# Patient Record
Sex: FEMALE | Race: WHITE | NOT HISPANIC OR LATINO | Employment: UNEMPLOYED | URBAN - METROPOLITAN AREA
[De-identification: names, ages, dates, MRNs, and addresses within clinical notes are randomized per-mention and may not be internally consistent; named-entity substitution may affect disease eponyms.]

---

## 2017-03-15 ENCOUNTER — TRANSFERRED RECORDS (OUTPATIENT)
Dept: HEALTH INFORMATION MANAGEMENT | Facility: CLINIC | Age: 27
End: 2017-03-15

## 2017-07-19 ENCOUNTER — TRANSFERRED RECORDS (OUTPATIENT)
Dept: HEALTH INFORMATION MANAGEMENT | Facility: CLINIC | Age: 27
End: 2017-07-19

## 2017-07-24 DIAGNOSIS — N31.9 NEUROGENIC BLADDER: Primary | ICD-10-CM

## 2017-07-24 RX ORDER — CEFAZOLIN SODIUM 1 G/3ML
1 INJECTION, POWDER, FOR SOLUTION INTRAMUSCULAR; INTRAVENOUS SEE ADMIN INSTRUCTIONS
Status: CANCELLED | OUTPATIENT
Start: 2017-07-24

## 2017-07-25 ENCOUNTER — TELEPHONE (OUTPATIENT)
Dept: UROLOGY | Facility: CLINIC | Age: 27
End: 2017-07-25

## 2017-08-22 ENCOUNTER — CARE COORDINATION (OUTPATIENT)
Dept: UROLOGY | Facility: CLINIC | Age: 27
End: 2017-08-22

## 2017-08-22 NOTE — PROGRESS NOTES
Upcoming surgical procedure with Dr Michael Mcnulty on 9.1.17 at 0930, check in at 0800.   Surgery at ([facility] or [place]): [facility]  Patient is having a Cystoscopy and Botox injection into the bladder  Pre-op physical completed: Yes  PAC: No  Bowel Prep: No, not needed  Urine culture completed: YES. Date-8.28.17.      Post-operative appointment needed: No    8.22.17 - left message for patient. Needs pre-op and UC.    Clara Medina, JARVIS-BC, BSN  Care Coordinator - Reconstructive Urology  [phone]

## 2017-09-01 ENCOUNTER — ANESTHESIA (OUTPATIENT)
Dept: SURGERY | Facility: AMBULATORY SURGERY CENTER | Age: 27
End: 2017-09-01

## 2017-09-01 ENCOUNTER — HOSPITAL ENCOUNTER (OUTPATIENT)
Facility: AMBULATORY SURGERY CENTER | Age: 27
End: 2017-09-01
Attending: UROLOGY

## 2017-09-01 ENCOUNTER — ANESTHESIA EVENT (OUTPATIENT)
Dept: SURGERY | Facility: AMBULATORY SURGERY CENTER | Age: 27
End: 2017-09-01

## 2017-09-01 VITALS
OXYGEN SATURATION: 98 % | WEIGHT: 160 LBS | HEART RATE: 94 BPM | SYSTOLIC BLOOD PRESSURE: 108 MMHG | DIASTOLIC BLOOD PRESSURE: 65 MMHG | RESPIRATION RATE: 16 BRPM | TEMPERATURE: 98.2 F | HEIGHT: 69 IN | BODY MASS INDEX: 23.7 KG/M2

## 2017-09-01 RX ORDER — ONDANSETRON 2 MG/ML
4 INJECTION INTRAMUSCULAR; INTRAVENOUS EVERY 30 MIN PRN
Status: DISCONTINUED | OUTPATIENT
Start: 2017-09-01 | End: 2017-09-02 | Stop reason: HOSPADM

## 2017-09-01 RX ORDER — IBUPROFEN 200 MG
600 TABLET ORAL ONCE
Status: DISCONTINUED | OUTPATIENT
Start: 2017-09-01 | End: 2017-09-02 | Stop reason: HOSPADM

## 2017-09-01 RX ORDER — LIDOCAINE HYDROCHLORIDE 20 MG/ML
INJECTION, SOLUTION INFILTRATION; PERINEURAL PRN
Status: DISCONTINUED | OUTPATIENT
Start: 2017-09-01 | End: 2017-09-01

## 2017-09-01 RX ORDER — ONDANSETRON 2 MG/ML
INJECTION INTRAMUSCULAR; INTRAVENOUS PRN
Status: DISCONTINUED | OUTPATIENT
Start: 2017-09-01 | End: 2017-09-01

## 2017-09-01 RX ORDER — PROPOFOL 10 MG/ML
INJECTION, EMULSION INTRAVENOUS CONTINUOUS PRN
Status: DISCONTINUED | OUTPATIENT
Start: 2017-09-01 | End: 2017-09-01

## 2017-09-01 RX ORDER — SODIUM CHLORIDE, SODIUM LACTATE, POTASSIUM CHLORIDE, CALCIUM CHLORIDE 600; 310; 30; 20 MG/100ML; MG/100ML; MG/100ML; MG/100ML
INJECTION, SOLUTION INTRAVENOUS CONTINUOUS
Status: DISCONTINUED | OUTPATIENT
Start: 2017-09-01 | End: 2017-09-02 | Stop reason: HOSPADM

## 2017-09-01 RX ORDER — LIDOCAINE 40 MG/G
CREAM TOPICAL
Status: DISCONTINUED | OUTPATIENT
Start: 2017-09-01 | End: 2017-09-01 | Stop reason: HOSPADM

## 2017-09-01 RX ORDER — NALOXONE HYDROCHLORIDE 0.4 MG/ML
.1-.4 INJECTION, SOLUTION INTRAMUSCULAR; INTRAVENOUS; SUBCUTANEOUS
Status: DISCONTINUED | OUTPATIENT
Start: 2017-09-01 | End: 2017-09-02 | Stop reason: HOSPADM

## 2017-09-01 RX ORDER — CEFAZOLIN SODIUM 1 G/3ML
1 INJECTION, POWDER, FOR SOLUTION INTRAMUSCULAR; INTRAVENOUS SEE ADMIN INSTRUCTIONS
Status: DISCONTINUED | OUTPATIENT
Start: 2017-09-01 | End: 2017-09-01 | Stop reason: HOSPADM

## 2017-09-01 RX ORDER — ONDANSETRON 4 MG/1
4 TABLET, ORALLY DISINTEGRATING ORAL EVERY 30 MIN PRN
Status: DISCONTINUED | OUTPATIENT
Start: 2017-09-01 | End: 2017-09-02 | Stop reason: HOSPADM

## 2017-09-01 RX ORDER — SODIUM CHLORIDE, SODIUM LACTATE, POTASSIUM CHLORIDE, CALCIUM CHLORIDE 600; 310; 30; 20 MG/100ML; MG/100ML; MG/100ML; MG/100ML
INJECTION, SOLUTION INTRAVENOUS CONTINUOUS
Status: DISCONTINUED | OUTPATIENT
Start: 2017-09-01 | End: 2017-09-01 | Stop reason: HOSPADM

## 2017-09-01 RX ORDER — TROSPIUM CHLORIDE ER 60 MG/1
60 CAPSULE ORAL EVERY MORNING
COMMUNITY
End: 2018-10-10

## 2017-09-01 RX ADMIN — SODIUM CHLORIDE, SODIUM LACTATE, POTASSIUM CHLORIDE, CALCIUM CHLORIDE: 600; 310; 30; 20 INJECTION, SOLUTION INTRAVENOUS at 09:27

## 2017-09-01 RX ADMIN — Medication 300 MCG: at 09:53

## 2017-09-01 RX ADMIN — ONDANSETRON 4 MG: 2 INJECTION INTRAMUSCULAR; INTRAVENOUS at 09:35

## 2017-09-01 RX ADMIN — PROPOFOL 200 MCG/KG/MIN: 10 INJECTION, EMULSION INTRAVENOUS at 09:30

## 2017-09-01 RX ADMIN — Medication 200 MCG: at 09:49

## 2017-09-01 RX ADMIN — LIDOCAINE HYDROCHLORIDE 100 MG: 20 INJECTION, SOLUTION INFILTRATION; PERINEURAL at 09:29

## 2017-09-01 NOTE — IP AVS SNAPSHOT
[facility]    [address] [place] [id number]    Phone:  [phone]    Fax:  [phone]                                       After Visit Summary   9/1/2017    Virgilio Chavez    MRN: [id number]           After Visit Summary Signature Page     I have received my discharge instructions, and my questions have been answered. I have discussed any challenges I see with this plan with the nurse or doctor.    ..........................................................................................................................................  Patient/Patient Representative Signature      ..........................................................................................................................................  Patient Representative Print Name and Relationship to Patient    ..................................................               ................................................  Date                                            Time    ..........................................................................................................................................  Reviewed by Signature/Title    ...................................................              ..............................................  Date                                                            Time

## 2017-09-01 NOTE — DISCHARGE INSTRUCTIONS
[facility]  Home Care Following Anesthesia  For 24 hours after surgery:  1. Get plenty of rest.  A responsible adult must stay with you for at least 24 hours after you leave the surgery center.  2. Do not drive or use heavy equipment.  If you have weakness or tingling, don't drive or use heavy equipment until this feeling goes away.   3. Do not drink alcohol.   4. Avoid strenuous or risky activities.  Ask for help when climbing stairs.  5. You may feel lightheaded.  IF so, sit for a few minutes before standing.  Have someone help you get up.   6. If you have nausea (feel sick to your stomach): Drink only clear liquids such as apple juice, ginger ale, broth or 7-Up.  Rest may also help.  Be sure to drink enough fluids.  Move to a regular diet as you feel able.   7. You may have a slight fever.  Call the doctor if your fever is over 100 F (37.7 C) (taken under the tongue) or lasts longer than 24 hours.  8. You may have a dry mouth, a sore throat, muscle aches or trouble sleeping. These should go away after 24 hours.  9. Do not make important or legal decisions.               Tips for taking pain medications  To get the best pain relief possible, remember these points:    Take pain medications as directed, before pain becomes severe.    Pain medication can upset your stomach: taking it with food may help.    Constipation is a common side effect of pain medication. Drink plenty of  fluids.    Eat foods high in fiber. Take a stool softener if recommended by your doctor or pharmacist.    Do not drink alcohol, drive or operate machinery while taking pain medications.    Ask about other ways to control pain, such as with heat, ice or relaxation.    Tylenol/Acetaminophen Consumption  To help encourage the safe use of acetaminophen, the makers of TYLENOL  have lowered the maximum daily dose for single-ingredient Extra Strength TYLENOL  (acetaminophen) products sold in the [place] from 8  pills per day (4,000 mg) to 6 pills per day (3,000 mg). The dosing interval has also changed from 2 pills every 4-6 hours to 2 pills every 6 hours.    If you feel your pain relief is insufficient, you may take Tylenol/Acetaminophen in addition to your narcotic pain medication.     Be careful not to exceed 3,000 mg of Tylenol/Acetaminophen in a 24 hour period from all sources.    If you are taking extra strength Tylenol/acetaminophen (500 mg), the maximum dose is 6 tablets in 24 hours.    If you are taking regular strength acetaminophen (325 mg), the maximum dose is 9 tablets in 24 hours.    Call a doctor for any of the followin. Signs of infection (fever, growing tenderness at the surgery site, a large amount of drainage or bleeding, severe pain, foul-smelling drainage, redness, swelling).  2. It has been over 8 to 10 hours since surgery and you are still not able to urinate (pass water).  3. Headache for over 24 hours.  Your doctor is:  Dr. Michael Rodarte, Prostate and Urology: [phone]                    Or dial [phone] and ask for the resident on call for:  Prostate Urology  For emergency care, call the:  [facility]:  [phone] (TTY for hearing impaired: [phone])

## 2017-09-01 NOTE — ANESTHESIA POSTPROCEDURE EVALUATION
Patient: Virgilio Chavez    Procedure(s):  Cystoscopy and Botox Injection into the Bladder - Wound Class: II-Clean Contaminated   - Wound Class: II-Clean Contaminated    Diagnosis:Neurogenic Bladder  Diagnosis Additional Information: No value filed.    Anesthesia Type:  MAC    Note:  Anesthesia Post Evaluation    Patient location during evaluation: Phase 2  Patient participation: Able to fully participate in evaluation  Level of consciousness: awake and alert  Pain management: adequate  Airway patency: patent  Cardiovascular status: acceptable and hemodynamically stable  Respiratory status: acceptable  Hydration status: acceptable  PONV: none     Anesthetic complications: None          Last vitals:  Vitals:    09/01/17 1050 09/01/17 1055 09/01/17 1105   BP: 94/55 92/74 104/68   Pulse:      Resp: 10 10 16   Temp:   36.8  C (98.2  F)   SpO2: 98%  96%         Electronically Signed By: Andrea Cornejo MD  September 1, 2017  11:14 AM

## 2017-09-01 NOTE — ANESTHESIA CARE TRANSFER NOTE
Patient: Virgilio Chavez    Procedure(s):  Cystoscopy and Botox Injection into the Bladder - Wound Class: II-Clean Contaminated   - Wound Class: II-Clean Contaminated    Diagnosis: Neurogenic Bladder  Diagnosis Additional Information: No value filed.    Anesthesia Type:   MAC     Note:  Airway :Face Mask  Patient transferred to:PACU        Vitals: (Last set prior to Anesthesia Care Transfer)    CRNA VITALS  9/1/2017 0954 - 9/1/2017 1037      9/1/2017             Pulse: 72    SpO2: 97 %    Resp Rate (set): 10                Electronically Signed By: AUTUMN Gaitan CRNA  September 1, 2017  10:37 AM

## 2017-09-01 NOTE — IP AVS SNAPSHOT
MRN:[id number]                      After Visit Summary   9/1/2017    Virgilio Chavez    MRN: [id number]           Thank you!     Thank you for choosing [facility] for your care. Our goal is always to provide you with excellent care. Hearing back from our patients is one way we can continue to improve our services. Please take a few minutes to complete the written survey that you may receive in the mail after you visit with us. Thank you!        Patient Information     Date Of Birth          1990        About your hospital stay     You were [facility] on:  September 1, 2017 [facility]    You were discharged on:  September 1, 2017       Who to Call     For medical emergencies, please call 911.  For non-urgent questions about your medical care, please call your primary care provider or clinic, None  For questions related to your surgery, please call your surgery clinic        Attending Provider     Provider Specialty    Michael Mcnulty MD Urology       Primary Care Provider    Pcp Unknown Verified      After Care Instructions     Diet Instructions       Resume pre procedure diet            Discharge Instructions       Activity  - No strenuous exercise for 3-5 days.  - No lifting, pushing, pulling more than 15 pounds for 3-5 days.   - Do not strain with bowel movements.  - Do not drive until you can press the brake pedal quickly and fully without pain.   - Do not operate a motor vehicle while taking narcotic pain medications.   - Some blood in the urine is expected. Increase your fluid intake to help flush the blood out of your bladder    Medications  - No narcotic pain medications are required and over the counter tylenol should suffice.  Wean yourself off all pain medications as you are able.  - Some pain medications contain both tylenol (acetaminophen) and a narcotic (Norco, vicodin, percocet), do not take more than 4,000mg of  "Tylenol (acetaminophen) from all sources in any 24 hour period.  - Take over the counter fiber (metamucil or benefiber) and stool softeners (miralax, docusate or senna) to prevent postoperative constipation, but stop if you develop diarrhea.  - No driving or operating machinery while taking narcotic pain medications     Follow-Up:  - Follow up with at [place] for repeat bladder study. Contact them on Tuesday. We will arrange for repeat botox injection here in 6 months.   - Call or return sooner than your regularly scheduled visit if you develop any of the following: fever (greater than 101.5), uncontrolled pain, uncontrolled nausea or vomiting, as well as worsening blood in the urine    Phone numbers:   - Monday through Friday 8am to 4:30pm: Call [phone] with questions or to schedule or confirm appointment.    - Nights or weekends: call the after hours emergency pager - [phone] and tell the  \"I would like to page the Urology Resident on call.\"  - For emergencies, call [phone]            Encourage fluids       Encourage fluids at home to keep urine clear to light pink            No Alcohol       for 24 hours post procedure            No driving or operating machinery        until the day after procedure                  Further instructions from your care team       [facility]  Home Care Following Anesthesia  For 24 hours after surgery:  1. Get plenty of rest.  A responsible adult must stay with you for at least 24 hours after you leave the surgery center.  2. Do not drive or use heavy equipment.  If you have weakness or tingling, don't drive or use heavy equipment until this feeling goes away.   3. Do not drink alcohol.   4. Avoid strenuous or risky activities.  Ask for help when climbing stairs.  5. You may feel lightheaded.  IF so, sit for a few minutes before standing.  Have someone help you get up.   6. If you have nausea (feel sick to your stomach): Drink " only clear liquids such as apple juice, ginger ale, broth or 7-Up.  Rest may also help.  Be sure to drink enough fluids.  Move to a regular diet as you feel able.   7. You may have a slight fever.  Call the doctor if your fever is over 100 F (37.7 C) (taken under the tongue) or lasts longer than 24 hours.  8. You may have a dry mouth, a sore throat, muscle aches or trouble sleeping. These should go away after 24 hours.  9. Do not make important or legal decisions.               Tips for taking pain medications  To get the best pain relief possible, remember these points:    Take pain medications as directed, before pain becomes severe.    Pain medication can upset your stomach: taking it with food may help.    Constipation is a common side effect of pain medication. Drink plenty of  fluids.    Eat foods high in fiber. Take a stool softener if recommended by your doctor or pharmacist.    Do not drink alcohol, drive or operate machinery while taking pain medications.    Ask about other ways to control pain, such as with heat, ice or relaxation.    Tylenol/Acetaminophen Consumption  To help encourage the safe use of acetaminophen, the makers of TYLENOL  have lowered the maximum daily dose for single-ingredient Extra Strength TYLENOL  (acetaminophen) products sold in the [place] from 8 pills per day (4,000 mg) to 6 pills per day (3,000 mg). The dosing interval has also changed from 2 pills every 4-6 hours to 2 pills every 6 hours.    If you feel your pain relief is insufficient, you may take Tylenol/Acetaminophen in addition to your narcotic pain medication.     Be careful not to exceed 3,000 mg of Tylenol/Acetaminophen in a 24 hour period from all sources.    If you are taking extra strength Tylenol/acetaminophen (500 mg), the maximum dose is 6 tablets in 24 hours.    If you are taking regular strength acetaminophen (325 mg), the maximum dose is 9 tablets in 24 hours.    Call a doctor for any of the followin. Signs  "of infection (fever, growing tenderness at the surgery site, a large amount of drainage or bleeding, severe pain, foul-smelling drainage, redness, swelling).  2. It has been over 8 to 10 hours since surgery and you are still not able to urinate (pass water).  3. Headache for over 24 hours.  Your doctor is:  Dr. Michael Rodarte, Prostate and Urology: [phone]                    Or dial [phone] and ask for the resident on call for:  Prostate Urology  For emergency care, call the:  [facility]:  [phone] (TTY for hearing impaired: [phone])                Pending Results     No orders found from 2017 to 2017.            Admission Information     Date & Time Provider Department Dept. Phone    2017 Michael Mcnulty MD [facility] [phone]      Your Vitals Were     Blood Pressure Pulse Temperature Respirations Height Weight    104/68 94 98.2  F (36.8  C) (Temporal) 16 1.753 m (5' 9\") 72.6 kg (160 lb)    Pulse Oximetry BMI (Body Mass Index)                96% 23.63 kg/m2          MyChart Information     mcTEL is an electronic gateway that provides easy, online access to your medical records. With mcTEL, you can request a clinic appointment, read your test results, renew a prescription or communicate with your care team.     To sign up for mcTEL visit the website at [email or web address]   You will be asked to enter the access code listed below, as well as some personal information. Please follow the directions to create your username and password.     Your access code is: [email or web address]  Expires: 2017 10:21 AM     Your access code will  in 90 days. If you need help or a new code, please contact your [facility] or call [phone] for assistance.        Care EveryWhere ID     This is your Care EveryWhere ID. This could be used by other organizations to access your [facility] medical " records  [id number]        Equal Access to Services     SHELLI RODRÍGUEZ : Sarah Chinchilla, waalonosda mauro, qamac dean, travis christian. So [facility] [phone].    ATENCIÓN: Si habla español, tiene a coon disposición servicios gratuitos de asistencia lingüística. Llame al [phone].    We comply with applicable federal civil rights laws and [place] laws. We do not discriminate on the basis of race, color, national origin, age, disability sex, sexual orientation or gender identity.               Review of your medicines      CONTINUE these medicines which have NOT CHANGED        Dose / Directions    BACLOFEN PO   Indication:  Muscle Spasticity        Refills:  0       trospium 60 MG Cp24 24 hr capsule   Commonly known as:  SANCTURA XR        Dose:  60 mg   Take 60 mg by mouth every morning   Refills:  0                Protect others around you: Learn how to safely use, store and throw away your medicines at www.disposemymeds.org.             Medication List: This is a list of all your medications and when to take them. Check marks below indicate your daily home schedule. Keep this list as a reference.      Medications           Morning Afternoon Evening Bedtime As Needed    BACLOFEN PO                                trospium 60 MG Cp24 24 hr capsule   Commonly known as:  SANCTURA XR   Take 60 mg by mouth every morning

## 2017-09-01 NOTE — ANESTHESIA PREPROCEDURE EVALUATION
Anesthesia Evaluation     . Pt has had prior anesthetic. Type: General    No history of anesthetic complications          ROS/MED HX    ENT/Pulmonary:     (+)Intermittent asthma , . .    Neurologic:     (+)Spinal cord injury level of injury: T10 without autonomic hyperflexia symptoms,     Cardiovascular:  - neg cardiovascular ROS   (+) ----. : . . . :. . No previous cardiac testing       METS/Exercise Tolerance:  >4 METS   Hematologic:  - neg hematologic  ROS       Musculoskeletal:  - neg musculoskeletal ROS       GI/Hepatic:  - neg GI/hepatic ROS       Renal/Genitourinary: Comment: Neurogenic bladder in relation to spinal cord injury.          Endo:  - neg endo ROS       Psychiatric:  - neg psychiatric ROS       Infectious Disease:  - neg infectious disease ROS       Malignancy:      - no malignancy   Other:    - neg other ROS                 Physical Exam  Normal systems: pulmonary and dental    Airway   Mallampati: II  TM distance: >3 FB  Neck ROM: full    Dental     Cardiovascular   Rhythm and rate: regular and normal      Pulmonary                        Lab / Radiology Results:   Reviewed current labs when avail, see EMR for details.      BMP:  No results for input(s): NA, POTASSIUM, CHLORIDE, CO2, BUN, CR, GLC, CHERIE in the last 92811 hours.    Invalid input(s): MG    LFTs:   No results for input(s): PROTTOTAL, ALBUMIN, BILITOTAL, ALKPHOS, AST, ALT, BILIDIRECT in the last 38964 hours.    CBC:   No results for input(s): WBC, HGB, PLT in the last 98609 hours.    Coags:  No results for input(s): INR, PTT, FIBR in the last 61638 hours.    Blood Bank:  No results found for: ABO, RH, AS    Studies:  See EMR for current studies, reviewed when available.      Anesthesia Plan      History & Physical Review  History and physical reviewed and following examination; no interval change.    ASA Status:  3 .    NPO Status:  > 8 hours    Plan for MAC with Intravenous induction. Maintenance will be TIVA.  Reason for MAC:   Deep or markedly invasive procedure (G8)  PONV prophylaxis:  Ondansetron (or other 5HT-3)       Postoperative Care  Postoperative pain management:  Multi-modal analgesia.      Consents  Anesthetic plan, risks, benefits and alternatives discussed with:  Patient.  Use of blood products discussed: No .   .      Andrea Cornejo MD  Anesthesiologist  9:07 AM  September 1, 2017                        .

## 2017-12-01 DIAGNOSIS — N31.9 NEUROGENIC BLADDER: Primary | ICD-10-CM

## 2017-12-01 RX ORDER — CEFAZOLIN SODIUM 1 G/3ML
1 INJECTION, POWDER, FOR SOLUTION INTRAMUSCULAR; INTRAVENOUS SEE ADMIN INSTRUCTIONS
Status: CANCELLED | OUTPATIENT
Start: 2017-12-01

## 2018-02-21 ENCOUNTER — CARE COORDINATION (OUTPATIENT)
Dept: UROLOGY | Facility: CLINIC | Age: 28
End: 2018-02-21

## 2018-02-21 PROBLEM — N31.9 NEUROGENIC BLADDER: Status: ACTIVE | Noted: 2018-02-21

## 2018-02-21 NOTE — PROGRESS NOTES
Upcoming surgical procedure with Dr Michael Mcnulty on 3/2/18 at 1400, check in at 1230.   Surgery at ([facility] or [place]): [facility]  Patient is having a Cystoscopy and Botox injection into the bladder  Patient has a responsible adult to drive home and stay with them for 24 hours: Yes  Pre-op physical completed: Yes, 2/27/18  PAC: No  Bowel Prep: No, not needed  Urine culture completed: YES. Date-2/27/18.  No growth  Post-operative appointment needed: No, not needed.  All questions answered.    Patient verbalized understanding. No further questions.      Kathy St, JARVIS  Care Coordinator - Reconstructive Urology  [phone]

## 2018-02-27 ENCOUNTER — TRANSFERRED RECORDS (OUTPATIENT)
Dept: HEALTH INFORMATION MANAGEMENT | Facility: CLINIC | Age: 28
End: 2018-02-27

## 2018-03-01 ENCOUNTER — ANESTHESIA EVENT (OUTPATIENT)
Dept: SURGERY | Facility: AMBULATORY SURGERY CENTER | Age: 28
End: 2018-03-01

## 2018-03-01 ENCOUNTER — TELEPHONE (OUTPATIENT)
Dept: UROLOGY | Facility: CLINIC | Age: 28
End: 2018-03-01

## 2018-03-01 NOTE — TELEPHONE ENCOUNTER
Left voice mail for patient to call back regarding surgery time change with Dr Mcnulty on 3/2/18.  Surgery time is 8:05 am with a check in time of 6:35 am @ [facility] OR. Left direct phone number for patient to call back.  Left time change information on voice mail.

## 2018-03-02 ENCOUNTER — SURGERY (OUTPATIENT)
Age: 28
End: 2018-03-02

## 2018-03-02 ENCOUNTER — ANESTHESIA (OUTPATIENT)
Dept: SURGERY | Facility: AMBULATORY SURGERY CENTER | Age: 28
End: 2018-03-02

## 2018-03-02 ENCOUNTER — HOSPITAL ENCOUNTER (OUTPATIENT)
Facility: AMBULATORY SURGERY CENTER | Age: 28
End: 2018-03-02
Attending: UROLOGY
Payer: COMMERCIAL

## 2018-03-02 VITALS
RESPIRATION RATE: 16 BRPM | BODY MASS INDEX: 23.7 KG/M2 | HEIGHT: 69 IN | WEIGHT: 160 LBS | OXYGEN SATURATION: 97 % | SYSTOLIC BLOOD PRESSURE: 109 MMHG | DIASTOLIC BLOOD PRESSURE: 70 MMHG | TEMPERATURE: 97.8 F

## 2018-03-02 RX ORDER — FENTANYL CITRATE 50 UG/ML
25-50 INJECTION, SOLUTION INTRAMUSCULAR; INTRAVENOUS
Status: DISCONTINUED | OUTPATIENT
Start: 2018-03-02 | End: 2018-03-02 | Stop reason: HOSPADM

## 2018-03-02 RX ORDER — SODIUM CHLORIDE, SODIUM LACTATE, POTASSIUM CHLORIDE, CALCIUM CHLORIDE 600; 310; 30; 20 MG/100ML; MG/100ML; MG/100ML; MG/100ML
INJECTION, SOLUTION INTRAVENOUS CONTINUOUS
Status: DISCONTINUED | OUTPATIENT
Start: 2018-03-02 | End: 2018-03-02 | Stop reason: HOSPADM

## 2018-03-02 RX ORDER — FENTANYL CITRATE 50 UG/ML
INJECTION, SOLUTION INTRAMUSCULAR; INTRAVENOUS PRN
Status: DISCONTINUED | OUTPATIENT
Start: 2018-03-02 | End: 2018-03-02

## 2018-03-02 RX ORDER — ONDANSETRON 2 MG/ML
INJECTION INTRAMUSCULAR; INTRAVENOUS PRN
Status: DISCONTINUED | OUTPATIENT
Start: 2018-03-02 | End: 2018-03-02

## 2018-03-02 RX ORDER — SODIUM CHLORIDE, SODIUM LACTATE, POTASSIUM CHLORIDE, CALCIUM CHLORIDE 600; 310; 30; 20 MG/100ML; MG/100ML; MG/100ML; MG/100ML
INJECTION, SOLUTION INTRAVENOUS CONTINUOUS
Status: DISCONTINUED | OUTPATIENT
Start: 2018-03-02 | End: 2018-03-03 | Stop reason: HOSPADM

## 2018-03-02 RX ORDER — DEXAMETHASONE SODIUM PHOSPHATE 4 MG/ML
INJECTION, SOLUTION INTRA-ARTICULAR; INTRALESIONAL; INTRAMUSCULAR; INTRAVENOUS; SOFT TISSUE PRN
Status: DISCONTINUED | OUTPATIENT
Start: 2018-03-02 | End: 2018-03-02

## 2018-03-02 RX ORDER — ALBUTEROL SULFATE 0.83 MG/ML
2.5 SOLUTION RESPIRATORY (INHALATION) EVERY 6 HOURS PRN
Status: DISCONTINUED | OUTPATIENT
Start: 2018-03-02 | End: 2018-03-03 | Stop reason: HOSPADM

## 2018-03-02 RX ORDER — MEPERIDINE HYDROCHLORIDE 25 MG/ML
12.5 INJECTION INTRAMUSCULAR; INTRAVENOUS; SUBCUTANEOUS
Status: DISCONTINUED | OUTPATIENT
Start: 2018-03-02 | End: 2018-03-03 | Stop reason: HOSPADM

## 2018-03-02 RX ORDER — ACETAMINOPHEN 325 MG/1
975 TABLET ORAL ONCE
Status: COMPLETED | OUTPATIENT
Start: 2018-03-02 | End: 2018-03-02

## 2018-03-02 RX ORDER — PROPOFOL 10 MG/ML
INJECTION, EMULSION INTRAVENOUS CONTINUOUS PRN
Status: DISCONTINUED | OUTPATIENT
Start: 2018-03-02 | End: 2018-03-02

## 2018-03-02 RX ORDER — ONDANSETRON 2 MG/ML
4 INJECTION INTRAMUSCULAR; INTRAVENOUS EVERY 30 MIN PRN
Status: DISCONTINUED | OUTPATIENT
Start: 2018-03-02 | End: 2018-03-03 | Stop reason: HOSPADM

## 2018-03-02 RX ORDER — ONDANSETRON 4 MG/1
4 TABLET, ORALLY DISINTEGRATING ORAL EVERY 30 MIN PRN
Status: DISCONTINUED | OUTPATIENT
Start: 2018-03-02 | End: 2018-03-03 | Stop reason: HOSPADM

## 2018-03-02 RX ORDER — NALOXONE HYDROCHLORIDE 0.4 MG/ML
.1-.4 INJECTION, SOLUTION INTRAMUSCULAR; INTRAVENOUS; SUBCUTANEOUS
Status: DISCONTINUED | OUTPATIENT
Start: 2018-03-02 | End: 2018-03-03 | Stop reason: HOSPADM

## 2018-03-02 RX ORDER — GABAPENTIN 300 MG/1
300 CAPSULE ORAL ONCE
Status: COMPLETED | OUTPATIENT
Start: 2018-03-02 | End: 2018-03-02

## 2018-03-02 RX ORDER — LIDOCAINE 40 MG/G
CREAM TOPICAL
Status: DISCONTINUED | OUTPATIENT
Start: 2018-03-02 | End: 2018-03-02 | Stop reason: HOSPADM

## 2018-03-02 RX ADMIN — ONDANSETRON 4 MG: 2 INJECTION INTRAMUSCULAR; INTRAVENOUS at 08:09

## 2018-03-02 RX ADMIN — DEXAMETHASONE SODIUM PHOSPHATE 4 MG: 4 INJECTION, SOLUTION INTRA-ARTICULAR; INTRALESIONAL; INTRAMUSCULAR; INTRAVENOUS; SOFT TISSUE at 08:09

## 2018-03-02 RX ADMIN — ALBUTEROL SULFATE 2.5 MG: 0.83 SOLUTION RESPIRATORY (INHALATION) at 07:10

## 2018-03-02 RX ADMIN — SODIUM CHLORIDE, SODIUM LACTATE, POTASSIUM CHLORIDE, CALCIUM CHLORIDE: 600; 310; 30; 20 INJECTION, SOLUTION INTRAVENOUS at 07:03

## 2018-03-02 RX ADMIN — PROPOFOL 150 MCG/KG/MIN: 10 INJECTION, EMULSION INTRAVENOUS at 08:06

## 2018-03-02 RX ADMIN — ACETAMINOPHEN 975 MG: 325 TABLET ORAL at 07:03

## 2018-03-02 RX ADMIN — FENTANYL CITRATE 50 MCG: 50 INJECTION, SOLUTION INTRAMUSCULAR; INTRAVENOUS at 08:09

## 2018-03-02 RX ADMIN — GABAPENTIN 300 MG: 300 CAPSULE ORAL at 07:03

## 2018-03-02 NOTE — OP NOTE
OPERATIVE REPORT  03/02/2018    PREOPERATIVE DIAGNOSIS:            1. Neurogenic bladder    POSTOPERATIVE DIAGNOSIS:          1. Neurogenic bladder    PROCEDURES PERFORMED:   1. Cystourethroscopy  2. Bladder botox injections; 200 units  3. Injection of phenylephrine into corpora; 300 mcg    STAFF SURGEON:  Michael Mcnulty MD  FELLOW:  Pascual Dominguez MD  RESIDENT(S):   Rene Woodruff MD (Urology Resident)  ANESTHESIA:  MAC  BLOOD LOSS:  < 1 mL.   DRAINS:   None    FINDINGS: Erection upon induction requiring intracavernosal injection of 300 mcg phenylephrine with detumescence.  Injection of 200 units botulinum toxin into detrusor muscle in templated fashion.    OPERATIVE INDICATIONS: Virgilio Chavez is a(n) 27 year old male with a history of neurogenic bladder due to SCI.  Following a through discussion of the risks and benefits of the proposed procedure, the patient consented to the above.    DESCRIPTION OF PROCEDURE:   After verification of informed consent was obtained, the patient was brought to the operating room, and moved to the operating table. After adequate anesthesia was induced, the patient was repositioned in dorsal lithotomy position and prepped and draped in the usual sterile fashion. A formal timeout was performed to confirm the correct patient, procedure and operative site.  Preoperative antibiotics were administered.  Prior to the start of the procedure, 200 units of botulimun toxin was mixed with 20 mL of sterile injectable saline and placed in a single 40 cc luer lock syringe.    A 21-Georgian rigid Carmona injection cystoscope with non-visual obturator was inserted into a well lubricated urethra. There was noted erection and 300 mcg of phenylephrine in 3 mL injectable saline was injected intracavernosally with detumescence.  The non-visual obturator was exchanged for our working element.  Full cystoscopy was performed and the ureteral orifices were noted to be in their orthotopic position.  There were no  tumors or stones.  The bladder was mildly trabeculated.  We then performed a templated injection of 200 units of Botox into the detrusor muscle.  The bladder was then drained and the cystoscope removed.    The procedure was then concluded. The patient was transferred to the postanesthesia care unit in stable condition and tolerated the procedure well.    POST-OPERATIVE PLAN: Following recovery in PACU, the patient will discharge to home.

## 2018-03-02 NOTE — DISCHARGE INSTRUCTIONS
[facility]  Home Care Following Anesthesia  For 24 hours after surgery:  1. Get plenty of rest.  A responsible adult must stay with you for at least 24 hours after you leave the surgery center.  2. Do not drive or use heavy equipment.  If you have weakness or tingling, don't drive or use heavy equipment until this feeling goes away.   3. Do not drink alcohol.   4. Avoid strenuous or risky activities.  Ask for help when climbing stairs.  5. You may feel lightheaded.  IF so, sit for a few minutes before standing.  Have someone help you get up.   6. If you have nausea (feel sick to your stomach): Drink only clear liquids such as apple juice, ginger ale, broth or 7-Up.  Rest may also help.  Be sure to drink enough fluids.  Move to a regular diet as you feel able.   7. You may have a slight fever.  Call the doctor if your fever is over 100 F (37.7 C) (taken under the tongue) or lasts longer than 24 hours.  8. You may have a dry mouth, a sore throat, muscle aches or trouble sleeping. These should go away after 24 hours.  9. Do not make important or legal decisions.               Tips for taking pain medications  To get the best pain relief possible, remember these points:    Take pain medications as directed, before pain becomes severe.    Pain medication can upset your stomach: taking it with food may help.    Constipation is a common side effect of pain medication. Drink plenty of  fluids.    Eat foods high in fiber. Take a stool softener if recommended by your doctor or pharmacist.    Do not drink alcohol, drive or operate machinery while taking pain medications.    Ask about other ways to control pain, such as with heat, ice or relaxation.    Tylenol/Acetaminophen Consumption  To help encourage the safe use of acetaminophen, the makers of TYLENOL  have lowered the maximum daily dose for single-ingredient Extra Strength TYLENOL  (acetaminophen) products sold in the [place] from 8  pills per day (4,000 mg) to 6 pills per day (3,000 mg). The dosing interval has also changed from 2 pills every 4-6 hours to 2 pills every 6 hours.    If you feel your pain relief is insufficient, you may take Tylenol/Acetaminophen in addition to your narcotic pain medication.     Be careful not to exceed 3,000 mg of Tylenol/Acetaminophen in a 24 hour period from all sources.    If you are taking extra strength Tylenol/acetaminophen (500 mg), the maximum dose is 6 tablets in 24 hours.    If you are taking regular strength acetaminophen (325 mg), the maximum dose is 9 tablets in 24 hours.    Call a doctor for any of the followin. Signs of infection (fever, growing tenderness at the surgery site, a large amount of drainage or bleeding, severe pain, foul-smelling drainage, redness, swelling).  2. It has been over 8 to 10 hours since surgery and you are still not able to urinate (pass water).  3. Headache for over 24 hours.  Your doctor is:  Dr. Michael Rodarte, Prostate and Urology: [phone]  Or dial [phone] and ask for the resident on call for:  Prostate Urology  For emergency care, call the:  [place] [facility]:  [phone] (TTY for hearing impaired: [phone])

## 2018-03-02 NOTE — ANESTHESIA CARE TRANSFER NOTE
Patient: Virgilio Chavez    Procedure(s):  Cystoscopy And Botox Injection Into The Bladder   - Wound Class: II-Clean Contaminated    Diagnosis: Neurogenic Bladder   Diagnosis Additional Information: No value filed.    Anesthesia Type:   MAC     Note:  Airway :Face Mask  Patient transferred to:Phase II  Comments: VSS and WNL, denies pain, no PONV, report to Julissa CONLEYHandoff Report: Identifed the Patient, Identified the Reponsible Provider, Reviewed the pertinent medical history, Discussed the surgical course, Reviewed Intra-OP anesthesia mangement and issues during anesthesia, Set expectations for post-procedure period and Allowed opportunity for questions and acknowledgement of understanding      Vitals: (Last set prior to Anesthesia Care Transfer)    CRNA VITALS  3/2/2018 0800 - 3/2/2018 0837      3/2/2018             Pulse: 84    Ht Rate: 83    SpO2: 98 %    Resp Rate (set): 10                Electronically Signed By: AUTUMN Tierney CRNA  March 2, 2018  8:37 AM

## 2018-03-02 NOTE — ANESTHESIA PREPROCEDURE EVALUATION
Anesthesia Evaluation     . Pt has had prior anesthetic.     No history of anesthetic complications          ROS/MED HX    ENT/Pulmonary:     (+)Intermittent asthma , . .    Neurologic:     (+)Spinal cord injury (Neurogenic bladder. Difficulty walking, but relativley high functioning adn able to go bowling,) without autonomic hyperflexia symptoms,     Cardiovascular:         METS/Exercise Tolerance:  >4 METS   Hematologic:         Musculoskeletal:         GI/Hepatic:         Renal/Genitourinary:         Endo:         Psychiatric:         Infectious Disease:         Malignancy:         Other:                     Physical Exam  Normal systems: cardiovascular and dental    Airway   Mallampati: II  TM distance: >3 FB  Neck ROM: full  Comment: beard    Dental     Cardiovascular       Pulmonary    breath sounds clear to auscultation                    Anesthesia Plan      History & Physical Review  History and physical reviewed and following examination; no interval change.    ASA Status:  2 .    NPO Status:  > 6 hours    Plan for MAC with Intravenous and Propofol induction. Maintenance will be TIVA.  Reason for MAC:  Deep or markedly invasive procedure (G8)  PONV prophylaxis:  Ondansetron (or other 5HT-3)  Patient had erection related to last anesthetic, will try and using multi-agent TIVA to decrease vasodilatory effects of propofol.      Postoperative Care  Postoperative pain management:  IV analgesics and Oral pain medications.      Consents  Anesthetic plan, risks, benefits and alternatives discussed with:  Patient..                  History and physical assessed; Patient examined.   Risks and alternatives presented and discussed. Patient and family agree. All questions answered.      Talon Yañez MD  Staff Anesthesiologist  [phone]

## 2018-03-02 NOTE — IP AVS SNAPSHOT
[facility]    [address] [place] [id number]    Phone:  [phone]    Fax:  [phone]                                       After Visit Summary   3/2/2018    Virgilio Chavez    MRN: [id number]           After Visit Summary Signature Page     I have received my discharge instructions, and my questions have been answered. I have discussed any challenges I see with this plan with the nurse or doctor.    ..........................................................................................................................................  Patient/Patient Representative Signature      ..........................................................................................................................................  Patient Representative Print Name and Relationship to Patient    ..................................................               ................................................  Date                                            Time    ..........................................................................................................................................  Reviewed by Signature/Title    ...................................................              ..............................................  Date                                                            Time

## 2018-03-02 NOTE — ANESTHESIA POSTPROCEDURE EVALUATION
Patient: Virgilio Chavez    Procedure(s):  Cystoscopy And Botox Injection Into The Bladder   - Wound Class: II-Clean Contaminated    Diagnosis:Neurogenic Bladder   Diagnosis Additional Information: No value filed.    Anesthesia Type:  MAC    Note:  Anesthesia Post Evaluation    Patient location during evaluation: Phase 2  Patient participation: Able to fully participate in evaluation  Level of consciousness: awake and alert  Pain management: adequate  Airway patency: patent  Cardiovascular status: acceptable  Respiratory status: acceptable  Hydration status: acceptable  PONV: none     Anesthetic complications: None    Comments: Patient with erection secondary to vasodilatory effects of anesthetic + spinal cord injury which made procedure difficult to start and required local injection of phenylephrine. Occurred despite decrease of propfol dose and supplementation with precedex to start sedation. Discussed with urology team, and may consider glycopyrrolate pre-op with next procedure to block parasympathetic response in attempt to avoid this issue with next surgery.        Last vitals:  Vitals:    03/02/18 0847 03/02/18 0900 03/02/18 0958   BP: 120/49 107/52 109/70   Resp: 16 16 16   Temp: 36.8  C (98.2  F)  36.6  C (97.8  F)   SpO2: 99% 99% 97%         Electronically Signed By: Talon Yañez MD  March 2, 2018  10:17 AM

## 2018-03-02 NOTE — IP AVS SNAPSHOT
MRN:[id number]                      After Visit Summary   3/2/2018    Virgilio Chavez    MRN: [id number]           Thank you!     Thank you for choosing [facility] for your care. Our goal is always to provide you with excellent care. Hearing back from our patients is one way we can continue to improve our services. Please take a few minutes to complete the written survey that you may receive in the mail after you visit with us. Thank you!        Patient Information     Date Of Birth          1990        About your hospital stay     You were admitted on:  March 2, 2018 [facility]    You were discharged on:  March 2, 2018       Who to Call     For medical emergencies, please call 911.  For non-urgent questions about your medical care, please call your primary care provider or clinic, None  For questions related to your surgery, please call your surgery clinic        Attending Provider     Provider Specialty    Michael Mcnulty MD Urology       Primary Care Provider    None Specified      After Care Instructions     Diet Instructions       Resume pre procedure diet            Discharge Instructions       Activity  - No strenuous exercise for 3-5 days.  - No lifting, pushing, pulling more than 15 pounds for 3-5 days.   - Do not strain with bowel movements.  - Do not drive until you can press the brake pedal quickly and fully without pain.   - Do not operate a motor vehicle while taking narcotic pain medications.   - Some blood in the urine is expected. Increase your fluid intake to help flush the blood out of your bladder    Medications  - No narcotic pain medications are required and over the counter tylenol should suffice.  Wean yourself off all pain medications as you are able.  - Some pain medications contain both tylenol (acetaminophen) and a narcotic (Norco, vicodin, percocet), do not take more than 4,000mg of Tylenol  "(acetaminophen) from all sources in any 24 hour period.  - Take over the counter fiber (metamucil or benefiber) and stool softeners (miralax, docusate or senna) to prevent postoperative constipation, but stop if you develop diarrhea.  - No driving or operating machinery while taking narcotic pain medications     Follow-Up:  - Follow up with Dr. Mcnulty's team as scheduled. Otherwise you can just follow up when your symptoms return for your next botox injection  - Call or return sooner than your regularly scheduled visit if you develop any of the following: fever (greater than 101.5), uncontrolled pain, uncontrolled nausea or vomiting, as well as worsening blood in the urine    Phone numbers:   - Monday through Friday 8am to 4:30pm: Call [phone] with questions or to schedule or confirm appointment.    - Nights or weekends: call the after hours emergency pager - [phone] and tell the  \"I would like to page the Urology Resident on call.\"  - For emergencies, call [phone]            Encourage fluids       Encourage fluids at home to keep urine clear to light pink            No Alcohol       for 24 hours post procedure            No driving or operating machinery        until the day after procedure                  Further instructions from your care team       [facility]  Home Care Following Anesthesia  For 24 hours after surgery:  1. Get plenty of rest.  A responsible adult must stay with you for at least 24 hours after you leave the surgery center.  2. Do not drive or use heavy equipment.  If you have weakness or tingling, don't drive or use heavy equipment until this feeling goes away.   3. Do not drink alcohol.   4. Avoid strenuous or risky activities.  Ask for help when climbing stairs.  5. You may feel lightheaded.  IF so, sit for a few minutes before standing.  Have someone help you get up.   6. If you have nausea (feel sick to your stomach): Drink only clear " liquids such as apple juice, ginger ale, broth or 7-Up.  Rest may also help.  Be sure to drink enough fluids.  Move to a regular diet as you feel able.   7. You may have a slight fever.  Call the doctor if your fever is over 100 F (37.7 C) (taken under the tongue) or lasts longer than 24 hours.  8. You may have a dry mouth, a sore throat, muscle aches or trouble sleeping. These should go away after 24 hours.  9. Do not make important or legal decisions.               Tips for taking pain medications  To get the best pain relief possible, remember these points:    Take pain medications as directed, before pain becomes severe.    Pain medication can upset your stomach: taking it with food may help.    Constipation is a common side effect of pain medication. Drink plenty of  fluids.    Eat foods high in fiber. Take a stool softener if recommended by your doctor or pharmacist.    Do not drink alcohol, drive or operate machinery while taking pain medications.    Ask about other ways to control pain, such as with heat, ice or relaxation.    Tylenol/Acetaminophen Consumption  To help encourage the safe use of acetaminophen, the makers of TYLENOL  have lowered the maximum daily dose for single-ingredient Extra Strength TYLENOL  (acetaminophen) products sold in the [place] from 8 pills per day (4,000 mg) to 6 pills per day (3,000 mg). The dosing interval has also changed from 2 pills every 4-6 hours to 2 pills every 6 hours.    If you feel your pain relief is insufficient, you may take Tylenol/Acetaminophen in addition to your narcotic pain medication.     Be careful not to exceed 3,000 mg of Tylenol/Acetaminophen in a 24 hour period from all sources.    If you are taking extra strength Tylenol/acetaminophen (500 mg), the maximum dose is 6 tablets in 24 hours.    If you are taking regular strength acetaminophen (325 mg), the maximum dose is 9 tablets in 24 hours.    Call a doctor for any of the followin. Signs of infection  "(fever, growing tenderness at the surgery site, a large amount of drainage or bleeding, severe pain, foul-smelling drainage, redness, swelling).  2. It has been over 8 to 10 hours since surgery and you are still not able to urinate (pass water).  3. Headache for over 24 hours.  Your doctor is:  Dr. Michael Rodarte, Prostate and Urology: [phone]  Or dial [phone] and ask for the resident on call for:  Prostate Urology  For emergency care, call the:  [place]:  [phone] (TTY for hearing impaired: [phone])    Pending Results     No orders found from 2018 to 3/3/2018.            Admission Information     Date & Time Provider Department Dept. Phone    3/2/2018 Michael Mcnulty MD [facility] [phone]      Your Vitals Were     Blood Pressure Temperature Respirations Height Weight Pulse Oximetry    120/49 98.2  F (36.8  C) (Temporal) 16 1.753 m (5' 9\") 72.6 kg (160 lb) 99%    BMI (Body Mass Index)                   23.63 kg/m2           MyChart Information     [employer] is an electronic gateway that provides easy, online access to your medical records. With [employer], you can request a clinic appointment, read your test results, renew a prescription or communicate with your care team.     To sign up for [employer] visit the website at [email or web address]   You will be asked to enter the access code listed below, as well as some personal information. Please follow the directions to create your username and password.     Your access code is: [email or web address]  Expires: 2018  8:53 AM     Your access code will  in 90 days. If you need help or a new code, please contact your [facility] or call [phone] for assistance.        Care EveryWhere ID     This is your Care EveryWhere ID. This could be used by other organizations to access your [facility] medical records  [id number]        Equal Access to Services     " SHELLI RODRÍGUEZ : Hadii aad ku hadkemalcaren Renée, waaxda luqadaha, qaybta kaalmada juliacharlyruth, travis mathewstingjake fong . So [facility] [phone].    ATENCIÓN: Si habla español, tiene a coon disposición servicios gratuitos de asistencia lingüística. Llame al [phone].    We comply with applicable federal civil rights laws and [place] laws. We do not discriminate on the basis of race, color, national origin, age, disability, sex, sexual orientation, or gender identity.               Review of your medicines      CONTINUE these medicines which have NOT CHANGED        Dose / Directions    BACLOFEN PO   Indication:  Muscle Spasticity        Refills:  0       trospium 60 MG Cp24 24 hr capsule   Commonly known as:  SANCTURA XR        Dose:  60 mg   Take 60 mg by mouth every morning   Refills:  0       UNABLE TO FIND        4 times daily as needed Albuteral inhaler   Refills:  0                Protect others around you: Learn how to safely use, store and throw away your medicines at www.disposemymeds.org.             Medication List: This is a list of all your medications and when to take them. Check marks below indicate your daily home schedule. Keep this list as a reference.      Medications           Morning Afternoon Evening Bedtime As Needed    BACLOFEN PO                                trospium 60 MG Cp24 24 hr capsule   Commonly known as:  SANCTURA XR   Take 60 mg by mouth every morning                                UNABLE TO FIND   4 times daily as needed Albuteral inhaler

## 2018-03-05 ENCOUNTER — CARE COORDINATION (OUTPATIENT)
Dept: UROLOGY | Facility: CLINIC | Age: 28
End: 2018-03-05

## 2018-03-05 NOTE — PROGRESS NOTES
Called and left message on 3/5/18 to see how he is doing after his procedure on 3/2/18 with Dr. Mcnulty.  Waiting to hear back from patient.    Kathy St RN  Care Coordinator- Reconstructive Urology

## 2018-08-06 ENCOUNTER — TELEPHONE (OUTPATIENT)
Dept: UROLOGY | Facility: CLINIC | Age: 28
End: 2018-08-06

## 2018-08-06 DIAGNOSIS — N31.9 NEUROGENIC BLADDER: Primary | ICD-10-CM

## 2018-08-06 RX ORDER — CEFAZOLIN SODIUM 1 G/50ML
1 INJECTION, SOLUTION INTRAVENOUS SEE ADMIN INSTRUCTIONS
Status: CANCELLED | OUTPATIENT
Start: 2018-08-06 | End: 2019-08-06

## 2018-08-06 NOTE — TELEPHONE ENCOUNTER
Called and left a voicemail regarding surgery orders. Left my direct number for a call back [phone].

## 2018-08-08 ENCOUNTER — TELEPHONE (OUTPATIENT)
Dept: UROLOGY | Facility: CLINIC | Age: 28
End: 2018-08-08

## 2018-08-08 NOTE — TELEPHONE ENCOUNTER
Called to schedule surgery with Dr Mcnulty.  Left voice mail with direct phone number [phone] for patient to call back to schedule.

## 2018-08-13 NOTE — TELEPHONE ENCOUNTER
Patient is scheduled for surgery with Dr. Mcnulty      Spoke or left message with: I spoke with the patient     Date of Surgery: 09/21/18    Location [facility] OR     H&P: Scheduled with PCP    Post op: NA    Additional imaging/appointments: NA    Surgery packet sent: Mailed out 08/13/18     Additional comments:  The patient is aware they need a pre-op at least a couple of weeks before surgery date. We went over the patient needing a  and someone to stay with them for 24 hours after the surgery. The patient was given my direct number for any questions [phone]

## 2018-10-09 ENCOUNTER — TRANSFERRED RECORDS (OUTPATIENT)
Dept: HEALTH INFORMATION MANAGEMENT | Facility: CLINIC | Age: 28
End: 2018-10-09

## 2018-10-11 ENCOUNTER — ANESTHESIA EVENT (OUTPATIENT)
Dept: SURGERY | Facility: AMBULATORY SURGERY CENTER | Age: 28
End: 2018-10-11

## 2018-10-11 DIAGNOSIS — N39.0 URINARY TRACT INFECTION: Primary | ICD-10-CM

## 2018-10-11 RX ORDER — SULFAMETHOXAZOLE/TRIMETHOPRIM 800-160 MG
1 TABLET ORAL 2 TIMES DAILY
Qty: 6 TABLET | Refills: 0 | Status: SHIPPED | OUTPATIENT
Start: 2018-10-11 | End: 2018-10-14

## 2018-10-12 ENCOUNTER — ANESTHESIA (OUTPATIENT)
Dept: SURGERY | Facility: AMBULATORY SURGERY CENTER | Age: 28
End: 2018-10-12

## 2018-10-12 ENCOUNTER — SURGERY (OUTPATIENT)
Age: 28
End: 2018-10-12

## 2018-10-12 ENCOUNTER — HOSPITAL ENCOUNTER (OUTPATIENT)
Facility: AMBULATORY SURGERY CENTER | Age: 28
End: 2018-10-12
Attending: UROLOGY
Payer: COMMERCIAL

## 2018-10-12 VITALS
HEIGHT: 69 IN | BODY MASS INDEX: 23.7 KG/M2 | OXYGEN SATURATION: 96 % | TEMPERATURE: 97.1 F | WEIGHT: 160 LBS | HEART RATE: 91 BPM | RESPIRATION RATE: 17 BRPM | DIASTOLIC BLOOD PRESSURE: 75 MMHG | SYSTOLIC BLOOD PRESSURE: 113 MMHG

## 2018-10-12 DIAGNOSIS — N31.9 NEUROGENIC BLADDER: ICD-10-CM

## 2018-10-12 RX ORDER — SODIUM CHLORIDE, SODIUM LACTATE, POTASSIUM CHLORIDE, CALCIUM CHLORIDE 600; 310; 30; 20 MG/100ML; MG/100ML; MG/100ML; MG/100ML
INJECTION, SOLUTION INTRAVENOUS CONTINUOUS PRN
Status: DISCONTINUED | OUTPATIENT
Start: 2018-10-12 | End: 2018-10-12

## 2018-10-12 RX ORDER — CEFAZOLIN SODIUM 1 G/50ML
1 SOLUTION INTRAVENOUS SEE ADMIN INSTRUCTIONS
Status: DISCONTINUED | OUTPATIENT
Start: 2018-10-12 | End: 2018-10-12 | Stop reason: HOSPADM

## 2018-10-12 RX ORDER — ACETAMINOPHEN 325 MG/1
650 TABLET ORAL ONCE
Status: DISCONTINUED | OUTPATIENT
Start: 2018-10-12 | End: 2018-10-13 | Stop reason: HOSPADM

## 2018-10-12 RX ORDER — LIDOCAINE HYDROCHLORIDE 20 MG/ML
INJECTION, SOLUTION INFILTRATION; PERINEURAL PRN
Status: DISCONTINUED | OUTPATIENT
Start: 2018-10-12 | End: 2018-10-12

## 2018-10-12 RX ORDER — CEFAZOLIN SODIUM 2 G/50ML
2 SOLUTION INTRAVENOUS
Status: COMPLETED | OUTPATIENT
Start: 2018-10-12 | End: 2018-10-12

## 2018-10-12 RX ORDER — ONDANSETRON 4 MG/1
4 TABLET, ORALLY DISINTEGRATING ORAL EVERY 30 MIN PRN
Status: DISCONTINUED | OUTPATIENT
Start: 2018-10-12 | End: 2018-10-13 | Stop reason: HOSPADM

## 2018-10-12 RX ORDER — PROPOFOL 10 MG/ML
INJECTION, EMULSION INTRAVENOUS PRN
Status: DISCONTINUED | OUTPATIENT
Start: 2018-10-12 | End: 2018-10-12

## 2018-10-12 RX ORDER — PROPOFOL 10 MG/ML
INJECTION, EMULSION INTRAVENOUS CONTINUOUS PRN
Status: DISCONTINUED | OUTPATIENT
Start: 2018-10-12 | End: 2018-10-12

## 2018-10-12 RX ORDER — MEPERIDINE HYDROCHLORIDE 25 MG/ML
12.5 INJECTION INTRAMUSCULAR; INTRAVENOUS; SUBCUTANEOUS
Status: DISCONTINUED | OUTPATIENT
Start: 2018-10-12 | End: 2018-10-13 | Stop reason: HOSPADM

## 2018-10-12 RX ORDER — OXYCODONE HYDROCHLORIDE 5 MG/1
5 TABLET ORAL EVERY 4 HOURS PRN
Status: DISCONTINUED | OUTPATIENT
Start: 2018-10-12 | End: 2018-10-13 | Stop reason: HOSPADM

## 2018-10-12 RX ORDER — ONDANSETRON 2 MG/ML
4 INJECTION INTRAMUSCULAR; INTRAVENOUS EVERY 30 MIN PRN
Status: DISCONTINUED | OUTPATIENT
Start: 2018-10-12 | End: 2018-10-13 | Stop reason: HOSPADM

## 2018-10-12 RX ORDER — NALOXONE HYDROCHLORIDE 0.4 MG/ML
.1-.4 INJECTION, SOLUTION INTRAMUSCULAR; INTRAVENOUS; SUBCUTANEOUS
Status: DISCONTINUED | OUTPATIENT
Start: 2018-10-12 | End: 2018-10-13 | Stop reason: HOSPADM

## 2018-10-12 RX ORDER — ONDANSETRON 2 MG/ML
INJECTION INTRAMUSCULAR; INTRAVENOUS PRN
Status: DISCONTINUED | OUTPATIENT
Start: 2018-10-12 | End: 2018-10-12

## 2018-10-12 RX ORDER — FENTANYL CITRATE 50 UG/ML
25-50 INJECTION, SOLUTION INTRAMUSCULAR; INTRAVENOUS
Status: DISCONTINUED | OUTPATIENT
Start: 2018-10-12 | End: 2018-10-12 | Stop reason: HOSPADM

## 2018-10-12 RX ORDER — SODIUM CHLORIDE, SODIUM LACTATE, POTASSIUM CHLORIDE, CALCIUM CHLORIDE 600; 310; 30; 20 MG/100ML; MG/100ML; MG/100ML; MG/100ML
INJECTION, SOLUTION INTRAVENOUS CONTINUOUS
Status: DISCONTINUED | OUTPATIENT
Start: 2018-10-12 | End: 2018-10-13 | Stop reason: HOSPADM

## 2018-10-12 RX ORDER — KETOROLAC TROMETHAMINE 30 MG/ML
INJECTION, SOLUTION INTRAMUSCULAR; INTRAVENOUS PRN
Status: DISCONTINUED | OUTPATIENT
Start: 2018-10-12 | End: 2018-10-12

## 2018-10-12 RX ORDER — ACETAMINOPHEN 325 MG/1
975 TABLET ORAL ONCE
Status: COMPLETED | OUTPATIENT
Start: 2018-10-12 | End: 2018-10-12

## 2018-10-12 RX ADMIN — PROPOFOL 150 MCG/KG/MIN: 10 INJECTION, EMULSION INTRAVENOUS at 08:10

## 2018-10-12 RX ADMIN — CEFAZOLIN SODIUM 2 G: 2 SOLUTION INTRAVENOUS at 08:05

## 2018-10-12 RX ADMIN — ACETAMINOPHEN 975 MG: 325 TABLET ORAL at 07:58

## 2018-10-12 RX ADMIN — ONDANSETRON 4 MG: 2 INJECTION INTRAMUSCULAR; INTRAVENOUS at 08:10

## 2018-10-12 RX ADMIN — LIDOCAINE HYDROCHLORIDE 40 MG: 20 INJECTION, SOLUTION INFILTRATION; PERINEURAL at 08:09

## 2018-10-12 RX ADMIN — SODIUM CHLORIDE, SODIUM LACTATE, POTASSIUM CHLORIDE, CALCIUM CHLORIDE: 600; 310; 30; 20 INJECTION, SOLUTION INTRAVENOUS at 08:04

## 2018-10-12 RX ADMIN — PROPOFOL 70 MG: 10 INJECTION, EMULSION INTRAVENOUS at 08:10

## 2018-10-12 RX ADMIN — PROPOFOL 50 MG: 10 INJECTION, EMULSION INTRAVENOUS at 08:12

## 2018-10-12 RX ADMIN — PROPOFOL 50 MG: 10 INJECTION, EMULSION INTRAVENOUS at 08:29

## 2018-10-12 RX ADMIN — KETOROLAC TROMETHAMINE 30 MG: 30 INJECTION, SOLUTION INTRAMUSCULAR; INTRAVENOUS at 08:10

## 2018-10-12 NOTE — ANESTHESIA POSTPROCEDURE EVALUATION
Patient: Virgilio Chavez    Procedure(s):  Cystoscopy, Botox - Wound Class: II-Clean Contaminated   - Wound Class: II-Clean Contaminated    Diagnosis:Neurogenic Bladder  Diagnosis Additional Information: No value filed.    Anesthesia Type:  MAC    Note:  Anesthesia Post Evaluation    Patient location during evaluation: PACU  Patient participation: Able to fully participate in evaluation  Level of consciousness: awake  Pain management: adequate  Airway patency: patent  Cardiovascular status: acceptable  Respiratory status: acceptable  Hydration status: balanced  PONV: none     Anesthetic complications: None          Last vitals:  Vitals:    10/12/18 0840 10/12/18 0855 10/12/18 0930   BP: 91/49 98/56 113/75   Pulse:      Resp: 16 15 17   Temp: 36.1  C (97  F) 36.2  C (97.1  F)    SpO2: 98% 98% 96%         Electronically Signed By: Partha High MD  October 12, 2018  4:20 PM

## 2018-10-12 NOTE — OP NOTE
PRE-OPERATIVE DIAGNOSIS:   1.  Neurogenic bladder    POST-OPERATIVE DIAGNOSIS:  1.  Neurogenic bladder    PROCEDURE:    1. Cystourethroscopy.   2. injection of botulinum toxin A 300units in 20cc sterile saline  3. Fulguration of bladder bleeding      SURGEON:  Michael Mcnulty MD; available for the entire case, present for key portions of the procedure    FELLOW:  Jac Govea MD  ANESTHESIA:  MAC    ESTIMATED BLOOD LOSS:  5 mL    DRAINS:  None    SIGNIFICANT FINDINGS: None    OPERATIVE INDICATIONS:  Virgilio Chavez is a 28 year old male with neurogenic bladder. He elects to proceed with botulinum toxin injection understanding the risks for urinary retention, infection, pain, bleeding, need for future procedures and risks of anesthesia.     OPERATIVE DETAILS:  The patient was taken to the operating room in his usual state of health.   He was positioned in modified dorsal lithotomy with yellowfin stirrups.  His genitalia were prepped and draped in the standard fashion. A time-out was performed to ensure proper patient, procedure and positioning.  The patient received appropriate IV antibiotics prior to the procedure.    A 21-Estonian Kim injection cystoscope was placed into the bladder.  The bladder mucosa appeared to be normal without stones, tumors or diverticulum on 360 degree inspection. The ureteral orifices were in the normal orthotopic position bilaterally.  We were unable to inject through the kim scope due to the patient's erection. We then inserted a 19-Estonian cystoscope and injected with a deflux needle. We mixed 2 vials of 100 U botulinum toxin A into 20 mL of injectable saline for a total of (10 units/mL).  We performed a template injection procedure with 5 columns of 4 injections. All injections were placed deep to the mucosa into the detrusor muscle.  We then emptied his bladder to re-inspect our injection sites and found that there was a decent amount of bleeding from one injection site. This was  fulgurated with bugbee electrocautery. The bladder was then reinspected and there was a minimal amount of blood. His bladder was then emptied again. The patient was returned to supine position and transported to recovery in stable condition.      PLAN: continue routine botox        As the attending surgeon I, Michael Mcnulty, was present and scrubbed throughout the procedure.

## 2018-10-12 NOTE — IP AVS SNAPSHOT
MRN:[id number]                      After Visit Summary   10/12/2018    Virgilio Chavez    MRN: [id number]           Thank you!     Thank you for choosing [facility] for your care. Our goal is always to provide you with excellent care. Hearing back from our patients is one way we can continue to improve our services. Please take a few minutes to complete the written survey that you may receive in the mail after you visit with us. Thank you!        Patient Information     Date Of Birth          1990        About your hospital stay     You were admitted on:  October 12, 2018 You last received care in the:  [facility]    You were discharged on:  October 12, 2018       Who to Call     For medical emergencies, please call 911.  For non-urgent questions about your medical care, please call your primary care provider or clinic, None  For questions related to your surgery, please call your surgery clinic        Attending Provider     Provider Michael Dejesus MD Urology       Primary Care Provider    None Specified      Further instructions from your care team       [facility]  Home Care Following Anesthesia  For 24 hours after surgery:  1. Get plenty of rest.  A responsible adult must stay with you for at least 24 hours after you leave the surgery center.  2. Do not drive or use heavy equipment.  If you have weakness or tingling, don't drive or use heavy equipment until this feeling goes away.   3. Do not drink alcohol.   4. Avoid strenuous or risky activities.  Ask for help when climbing stairs.  5. You may feel lightheaded.  IF so, sit for a few minutes before standing.  Have someone help you get up.   6. If you have nausea (feel sick to your stomach): Drink only clear liquids such as apple juice, ginger ale, broth or 7-Up.  Rest may also help.  Be sure to drink enough fluids.  Move to a regular diet as you feel able.    7. You may have a slight fever.  Call the doctor if your fever is over 100 F (37.7 C) (taken under the tongue) or lasts longer than 24 hours.  8. You may have a dry mouth, a sore throat, muscle aches or trouble sleeping. These should go away after 24 hours.  9. Do not make important or legal decisions.               Tips for taking pain medications  To get the best pain relief possible, remember these points:    Take pain medications as directed, before pain becomes severe.    Pain medication can upset your stomach: taking it with food may help.    Constipation is a common side effect of pain medication. Drink plenty of  fluids.    Eat foods high in fiber. Take a stool softener if recommended by your doctor or pharmacist.    Do not drink alcohol, drive or operate machinery while taking pain medications.    Ask about other ways to control pain, such as with heat, ice or relaxation.    Tylenol/Acetaminophen Consumption  To help encourage the safe use of acetaminophen, the makers of TYLENOL  have lowered the maximum daily dose for single-ingredient Extra Strength TYLENOL  (acetaminophen) products sold in the [place] from 8 pills per day (4,000 mg) to 6 pills per day (3,000 mg). The dosing interval has also changed from 2 pills every 4-6 hours to 2 pills every 6 hours.    If you feel your pain relief is insufficient, you may take Tylenol/Acetaminophen in addition to your narcotic pain medication.     Be careful not to exceed 3,000 mg of Tylenol/Acetaminophen in a 24 hour period from all sources.    If you are taking extra strength Tylenol/acetaminophen (500 mg), the maximum dose is 6 tablets in 24 hours.    If you are taking regular strength acetaminophen (325 mg), the maximum dose is 9 tablets in 24 hours.    Call a doctor for any of the followin. Signs of infection (fever, growing tenderness at the surgery site, a large amount of drainage or bleeding, severe pain, foul-smelling drainage, redness, swelling).  2. It  "has been over 8 to 10 hours since surgery and you are still not able to urinate (pass water).  3. Headache for over 24 hours.  Your doctor is:  Dr. Michael Rodarte, Prostate and Urology: [phone]                Or dial [phone] and ask for the resident on call for:  Prostate Urology  For emergency care, call the:  [facility]:  [phone] (TTY for hearing impaired: [phone])              Pending Results     No orders found from 10/10/2018 to 10/13/2018.            Admission Information     Date & Time Provider Department Dept. Phone    10/12/2018 Michael Mcnulty MD [facility] [phone]      Your Vitals Were     Blood Pressure Pulse Temperature Respirations Height Weight    98/56 91 97.1  F (36.2  C) (Temporal) 15 1.753 m (5' 9\") 72.6 kg (160 lb)    Pulse Oximetry BMI (Body Mass Index)                98% 23.63 kg/m2          [facility] Information     [facility] is an electronic gateway that provides easy, online access to your medical records. With [facility], you can request a clinic appointment, read your test results, renew a prescription or communicate with your care team.     To sign up for [facility] visit the website at [email or web address]   You will be asked to enter the access code listed below, as well as some personal information. Please follow the directions to create your username and password.     Your access code is: [email or web address]  Expires: 1/10/2019  8:49 AM     Your access code will  in 90 days. If you need help or a new code, please contact your [facility] or call [phone] for assistance.        Care EveryWhere ID     This is your Care EveryWhere ID. This could be used by other organizations to access your [facility] medical records  [id number]        Equal Access to Services     SHELLI RODRÍGUEZ AH: Sarah Chinchilla, waaxda luqadaha, qaybta kaalelgin dean, travis simpson " bisitingjake miguelElderaan ah. So [facility] [phone].    ATENCIÓN: Si steffi chapa, tiene a coon disposición servicios gratuitos de asistencia lingüística. Carlos al [phone].    We comply with applicable federal civil rights laws and [place] laws. We do not discriminate on the basis of race, color, national origin, age, disability, sex, sexual orientation, or gender identity.               Review of your medicines      CONTINUE these medicines which have NOT CHANGED        Dose / Directions    ALBUTEROL IN        Inhale into the lungs 4 times daily as needed   Refills:  0       BACLOFEN PO   Indication:  Muscle Spasticity, if Baclofen pump fails, takes oral        Refills:  0       DETROL PO        Take by mouth daily   Refills:  0       sulfamethoxazole-trimethoprim 800-160 MG per tablet   Commonly known as:  BACTRIM DS/SEPTRA DS   Used for:  Urinary tract infection        Dose:  1 tablet   Take 1 tablet by mouth 2 times daily for 3 days   Quantity:  6 tablet   Refills:  0                Protect others around you: Learn how to safely use, store and throw away your medicines at www.disposemymeds.org.             Medication List: This is a list of all your medications and when to take them. Check marks below indicate your daily home schedule. Keep this list as a reference.      Medications           Morning Afternoon Evening Bedtime As Needed    ALBUTEROL IN   Inhale into the lungs 4 times daily as needed                                BACLOFEN PO                                DETROL PO   Take by mouth daily                                sulfamethoxazole-trimethoprim 800-160 MG per tablet   Commonly known as:  BACTRIM DS/SEPTRA DS   Take 1 tablet by mouth 2 times daily for 3 days                                          More Information        Cystoscopy    Cystoscopy is a procedure that lets your doctor look directly inside your urethra and bladder. It can be used to:    Help diagnose a problem with your urethra, bladder,  or kidneys.    Take a sample (biopsy) of bladder or urethral tissue.    Treat certain problems (such as removing kidney stones).    Place a stent to bypass an obstruction.    Take special X-rays of the kidneys.  Based on the findings, your doctor may recommend other tests or treatments.  What is a cystoscope?  A cystoscope is a telescope-like instrument that contains lenses and fiberoptics (small glass wires that make bright light). The cystoscope may be straight and rigid, or flexible to bend around curves in the urethra. The doctor may look directly into the cystoscope, or project the image onto a monitor.  Getting ready    Ask your doctor if you should stop taking any medicines before the procedure.    Ask whether you should avoid eating or drinking anything after midnight before the procedure.    Follow any other instructions your doctor gives you.  Tell your doctor before the exam if you:    Take any medicines, such as aspirin or blood thinners    Have allergies to any medicines    Are pregnant   The procedure  Cystoscopy is done in the doctor s office, surgery center, or hospital. The doctor and a nurse are present during the procedure. It takes only a few minutes, longer if a biopsy, X-ray, or treatment needs to be done.  During the procedure:    You lie on an exam table on your back, knees bent and legs apart. You are covered with a drape.    Your urethra and the area around it are washed. Anesthetic jelly may be applied to numb the urethra. Other pain medicine is usually not needed. In some cases, you may be offered a mild sedative to help you relax. If a more extensive procedure is to be done, such as a biopsy or kidney stone removal, general anesthesia may be needed.    The cystoscope is inserted. A sterile fluid is put into the bladder to expand it. You may feel pressure from this fluid.    When the procedure is done, the cystoscope is removed.  After the procedure  If you had a sedative, general  anesthesia, or spinal anesthesia, you must have someone drive you home. Once you re home:    Drink plenty of fluids.    You may have burning or light bleeding when you urinate--this is normal.    Medicines may be prescribed to ease any discomfort or prevent infection. Take these as directed.    Call your doctor if you have heavy bleeding or blood clots, burning that lasts more than a day, a fever over 100 F  (38  C), or trouble urinating.  Date Last Reviewed: 1/1/2017 2000-2017 [employer]. [address] [place] [address]. All rights reserved. This information is not intended as a substitute for professional medical care. Always follow your healthcare professional's instructions.

## 2018-10-12 NOTE — DISCHARGE INSTRUCTIONS
[facility]  Home Care Following Anesthesia  For 24 hours after surgery:  1. Get plenty of rest.  A responsible adult must stay with you for at least 24 hours after you leave the surgery center.  2. Do not drive or use heavy equipment.  If you have weakness or tingling, don't drive or use heavy equipment until this feeling goes away.   3. Do not drink alcohol.   4. Avoid strenuous or risky activities.  Ask for help when climbing stairs.  5. You may feel lightheaded.  IF so, sit for a few minutes before standing.  Have someone help you get up.   6. If you have nausea (feel sick to your stomach): Drink only clear liquids such as apple juice, ginger ale, broth or 7-Up.  Rest may also help.  Be sure to drink enough fluids.  Move to a regular diet as you feel able.   7. You may have a slight fever.  Call the doctor if your fever is over 100 F (37.7 C) (taken under the tongue) or lasts longer than 24 hours.  8. You may have a dry mouth, a sore throat, muscle aches or trouble sleeping. These should go away after 24 hours.  9. Do not make important or legal decisions.               Tips for taking pain medications  To get the best pain relief possible, remember these points:    Take pain medications as directed, before pain becomes severe.    Pain medication can upset your stomach: taking it with food may help.    Constipation is a common side effect of pain medication. Drink plenty of  fluids.    Eat foods high in fiber. Take a stool softener if recommended by your doctor or pharmacist.    Do not drink alcohol, drive or operate machinery while taking pain medications.    Ask about other ways to control pain, such as with heat, ice or relaxation.    Tylenol/Acetaminophen Consumption  To help encourage the safe use of acetaminophen, the makers of TYLENOL  have lowered the maximum daily dose for single-ingredient Extra Strength TYLENOL  (acetaminophen) products sold in the [place] from 8  pills per day (4,000 mg) to 6 pills per day (3,000 mg). The dosing interval has also changed from 2 pills every 4-6 hours to 2 pills every 6 hours.    If you feel your pain relief is insufficient, you may take Tylenol/Acetaminophen in addition to your narcotic pain medication.     Be careful not to exceed 3,000 mg of Tylenol/Acetaminophen in a 24 hour period from all sources.    If you are taking extra strength Tylenol/acetaminophen (500 mg), the maximum dose is 6 tablets in 24 hours.    If you are taking regular strength acetaminophen (325 mg), the maximum dose is 9 tablets in 24 hours.    Call a doctor for any of the followin. Signs of infection (fever, growing tenderness at the surgery site, a large amount of drainage or bleeding, severe pain, foul-smelling drainage, redness, swelling).  2. It has been over 8 to 10 hours since surgery and you are still not able to urinate (pass water).  3. Headache for over 24 hours.  Your doctor is:  Dr. Michael Rodarte, Prostate and Urology: [phone]                Or dial [phone] and ask for the resident on call for:  Prostate Urology  For emergency care, call the:  [facility]:  [phone] (TTY for hearing impaired: [phone])

## 2018-10-12 NOTE — IP AVS SNAPSHOT
[facility]    [address] [place] [address]    Phone:  [phone]    Fax:  [phone]                                       After Visit Summary   10/12/2018    Virgilio Chavez    MRN: [id number]           After Visit Summary Signature Page     I have received my discharge instructions, and my questions have been answered. I have discussed any challenges I see with this plan with the nurse or doctor.    ..........................................................................................................................................  Patient/Patient Representative Signature      ..........................................................................................................................................  Patient Representative Print Name and Relationship to Patient    ..................................................               ................................................  Date                                   Time    ..........................................................................................................................................  Reviewed by Signature/Title    ...................................................              ..............................................  Date                                               Time          22EPIC Rev 08/18

## 2018-10-15 ENCOUNTER — CARE COORDINATION (OUTPATIENT)
Dept: UROLOGY | Facility: CLINIC | Age: 28
End: 2018-10-15

## 2018-10-15 NOTE — PROGRESS NOTES
Urology Postop Phone Note:    Mr. Virgilio Chavez is a 28 year old male who underwent Cystourethroscopy, injection of botulinum toxin A 300units in 20cc sterile saline and Fulguration of bladder bleeding on 10/12/18 with Dr. Mcnulty for a history of neurogenic bladder.  His postoperative course was unremarkable and the patient was d/c to home on 10/12/18.    10/15/18:  Left patient message to see how he's doing since his procedure.    Next Botox is due in 6 months.    Kathy St, RN, BSN  Care Coordinator - Reconstructive Urology

## 2019-07-08 NOTE — ANESTHESIA CARE TRANSFER NOTE
Patient: Virgilio Chavez    Procedure(s):  Cystoscopy, Botox - Wound Class: II-Clean Contaminated   - Wound Class: II-Clean Contaminated    Diagnosis: Neurogenic Bladder  Diagnosis Additional Information: No value filed.    Anesthesia Type:   MAC     Note:  Airway :Room Air  Patient transferred to:Phase II  Comments: Report to RN    91/49, 16, 97, 98%, 67Handoff Report: Identifed the Patient, Identified the Reponsible Provider, Reviewed the pertinent medical history, Discussed the surgical course, Reviewed Intra-OP anesthesia mangement and issues during anesthesia, Set expectations for post-procedure period and Allowed opportunity for questions and acknowledgement of understanding      Vitals: (Last set prior to Anesthesia Care Transfer)    CRNA VITALS  10/12/2018 0807 - 10/12/2018 0843      10/12/2018             Pulse: 69    SpO2: 96 %    Resp Rate (set): 10                Electronically Signed By: AUTUMN Bautista CRNA  October 12, 2018  8:43 AM   (3) adequate

## 2019-07-23 ENCOUNTER — TRANSFERRED RECORDS (OUTPATIENT)
Dept: HEALTH INFORMATION MANAGEMENT | Facility: CLINIC | Age: 29
End: 2019-07-23

## 2021-02-21 ENCOUNTER — COMMUNICATION - HEALTHEAST (OUTPATIENT)
Dept: SCHEDULING | Facility: CLINIC | Age: 31
End: 2021-02-21

## 2021-09-04 ENCOUNTER — HEALTH MAINTENANCE LETTER (OUTPATIENT)
Age: 31
End: 2021-09-04

## 2021-12-21 ENCOUNTER — TELEPHONE (OUTPATIENT)
Dept: PLASTIC SURGERY | Facility: CLINIC | Age: 31
End: 2021-12-21
Payer: COMMERCIAL

## 2021-12-21 DIAGNOSIS — F64.0 GENDER DYSPHORIA IN ADOLESCENT AND ADULT: Primary | ICD-10-CM

## 2021-12-21 NOTE — TELEPHONE ENCOUNTER
Pt referred by Dr. Dominguez for gender affirming hair removal pre-vaginoplasty. Writer to enter referral and reach out to pt.     Fidel Pritchard

## 2022-01-21 ENCOUNTER — TELEPHONE (OUTPATIENT)
Dept: DERMATOLOGY | Facility: CLINIC | Age: 32
End: 2022-01-21
Payer: COMMERCIAL

## 2022-05-19 ENCOUNTER — TELEPHONE (OUTPATIENT)
Dept: UROLOGY | Facility: CLINIC | Age: 32
End: 2022-05-19
Payer: COMMERCIAL

## 2022-05-19 NOTE — TELEPHONE ENCOUNTER
[facility] Call Center    Phone Message    May a detailed message be left on voicemail: yes     Reason for Call: Other: Hannah is calling in asking for a call back. She states that she would like to speak with someone about whether we offer any options for vaginoplasty that do not require hair removal. Please call back as soon as possible to discuss.     Action Taken: Message routed to:  [facility] ([facility] Gender Care    Travel Screening: Not Applicable

## 2022-05-20 NOTE — TELEPHONE ENCOUNTER
Writer sent pt [employer] message regarding services. If no answer, writer to call.     Fidel Pritchard

## 2022-05-23 ENCOUNTER — TELEPHONE (OUTPATIENT)
Dept: PLASTIC SURGERY | Facility: CLINIC | Age: 32
End: 2022-05-23
Payer: COMMERCIAL

## 2022-05-23 DIAGNOSIS — F64.0 GENDER DYSPHORIA IN ADOLESCENT AND ADULT: Primary | ICD-10-CM

## 2022-05-23 NOTE — TELEPHONE ENCOUNTER
[facility] :  Care Coordination Note     SITUATION   Hannah, she/her, is a 32 year old adult who is receiving support for:  No chief complaint on file.  .    BACKGROUND     Pt scheduled vaginoplasty consultation with Dr. Dominguez 5/31/22. Pt previously saw Dr. Dominguez for an appointment at [facility]. Pt is interested in full depth vaginoplasty without hair removal, which Dr. Dominguez explained he could discuss.     ASSESSMENT     Surgery              [facility] Assessment  [facility] ([facility] Enrollment: Enrolled  Patient has a therapist: Yes  Letter of support #1: Requested  Letter of support #2: Requested  Surgery being considered: Yes  Vaginoplasty: Yes          PLAN          Nursing Interventions:      Follow-up plan:    1. Attend Consultation       Fidel Pritchard

## 2022-05-31 ENCOUNTER — PRE VISIT (OUTPATIENT)
Dept: UROLOGY | Facility: CLINIC | Age: 32
End: 2022-05-31
Payer: COMMERCIAL

## 2022-05-31 ENCOUNTER — OFFICE VISIT (OUTPATIENT)
Dept: PLASTIC SURGERY | Facility: CLINIC | Age: 32
End: 2022-05-31
Payer: COMMERCIAL

## 2022-05-31 VITALS
HEIGHT: 66 IN | OXYGEN SATURATION: 97 % | SYSTOLIC BLOOD PRESSURE: 115 MMHG | HEART RATE: 102 BPM | DIASTOLIC BLOOD PRESSURE: 83 MMHG | BODY MASS INDEX: 25.71 KG/M2 | WEIGHT: 160 LBS

## 2022-05-31 DIAGNOSIS — N31.9 NEUROGENIC BLADDER: Primary | ICD-10-CM

## 2022-05-31 DIAGNOSIS — K63.1 PERFORATION OF SIGMOID COLON (H): ICD-10-CM

## 2022-05-31 DIAGNOSIS — F64.0 GENDER DYSPHORIA IN ADOLESCENT AND ADULT: ICD-10-CM

## 2022-05-31 PROCEDURE — 99205 OFFICE O/P NEW HI 60 MIN: CPT | Mod: GC | Performed by: UROLOGY

## 2022-05-31 RX ORDER — BACLOFEN 10 MG/1
10 TABLET ORAL
COMMUNITY
End: 2023-08-28

## 2022-05-31 RX ORDER — ESTRADIOL VALERATE 20 MG/ML
INJECTION INTRAMUSCULAR WEEKLY
COMMUNITY
Start: 2022-02-15

## 2022-05-31 RX ORDER — SYRINGE AND NEEDLE,INSULIN,1ML 25GX1"
SYRINGE, EMPTY DISPOSABLE MISCELLANEOUS
COMMUNITY

## 2022-05-31 RX ORDER — DIAZEPAM 5 MG
5 TABLET ORAL EVERY 6 HOURS PRN
COMMUNITY
Start: 2022-05-09

## 2022-05-31 RX ORDER — SPIRONOLACTONE 100 MG/1
TABLET, FILM COATED ORAL
COMMUNITY
Start: 2021-07-27 | End: 2023-05-08

## 2022-05-31 RX ORDER — SERTRALINE HYDROCHLORIDE 100 MG/1
100 TABLET, FILM COATED ORAL
COMMUNITY
Start: 2022-05-09 | End: 2023-05-08

## 2022-05-31 RX ORDER — PROGESTERONE 200 MG/1
200 CAPSULE ORAL
COMMUNITY
Start: 2022-04-26 | End: 2023-04-26

## 2022-05-31 RX ORDER — CONTAINER,EMPTY
EACH MISCELLANEOUS
COMMUNITY

## 2022-05-31 RX ORDER — FOLIC ACID 1 MG/1
TABLET ORAL
COMMUNITY
Start: 2021-09-27 | End: 2022-08-18

## 2022-05-31 RX ORDER — TRAZODONE HYDROCHLORIDE 100 MG/1
1 TABLET ORAL
COMMUNITY
End: 2023-05-08

## 2022-05-31 RX ORDER — CEFUROXIME AXETIL 500 MG/1
TABLET ORAL
COMMUNITY
Start: 2022-03-29 | End: 2022-08-18

## 2022-05-31 ASSESSMENT — PAIN SCALES - GENERAL: PAINLEVEL: MILD PAIN (3)

## 2022-05-31 NOTE — LETTER
Date:June 9, 2022      Provider requested that no letter be sent. Do not send.       [facility]
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
## 2022-05-31 NOTE — NURSING NOTE
"Chief Complaint   Patient presents with     Consult     Hannah, is being seen today for a consult regarding vaginoplasty.       Vitals:    05/31/22 1309   BP: 115/83   BP Location: Left arm   Patient Position: Chair   Cuff Size: Adult Regular   Pulse: 102   SpO2: 97%   Weight: 72.6 kg (160 lb)   Height: 1.676 m (5' 6\")       Body mass index is 25.82 kg/m .      Elana Mejía LPN    "
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
## 2022-06-06 ENCOUNTER — MYC MEDICAL ADVICE (OUTPATIENT)
Dept: PLASTIC SURGERY | Facility: CLINIC | Age: 32
End: 2022-06-06
Payer: COMMERCIAL

## 2022-06-07 ENCOUNTER — TELEPHONE (OUTPATIENT)
Dept: PLASTIC SURGERY | Facility: CLINIC | Age: 32
End: 2022-06-07
Payer: COMMERCIAL

## 2022-06-07 NOTE — TELEPHONE ENCOUNTER
[facility] :  Care Coordination Note     SITUATION   Hannah Chavez is a 32 year old adult who is receiving support for:  No chief complaint on file.  .    BACKGROUND     Reviewed LOS. They meet criteria. For PA, include all three letters as one can be considered a diagnostic assessment.     ASSESSMENT     Surgery              CGC Assessment  [facility] (CGC) Enrollment: Enrolled  Patient has a therapist: Yes  Name of therapist: Duyen Medina  Letter of support #1: Received  Letter #1 Date: 06/02/22  Letter of support #2: Received  Letter #2 Date: 06/06/22  Surgery being considered: Yes  Vaginoplasty: Yes  Orchiectomy: Yes          PLAN          Nursing Interventions:      Follow-up plan:  Dr. Dominguez to submit PA for orchiectomy.        [facility]A [facility]

## 2022-06-08 DIAGNOSIS — Z93.3 COLOSTOMY PRESENT (H): Primary | ICD-10-CM

## 2022-06-09 ENCOUNTER — TELEPHONE (OUTPATIENT)
Dept: GASTROENTEROLOGY | Facility: CLINIC | Age: 32
End: 2022-06-09
Payer: COMMERCIAL

## 2022-06-09 NOTE — TELEPHONE ENCOUNTER
Screening Questions  BlueKIND OF PREP RedLOCATION [review exclusion criteria] GreenSEDATION TYPE      1. Are you able to give consent for your medical care? Do you have a legal guardian or medical Power of ?  y (Sedation review/consideration needed)    2. Have you had a positive covid test in the last 90 days? n  a. If yes, what date?n    3. Are you active on mychart? y    4. What insurance is in the chart? [employer]     3.   Ordering/Referring Provider: Wilfrido    4. BMI 25.5 [BMI OVER 40-EXTENDED PREP]  If greater than 40 review exclusion criteria [PAC APPT IF @ [facility]]        5.  Respiratory Screening :  [If yes to any of the following HOSPITAL setting only]     Do you use daily home oxygen? n    Do you have mod to severe Obstructive Sleep Apnea? n  [OKAY @ [facility] SH PH RI]   Do you have Pulmonary Hypertension? n     Do you have UNCONTROLLED asthma? n        6.   Have you had a heart or lung transplant? n      7.   Are you currently on dialysis? n [ If yes, G-PREP & HOSPITAL setting only]     8.   Do you have chronic kidney disease? n [ If yes, G-PREP ]    9.   Have you had a stroke or Transient ischemic attack (TIA - aka  mini stroke ) within 6 months?  n (If yes, please review exclusion criteria)    10.   In the past 6 months, have you had any heart related issues including cardiomyopathy or heart attack? n           If yes, did it require cardiac stenting or other implantable device? n      11.   Do you have any implantable devices in your body (pacemaker, defib, LVAD)? n (If yes, please review exclusion criteria)    12.   Do you take nitroglycerin? n           If yes, how often? n  (if yes, HOSPITAL setting ONLY)    13.   Are you currently taking any blood thinners? n           [IF YES, INFORM PATIENT TO FOLLOW UP W/ ORDERING PROVIDER FOR BRIDGING INSTRUCTIONS]     14.   Do you have a diagnosis of diabetes? n   [ If yes, G-PREP ]    15.   [FEMALES] Are you currently pregnant?     If yes, how many  weeks?     16.   Are you taking any prescription pain medications on a routine schedule?  n  [ If yes, EXTENDED PREP.] [If yes, MAC]    17.   Do you have any chemical dependencies such as alcohol, street drugs, or methadone?  n [If yes, MAC]    18.   Do you have any history of post-traumatic stress syndrome, severe anxiety or history of psychosis?  n  [If yes, MAC]    19.   Do you transfer independently?  y    20.  On a regular basis do you go 3-5 days between bowel movements? n   [ If yes, EXTENDED PREP.]    21.   Preferred LOCAL Pharmacy for Pre Prescription   [id number] [place] [address]      Scheduling Details      Caller : Virgilio Chavez  (Please ask for phone number if not scheduled by patient)    Type of Procedure Scheduled: Colonosopy  Which Colonoscopy Prep was Sent?:   ALTONUTS CF PATIENTS & GROEN'S PATIENTS REQUIRE EXTENDED PREP  Surgeon:   Date of Procedure:   Location:       Sedation Type:   Conscious Sedation- Needs  for 6 hours after the procedure  MAC/General-Needs  for 24 hours after procedure    Pre-op Required at [facility], [place], [place] and OR for MAC sedation:   (advise patient they will need a pre-op prior to procedure -)      Informed patient they will need an adult    Cannot take any type of public or medical transportation alone    Pre-Procedure Covid test to be completed at [facility] or Externally:     Confirmed Nurse will call to complete assessment     Additional comments:

## 2022-06-09 NOTE — TELEPHONE ENCOUNTER
Scheduling Details      Caller : Hannah  (Please ask for phone number if not scheduled by patient)    Type of Procedure Scheduled: colon  Which Colonoscopy Prep was Sent?: nandoep  ZAK CF PATIENTS & GROEN'S PATIENTS REQUIRE EXTENDED PREP  Surgeon: SARAH  Date of Procedure: 07/13  Location: [facility]      Sedation Type: MAC  Conscious Sedation- Needs  for 6 hours after the procedure  MAC/General-Needs  for 24 hours after procedure    Pre-op Required at [facility], [place], [place] and OR for MAC sedation: N  (advise patient they will need a pre-op prior to procedure -)      Informed patient they will need an adult  Y  Cannot take any type of public or medical transportation alone    Pre-Procedure Covid test to be completed at [facility] or Externally: HOME    Confirmed Nurse will call to complete assessment Y    Additional comments:

## 2022-06-14 NOTE — TELEPHONE ENCOUNTER
FUTURE VISIT INFORMATION      FUTURE VISIT INFORMATION:    Date: 5/31/22    Time: 1:00pm    Location: [facility]  REFERRAL INFORMATION:    Referring providers clinic:  self    Reason for visit/diagnosis  vaginoplasty    RECORDS REQUESTED FROM:       No recs to collect    
29 yo woman with clinical evidence of pyelonephritis.  Patient is severe pain.  IV antibiotics given as well as IV analgesia.  I recommended admission for IV antibiotics and pain management.  However, patient prefers discharge.  She will return for any new or worsening symptoms.

## 2022-06-16 DIAGNOSIS — F64.9 GENDER DYSPHORIA: Primary | ICD-10-CM

## 2022-06-16 RX ORDER — CEFAZOLIN SODIUM 2 G/50ML
2 SOLUTION INTRAVENOUS
Status: CANCELLED | OUTPATIENT
Start: 2022-06-16

## 2022-06-16 RX ORDER — CEFAZOLIN SODIUM 2 G/50ML
2 SOLUTION INTRAVENOUS SEE ADMIN INSTRUCTIONS
Status: CANCELLED | OUTPATIENT
Start: 2022-06-16

## 2022-06-29 ENCOUNTER — TELEPHONE (OUTPATIENT)
Dept: GASTROENTEROLOGY | Facility: CLINIC | Age: 32
End: 2022-06-29

## 2022-06-29 ENCOUNTER — TELEPHONE (OUTPATIENT)
Dept: SURGERY | Facility: CLINIC | Age: 32
End: 2022-06-29

## 2022-06-29 DIAGNOSIS — Z12.11 ENCOUNTER FOR SCREENING COLONOSCOPY: Primary | ICD-10-CM

## 2022-06-29 NOTE — TELEPHONE ENCOUNTER
Pre assessment questions completed for upcoming colonoscopy procedure scheduled on 7.13.2022    Discussed at home rapid antigen COVID test 1-2 days prior to procedure.    Reviewed procedural arrival time 0900 and facility location [facility].    Designated  policy reviewed. Instructed to have someone stay 24 hours post procedure.     Anticoagulation/blood thinners? no    Electronic implanted devices? No; baclofen pump    Constipation? No    Indication for procedure: screening, pt has a colostomy    Reviewed Miralax/Magnesium citrate/Dulcolax prep instructions with patient. No fiber/iron supplements or foods that contain nuts/seeds prior to procedure.     Patient verbalized understanding and had no questions or concerns at this time.    Anne Lemus RN

## 2022-07-05 NOTE — TELEPHONE ENCOUNTER
Diagnosis, Referred by & from: Discuss Colostomy Reversal   Appt date: 9/19/2022   NOTES STATUS DETAILS   OFFICE NOTE from referring provider Internal [facility]:  5/31/22 - PLASTIC OV with Dr. Dominguez   OFFICE NOTE from other specialist Care Everywhere [facility]:  5/24/22 - PLASTIC OV with Dr. Cade  5/17/22 - [facility] OV with Hitesh Stephens NP    [facility]:  7/19/17 - URO OV with Dr. Mcnulty   DISCHARGE SUMMARY from hospital Care Everywhere [facility]:  12/31/21 - Admission with Dr. Hwang   DISCHARGE REPORT from the ER Care Everywhere [facility]:  5/27/22 - ED OV with HELENA Awad  2/21/22 - ED OV with Dr. Chavez   OPERATIVE REPORT Care Everywhere / [facility]:  8/18/22 - OP Note for BILATERAL SIMPLE SCROTAL ORCHIECTOMY with Dr. Dominguez  10/12/18, 3/2/18, 9/1/17 - OP Note for CYSTOSCOPY, BOTOX with Dr. Mcnulty    [facility]:  9/21/19 - OP Note for CYSTOSCOPY, BLADDER EVACUATION, CATHETER PLACEMENT WITH WIRE with Dr. Huerta  9/19/19 - OP Note for FLEXIBLE CYSTOSCOPY, RETROGRADE, DIFFICULT INSERTION OF URETHRAL CATHETER with Dr. Zheng  4/20/18 - OP Note for EXPLORATORY LAPAROTOMY, COLOSTOMY with Dr. Plummer   MEDICATION LIST Internal    LABS     BIOPSIES/PATHOLOGY RELATED TO DIAGNOSIS Care Everywhere [facility]:  4/20/18 - Colon Biopsy (Case: [id number])   DIAGNOSTIC PROCEDURES     COLONOSCOPY Care Everywhere / Received [facility]:  8/10/22 - Colonoscopy    [facility]:  6/21/19 - Colonoscopy   IMAGING (DISC & REPORT)      CT Received [facility]:  8/12/22 - CT Abd/Pelvis  2/28/20 - CTA Chest/Abd/Pelvis  1/9/20 - CT Abd/Pelvis  9/19/19 - CT Abd/Pelvis  4/20/18 - CT Abd/Pelvis   XRAY Received [facility]:  9/19/19 - XR KUB   ULTRASOUND  (ENDOANAL/ENDORECTAL) Received [facility]:  5/27/22 - US Scrotum  3/29/22 - US Scrotum     Records Requested  07/05/22    Facility  [facility]  Fax: [phone]   Outcome * 7/5/22 2:11 PM Faxed req to [facility] for images to be pushed to [facility]. - Shanel    * 7/12/22 2:27 PM Images received  from [facility] and attached to the patient in PACs. - Shanel    * 8/18/22 8:49 AM Faxed urg req to [facility] for images to be pushed to [facility]. - Shanel    * 8/18/22 12:23 PM Images received from [facility] and attached to the patient in PACs. - Shanel

## 2022-07-15 ENCOUNTER — TELEPHONE (OUTPATIENT)
Dept: GASTROENTEROLOGY | Facility: CLINIC | Age: 32
End: 2022-07-15

## 2022-07-15 DIAGNOSIS — Z93.3 COLOSTOMY PRESENT (H): Primary | ICD-10-CM

## 2022-07-15 NOTE — TELEPHONE ENCOUNTER
From: Carla Gilliam RN  Sent: 7/15/2022  11:48 AM CDT  To: Julianadeanna Linden    Sounds good! I placed a new order. She needs to be scheduled before sept.    Thanks    ----- Message -----  From: Samira Cheatham  Sent: 7/15/2022  11:31 AM CDT  To: Carla Gilliam RN    Sorry just to clarify, does this pt need an additional appointment scheduled besides the one from 7/13? If so, can  you please enter another referral.     From: Carla Gilliam RN  Sent: 7/15/2022  11:33 AM CDT  To: Samira Cheatham    She did nott come to that appointment so needs to be scheduled again, thank you    ----- Message -----  From: Samira Cheatham  Sent: 7/15/2022  11:29 AM CDT  To: Carla Gilliam RN    Pt was scheduled on 6/9  for a colon appointment on  7/13 at [facility] with Dr. Anna. If there is an additional appointment this pt needs please let us know!    Thank you!    Samira Cheatham  Endo Scheduling Team       From: Carla Gilliam RN  Sent: 7/14/2022  10:21 AM CDT  To: Endoscopy Scheduling Pool    Please reschedule this patient at [facility] with Dr. Anna for colonoscopy- please let me know when this is scheduled    Thank you  Carla Gilliam RN BSN  RN Colon Rectal Surgery  [phone]

## 2022-07-15 NOTE — TELEPHONE ENCOUNTER
Caller: Virgilio Chavez    Procedure: Colon    Date, Location, and Surgeon of Procedure Cancelled: 8/3, [facility], [place]    Ordering Provider: Wilfrido    Reason for cancel (please be detailed, any staff messages or encounters to note?): See Notes Above        Rescheduled: Y     If rescheduled:    Date: 8/3   Location: [facility]   Prep Resent: Yes (changes to prep?)   Covid Test Rescheduled: No, home rapid test   Note any change or update to original order/sedation:

## 2022-07-27 NOTE — TELEPHONE ENCOUNTER
Rescheduled procedure.    Attempted to contact patient regarding upcoming colonoscopy procedure on 8.3.2022 for pre assessment questions. No answer.     Left message to return call to [phone] #3    Bring proof of COVID 19 vaccination.    Arrival time: 1400    Facility location: [facility]    Sedation type: MAC    Indication for procedure: screening; pt has a colostomy    Bowel prep recommendation: Does pt have Miralax/Magnesium citrate/Dulcolax prep items?  If not, order Golytely d/t mag citrate recall.  Per [employer] message 7.15.2022 instructions were also sent to pt to complete 2 fleet enemas    Anne Lemus RN

## 2022-07-29 RX ORDER — BISACODYL 5 MG/1
TABLET, DELAYED RELEASE ORAL
Qty: 4 TABLET | Refills: 0 | Status: SHIPPED | OUTPATIENT
Start: 2022-07-29 | End: 2022-08-18

## 2022-07-29 NOTE — TELEPHONE ENCOUNTER
Pre assessment questions completed for upcoming colonoscopy procedure scheduled on 8.3.2022    Bring proof of COVID 19 vaccination.    Reviewed procedural arrival time 1400 and facility location [facility].    Designated  policy reviewed. Instructed to have someone stay 24 hours post procedure.     Anticoagulation/blood thinners? no    Electronic implanted devices? No, baclofen pump    Golytely prep d/t mag citrate recall    Reviewed Goltyely prep instructions with patient. No fiber/iron supplements or foods that contain nuts/seeds prior to procedure.     Patient verbalized understanding and had no questions or concerns at this time.    Anne Lemus RN

## 2022-08-02 ENCOUNTER — TELEPHONE (OUTPATIENT)
Dept: GASTROENTEROLOGY | Facility: CLINIC | Age: 32
End: 2022-08-02

## 2022-08-02 PROBLEM — F64.9 GENDER DYSPHORIA: Status: ACTIVE | Noted: 2022-08-02

## 2022-08-02 NOTE — TELEPHONE ENCOUNTER
Caller: RICHAR    Procedure: COLONOSCOPY    Date, Location, and Surgeon of Procedure Cancelled: 8/3, [facility], [place]    Ordering Provider:    Reason for cancel (please be detailed, any staff messages or encounters to note?): SCHEDULE CONFLICT PATIENT HAS ALL THE PREP         Rescheduled: YES     If rescheduled:    Date: 8/10   Location: [facility]   Prep Resent: Y(changes to prep?)   Covid Test Rescheduled: Y   Note any change or update to original order/sedation: N

## 2022-08-03 NOTE — TELEPHONE ENCOUNTER
Rescheduled procedure.    Attempted to contact patient regarding upcoming colonoscopy procedure on 8.10.2022 for pre assessment questions. No answer.     Left message to return call to [phone] #3    Bring proof COVID 19 vaccination    Arrival time: 0830    Facility location: [facility]    Sedation type: PETRONA Lemus RN

## 2022-08-04 ENCOUNTER — DOCUMENTATION ONLY (OUTPATIENT)
Dept: UROLOGY | Facility: CLINIC | Age: 32
End: 2022-08-04

## 2022-08-04 NOTE — PROGRESS NOTES
RN Care Coordinator: Parth Campos; [phone]    Surgery is scheduled with Dr. Dominguez on 8/18 at the [facility]  Scheduled per next available    H&P to be completed by Primary Care    COVID-19 test:   patient to complete an at-home test 1-2 days prior to scheduled date and bring a picture of negative results or call [phone] option # 7 to schedule a PCR test within 4 days of the scheduled date     Post-op:   9/2, virtually with Dr. Dominguez    Patient will receive a phone call from pre-admission nurses 1-2 days prior to surgery with arrival and start time.    Confirmed with patient via [employer].    Surgery packet was sent via US mail

## 2022-08-08 ENCOUNTER — TELEPHONE (OUTPATIENT)
Dept: PLASTIC SURGERY | Facility: CLINIC | Age: 32
End: 2022-08-08

## 2022-08-08 NOTE — TELEPHONE ENCOUNTER
Pre assessment questions completed for upcoming colonoscopy procedure scheduled on 8.10.2.022    Bring proof COVID 19 vaccination    Reviewed procedural arrival time 0830 and facility location [facility].    Designated  policy reviewed. Instructed to have someone stay 24 hours post procedure.     Anticoagulation/blood thinners? no    Electronic implanted devices? No, baclofen pump    Pt declined having prep instructions reviewed.    Patient verbalized understanding and had no questions or concerns at this time.    Anne Lemus RN

## 2022-08-08 NOTE — TELEPHONE ENCOUNTER
Pre Op Teaching Flowsheet                                        Pre and Post op Patient Education  Diagnosis: Gender dysphoria  Teaching Pre and post op for: bilateral orchiectomy  Person Involved in teaching: Patient      Motivation Level: High  Asks Questions: Yes  Eager to Learn:  Yes  Cooperative: Yes  Receptive (willing/able to accept information):  Yes    Patient demonstrates understanding of the following:  Date of surgery:  8/18/22  Location of surgery: [facility]  Pre operative History/Physical other testing prior to surgery: Pt will schedule with [facility] provider [facility], [facility] in [place]. Pt understands this is a requirement for surgery.  No more than 30 days prior to surgery date.   Medications to take the day of surgery: PCP   Blood thinner medications discussed and when to stop (if applicable): PCP   Diabetes medication management (if applicable): PCP    Patient demonstrates understanding of the following:  Patient informed and verbalizes understanding of the need for a responsible adult  and someone to stay with them for the first 24 hours post-operatively: Yes  Pre-op bowel prep: N/A  NPO per Anesthesia Guidelines : Reviewed  Pre-op showering/scrub information with Hibiclens Soap: Yes    Discussed   Pain Management after surgery: pain scale, pain medications techniques  Infection Prevention  Patient instructed on hand hygiene  Surgical procedure site care taught  Signs and symptoms of infection taught  Wound care will be taught at the time of discharge.    Urine anaylsis and Urine Culture to be collected on: n/a  Pre op Covid testing on: Rapid at-home test 1-2 days before surgery, will take a picture of test and bring on day of surgery or upload to [employer]  Post-op follow-up: 9/2/22 virtual with Dr Dominguez  Discussed how to contact the hospital, nurse, and clinic scheduling staff if necessary.     Surgical instructions given to patient via Phone .  Instructional materials: Before  your surgery packet.    Total time with patient: 10 minutes    Parth ELIZABETH RN

## 2022-08-10 ENCOUNTER — TRANSFERRED RECORDS (OUTPATIENT)
Dept: HEALTH INFORMATION MANAGEMENT | Facility: CLINIC | Age: 32
End: 2022-08-10

## 2022-08-17 ENCOUNTER — ANESTHESIA EVENT (OUTPATIENT)
Dept: SURGERY | Facility: AMBULATORY SURGERY CENTER | Age: 32
End: 2022-08-17
Payer: COMMERCIAL

## 2022-08-18 ENCOUNTER — ANESTHESIA (OUTPATIENT)
Dept: SURGERY | Facility: AMBULATORY SURGERY CENTER | Age: 32
End: 2022-08-18
Payer: COMMERCIAL

## 2022-08-18 ENCOUNTER — HOSPITAL ENCOUNTER (OUTPATIENT)
Facility: AMBULATORY SURGERY CENTER | Age: 32
Discharge: HOME | End: 2022-08-18
Attending: UROLOGY
Payer: COMMERCIAL

## 2022-08-18 VITALS
TEMPERATURE: 98 F | SYSTOLIC BLOOD PRESSURE: 94 MMHG | BODY MASS INDEX: 25.71 KG/M2 | HEIGHT: 66 IN | HEART RATE: 87 BPM | RESPIRATION RATE: 12 BRPM | WEIGHT: 160 LBS | DIASTOLIC BLOOD PRESSURE: 55 MMHG | OXYGEN SATURATION: 95 %

## 2022-08-18 DIAGNOSIS — F64.9 GENDER DYSPHORIA: ICD-10-CM

## 2022-08-18 PROCEDURE — 54520 REMOVAL OF TESTIS: CPT | Mod: 50 | Performed by: UROLOGY

## 2022-08-18 PROCEDURE — 88305 TISSUE EXAM BY PATHOLOGIST: CPT | Mod: 26 | Performed by: PATHOLOGY

## 2022-08-18 PROCEDURE — 88305 TISSUE EXAM BY PATHOLOGIST: CPT | Mod: TC | Performed by: UROLOGY

## 2022-08-18 PROCEDURE — 54520 REMOVAL OF TESTIS: CPT | Mod: KX,RT

## 2022-08-18 RX ORDER — PROPOFOL 10 MG/ML
INJECTION, EMULSION INTRAVENOUS CONTINUOUS PRN
Status: DISCONTINUED | OUTPATIENT
Start: 2022-08-18 | End: 2022-08-18

## 2022-08-18 RX ORDER — ONDANSETRON 4 MG/1
4 TABLET, ORALLY DISINTEGRATING ORAL EVERY 30 MIN PRN
Status: DISCONTINUED | OUTPATIENT
Start: 2022-08-18 | End: 2022-08-19 | Stop reason: HOSPADM

## 2022-08-18 RX ORDER — HYDROMORPHONE HYDROCHLORIDE 1 MG/ML
0.4 INJECTION, SOLUTION INTRAMUSCULAR; INTRAVENOUS; SUBCUTANEOUS EVERY 10 MIN PRN
Status: DISCONTINUED | OUTPATIENT
Start: 2022-08-18 | End: 2022-08-18 | Stop reason: HOSPADM

## 2022-08-18 RX ORDER — ONDANSETRON 2 MG/ML
INJECTION INTRAMUSCULAR; INTRAVENOUS PRN
Status: DISCONTINUED | OUTPATIENT
Start: 2022-08-18 | End: 2022-08-18

## 2022-08-18 RX ORDER — OXYCODONE HYDROCHLORIDE 5 MG/1
5 TABLET ORAL EVERY 6 HOURS PRN
Qty: 12 TABLET | Refills: 0 | Status: SHIPPED | OUTPATIENT
Start: 2022-08-18 | End: 2022-08-21

## 2022-08-18 RX ORDER — CEFAZOLIN SODIUM 2 G/50ML
2 SOLUTION INTRAVENOUS
Status: COMPLETED | OUTPATIENT
Start: 2022-08-18 | End: 2022-08-18

## 2022-08-18 RX ORDER — ONDANSETRON 2 MG/ML
4 INJECTION INTRAMUSCULAR; INTRAVENOUS EVERY 30 MIN PRN
Status: DISCONTINUED | OUTPATIENT
Start: 2022-08-18 | End: 2022-08-19 | Stop reason: HOSPADM

## 2022-08-18 RX ORDER — LIDOCAINE 40 MG/G
CREAM TOPICAL
Status: DISCONTINUED | OUTPATIENT
Start: 2022-08-18 | End: 2022-08-18 | Stop reason: HOSPADM

## 2022-08-18 RX ORDER — GLYCOPYRROLATE 0.2 MG/ML
INJECTION, SOLUTION INTRAMUSCULAR; INTRAVENOUS PRN
Status: DISCONTINUED | OUTPATIENT
Start: 2022-08-18 | End: 2022-08-18

## 2022-08-18 RX ORDER — OXYCODONE HYDROCHLORIDE 5 MG/1
5 TABLET ORAL EVERY 4 HOURS PRN
Status: DISCONTINUED | OUTPATIENT
Start: 2022-08-18 | End: 2022-08-19 | Stop reason: HOSPADM

## 2022-08-18 RX ORDER — LIDOCAINE HYDROCHLORIDE 20 MG/ML
INJECTION, SOLUTION INFILTRATION; PERINEURAL PRN
Status: DISCONTINUED | OUTPATIENT
Start: 2022-08-18 | End: 2022-08-18

## 2022-08-18 RX ORDER — FENTANYL CITRATE 50 UG/ML
50 INJECTION, SOLUTION INTRAMUSCULAR; INTRAVENOUS EVERY 5 MIN PRN
Status: DISCONTINUED | OUTPATIENT
Start: 2022-08-18 | End: 2022-08-18 | Stop reason: HOSPADM

## 2022-08-18 RX ORDER — SODIUM CHLORIDE, SODIUM LACTATE, POTASSIUM CHLORIDE, CALCIUM CHLORIDE 600; 310; 30; 20 MG/100ML; MG/100ML; MG/100ML; MG/100ML
INJECTION, SOLUTION INTRAVENOUS CONTINUOUS
Status: DISCONTINUED | OUTPATIENT
Start: 2022-08-18 | End: 2022-08-18 | Stop reason: HOSPADM

## 2022-08-18 RX ORDER — PROPOFOL 10 MG/ML
INJECTION, EMULSION INTRAVENOUS PRN
Status: DISCONTINUED | OUTPATIENT
Start: 2022-08-18 | End: 2022-08-18

## 2022-08-18 RX ORDER — FENTANYL CITRATE 50 UG/ML
INJECTION, SOLUTION INTRAMUSCULAR; INTRAVENOUS PRN
Status: DISCONTINUED | OUTPATIENT
Start: 2022-08-18 | End: 2022-08-18

## 2022-08-18 RX ORDER — GABAPENTIN 300 MG/1
300 CAPSULE ORAL
Status: COMPLETED | OUTPATIENT
Start: 2022-08-18 | End: 2022-08-18

## 2022-08-18 RX ORDER — ACETAMINOPHEN 325 MG/1
975 TABLET ORAL ONCE
Status: COMPLETED | OUTPATIENT
Start: 2022-08-18 | End: 2022-08-18

## 2022-08-18 RX ORDER — KETOROLAC TROMETHAMINE 30 MG/ML
INJECTION, SOLUTION INTRAMUSCULAR; INTRAVENOUS PRN
Status: DISCONTINUED | OUTPATIENT
Start: 2022-08-18 | End: 2022-08-18

## 2022-08-18 RX ORDER — ALBUTEROL SULFATE 0.83 MG/ML
2.5 SOLUTION RESPIRATORY (INHALATION) EVERY 4 HOURS PRN
Status: DISCONTINUED | OUTPATIENT
Start: 2022-08-18 | End: 2022-08-18 | Stop reason: HOSPADM

## 2022-08-18 RX ORDER — AMOXICILLIN 250 MG
1-2 CAPSULE ORAL 2 TIMES DAILY
Qty: 30 TABLET | Refills: 0 | Status: SHIPPED | OUTPATIENT
Start: 2022-08-18 | End: 2023-05-08

## 2022-08-18 RX ORDER — CEFAZOLIN SODIUM 2 G/50ML
2 SOLUTION INTRAVENOUS SEE ADMIN INSTRUCTIONS
Status: DISCONTINUED | OUTPATIENT
Start: 2022-08-18 | End: 2022-08-18 | Stop reason: HOSPADM

## 2022-08-18 RX ORDER — BUPIVACAINE HYDROCHLORIDE AND EPINEPHRINE 5; 5 MG/ML; UG/ML
INJECTION, SOLUTION PERINEURAL PRN
Status: DISCONTINUED | OUTPATIENT
Start: 2022-08-18 | End: 2022-08-18 | Stop reason: HOSPADM

## 2022-08-18 RX ORDER — HALOPERIDOL 5 MG/ML
1 INJECTION INTRAMUSCULAR
Status: DISCONTINUED | OUTPATIENT
Start: 2022-08-18 | End: 2022-08-19 | Stop reason: HOSPADM

## 2022-08-18 RX ORDER — DEXAMETHASONE SODIUM PHOSPHATE 4 MG/ML
INJECTION, SOLUTION INTRA-ARTICULAR; INTRALESIONAL; INTRAMUSCULAR; INTRAVENOUS; SOFT TISSUE PRN
Status: DISCONTINUED | OUTPATIENT
Start: 2022-08-18 | End: 2022-08-18

## 2022-08-18 RX ADMIN — PROPOFOL 200 MG: 10 INJECTION, EMULSION INTRAVENOUS at 09:47

## 2022-08-18 RX ADMIN — GLYCOPYRROLATE 0.2 MG: 0.2 INJECTION, SOLUTION INTRAMUSCULAR; INTRAVENOUS at 09:40

## 2022-08-18 RX ADMIN — LIDOCAINE HYDROCHLORIDE 80 MG: 20 INJECTION, SOLUTION INFILTRATION; PERINEURAL at 09:47

## 2022-08-18 RX ADMIN — CEFAZOLIN SODIUM 2 G: 2 SOLUTION INTRAVENOUS at 09:41

## 2022-08-18 RX ADMIN — PROPOFOL 200 MCG/KG/MIN: 10 INJECTION, EMULSION INTRAVENOUS at 09:48

## 2022-08-18 RX ADMIN — OXYCODONE HYDROCHLORIDE 5 MG: 5 TABLET ORAL at 11:53

## 2022-08-18 RX ADMIN — FENTANYL CITRATE 100 MCG: 50 INJECTION, SOLUTION INTRAMUSCULAR; INTRAVENOUS at 09:52

## 2022-08-18 RX ADMIN — KETOROLAC TROMETHAMINE 30 MG: 30 INJECTION, SOLUTION INTRAMUSCULAR; INTRAVENOUS at 10:59

## 2022-08-18 RX ADMIN — SODIUM CHLORIDE, SODIUM LACTATE, POTASSIUM CHLORIDE, CALCIUM CHLORIDE: 600; 310; 30; 20 INJECTION, SOLUTION INTRAVENOUS at 08:53

## 2022-08-18 RX ADMIN — DEXAMETHASONE SODIUM PHOSPHATE 4 MG: 4 INJECTION, SOLUTION INTRA-ARTICULAR; INTRALESIONAL; INTRAMUSCULAR; INTRAVENOUS; SOFT TISSUE at 09:48

## 2022-08-18 RX ADMIN — ONDANSETRON 4 MG: 2 INJECTION INTRAMUSCULAR; INTRAVENOUS at 09:48

## 2022-08-18 RX ADMIN — GABAPENTIN 300 MG: 300 CAPSULE ORAL at 08:45

## 2022-08-18 RX ADMIN — ACETAMINOPHEN 975 MG: 325 TABLET ORAL at 08:44

## 2022-08-18 NOTE — ANESTHESIA CARE TRANSFER NOTE
Patient: Hannah Chavez    Procedure: Procedure(s):  Bilateral Simple Scrotal Orchiectomy       Diagnosis: Gender dysphoria [F64.9]  Diagnosis Additional Information: No value filed.    Anesthesia Type:   No value filed.     Note:    Oropharynx: oropharynx clear of all foreign objects  Level of Consciousness: awake  Oxygen Supplementation: room air    Independent Airway: airway patency satisfactory and stable  Dentition: dentition unchanged  Vital Signs Stable: post-procedure vital signs reviewed and stable  Report to RN Given: handoff report given  Patient transferred to: PACU  Comments: VSS and WNL, comfortable, no PONV, report to Ana RN  Handoff Report: Identifed the Patient, Identified the Reponsible Provider, Reviewed the pertinent medical history, Discussed the surgical course, Reviewed Intra-OP anesthesia mangement and issues during anesthesia, Set expectations for post-procedure period and Allowed opportunity for questions and acknowledgement of understanding      Vitals:  Vitals Value Taken Time   BP     Temp     Pulse     Resp     SpO2         Electronically Signed By: AUTUMN Tierney CRNA  August 18, 2022  11:19 AM

## 2022-08-18 NOTE — ANESTHESIA PREPROCEDURE EVALUATION
"Anesthesia Pre-Procedure Evaluation    Patient: Hannah Chavez   MRN: [id number] : 1990        Procedure : Procedure(s):  Bilateral Simple Scrotal Orchiectomy          Past Medical History:   Diagnosis Date     Asthma      Bladder incontinence      Self-catheterizes urinary bladder      Spinal cord injury of T10 vertebra (H)      Spinal cord injury of thoracic region without bone injury, initial encounter (H)      Uncomplicated asthma       Past Surgical History:   Procedure Laterality Date     CYSTOSCOPY N/A 2017    Procedure: CYSTOSCOPY;  Cystoscopy and Botox Injection into the Bladder;  Surgeon: Michael Mcnulty MD;  [facility]     CYSTOSCOPY N/A 10/12/2018    Procedure: CYSTOSCOPY;  Cystoscopy, Botox;  Surgeon: Michael Mcnulty MD;  [facility]     CYSTOSCOPY, INTRAVESICAL INJECTION N/A 3/2/2018    Procedure: CYSTOSCOPY, INTRAVESICAL INJECTION;  Cystoscopy And Botox Injection Into The Bladder  ;  Surgeon: Michael Mcnulty MD;  [facility]     INJECT BOTOX N/A 2017    Procedure: INJECT BOTOX;;  Surgeon: Michael Mcnulty MD;  [facility]     INJECT BOTOX N/A 10/12/2018    Procedure: INJECT BOTOX;;  Surgeon: Michael Mcnulty MD;  [facility]      Allergies   Allergen Reactions     Shellfish Allergy Nausea and Vomiting and Unknown     Patient states his mother has a \"violent reaction\" to shellfish, and that he has a feeling of nausea when he smells shellfish, so he has never consumed any. He states he has also not been tested.       Shellfish-Derived Products Other (See Comments)     Patient states her mother has a \"violent reaction\" to shellfish, and that she has a feeling of nausea when she smells shellfish, so she has never consumed any. She states she has also not been tested.  Patient states his mother has a \"violent reaction\" to shellfish, and that he has a feeling of nausea when he smells shellfish, so he has never consumed any. He states he " has also not been tested.        Social History     Tobacco Use     Smoking status: Never Smoker     Smokeless tobacco: Never Used   Substance Use Topics     Alcohol use: No      Wt Readings from Last 1 Encounters:   08/18/22 72.6 kg (160 lb)        Anesthesia Evaluation   Pt has had prior anesthetic.         ROS/MED HX  ENT/Pulmonary:     (+) Intermittent, asthma     Neurologic: Comment: T10 injury      Cardiovascular:  - neg cardiovascular ROS     METS/Exercise Tolerance:     Hematologic:  - neg hematologic  ROS     Musculoskeletal:  - neg musculoskeletal ROS     GI/Hepatic:  - neg GI/hepatic ROS     Renal/Genitourinary:  - neg Renal ROS     Endo:  - neg endo ROS     Psychiatric/Substance Use:  - neg psychiatric ROS     Infectious Disease:       Malignancy:       Other:            Physical Exam    Airway  airway exam normal           Respiratory Devices and Support         Dental  no notable dental history         Cardiovascular   cardiovascular exam normal          Pulmonary   pulmonary exam normal                OUTSIDE LABS:  CBC: No results found for: WBC, HGB, HCT, PLT  BMP: No results found for: NA, POTASSIUM, CHLORIDE, CO2, BUN, CR, GLC  COAGS: No results found for: PTT, INR, FIBR  POC: No results found for: BGM, HCG, HCGS  HEPATIC: No results found for: ALBUMIN, PROTTOTAL, ALT, AST, GGT, ALKPHOS, BILITOTAL, BILIDIRECT, SABAS  OTHER: No results found for: PH, LACT, A1C, CHERIE, PHOS, MAG, LIPASE, AMYLASE, TSH, T4, T3, CRP, SED    Anesthesia Plan    ASA Status:  3   NPO Status:  NPO Appropriate    Anesthesia Type: General.     - Airway: LMA   Induction: Intravenous.   Maintenance: TIVA.        Consents    Anesthesia Plan(s) and associated risks, benefits, and realistic alternatives discussed. Questions answered and patient/representative(s) expressed understanding.     - Discussed: Risks, Benefits and Alternatives for BOTH SEDATION and the PROCEDURE were discussed     - Discussed with:  Patient          Postoperative Care    Pain management: Oral pain medications.   PONV prophylaxis: Ondansetron (or other 5HT-3), Dexamethasone or Solumedrol     Comments:                Geoff Pulido MD, MD

## 2022-08-18 NOTE — DISCHARGE INSTRUCTIONS
"[facility]  Home Care Following Anesthesia  For 24 hours after surgery:  Get plenty of rest.  A responsible adult must stay with you for at least 24 hours after you leave the surgery center.  Do not drive or use heavy equipment.  If you have weakness or tingling, don't drive or use heavy equipment until this feeling goes away.   Do not drink alcohol.   Avoid strenuous or risky activities.  Ask for help when climbing stairs.  You may feel lightheaded.  IF so, sit for a few minutes before standing.  Have someone help you get up.   If you have nausea (feel sick to your stomach): Drink only clear liquids such as apple juice, ginger ale, broth or 7-Up.  Rest may also help.  Be sure to drink enough fluids.  Move to a regular diet as you feel able.   You may have a slight fever.  Call the doctor if your fever is over 100 F (37.7 C) (taken under the tongue) or lasts longer than 24 hours.  You may have a dry mouth, a sore throat, muscle aches or trouble sleeping. These should go away after 24 hours.  Do not make important or legal decisions.   It is recommended to avoid smoking.        Today you received a Marcaine or bupivacaine block to numb the nerves near your surgery site.  This is a block using local anesthetic or \"numbing\" medication injected around the nerves to anesthetize or \"numb\" the area supplied by those nerves.  This block is injected into the muscle layer near your surgical site.  The medication may numb the location where you had surgery for 6-18 hours, but may last up to 24 hours.  If your surgical site is an arm or leg you should be careful with your affected limb, since it is possible to injure your limb without being aware of it due to the numbing.  Until full feeling returns, you should guard against bumping or hitting your limb, and avoid extreme hot or cold temperatures on the skin.  As the block wears off, the feeling will return as a tingling or prickly " sensation near your surgical site.  You will experience more discomfort from your incision as the feeling returns.  You may want to take a pain pill (a narcotic or Tylenol if this was prescribed by your surgeon) when you start to experience mild pain before the pain beccomes more severe.  If your pain medications do not control your pain you should notifiy your surgeon.    Tips for taking pain medications  To get the best pain relief possible, remember these points:  Take pain medications as directed, before pain becomes severe.  Pain medication can upset your stomach: taking it with food may help.  Constipation is a common side effect of pain medication. Drink plenty of  fluids.  Eat foods high in fiber. Take a stool softener if recommended by your doctor or pharmacist.  Do not drink alcohol, drive or operate machinery while taking pain medications.  Ask about other ways to control pain, such as with heat, ice or relaxation.    Tylenol/Acetaminophen Consumption  To help encourage the safe use of acetaminophen, the makers of TYLENOL  have lowered the maximum daily dose for single-ingredient Extra Strength TYLENOL  (acetaminophen) products sold in the [place] from 8 pills per day (4,000 mg) to 6 pills per day (3,000 mg). The dosing interval has also changed from 2 pills every 4-6 hours to 2 pills every 6 hours.  If you feel your pain relief is insufficient, you may take Tylenol/Acetaminophen in addition to your narcotic pain medication.   Be careful not to exceed 3,000 mg of Tylenol/Acetaminophen in a 24 hour period from all sources.  If you are taking extra strength Tylenol/acetaminophen (500 mg), the maximum dose is 6 tablets in 24 hours.  If you are taking regular strength acetaminophen (325 mg), the maximum dose is 9 tablets in 24 hours.  Tylenol 975mg given at 8:44am. Next dose available after 2:45pm. Then follow package instructions.      Call a doctor for any of the following:  Signs of infection (fever, growing  tenderness at the surgery site, a large amount of drainage or bleeding, severe pain, foul-smelling drainage, redness, swelling).  It has been over 8 to 10 hours since surgery and you are still not able to urinate (pass water).  Headache for over 24 hours.  Numbness, tingling or weakness the day after surgery (if you had spinal anesthesia).  Signs of Covid-19 infection (temperature over 100 degrees, shortness of breath, cough, loss of taste/smell, generalized body aches, persistent headache, chills, sore throat, nausea/vomiting/diarrhea)  Your doctor is:       Dr. Chilango Dominguez, Prostate and Urology: [phone]               Or dial [phone] and ask for the resident on call for:  Prostate Urology  For emergency care, call the:  [facility]:  [phone] (TTY for hearing impaired: [phone])

## 2022-08-18 NOTE — OP NOTE
PREOPERATIVE DIAGNOSIS:   1. Gender dysphoria  2. Recurrent epididymitis    POSTOPERATIVE DIAGNOSIS: Same    PROCEDURES PERFORMED: Bilateral simple scrotal orchiectomy    STAFF SURGEON: Chilango Dominguez MD  RESIDENT(S): Ehsan Reid MD  ANESTHESIA: General    ESTIMATED BLOOD LOSS: 5 mL.   IV FLUIDS: see dictated anesthesia record  COMPLICATIONS: None.   SPECIMEN:  Bilateral testicle and spermatic cord up to the level of the external ring     SIGNIFICANT FINDINGS:   Ligation of the cord at the level approximately of the external ring; testicle excised en bloc. Inflammation from chronic epididymitis.     BRIEF OPERATIVE INDICATIONS: Hannah Chavez is a 32 year old transgender female wishing to undergo bilateral orchiectomy for gender affirmation surgery.      DESCRIPTION OF PROCEDURE: After full informed voluntary consent was obtained, the patient was transported to the operating room, placed supine on the table. After adequate anesthesia was induced, they were prepped and draped in the usual sterile fashion. A timeout was taken to confirm correct patient, procedure and laterality      We began the procedure by marking a 3 cm scrotal incision beginning at the penoscrotal junction at the midline raphe.  Incision was made using a scalpel and electrocautery was used to carry dissection down through the dartos muscle. Of note, there was significant adhesions from chronic epididymitis. The left testicle was delivered into the wound. Tunica vaginalis was intact. Blunt and electrocautery dissection was continued proximally to mobilize the cord.    The cord was dissected superiorly up to the level of the external ring. It was clamped and divided into two segments which were then separately clamped. The cord was divided. Each segment was then stick tied with 0 silk suture. The testicle and spermatic cord were removed.    The procedure as stated above was then repeated on the right side.   Irrigation was performed and a cord  block was done with 0.5% Marcaine on remnant of spermatic cord bilaterally. Hemostasis was excellent.     Of note, procedure was very challenging given extensive scarring due to numerous epididymitis episodes.     The dartos was closed with a running 3-0 vicryl suture followed by the skin with running 4-0 monocryl suture in horizontal mattress fashion. Bacitracin, fluffs, and a scrotal support were applied.      Patient tolerated the procedure well. No apparent complications. She was transported to the postanesthesia care unit in stable condition    As attending surgeon, I, Chilango Dominguez MD, was scrubbed and present for the entire procedure.

## 2022-08-18 NOTE — ANESTHESIA POSTPROCEDURE EVALUATION
Patient: Hannah Chavez    Procedure: Procedure(s):  Bilateral Simple Scrotal Orchiectomy       Anesthesia Type:  General    Note:  Disposition: Outpatient   Postop Pain Control: Uneventful            Sign Out: Well controlled pain   PONV: No   Neuro/Psych: Uneventful            Sign Out: Acceptable/Baseline neuro status   Airway/Respiratory: Uneventful            Sign Out: Acceptable/Baseline resp. status   CV/Hemodynamics: Uneventful            Sign Out: Acceptable CV status; No obvious hypovolemia; No obvious fluid overload   Other NRE: NONE   DID A NON-ROUTINE EVENT OCCUR? No           Last vitals:  Vitals Value Taken Time   BP 94/62 08/18/22 1145   Temp 36.2  C (97.2  F) 08/18/22 1145   Pulse 93 08/18/22 1156   Resp 11 08/18/22 1156   SpO2 94 % 08/18/22 1156   Vitals shown include unvalidated device data.    Electronically Signed By: Geoff Pulido MD, MD  August 18, 2022  12:26 PM

## 2022-08-19 ENCOUNTER — TELEPHONE (OUTPATIENT)
Dept: PLASTIC SURGERY | Facility: CLINIC | Age: 32
End: 2022-08-19

## 2022-08-19 ENCOUNTER — NURSE TRIAGE (OUTPATIENT)
Dept: NURSING | Facility: CLINIC | Age: 32
End: 2022-08-19

## 2022-08-19 NOTE — TELEPHONE ENCOUNTER
Called pt in response to receiving a forwarded triage call from Alicia Escobar RN at 4:44pm today. Pt had bilateral orchiectomy yesterday. Today she has swelling in the scrotal area, which is to be expected at this point. Pt also has swelling near her bladder which she says has made it impossible for her to straight cath herself. Pt denies fever, chills, feeling sick, and spreading redness from the surgical site. Provided pt direction about how to contact the on-call urology resident and instructed her to call. Pt accepting to this plan of care and thanked writer.    JARVIS Alba

## 2022-08-19 NOTE — TELEPHONE ENCOUNTER
Attempted to call pt. Left detailed message to call clinic back at [phone].   To discuss symptoms. Provided after hours line of [phone].     Alicia Escobar RN MSN

## 2022-08-19 NOTE — TELEPHONE ENCOUNTER
Nurse Triage SBAR    Is this a 2nd Level Triage?  Yes    Situation:  Severe pain/Pain medication not working and Pt having issues with straight cathing.    Background/Assessment:     On 8/18/2022 Pt had Bilateral Simple Scrotal Orchiectomy.    Pt is in severe pain and not getting any pain relief.  Pt is having issues with straight cathing.   Also, there is a lot of blood on the gauze from the surgical procedures.   Pt is worried about bleeding concerns.    Would like a call back from the clinic    Please call Pt back @ [phone] for further assistance.    Irina Mccormack RN  Central Triage Red Flags/Med Refills    Protocol Recommended Disposition:   Discuss With PCP And Callback By Nurse Within 1 Hour      Reason for Disposition    SEVERE post-op pain (e.g., excruciating, pain scale 8-10) that is not controlled with pain medications    Additional Information    Negative: Sounds like a life-threatening emergency to the triager    Negative: Chest pain    Negative: Difficulty breathing    Negative: Acting confused (e.g., disoriented, slurred speech) or excessively sleepy    Negative: Surgical incision symptoms and questions    Negative: Pain or burning with passing urine (urination) and male    Negative: Pain or burning with passing urine (urination) and female    Negative: Constipation    Negative: New or worsening leg (calf, thigh) pain    Negative: New or worsening leg swelling    Negative: Dizziness is severe, or persists > 24 hours after surgery    Negative: Symptoms arising from use of a urinary catheter (Evangelista or Coude)    Negative: Cast problems or questions    Negative: Medication question    Negative: Bright red, wide-spread, sunburn-like rash    Negative: SEVERE headache and after spinal (epidural) anesthesia    Negative: Vomiting and persists > 4 hours    Negative: Vomiting and abdomen looks much more swollen than usual    Negative: Drinking very little and dehydration suspected (e.g., no urine > 12  hours, very dry mouth, very lightheaded)    Negative: Patient sounds very sick or weak to the triager    Negative: Sounds like a serious complication to the triager    Negative: Fever > 100.4 F (38.0 C)    Negative: Caller has URGENT question and triager unable to answer question    Protocols used: POST-OP SYMPTOMS AND [id number]

## 2022-08-26 LAB
PATH REPORT.COMMENTS IMP SPEC: NORMAL
PATH REPORT.FINAL DX SPEC: NORMAL
PATH REPORT.GROSS SPEC: NORMAL
PATH REPORT.MICROSCOPIC SPEC OTHER STN: NORMAL
PATH REPORT.RELEVANT HX SPEC: NORMAL
PHOTO IMAGE: NORMAL

## 2022-09-02 ENCOUNTER — VIRTUAL VISIT (OUTPATIENT)
Dept: UROLOGY | Facility: CLINIC | Age: 32
End: 2022-09-02
Payer: COMMERCIAL

## 2022-09-02 DIAGNOSIS — F64.9 GENDER DYSPHORIA: Primary | ICD-10-CM

## 2022-09-02 PROCEDURE — 99024 POSTOP FOLLOW-UP VISIT: CPT | Performed by: UROLOGY

## 2022-09-02 NOTE — LETTER
Date:September 3, 2022      Provider requested that no letter be sent. Do not send.       [facility]

## 2022-09-02 NOTE — NURSING NOTE
"Chief Complaint   Patient presents with     Follow Up     S/p bilateral orchiectomy       There were no vitals taken for this visit. There is no height or weight on file to calculate BMI.    Patient Active Problem List   Diagnosis     Neurogenic bladder     Gender dysphoria       Allergies   Allergen Reactions     Shellfish Allergy Nausea and Vomiting and Unknown     Patient states his mother has a \"violent reaction\" to shellfish, and that he has a feeling of nausea when he smells shellfish, so he has never consumed any. He states he has also not been tested.       Shellfish-Derived Products Other (See Comments)     Patient states her mother has a \"violent reaction\" to shellfish, and that she has a feeling of nausea when she smells shellfish, so she has never consumed any. She states she has also not been tested.  Patient states his mother has a \"violent reaction\" to shellfish, and that he has a feeling of nausea when he smells shellfish, so he has never consumed any. He states he has also not been tested.         Current Outpatient Medications   Medication Sig Dispense Refill     ALBUTEROL IN Inhale into the lungs 4 times daily as needed       baclofen (LIORESAL) 10 MG tablet Take 10 mg by mouth       diazepam (VALIUM) 5 MG tablet Take 5 mg by mouth       estradiol valerate (DELESTROGEN) 20 MG/ML injection estradiol valerate 20 mg/mL intramuscular oil       Insulin Syringe-Needle U-100 (B-D INSULIN SYRINGE) 25G X 1\" 1 ML MISC BD Insulin Syringe 1 mL 25 x 1\"       medication given by implanted intrathecal pump continuous Baclofen continuous pump 171 mcg/day       progesterone (PROMETRIUM) 200 MG capsule Take 200 mg by mouth       senna-docusate (SENOKOT-S/PERICOLACE) 8.6-50 MG tablet Take 1-2 tablets by mouth 2 times daily 30 tablet 0     sertraline (ZOLOFT) 100 MG tablet Take 100 mg by mouth       Sharps Container MISC Sharps Container       spironolactone (ALDACTONE) 100 MG tablet spironolactone 100 mg tablet   " 100mg 2/day       Tolterodine Tartrate (DETROL PO) Take by mouth daily       traZODone (DESYREL) 100 MG tablet Take 1 tablet by mouth         Social History     Tobacco Use     Smoking status: Never Smoker     Smokeless tobacco: Never Used   Substance Use Topics     Alcohol use: No     Drug use: No       Sherif Goode EMT  9/2/2022  12:50 PM

## 2022-09-02 NOTE — LETTER
9/2/2022       RE: Hannah Chavez  [address]  [place] [address]     Dear Colleague,    Thank you for referring your patient, Hannah Chavez, to the [facility] [place] at [facility]. Please see a copy of my visit note below.    Hannah is a 32 year old who is being evaluated via a billable video visit.      Video Visit Technology for this patient: Gerard Video Visit- Patient was left in waiting room    How would you like to obtain your AVS? MyChart  Will anyone else be joining your video visit? No        Video-Visit Details    Video Start Time: 1230p    Type of service:  Video Visit    Video End Time:100p    Originating Location (pt. Location): Home    Distant Location (provider location):  [facility]     Platform used for Video Visit: Gerard    HPI:  Hannah Chavez is a 32 year old transwoman who underwent orchiectomy 8/18/2022  Notably she had a hematoma/swelling, but it has all drained from the incision  Now the scrotum is normal size.  She also has a Baclofen pump, which she wants removed.  She also has a colostomy and Zuleyma's pouch  She is back on estrogen and tells me her E is high,.    She is seeing hormone provider later this week  Also seeing Dr. Anna. She had colostomy and has followup later this month    She is much happier after orchiectomy.  No more testicular pain and swelling/drainage is nearly resolved  She continues to work towards full depth vaginoplasty and colostomy takedown.      Exam:  Exam:           Constitutional - No apparent distress. Patient of stated age.               Eyes - no redness or discharge.              Respiratory - no cough. no labored breathing              Musculoskeletal - full range of motion in all extremities              Skin - no visible skin discoloration or lesions              Neurological - no tremors              Psychiatric - no anxiety, alert &  oriented                 The rest of a comprehensive physical examination is deferred due to PHE (public health emergency) video visit restrictions.    Assessment & Plan     Gender Dysphoria  S/p orchiectomy    Will see Dr. Anna later this month.  Also needs hair removal from scrotum/perineum to be complete if she is to have full depth vaginoplasty.  I hope there is an option for simultaneous colostomy takedown and vaginoplasty. I think going back after completed anastomosis would be risky and challenging. I discussed likely diverting stoma regardless of any plans, she says that's fine.  Will discuss with Dr. Anna re: surgical plans after their consult.    Chilango Dominguez MD  Reconstructive Urology  [facility]        Again, thank you for allowing me to participate in the care of your patient.      Sincerely,    Chilango Dominguez MD

## 2022-09-02 NOTE — PROGRESS NOTES
Hannah is a 32 year old who is being evaluated via a billable video visit.      Video Visit Technology for this patient: Gerard Video Visit- Patient was left in waiting room    How would you like to obtain your AVS? MyChart  Will anyone else be joining your video visit? No        Video-Visit Details    Video Start Time: 1230p    Type of service:  Video Visit    Video End Time:100p    Originating Location (pt. Location): Home    Distant Location (provider location):  [facility] [place]     Platform used for Video Visit: Gerard    HPI:  Hannah Chavez is a 32 year old transwoman who underwent orchiectomy 8/18/2022  Notably she had a hematoma/swelling, but it has all drained from the incision  Now the scrotum is normal size.  She also has a Baclofen pump, which she wants removed.  She also has a colostomy and Zuleyma's pouch  She is back on estrogen and tells me her E is high,.    She is seeing hormone provider later this week  Also seeing Dr. Anna. She had colostomy and has followup later this month    She is much happier after orchiectomy.  No more testicular pain and swelling/drainage is nearly resolved  She continues to work towards full depth vaginoplasty and colostomy takedown.      Exam:  Exam:           Constitutional - No apparent distress. Patient of stated age.               Eyes - no redness or discharge.              Respiratory - no cough. no labored breathing              Musculoskeletal - full range of motion in all extremities              Skin - no visible skin discoloration or lesions              Neurological - no tremors              Psychiatric - no anxiety, alert & oriented                 The rest of a comprehensive physical examination is deferred due to PHE (public health emergency) video visit restrictions.    Assessment & Plan     Gender Dysphoria  S/p orchiectomy    Will see Dr. Anna later this month.  Also needs hair removal from scrotum/perineum to be  complete if she is to have full depth vaginoplasty.  I hope there is an option for simultaneous colostomy takedown and vaginoplasty. I think going back after completed anastomosis would be risky and challenging. I discussed likely diverting stoma regardless of any plans, she says that's fine.  Will discuss with Dr. Anna re: surgical plans after their consult.    Chilango Dominguez MD  Reconstructive Urology  [facility]

## 2022-09-19 ENCOUNTER — PRE VISIT (OUTPATIENT)
Dept: SURGERY | Facility: CLINIC | Age: 32
End: 2022-09-19

## 2022-10-11 ENCOUNTER — TRANSCRIBE ORDERS (OUTPATIENT)
Dept: OTHER | Age: 32
End: 2022-10-11

## 2022-10-11 DIAGNOSIS — N31.9 NEUROGENIC BLADDER: Primary | ICD-10-CM

## 2022-10-11 DIAGNOSIS — R32 URINARY INCONTINENCE: ICD-10-CM

## 2022-10-22 ENCOUNTER — HEALTH MAINTENANCE LETTER (OUTPATIENT)
Age: 32
End: 2022-10-22

## 2022-10-26 ENCOUNTER — TELEPHONE (OUTPATIENT)
Dept: UROLOGY | Facility: CLINIC | Age: 32
End: 2022-10-26

## 2022-10-26 NOTE — TELEPHONE ENCOUNTER
Pt called and scheduled for bladder botox with Dr. Dominguez. Pt would also like to discuss upcoming gender confirmation surgery at this visit.    Diamond Gabriel CMA  10/26/22

## 2022-10-31 ENCOUNTER — PRE VISIT (OUTPATIENT)
Dept: UROLOGY | Facility: CLINIC | Age: 32
End: 2022-10-31

## 2022-10-31 DIAGNOSIS — N31.9 NEUROGENIC BLADDER: ICD-10-CM

## 2022-10-31 DIAGNOSIS — F64.9 GENDER DYSPHORIA: Primary | ICD-10-CM

## 2022-11-28 ENCOUNTER — PRE VISIT (OUTPATIENT)
Dept: UROLOGY | Facility: CLINIC | Age: 32
End: 2022-11-28

## 2022-11-28 NOTE — TELEPHONE ENCOUNTER
Called patient and discussed botox prep instructions. Ciprofloxacin not sent because patient is already on a course of Ciprofloxacin for the next 9 days. All questions addressed.    Sukhjinder Goode EMT  11/28/22  11:01 AM

## 2022-11-30 ENCOUNTER — MYC MEDICAL ADVICE (OUTPATIENT)
Dept: UROLOGY | Facility: CLINIC | Age: 32
End: 2022-11-30

## 2022-12-01 NOTE — TELEPHONE ENCOUNTER
Called pt to follow up on Am[employer] message regarding what needs to be done before vaginoplasty and colostomy takedown. Per Dr Dominguez's last consultation note, pt was to see Dr Anna for consultation. Dr Dominguez would then consult with Dr Anna about surgical options and planning, hopefully for simultaneous surgeries. Pt is pursuing full depth vaginoplasty and requires hair removal. Called pt to discuss. She shared that she has had two appointments cancelled with Dr Hernández and is now rescheduled for April. Pt is frustrated about this, as this is requisite for her to move forward with vaginoplasty. Pt shared that she is not able to tolerate electrolysis and laser hair removal is not covered by insurance. She mentioned that Dr Dominguez stated alternative options could be available. Discussed with pt that this RN will talk with Dr Dominguez about alternatives to hair removal and see if her appointment with Dr Anna can be moved up sooner, if possible. Pt is accepting to this plan. Will follow up with pt after talking with Dr Dominguez.    Parth Patel, JARVIS

## 2022-12-16 NOTE — PROGRESS NOTES
[facility]    UROLOGICAL ORDER FORM      Patient Information:    Customer's Name: Hannah Chavez  YOB: 1990  Address: [address]  [place] [address]  Phone #: [phone]  Diagnosis: Neurogenic bladder    Product Needed:    Hygiene wipes  Instruction: Use as needed  QTY: 1 pack/week  Length of need: 12 months    Product Needed:    Sterile Gloves for self catheterization  Instruction: use when self catheterizing  QTY: 1 box per month  Length of need: 12 months    Product Needed:    Overnight disposable adult underwear  Instruction: Use as needed  QTY: 30/month  Length of need: 12 months    ORDERING Physician/PA/NP      Dr. Chilango Dominguez  DATE: 12/16/22    [facility]  [address] [place] [address] [id number]  [place], [place], [address]      FAX to [facility] [employer]  [address] [place] [address], [id number]  Phone: [phone]  Fax: [phone]  Urgent Fax: [phone]  Email: [email or web address]    John Reynolds  Phone: [phone]  Fax: [phone]  Email: [email or web address]

## 2022-12-19 ENCOUNTER — TELEPHONE (OUTPATIENT)
Dept: UROLOGY | Facility: CLINIC | Age: 32
End: 2022-12-19

## 2022-12-19 DIAGNOSIS — N31.9 NEUROGENIC BLADDER: Primary | ICD-10-CM

## 2022-12-19 RX ORDER — CIPROFLOXACIN 500 MG/1
500 TABLET, FILM COATED ORAL DAILY
Qty: 3 TABLET | Refills: 0 | Status: SHIPPED | OUTPATIENT
Start: 2022-12-19 | End: 2022-12-22

## 2022-12-19 NOTE — TELEPHONE ENCOUNTER
Called patient and LVM stating antibiotics were sent to [facility] pharmacy in [place]. Patient to start taking 1 tablet 500 mg cipro for 3 days starting Tuesday 12/20.    Sukhjinder Goode EMT  12/19/22  8:39 AM

## 2022-12-21 ENCOUNTER — OFFICE VISIT (OUTPATIENT)
Dept: UROLOGY | Facility: CLINIC | Age: 32
End: 2022-12-21
Attending: NURSE PRACTITIONER
Payer: COMMERCIAL

## 2022-12-21 VITALS — HEART RATE: 85 BPM | DIASTOLIC BLOOD PRESSURE: 85 MMHG | SYSTOLIC BLOOD PRESSURE: 126 MMHG

## 2022-12-21 DIAGNOSIS — N31.9 NEUROGENIC BLADDER: Primary | ICD-10-CM

## 2022-12-21 DIAGNOSIS — F64.9 GENDER DYSPHORIA: ICD-10-CM

## 2022-12-21 PROCEDURE — 52287 CYSTOSCOPY CHEMODENERVATION: CPT | Performed by: UROLOGY

## 2022-12-21 ASSESSMENT — PAIN SCALES - GENERAL: PAINLEVEL: NO PAIN (0)

## 2022-12-21 NOTE — NURSING NOTE
"Chief Complaint   Patient presents with     Cystoscopy       Blood pressure 126/85, pulse 85. There is no height or weight on file to calculate BMI.    Patient Active Problem List   Diagnosis     Neurogenic bladder     Gender dysphoria       Allergies   Allergen Reactions     Shellfish Allergy Nausea and Vomiting and Unknown     Patient states his mother has a \"violent reaction\" to shellfish, and that he has a feeling of nausea when he smells shellfish, so he has never consumed any. He states he has also not been tested.       Shellfish-Derived Products Other (See Comments)     Patient states her mother has a \"violent reaction\" to shellfish, and that she has a feeling of nausea when she smells shellfish, so she has never consumed any. She states she has also not been tested.  Patient states his mother has a \"violent reaction\" to shellfish, and that he has a feeling of nausea when he smells shellfish, so he has never consumed any. He states he has also not been tested.         Current Outpatient Medications   Medication Sig Dispense Refill     ALBUTEROL IN Inhale into the lungs 4 times daily as needed       baclofen (LIORESAL) 10 MG tablet Take 10 mg by mouth       ciprofloxacin (CIPRO) 500 MG tablet Take 1 tablet (500 mg) by mouth daily for 3 days 3 tablet 0     diazepam (VALIUM) 5 MG tablet Take 5 mg by mouth       estradiol valerate (DELESTROGEN) 20 MG/ML injection estradiol valerate 20 mg/mL intramuscular oil       Insulin Syringe-Needle U-100 (B-D INSULIN SYRINGE) 25G X 1\" 1 ML MISC BD Insulin Syringe 1 mL 25 x 1\"       medication given by implanted intrathecal pump continuous Baclofen continuous pump 171 mcg/day       progesterone (PROMETRIUM) 200 MG capsule Take 200 mg by mouth       senna-docusate (SENOKOT-S/PERICOLACE) 8.6-50 MG tablet Take 1-2 tablets by mouth 2 times daily 30 tablet 0     sertraline (ZOLOFT) 100 MG tablet Take 100 mg by mouth       Sharps Container MISC Sharps Container       " spironolactone (ALDACTONE) 100 MG tablet spironolactone 100 mg tablet   100mg 2/day       Tolterodine Tartrate (DETROL PO) Take by mouth daily       traZODone (DESYREL) 100 MG tablet Take 1 tablet by mouth         Social History     Tobacco Use     Smoking status: Never     Smokeless tobacco: Never   Substance Use Topics     Alcohol use: No     Drug use: No       Invasive Procedure Safety Checklist:    Procedure: Cystoscopy with Botox injection    Action: Complete sections and checkboxes as appropriate.    Pre-procedure:  1. Patient ID Verified with 2 identifiers (Asya and  or MRN) : YES    2. Procedure and site verified with patient/designee (when able) : YES    3. Accurate consent documentation in medical record : YES    4. H&P (or appropriate assessment) documented in medical record : N/A  H&P must be up to 30 days prior to procedure an updated within 24 hours of                 Procedure as applicable.     5. Relevant diagnostic and radiology test results appropriately labeled and displayed as applicable : YES    6. Blood products, implants, devices, and/or special equipment available for the procedure as applicable : YES    7. Procedure site(s) marked with provider initials [Exclusions: none] : NO    8. Marking not required. Reason : Yes  Procedure does not require site marking    Time Out:     Time-Out performed immediately prior to starting procedure, including verbal and active participation of all team members addressing: YES    1. Correct patient identity.  2. Confirmed that the correct side and site are marked.  3. An accurate procedure to be done.  4. Agreement on the procedure to be done.  5. Correct patient position.  6. Relevant images and results are properly labeled and appropriately displayed.  7. The need to administer antibiotics or fluids for irrigation purposes during the procedure as applicable.  8. Safety precautions based on patient history or medication use.    During Procedure:  Verification of correct person, site, and procedure occurs any time the responsibility for care of the patient is transferred to another member of the care team.    Patient verified with three identifiers, allergies reviewed. Verified patient stopped blood thinning medications and started Cipro.     Urine obtained and, using Clinitek machine, UA showed no nitrites or leuk.       The following medication was given:     MEDICATION:  Botox  ROUTE: administered by physician - bladder wall/urinary sphincter   SITE: administered by physician - bladder wall/urinary sphincter    DOSE: 200 units  LOT #: [id number]  : Gen[employer]  EXPIRATION DATE: 2025/05  NDC#: [id number]   Was there drug waste? No    Prior to injection, verified patient identity using patient's name and date of birth.  Due to injection administration, patient instructed to remain in clinic for 15 minutes  afterwards, and to report any adverse reaction to me immediately.    Drug Amount Wasted:  None.  Vial/Syringe: Single dose vial      Jerrod Gruber EMT-P  12/21/2022  10:48 AM

## 2022-12-21 NOTE — PATIENT INSTRUCTIONS
"Please follow up in six months for a botox cystoscopy.     It was a pleasure meeting with you today.  Thank you for allowing me and my team the privilege of caring for you today.  YOU are the reason we are here, and I truly hope we provided you with the excellent service you deserve.  Please let us know if there is anything else we can do for you so that we can be sure you are leaving completely satisfied with your care experience.        AFTER YOUR CYSTOSCOPY        You have just completed a cystoscopy, or \"cysto\", which allowed your physician to learn more about your bladder (or to remove a stent placed after surgery). We suggest that you continue to avoid caffeine, fruit juice, and alcohol for the next 24 hours, however, you are encouraged to return to your normal activities.         A few things that are considered normal after your cystoscopy:     * Small amount of bleeding (or spotting) that clears within the next 24 hours     * Slight burning sensation with urination     * Sensation to of needing to avoid more frequently     * The feeling of \"air\" in your urine     * Mild discomfort that is relieved with Tylenol        Please contact our office promptly if you:     * Develop a fever above 101 degrees     * Are unable to urinate     * Develop bright red blood that does not stop     * Severe pain or swelling         Please contact our office with any concerns or questions @[email or web address].  "

## 2022-12-28 NOTE — PROGRESS NOTES
Cystoscopy Note    PRE-PROCEDURE DIAGNOSIS:  Neurogenic bladder    POST-PROCEDURE DIAGNOSIS:  Neurogenic bladder    PROCEDURE:   Cystoscopy with chemodenervation of bladder    HISTORY: Hannah Chavez is a 32 year old transgender female with neurogenic bladder.  She has a colostomy and a baclofen pump.  Also does CIC per urethra and gets botox to bladder for neurogenic bladder.      DESCRIPTION OF PROCEDURE:  After informed consent was obtained, the patient was brought to the procedure room where they were placed in the modified lithotomy position with all pressure points well padded.  The patient was prepped and draped in a sterile fashion. A flexible cystoscope was introduced through a well-lubricated urethra. Botox mixed 200u in 20cc saline. Using flexible needle through flexible scope, we did injection of bladder wall in 15-20 locations spread across bladder. Minimal bleeding. Scope removed.    ASSESSMENT AND PLAN:  Neurogenic bladder    Cystoscopic botox injection well tolerated today  Still working on hair removal. Parth spoke with patient today and helped with resources.  Repeat injection in 6 months.    Chilango Dominguez MD  Reconstructive Urology

## 2023-01-04 ENCOUNTER — DOCUMENTATION ONLY (OUTPATIENT)
Dept: UROLOGY | Facility: CLINIC | Age: 33
End: 2023-01-04

## 2023-01-04 ENCOUNTER — MEDICAL CORRESPONDENCE (OUTPATIENT)
Dept: HEALTH INFORMATION MANAGEMENT | Facility: CLINIC | Age: 33
End: 2023-01-04

## 2023-01-04 NOTE — PROGRESS NOTES
Received Completed forms Yes   Faxed Forms Faxed To: [employer]  Fax Number: [phone]   Sent to HIM (Date) 1/4/23

## 2023-01-18 ENCOUNTER — MEDICAL CORRESPONDENCE (OUTPATIENT)
Dept: HEALTH INFORMATION MANAGEMENT | Facility: CLINIC | Age: 33
End: 2023-01-18
Payer: COMMERCIAL

## 2023-01-25 NOTE — TELEPHONE ENCOUNTER
Diagnosis, Referred by & from: Discuss Colostomy Reversal   Appt date: 4/24/2023   NOTES STATUS DETAILS   OFFICE NOTE from referring provider N/A    OFFICE NOTE from other specialist Care Everywhere / [facility]:  3/9/23 - PCC OV with Maurizio Minaya NP  1/26/23 - UC OV with Connie Reyna NP  1/24/23 - PCC OV with Dr. Cerda  8/13/22 - PCC OV with Dr. Owens  1/12/21 - UC OV with Elsie Freire NP    [facility]:  12/21/22, 9/2/22 - URO OV with Dr. Alberto Gardner:  1/11/22 - ID OV with HELENA Singh   DISCHARGE SUMMARY from hospital Care Everywhere [facility]:  12/31/21 - Admission with Dr. Hwang  1/9/20 - Admission with Dr. Peace  4/20/18 - Admission with Dr. Bautista   DISCHARGE REPORT from the ER Care Everywhere [facility]:  2/12/23 - ED OV with Dr. Escobedo  8/27/22 - ED OV with Dr. Altamirano  8/19/22 - ED OV with HELENA Means  7/25/22 - ED OV with Dr. Burton   * Additional ED visits in [place]   OPERATIVE REPORT Care Everywhere / Internal [facility]:  8/18/22 - OP Note for BILATERAL SIMPLE SCROTAL ORCHIECTOMY with Dr. Dominguez  10/12/18, 3/2/18, 9/1/17 - OP Note for CYSTOSCOPY with Dr. Hamlet Gardner:  9/21/19, 9/19/19 - OP Note for CYSTOSCOPY with Dr. Huerta  4/20/18 - OP Note for LAPAROTOMY with Dr. Plummer   MEDICATION LIST Care Everywhere    LABS N/A    DIAGNOSTIC PROCEDURES     COLONOSCOPY Received / Care Everywhere [facility]:  8/10/22 - Colonoscopy    [facility]:  6/21/19 - Colonoscopy   IMAGING (DISC & REPORT)      CT Received [facility]:  8/12/22 - CT Abd/Pelvis  2/21/22 - CT Abd/Pelvis  2/28/20 - CTA Chest/Abd/Pelvis  1/11/20 - CT Abd/Pelvis  1/9/20 - CT Abd/Pelvis  9/19/19 - CT Abd/Pelvis  4/20/18 - CT Abd/Pelvis   ULTRASOUND  (ENDOANAL/ENDORECTAL) Received [facility]:  8/27/22 - US Pelvis  7/25/22 - US Scrotum  5/27/22 - US Scrotum  3/29/22 - US Scrotum     Records Requested  01/25/23    Facility  [facility]  Fax: [phone]   Outcome * 1/25/23 9:54 AM Faxed req to [facility] for images to be pushed to [employer] PACs. -  Shanel    * 2/15/23 8:52 AM Some images received from [facility] and attached to the patient in PACs, sent another req for additional images. - Shanel    * 3/8/23 7:13 AM Images received from [facility] and attached to the patient in PACs. - Shanel

## 2023-02-22 ENCOUNTER — TELEPHONE (OUTPATIENT)
Dept: UROLOGY | Facility: CLINIC | Age: 33
End: 2023-02-22
Payer: COMMERCIAL

## 2023-02-22 NOTE — TELEPHONE ENCOUNTER
[facility] Call Center    Phone Message    May a detailed message be left on voicemail: yes     Reason for Call: Yesenia goyal RN Case Manager from "[employer] called on behalf of pt. Yesenia would like to get the billing code for catheterization wipes in order to confirm insurance coverage. Please give Yesenia a call back to discuss. Thank you.    Action Taken: Other: URO    Travel Screening: Not Applicable

## 2023-02-24 NOTE — TELEPHONE ENCOUNTER
Mita Hedrick  You 2 hours ago (8:03 AM)     WO  I'm afraid I don't know. Maybe  hospital coders would know?     Mita      Writer having RN assist. Either pt does not need wipes or someone else has code like [email or web address] who supplies these for pt. Writer unsure how to contact [facility] coders.    Writer called Yesenia and left detailed VM stating code should be obtained from supplier: [email or web address].

## 2023-03-16 ENCOUNTER — TELEPHONE (OUTPATIENT)
Dept: DERMATOLOGY | Facility: CLINIC | Age: 33
End: 2023-03-16
Payer: COMMERCIAL

## 2023-03-16 NOTE — TELEPHONE ENCOUNTER
Pt called to inform that they are being taken off the LHR wait list and to get scheduled for telephone consult with Dr Cedeno. Will send mychart as well.    Anne Lewis RN on 3/16/2023 at 2:11 PM

## 2023-03-16 NOTE — TELEPHONE ENCOUNTER
Pt responded to writers [employer] message and consult scheduled.    Anne Lewis RN on 3/16/2023 at 3:02 PM

## 2023-03-21 ENCOUNTER — VIRTUAL VISIT (OUTPATIENT)
Dept: DERMATOLOGY | Facility: CLINIC | Age: 33
End: 2023-03-21
Payer: COMMERCIAL

## 2023-03-21 ENCOUNTER — TELEPHONE (OUTPATIENT)
Dept: DERMATOLOGY | Facility: CLINIC | Age: 33
End: 2023-03-21

## 2023-03-21 DIAGNOSIS — F64.9 GENDER DYSPHORIA: Primary | ICD-10-CM

## 2023-03-21 PROCEDURE — 99203 OFFICE O/P NEW LOW 30 MIN: CPT | Mod: 93 | Performed by: DERMATOLOGY

## 2023-03-21 NOTE — NURSING NOTE
Teledermatology Nurse Call Patients:     Are you in the [facility] at the time of the encounter? yes    Today's visit will be billed to you and your insurance.    A teledermatology visit is not as thorough as an in-person visit and the quality of the photograph sent may not be of the same quality as that taken by the dermatology clinic.      Pt reports taking Seroquel/QUETIAPINE 25mg up to 4x as needed for sleep or anxiety.     Emilia Wilkinson on 3/21/2023 at 10:19 AM

## 2023-03-21 NOTE — PROGRESS NOTES
[facility] Dermatology Note      Dermatology Problem List:  1. Gender dysphoria, pending hair removal on the face and extremities  - LHR PA sent 3/21/23    Encounter Date: Mar 21, 2023    CC:   LHR consult    [facility] Data:  Sex Assigned at Birth: Male  Gender Identity: Female  Pronouns: she/her/hers    History of Present Illness:  Ms. Hannah Chavez is a 32 year old adult who presents for evaluation of laser hair removal on the face and extremities. Patient is pending vagnioplasty but has been receiving electrolysis for the genital area given she has tattoos in those locations.     She is interested in having LHR for the face (perioral area and chin), thighs, and popliteal fossa. She has been getting LHR at [facility] in [place], but has been paying out-of-pocket for these services. She is currently on estradiol and progesterone, prior on spironolactone (s/p orchiectomy), which is managed by her hormone provider Dr. Soriano.     Health is otherwise stable. No other skin concerns.      Dermatology Safety Transgender Patient Intake:  Is the patient pending upcoming gender affirmation surgery: Yes  IF yes, what type of surgery is the upcoming expected affirmation surgery(phalloplasty/vaginoplasty/facial surgery/NA)? Vaginoplasty  Has the patient met with plastic surgeon prior to this visit: Yes  What is the goal date of the upcoming expected affirmation surgery (date/ NA)? Pending hair removal.   Is the patient on hormonal therapy(if yes list what type (estrogen/iraj/estradiol or testosoterone) and length of time treated):  Yes    Is the patient here for laser hair removal assessment:  Yes  Has the patient had laser hair removal in the past?  Yes  If, so, how many approximate weeks/months/years prior: starting in January 2022  Was insurance coverage pursued for laser hair removal procedure (Yes/no/pending)?  No  Was insurance coverage obtained (yes/no/pending)?  No    Has the patient ever had  any other minimally invasive cosmetic procedures such as the following:  Electrolysis:  Yes; electrolysis in groin area for pre-op vaginoplasty prep  Botulinum toxin:  Yes; every 6 months for bladder.   Fillers (list filler type and location):  No  Chemical peels: No  Lasers: No  Intense pulsed light: No  Broad band light: No  Microneedling: No  Has the patient every had any unknown injectable products or other injectable products? (If yes, please list details if available):  If any minimally invasive  cosmetic procedures performed prior to presentation to our clinic were they performed by a gosia DANGELO/ NP/RN/ other or dakotahn:N/A    Has the patient had any prior surgeries as follows:  Breast augmentation:  Yes  Genital:  No; pending  Face:  Yes, facial feminiziation (brow, chins, nose) - Dr. Deleon and Kinsey  ([facility])   Hair transplant:  No    Does the patient have any other concerns regarding the following they would like to address at future appointments:  Skin tone/texture:  No  Rhytides:  No  Scars:  No  Hx of skin cancer:No  History of keloids?:No  History of hypertrophic scars?:No  Other:  No  Does patient have acne concerns: No    Past Medical History:   Patient Active Problem List   Diagnosis     Neurogenic bladder     Gender dysphoria     Past Medical History:   Diagnosis Date     Asthma      Bladder incontinence      Self-catheterizes urinary bladder      Spinal cord injury of T10 vertebra (H)      Spinal cord injury of thoracic region without bone injury, initial encounter (H)      Uncomplicated asthma      Past Surgical History:   Procedure Laterality Date     CYSTOSCOPY N/A 9/1/2017    Procedure: CYSTOSCOPY;  Cystoscopy and Botox Injection into the Bladder;  Surgeon: Michael Mcnulty MD;  [facility]     CYSTOSCOPY N/A 10/12/2018    Procedure: CYSTOSCOPY;  Cystoscopy, Botox;  Surgeon: Michael Mcnulty MD;  [facility]     CYSTOSCOPY, INTRAVESICAL INJECTION N/A 3/2/2018     "Procedure: CYSTOSCOPY, INTRAVESICAL INJECTION;  Cystoscopy And Botox Injection Into The Bladder  ;  Surgeon: Michael Mcnulty MD;  [facility]     INJECT BOTOX N/A 9/1/2017    Procedure: INJECT BOTOX;;  Surgeon: Michael Mcnulty MD;  [facility]     INJECT BOTOX N/A 10/12/2018    Procedure: INJECT BOTOX;;  Surgeon: Michael Mcnulty MD;  [facility]     ORCHIECTOMY SCROTAL Bilateral 8/18/2022    Procedure: Bilateral Simple Scrotal Orchiectomy;  Surgeon: Chilango Dominguez MD;  Location[facility]       Social History:  Patient reports that she has never smoked. She has never used smokeless tobacco. She reports that she does not drink alcohol and does not use drugs.    Family History:  No family history on file.    Medications:  Current Outpatient Medications   Medication Sig Dispense Refill     ALBUTEROL IN Inhale into the lungs 4 times daily as needed       baclofen (LIORESAL) 10 MG tablet Take 10 mg by mouth       diazepam (VALIUM) 5 MG tablet Take 5 mg by mouth       estradiol valerate (DELESTROGEN) 20 MG/ML injection estradiol valerate 20 mg/mL intramuscular oil       Insulin Syringe-Needle U-100 (B-D INSULIN SYRINGE) 25G X 1\" 1 ML MISC BD Insulin Syringe 1 mL 25 x 1\"       medication given by implanted intrathecal pump continuous Baclofen continuous pump 171 mcg/day       progesterone (PROMETRIUM) 200 MG capsule Take 200 mg by mouth       Sharps Container MISC Sharps Container       senna-docusate (SENOKOT-S/PERICOLACE) 8.6-50 MG tablet Take 1-2 tablets by mouth 2 times daily 30 tablet 0     sertraline (ZOLOFT) 100 MG tablet Take 100 mg by mouth       spironolactone (ALDACTONE) 100 MG tablet spironolactone 100 mg tablet   100mg 2/day       Tolterodine Tartrate (DETROL PO) Take by mouth daily       traZODone (DESYREL) 100 MG tablet Take 1 tablet by mouth (Patient not taking: Reported on 3/21/2023)          Allergies   Allergen Reactions     Shellfish Allergy Nausea and Vomiting and " "Unknown     Patient states his mother has a \"violent reaction\" to shellfish, and that he has a feeling of nausea when he smells shellfish, so he has never consumed any. He states he has also not been tested.       Shellfish-Derived Products Other (See Comments)     Patient states her mother has a \"violent reaction\" to shellfish, and that she has a feeling of nausea when she smells shellfish, so she has never consumed any. She states she has also not been tested.  Patient states his mother has a \"violent reaction\" to shellfish, and that he has a feeling of nausea when he smells shellfish, so he has never consumed any. He states he has also not been tested.       Physical exam:  Vitals: There were no vitals taken for this visit.  SKIN: Telemedicine photographs reviewed (Date of images: 3/21/23. Image quality and interpretability: acceptable)  -Orellana skin type: II  -Dark brown terminal pigmented coarse and fine hairs on the face, chin, thighs, popliteal fossa.   -No other lesions of concern on areas examined.                     Impression/Plan:    1. Gender dysphoria, plan for laser hair removal which is medically recommended.     Orellana skin type II. Recommend the 755 laser.     Location: Area planned for treatment and reviewed with patient is for face (chin and lip), bilateral thighs, and popliteal fossa.     Reviewed each treatment will target existing hair bulbs, but to treat all hair multiple treatments will be needed. Patient informed laser hair removal is not permenant and hair may regrow years later. Reviewed this will not work for blond or white hair. Any blonde or white hair will need electrolysis. Reviewed that home laser units may not be as efficacious.     Shaving hair prior to LHR will improve efficacy. Any plucking, waxing, electrolysis, or needs to be avoided at least 4 weeks before LHR. Adhere to strict sun avoidance for a minimum of 6 weeks before and after each treatment.     We reviewed " with the patient that even with laser hair removal there is still risk for hair growth within the neovaginal cavity after vaginoplasty.      After treatment we recommend the patient wait 3 months after the last planned hair removal treatment before proceeding with surgery, in and effort to confirm that no further hair regrowth will occur.    Electrolysis was offered as an alternative, especially for light hairs that may not response to laser hair removal. We would need to refer outside our sytem.     Hand-out provided on laser hair removal in preparation for vaginoplasty, including insurance approval process and contact information in the case of questions or concerns.     Patient was confirmed to not be using topical minoxidil in treatment areas.     We will initiate a letter to insurance to check for insurance coverage by messaging the [facility] derm pool.     Other notes for laser hair removal team:  None. .        The patient will send a [employer] message in 2 weeks to check status of coverage.       Follow-up pending prior auth.       Staff Involved:  Adam Cedeno MD  Pronouns: he/him/his    Department of Dermatology  [facility]: Phone: [phone], Fax:[phone]  [facility]: Phone: [phone] Fax: [phone]

## 2023-03-21 NOTE — TELEPHONE ENCOUNTER
Financial Counselor Review:  Please submit letter of medical necessity from Dr. Cedeno dated 3/21/23 to insurance.    Procedure to be performed (include CPT code):  Laser hair removal, [address]1852  Diagnosis:  Gender dyshporia  ICD-10 code:  F64.9  # of treatments requested:  10  Sites: Face, bilateral thighs, popliteal fossa

## 2023-03-21 NOTE — PATIENT INSTRUCTIONS
Laser Hair Removal     ? What is it?  Laser hair removal is a medical treatment that uses light to reduce hair. It works by targeting the  pigment (melanin) in darker hair.    ? Is it permanent?  No. Laser hair removal may not be permanent. Hair may regrow years later, but it tends to be  less, thinner, and paler than before treatment.    ? Does it work on blonde or white hair?  No. Laser hair removal will not work for blonde or white hair. You will need electrolysis (a  treatment that uses radio waves to stop hair growth) for any blonde or white hair.    ? How many treatments do I need?  We suggest 6 to 12 treatments (performed every 3-6 weeks).    ? What are the risks?  I will have redness, pain and swelling. I may have skin discoloration, bruising and itching. Risks  are permanent discoloration, hives, infection, scarring, eye injury, hair growth, and blisters. The  outcome could be no improvement or slight improvement. Multiple treatments are required. All  hair will not be removed.    ? Will my insurance pay for this?  We will work with you to try to get insurance coverage. After your visit, we will send a letter to  your insurance through our financial counselors to check coverage. If you do NOT hear from us  in two weeks, please contact us via yoonew or telephone. For more details on insurance  coverage, see the diagram below.    Insurance Workflow        Contact Information  ? [facility]  [address], [place] [address]  Phone Number: [phone]    ? [address] [facility], [place] [address]  Phone Number: [phone]    ? Transgender Intake & Referral Coordinator for insurance denial navigation:  Phone Number: [phone]    ? For urgent needs outside of business hours: Call [phone] and ask for the  dermatology resident on call.    ? What should I do BEFORE treatment?    6 weeks before treatment  ?  Keep your treatment area out of the sun. Do NOT tan the removal area or come in for  treatment with a tan. Tans increase the risks of treatment. Do NOT use spray tan or any  over-the-counter self-tanners before treatment.  ? Stop waxing, plucking, electrolysis and other laser treatments.  ? Stop any other cosmetic treatments to the area, such as chemical peels or  Dermabrasion.    1 week before treatment  ? Do NOT use retinols, such as adapalene (Differin) and tretinoin (Retin-A), on the  treatment area.  ? Do NOT use any bleaching creams, such as hydroquinone, on the treatment area.    The night before treatment  ? Gently shave the treatment area.

## 2023-03-21 NOTE — LETTER
3/21/2023       RE: Hannah Chavez  [address]  [place] [address]     Dear Colleague,    Thank you for referring your patient, Hannah Chavez, to the [facility] [place] [facility]. Please see a copy of my visit note below.    [facility] Dermatology Note      Dermatology Problem List:  1. Gender dysphoria, pending hair removal on the face and extremities  - LHR PA sent 3/21/23    Encounter Date: Mar 21, 2023    CC:   LHR consult    SO Data:  Sex Assigned at Birth: Male  Gender Identity: Female  Pronouns: she/her/hers    History of Present Illness:  Ms. Hannah Chavez is a 32 year old adult who presents for evaluation of laser hair removal on the face and extremities. Patient is pending vagnioplasty but has been receiving electrolysis for the genital area given she has tattoos in those locations.     She is interested in having LHR for the face (perioral area and chin), thighs, and popliteal fossa. She has been getting LHR at [facility] in [place], but has been paying out-of-pocket for these services. She is currently on estradiol and progesterone, prior on spironolactone (s/p orchiectomy), which is managed by her hormone provider Dr. Soriano.     Health is otherwise stable. No other skin concerns.      Dermatology Safety Transgender Patient Intake:  Is the patient pending upcoming gender affirmation surgery: Yes  IF yes, what type of surgery is the upcoming expected affirmation surgery(phalloplasty/vaginoplasty/facial surgery/NA)? Vaginoplasty  Has the patient met with plastic surgeon prior to this visit: Yes  What is the goal date of the upcoming expected affirmation surgery (date/ NA)? Pending hair removal.   Is the patient on hormonal therapy(if yes list what type (estrogen/iraj/estradiol or testosoterone) and length of time treated):  Yes    Is the patient here for laser hair removal assessment:   Yes  Has the patient had laser hair removal in the past?  Yes  If, so, how many approximate weeks/months/years prior: starting in January 2022  Was insurance coverage pursued for laser hair removal procedure (Yes/no/pending)?  No  Was insurance coverage obtained (yes/no/pending)?  No    Has the patient ever had any other minimally invasive cosmetic procedures such as the following:  Electrolysis:  Yes; electrolysis in groin area for pre-op vaginoplasty prep  Botulinum toxin:  Yes; every 6 months for bladder.   Fillers (list filler type and location):  No  Chemical peels: No  Lasers: No  Intense pulsed light: No  Broad band light: No  Microneedling: No  Has the patient every had any unknown injectable products or other injectable products? (If yes, please list details if available):  If any minimally invasive  cosmetic procedures performed prior to presentation to our clinic were they performed by a gosia DANGELO/ NP/RN/ other or allie:N/A    Has the patient had any prior surgeries as follows:  Breast augmentation:  Yes  Genital:  No; pending  Face:  Yes, facial feminiziation (brow, chins, nose) - Dr. Deleon and Kinsey  ([facility])   Hair transplant:  No    Does the patient have any other concerns regarding the following they would like to address at future appointments:  Skin tone/texture:  No  Rhytides:  No  Scars:  No  Hx of skin cancer:No  History of keloids?:No  History of hypertrophic scars?:No  Other:  No  Does patient have acne concerns: No    Past Medical History:   Patient Active Problem List   Diagnosis     Neurogenic bladder     Gender dysphoria     Past Medical History:   Diagnosis Date     Asthma      Bladder incontinence      Self-catheterizes urinary bladder      Spinal cord injury of T10 vertebra (H)      Spinal cord injury of thoracic region without bone injury, initial encounter (H)      Uncomplicated asthma      Past Surgical History:   Procedure Laterality Date     CYSTOSCOPY N/A 9/1/2017  "   Procedure: CYSTOSCOPY;  Cystoscopy and Botox Injection into the Bladder;  Surgeon: Michael Mcnulty MD;  [facility]     CYSTOSCOPY N/A 10/12/2018    Procedure: CYSTOSCOPY;  Cystoscopy, Botox;  Surgeon: Michael Mcnulty MD;  [facility]     CYSTOSCOPY, INTRAVESICAL INJECTION N/A 3/2/2018    Procedure: CYSTOSCOPY, INTRAVESICAL INJECTION;  Cystoscopy And Botox Injection Into The Bladder  ;  Surgeon: Michael Mcnulty MD;  [facility]     INJECT BOTOX N/A 9/1/2017    Procedure: INJECT BOTOX;;  Surgeon: Michael Mcnulty MD;  [facility]     INJECT BOTOX N/A 10/12/2018    Procedure: INJECT BOTOX;;  Surgeon: Michael Mcnulty MD;  [facility]     ORCHIECTOMY SCROTAL Bilateral 8/18/2022    Procedure: Bilateral Simple Scrotal Orchiectomy;  Surgeon: Chilango Dominguez MD;  Location: [facility]       Social History:  Patient reports that she has never smoked. She has never used smokeless tobacco. She reports that she does not drink alcohol and does not use drugs.    Family History:  No family history on file.    Medications:  Current Outpatient Medications   Medication Sig Dispense Refill     ALBUTEROL IN Inhale into the lungs 4 times daily as needed       baclofen (LIORESAL) 10 MG tablet Take 10 mg by mouth       diazepam (VALIUM) 5 MG tablet Take 5 mg by mouth       estradiol valerate (DELESTROGEN) 20 MG/ML injection estradiol valerate 20 mg/mL intramuscular oil       Insulin Syringe-Needle U-100 (B-D INSULIN SYRINGE) 25G X 1\" 1 ML MISC BD Insulin Syringe 1 mL 25 x 1\"       medication given by implanted intrathecal pump continuous Baclofen continuous pump 171 mcg/day       progesterone (PROMETRIUM) 200 MG capsule Take 200 mg by mouth       Sharps Container MISC Sharps Container       senna-docusate (SENOKOT-S/PERICOLACE) 8.6-50 MG tablet Take 1-2 tablets by mouth 2 times daily 30 tablet 0     sertraline (ZOLOFT) 100 MG tablet Take 100 mg by mouth       spironolactone (ALDACTONE) " "100 MG tablet spironolactone 100 mg tablet   100mg 2/day       Tolterodine Tartrate (DETROL PO) Take by mouth daily       traZODone (DESYREL) 100 MG tablet Take 1 tablet by mouth (Patient not taking: Reported on 3/21/2023)          Allergies   Allergen Reactions     Shellfish Allergy Nausea and Vomiting and Unknown     Patient states his mother has a \"violent reaction\" to shellfish, and that he has a feeling of nausea when he smells shellfish, so he has never consumed any. He states he has also not been tested.       Shellfish-Derived Products Other (See Comments)     Patient states her mother has a \"violent reaction\" to shellfish, and that she has a feeling of nausea when she smells shellfish, so she has never consumed any. She states she has also not been tested.  Patient states his mother has a \"violent reaction\" to shellfish, and that he has a feeling of nausea when he smells shellfish, so he has never consumed any. He states he has also not been tested.       Physical exam:  Vitals: There were no vitals taken for this visit.  SKIN: Telemedicine photographs reviewed (Date of images: 3/21/23. Image quality and interpretability: acceptable)  -Orellana skin type: II  -Dark brown terminal pigmented coarse and fine hairs on the face, chin, thighs, popliteal fossa.   -No other lesions of concern on areas examined.                     Impression/Plan:    1. Gender dysphoria, plan for laser hair removal which is medically recommended.     Orellana skin type II. Recommend the 755 laser.     Location: Area planned for treatment and reviewed with patient is for face (chin and lip), bilateral thighs, and popliteal fossa.     Reviewed each treatment will target existing hair bulbs, but to treat all hair multiple treatments will be needed. Patient informed laser hair removal is not permenant and hair may regrow years later. Reviewed this will not work for blond or white hair. Any blonde or white hair will need " electrolysis. Reviewed that home laser units may not be as efficacious.     Shaving hair prior to LHR will improve efficacy. Any plucking, waxing, electrolysis, or needs to be avoided at least 4 weeks before LHR. Adhere to strict sun avoidance for a minimum of 6 weeks before and after each treatment.     We reviewed with the patient that even with laser hair removal there is still risk for hair growth within the neovaginal cavity after vaginoplasty.      After treatment we recommend the patient wait 3 months after the last planned hair removal treatment before proceeding with surgery, in and effort to confirm that no further hair regrowth will occur.    Electrolysis was offered as an alternative, especially for light hairs that may not response to laser hair removal. We would need to refer outside our sytem.     Hand-out provided on laser hair removal in preparation for vaginoplasty, including insurance approval process and contact information in the case of questions or concerns.     Patient was confirmed to not be using topical minoxidil in treatment areas.     We will initiate a letter to insurance to check for insurance coverage by messaging the [facility] derm pool.     Other notes for laser hair removal team:  None. .        The patient will send a [employer] message in 2 weeks to check status of coverage.       Follow-up pending prior auth.       Staff Involved:  Adam Cedeno MD  Pronouns: he/him/his    Department of Dermatology  [facility]: Phone: [phone], Fax:[phone]  [facility]: Phone: [phone] Fax: [phone]

## 2023-03-22 ENCOUNTER — MYC MEDICAL ADVICE (OUTPATIENT)
Dept: DERMATOLOGY | Facility: CLINIC | Age: 33
End: 2023-03-22
Payer: COMMERCIAL

## 2023-03-22 NOTE — TELEPHONE ENCOUNTER
Good Morning,    We are needing 2 letters of support from a licensed psychologist for the Prior Authorization. If we can get those in the chart or faxed ([phone]) to us that would be great. Please reach out if you have any questions.    Thanks!    Nathaly Bueno    Financial Counselor  [address] [place] [address]  Phone- [phone]  Fax- [phone]

## 2023-03-22 NOTE — TELEPHONE ENCOUNTER
Pt states she would like a call back to discuss the Prior Auth for her Laser Hair Removal, please call Pt back to discuss, thanks!

## 2023-03-27 NOTE — TELEPHONE ENCOUNTER
Marcus Hartman, here is the medical policy you inquired about:    ...    The member must:  Have persistent, well documented gender dysphoria; and  Have the capacity to make a fully informed decision and to consent for treatment; and  Have two referrals from two separate qualified mental health professionals. One therapist may be in a purely evaluative role, and one must address all of the following:  The member s general identifying characteristics; and  Results of the member s psychosocial assessment, including any diagnoses; and  The duration of the mental health professional s relationship with the member including the type of evaluation and therapy or counseling to date; and  An explanation that the criteria for surgery have been met, and a brief description of the clinical rationale for supporting the member s request for surgery; and  A statement that informed consent has been obtained from the patient; and  A statement that the mental health professional is available for coordination of care.  If significant medical or mental health concerns are present, documentation must support that they are reasonably well controlled; and    ...    Qualified Mental Health Professional (from the [facility] SOC-7)    Master s degree or equivalent in a clinical behavioral science field granted by an institution accredited by the appropriate national accrediting board.  The professional should also have documented credentials from the relevant licensing board or equivalent; and  Competence in using the Diagnostic Statistical Manual of Mental Disorders and/or the International Classification of Disease for diagnostic purposes; and  Ability to recognize and diagnose co-existing mental health concerns and to distinguish these from gender dysphoria;  Knowledgeable about gender nonconforming identities and expressions, and the assessment and treatment of gender dysphoria; and  Continuing education in the assessment and treatment of gender  dysphoria.  This may include attending relevant professional meetings, workshops, or seminars; obtaining supervision from a mental health professional with relevant experience; or participating in research related to gender nonconformity and gender dysphoria.    Or the link:  https://[email or web address]/public/coverage-criteria/policy.html?contentid=AENTRY_045928    SYD Garcia

## 2023-04-17 ASSESSMENT — ENCOUNTER SYMPTOMS
DEPRESSION: 1
HEMATURIA: 0
DYSURIA: 0
FLANK PAIN: 0
DECREASED CONCENTRATION: 0
INSOMNIA: 1
NERVOUS/ANXIOUS: 1
DIFFICULTY URINATING: 0
PANIC: 0

## 2023-04-18 NOTE — PROGRESS NOTES
Colon and Rectal Surgery Clinic Note    RE: Hannah Chavez.  : 1990.  SHAHRIAR: 2023.    Reason for visit: Colostomy status.      HPI:     32-year-old transgender female who developed acute onset bloody stool followed by abdominal pain and was found to have perforated sigmoid colon and underwent exploratory laparotomy with resection and end colostomy on 18 with Dr. Mike Plummer at [facility].  A) SIGMOID COLON, SEGMENTAL RESECTION:  1. Acutely perforated colon clinically, with nonspecific histology (see comment)  2. No evidence of idiopathic inflammatory bowel disease, ischemic bowel disease or neoplasm  3. Negative for diverticular disease  4. Two benign lymph nodes  5. Focal pericolonic fibrosis suggestive of adhesion  Comment:  This is an unusual case. The mucosa adjacent to the 5 cm sigmoid tear show only mild regenerative changes. In this context, one could speculate about possible NSAID-associated injury or intraluminal trauma of some sort. I certainly do not see evidence for an idiopathic inflammatory bowel disease here. There is a small adhesion-like focus of fibrosis, of uncertain etiology.      History of T10 spinal cord injury with neurogenic bladder and indwelling baclofen pump. History of orchiectomy in 2022 with Dr. Pascual Dominguez    He met with Dr. Clive Sagastume in 2018 and Dr. Jagdish Rodriguez in 2019 to discuss colostomy takedown they both recommended pelvic floor evaluation prior to considering takedown.    Colonoscopy with Dr. Jagdish Rodriguez 19:  The digital rectal exam findings include decreased sphincter tone.  There was evidence of a prior avendaño's procedure anastomosis in the   proximal rectum. This was characterized by healthy appearing mucosa. The   previous staple line was just about at the level of the proximal rectal   valve  The entire examined colon appeared normal.    Colonoscopy with me 8/10/22:      PFC testing 19      Surgical history:  Past Surgical History:  "  Procedure Laterality Date     CYSTOSCOPY N/A 9/1/2017    Procedure: CYSTOSCOPY;  Cystoscopy and Botox Injection into the Bladder;  Surgeon: Michael Mcnulty MD;  [facility]     CYSTOSCOPY N/A 10/12/2018    Procedure: CYSTOSCOPY;  Cystoscopy, Botox;  Surgeon: Michael Mcnulty MD;  [facility]     CYSTOSCOPY, INTRAVESICAL INJECTION N/A 3/2/2018    Procedure: CYSTOSCOPY, INTRAVESICAL INJECTION;  Cystoscopy And Botox Injection Into The Bladder  ;  Surgeon: Michael Mcnulty MD;  [facility]     INJECT BOTOX N/A 9/1/2017    Procedure: INJECT BOTOX;;  Surgeon: Michael Mcnulty MD;  [facility]     INJECT BOTOX N/A 10/12/2018    Procedure: INJECT BOTOX;;  Surgeon: Michael Mcnulty MD;  [facility]     ORCHIECTOMY SCROTAL Bilateral 8/18/2022    Procedure: Bilateral Simple Scrotal Orchiectomy;  Surgeon: Chilango Dominguez MD;  Location: [facility] OR       Family history:  No family history on file.    Medications:  Current Outpatient Medications   Medication Sig Dispense Refill     ALBUTEROL IN Inhale into the lungs 4 times daily as needed       baclofen (LIORESAL) 10 MG tablet Take 10 mg by mouth       diazepam (VALIUM) 5 MG tablet Take 5 mg by mouth       estradiol valerate (DELESTROGEN) 20 MG/ML injection estradiol valerate 20 mg/mL intramuscular oil       Insulin Syringe-Needle U-100 (B-D INSULIN SYRINGE) 25G X 1\" 1 ML MISC BD Insulin Syringe 1 mL 25 x 1\"       medication given by implanted intrathecal pump continuous Baclofen continuous pump 171 mcg/day       progesterone (PROMETRIUM) 200 MG capsule Take 200 mg by mouth       Sharps Container MISC Sharps Container       metroNIDAZOLE (FLAGYL) 500 MG tablet Take 1 tablet (500 mg) by mouth every 6 hours At 8:00 am, 2:00 pm, 8:00 pm the day prior to your surgery with neomycin and zofran. 3 tablet 0     neomycin (MYCIFRADIN) 500 MG tablet Take 2 tablets (1,000 mg) by mouth every 6 hours At 8:00 am, 2:00 pm, 8:00 pm the day prior " "to your surgery with flagyl and zofran. 6 tablet 0     ondansetron (ZOFRAN) 4 MG tablet Take 1 tablet (4 mg) by mouth every 6 hours At 8:00 am, 2:00 pm, 8:00 pm the day prior to your surgery with neomycin and flagyl. 3 tablet 0     polyethylene glycol (MIRALAX) 17 g packet Take 238 g by mouth See Admin Instructions Starting at 4 pm night prior to surgery. Refer to \"Getting Ready for Surgery\" instructions. 14 packet 0     senna-docusate (SENOKOT-S/PERICOLACE) 8.6-50 MG tablet Take 1-2 tablets by mouth 2 times daily 30 tablet 0     sertraline (ZOLOFT) 100 MG tablet Take 100 mg by mouth       spironolactone (ALDACTONE) 100 MG tablet spironolactone 100 mg tablet   100mg 2/day       Tolterodine Tartrate (DETROL PO) Take by mouth daily       traZODone (DESYREL) 100 MG tablet Take 1 tablet by mouth (Patient not taking: Reported on 3/21/2023)         Allergies:  Allergies   Allergen Reactions     Shellfish Allergy Nausea and Vomiting and Unknown     Patient states his mother has a \"violent reaction\" to shellfish, and that he has a feeling of nausea when he smells shellfish, so he has never consumed any. He states he has also not been tested.       Shellfish-Derived Products Other (See Comments)     Patient states her mother has a \"violent reaction\" to shellfish, and that she has a feeling of nausea when she smells shellfish, so she has never consumed any. She states she has also not been tested.  Patient states his mother has a \"violent reaction\" to shellfish, and that he has a feeling of nausea when he smells shellfish, so he has never consumed any. He states he has also not been tested.         Social history:   Social History     Tobacco Use     Smoking status: Never     Smokeless tobacco: Never   Vaping Use     Vaping status: Not on file   Substance Use Topics     Alcohol use: No     Marital status: single.    Physical Examination:  BP (!) 146/93 (BP Location: Left arm, Patient Position: Sitting, Cuff Size: Adult " "Regular)   Pulse 90   Ht 1.676 m (5' 6\")   Wt 75.3 kg (166 lb)   SpO2 98%   BMI 26.79 kg/m    General: Well hydrated. No acute distress.  Abdomen: Soft, NT, right lower quadrant baclofen pump. Left-sided colostomy viable and functioning.  No abdominal masses or inguinal adenopathy palpated.    Investigations:  None.    Procedures:  None.    ASSESSMENT  32-year old transgender female status post Sanchez's procedure for sigmoid perforation of unclear etiology in 2018, possibly related to ischemia or NSAIDs. Given past medical history, there is a concern for pelvic floor disorder.  Pelvic floor testing showed a component of nonrelaxation but this is not very severe.  Her manometry pressures are slightly below normal although she was able to retain and evacuate the defecography contrast completely. Risks, benefits, and alternatives of operative treatment were thoroughly discussed with the patient, he/she understands these well and agrees to proceed.    PLAN  1.  After discussing with Dr. Dominguez, he prefers to schedule sexual reaffirmation surgery (vaginoplasty) robotically given that his dissection is very low in the pelvis.  I told him that we could start by inserting a laparoscope in the peritoneal cavity and see if we can do lysis of adhesions laparoscopically and potentially free up an area for us to dock the robot and perform the entire operation robotically versus laparoscopic assisted, but it could potentially be open as well.  Schedule robotic colostomy takedown and vaginoplasty, flexible sigmoidoscopy.  Schedule full set of labs now as well as a CT abdomen and pelvis with IV and p.o. contrast.  Once we have a surgery date, she will need PAC, labs week before surgery, mechanical bowel prep with antibiotics and 2 Fleet enemas.    60 minutes spent on the date of encounter performing chart review, history and exam, documentation.     Amadou Anna MD, MSc, FACS, FASCRS.    Chief, " Division of Colon & Rectal Surgery  Stanley M. Goldberg, MD Endowed Chair, Colon & Rectal Surgery  Department of Surgery  [facility]      Referring Provider:  Chilango Dominguez MD  [address] [place] [id number]     Primary Care Provider:  Hayden Cerda

## 2023-04-24 ENCOUNTER — PRE VISIT (OUTPATIENT)
Dept: SURGERY | Facility: CLINIC | Age: 33
End: 2023-04-24

## 2023-04-24 ENCOUNTER — OFFICE VISIT (OUTPATIENT)
Dept: SURGERY | Facility: CLINIC | Age: 33
End: 2023-04-24
Attending: UROLOGY
Payer: COMMERCIAL

## 2023-04-24 ENCOUNTER — TELEPHONE (OUTPATIENT)
Dept: SURGERY | Facility: CLINIC | Age: 33
End: 2023-04-24

## 2023-04-24 VITALS
HEIGHT: 66 IN | SYSTOLIC BLOOD PRESSURE: 146 MMHG | OXYGEN SATURATION: 98 % | BODY MASS INDEX: 26.68 KG/M2 | WEIGHT: 166 LBS | HEART RATE: 90 BPM | DIASTOLIC BLOOD PRESSURE: 93 MMHG

## 2023-04-24 DIAGNOSIS — F64.0 TRANSSEXUALISM: ICD-10-CM

## 2023-04-24 DIAGNOSIS — Z93.3 COLOSTOMY STATUS (H): Primary | ICD-10-CM

## 2023-04-24 DIAGNOSIS — Z93.3 COLOSTOMY PRESENT (H): ICD-10-CM

## 2023-04-24 DIAGNOSIS — K63.1 PERFORATION OF SIGMOID COLON (H): ICD-10-CM

## 2023-04-24 PROCEDURE — 99205 OFFICE O/P NEW HI 60 MIN: CPT | Performed by: COLON & RECTAL SURGERY

## 2023-04-24 RX ORDER — METRONIDAZOLE 500 MG/1
500 TABLET ORAL EVERY 6 HOURS
Qty: 3 TABLET | Refills: 0 | Status: SHIPPED | OUTPATIENT
Start: 2023-04-24 | End: 2023-08-28

## 2023-04-24 RX ORDER — POLYETHYLENE GLYCOL 3350 17 G/17G
238 POWDER, FOR SOLUTION ORAL SEE ADMIN INSTRUCTIONS
Qty: 14 PACKET | Refills: 0 | Status: SHIPPED | OUTPATIENT
Start: 2023-04-24 | End: 2023-08-28

## 2023-04-24 RX ORDER — NEOMYCIN SULFATE 500 MG/1
1000 TABLET ORAL EVERY 6 HOURS
Qty: 6 TABLET | Refills: 0 | Status: SHIPPED | OUTPATIENT
Start: 2023-04-24 | End: 2023-08-28

## 2023-04-24 RX ORDER — ONDANSETRON 4 MG/1
4 TABLET, FILM COATED ORAL EVERY 6 HOURS
Qty: 3 TABLET | Refills: 0 | Status: SHIPPED | OUTPATIENT
Start: 2023-04-24 | End: 2023-08-28

## 2023-04-24 ASSESSMENT — PAIN SCALES - GENERAL: PAINLEVEL: NO PAIN (0)

## 2023-04-24 NOTE — LETTER
2023       RE: Hannah Chavez  [address]  [place] [address]       Dear Colleague,    Thank you for referring your patient, Hannah Chavez, to the [facility] COLON AND RECT[facility]GERY [facility]INIC [place] at [facility]. Please see a copy of my visit note below.    Colon and Rectal Surgery Clinic Note    RE: Hannah Chavez.  : 1990.  SHAHRIAR: 2023.    Reason for visit: Colostomy status.      HPI:   32-year-old transgender female who developed acute onset bloody stool followed by abdominal pain and was found to have perforated sigmoid colon and underwent exploratory laparotomy with resection and end colostomy on 18 with Dr. Mike Plummer at [facility].  A) SIGMOID COLON, SEGMENTAL RESECTION:  1. Acutely perforated colon clinically, with nonspecific histology (see comment)  2. No evidence of idiopathic inflammatory bowel disease, ischemic bowel disease or neoplasm  3. Negative for diverticular disease  4. Two benign lymph nodes  5. Focal pericolonic fibrosis suggestive of adhesion  Comment:  This is an unusual case. The mucosa adjacent to the 5 cm sigmoid tear show only mild regenerative changes. In this context, one could speculate about possible NSAID-associated injury or intraluminal trauma of some sort. I certainly do not see evidence for an idiopathic inflammatory bowel disease here. There is a small adhesion-like focus of fibrosis, of uncertain etiology.    History of T10 spinal cord injury with neurogenic bladder and indwelling baclofen pump. History of orchiectomy in 2022 with Dr. Pascual Dominguez  He met with Dr. Clive Sagastume in 2018 and Dr. Jagdish Rodriguez in 2019 to discuss colostomy takedown they both recommended pelvic floor evaluation prior to considering takedown.  Colonoscopy with Dr. Jagdish Rodriguez 19:  The digital rectal exam findings include decreased sphincter tone.  There was evidence of a prior avendaño's procedure anastomosis in  "the   proximal rectum. This was characterized by healthy appearing mucosa. The   previous staple line was just about at the level of the proximal rectal   valve  The entire examined colon appeared normal.  Colonoscopy with me 8/10/22:    PFC testing 7/23/19      Surgical history:  Past Surgical History:   Procedure Laterality Date    CYSTOSCOPY N/A 9/1/2017    Procedure: CYSTOSCOPY;  Cystoscopy and Botox Injection into the Bladder;  Surgeon: Michael Mcnulty MD;  [facility]    CYSTOSCOPY N/A 10/12/2018    Procedure: CYSTOSCOPY;  Cystoscopy, Botox;  Surgeon: Michael Mcnulty MD;  [facility]    CYSTOSCOPY, INTRAVESICAL INJECTION N/A 3/2/2018    Procedure: CYSTOSCOPY, INTRAVESICAL INJECTION;  Cystoscopy And Botox Injection Into The Bladder  ;  Surgeon: Michael Mcnulty MD;  [facility]    INJECT BOTOX N/A 9/1/2017    Procedure: INJECT BOTOX;;  Surgeon: Michael Mcnulty MD;  [facility]    INJECT BOTOX N/A 10/12/2018    Procedure: INJECT BOTOX;;  Surgeon: Michael Mcnulty MD;  [facility]    ORCHIECTOMY SCROTAL Bilateral 8/18/2022    Procedure: Bilateral Simple Scrotal Orchiectomy;  Surgeon: Chilango Dominguez MD;  Location[facility]       Family history:  No family history on file.    Medications:  Current Outpatient Medications   Medication Sig Dispense Refill    ALBUTEROL IN Inhale into the lungs 4 times daily as needed      baclofen (LIORESAL) 10 MG tablet Take 10 mg by mouth      diazepam (VALIUM) 5 MG tablet Take 5 mg by mouth      estradiol valerate (DELESTROGEN) 20 MG/ML injection estradiol valerate 20 mg/mL intramuscular oil      Insulin Syringe-Needle U-100 (B-D INSULIN SYRINGE) 25G X 1\" 1 ML MISC BD Insulin Syringe 1 mL 25 x 1\"      medication given by implanted intrathecal pump continuous Baclofen continuous pump 171 mcg/day      progesterone (PROMETRIUM) 200 MG capsule Take 200 mg by mouth      Sharps Container MISC Sharps Container      metroNIDAZOLE " "(FLAGYL) 500 MG tablet Take 1 tablet (500 mg) by mouth every 6 hours At 8:00 am, 2:00 pm, 8:00 pm the day prior to your surgery with neomycin and zofran. 3 tablet 0    neomycin (MYCIFRADIN) 500 MG tablet Take 2 tablets (1,000 mg) by mouth every 6 hours At 8:00 am, 2:00 pm, 8:00 pm the day prior to your surgery with flagyl and zofran. 6 tablet 0    ondansetron (ZOFRAN) 4 MG tablet Take 1 tablet (4 mg) by mouth every 6 hours At 8:00 am, 2:00 pm, 8:00 pm the day prior to your surgery with neomycin and flagyl. 3 tablet 0    polyethylene glycol (MIRALAX) 17 g packet Take 238 g by mouth See Admin Instructions Starting at 4 pm night prior to surgery. Refer to \"Getting Ready for Surgery\" instructions. 14 packet 0    senna-docusate (SENOKOT-S/PERICOLACE) 8.6-50 MG tablet Take 1-2 tablets by mouth 2 times daily 30 tablet 0    sertraline (ZOLOFT) 100 MG tablet Take 100 mg by mouth      spironolactone (ALDACTONE) 100 MG tablet spironolactone 100 mg tablet   100mg 2/day      Tolterodine Tartrate (DETROL PO) Take by mouth daily      traZODone (DESYREL) 100 MG tablet Take 1 tablet by mouth (Patient not taking: Reported on 3/21/2023)         Allergies:  Allergies   Allergen Reactions    Shellfish Allergy Nausea and Vomiting and Unknown     Patient states his mother has a \"violent reaction\" to shellfish, and that he has a feeling of nausea when he smells shellfish, so he has never consumed any. He states he has also not been tested.      Shellfish-Derived Products Other (See Comments)     Patient states her mother has a \"violent reaction\" to shellfish, and that she has a feeling of nausea when she smells shellfish, so she has never consumed any. She states she has also not been tested.  Patient states his mother has a \"violent reaction\" to shellfish, and that he has a feeling of nausea when he smells shellfish, so he has never consumed any. He states he has also not been tested.         Social history:   Social History     Tobacco " "Use    Smoking status: Never    Smokeless tobacco: Never   Vaping Use    Vaping status: Not on file   Substance Use Topics    Alcohol use: No     Marital status: single.    Physical Examination:  BP (!) 146/93 (BP Location: Left arm, Patient Position: Sitting, Cuff Size: Adult Regular)   Pulse 90   Ht 1.676 m (5' 6\")   Wt 75.3 kg (166 lb)   SpO2 98%   BMI 26.79 kg/m    General: Well hydrated. No acute distress.  Abdomen: Soft, NT, right lower quadrant baclofen pump. Left-sided colostomy viable and functioning.  No abdominal masses or inguinal adenopathy palpated.    Investigations:  None.    Procedures:  None.    ASSESSMENT  32-year old transgender female status post Sanchez's procedure for sigmoid perforation of unclear etiology in 2018, possibly related to ischemia or NSAIDs. Given past medical history, there is a concern for pelvic floor disorder.  Pelvic floor testing showed a component of nonrelaxation but this is not very severe.  Her manometry pressures are slightly below normal although she was able to retain and evacuate the defecography contrast completely. Risks, benefits, and alternatives of operative treatment were thoroughly discussed with the patient, he/she understands these well and agrees to proceed.    PLAN  1.  After discussing with Dr. Dominguez, he prefers to schedule sexual reaffirmation surgery (vaginoplasty) robotically given that his dissection is very low in the pelvis.  I told him that we could start by inserting a laparoscope in the peritoneal cavity and see if we can do lysis of adhesions laparoscopically and potentially free up an area for us to dock the robot and perform the entire operation robotically versus laparoscopic assisted, but it could potentially be open as well.  Schedule robotic colostomy takedown and vaginoplasty, flexible sigmoidoscopy.  Schedule full set of labs now as well as a CT abdomen and pelvis with IV and p.o. contrast.  Once we have a surgery date, she will " need PAC, labs week before surgery, mechanical bowel prep with antibiotics and 2 Fleet enemas.    60 minutes spent on the date of encounter performing chart review, history and exam, documentation.     Referring Provider:  Chilango Dominguez MD  [address] [place] [id number]     Primary Care Provider:  Hayden Cerda      Again, thank you for allowing me to participate in the care of your patient.      Sincerely,    Amadou Anna MD

## 2023-04-24 NOTE — TELEPHONE ENCOUNTER
Records received    April 24, 2023 9:39 AM  [id number]   Facility  [facility]   Outcome Received recs, sent to scan. Copy in provider's rightfax folder - Amay   Name: Virgilio Kathy /MRN: [id number]  Records received:   7/23/19 OV Dr Savi Blackmon  7/23/19 ARM4  7/23/19 EMG tracings

## 2023-04-24 NOTE — TELEPHONE ENCOUNTER
[facility] Call Center    Phone Message    May a detailed message be left on voicemail: yes     Reason for Call: Medication Question or concern regarding medication   Prescription Clarification  Name of Medication: neomycin (MYCIFRADIN) 500 MG tablet  Prescribing Provider: Amadou Anna MD   Pharm[facility]: [id number] [address] [address]   What on the order needs clarification? They are currently unable to get this medication, as it is on back order. They would like to discuss if there are alternative options. Please call back to discuss.          Action Taken: Message routed to:  [facility] MANASA    Travel Screening: Not Applicable

## 2023-04-24 NOTE — PATIENT INSTRUCTIONS
"Follow up:  CT Abdomen pelvis and labs now  Schedule surgery \"(with Dr. Anna and Dr. Dominguez) with Elana at [phone]  Full bowel prep day before surgery and 2 fleet enemas day of surgery  Pre-op physical and labs 1 month before surgery      Please call with any questions or concerns regarding your clinic visit today.    It is a pleasure being involved in your health care.    Contacts post-consultation depending on your need:    Radiology Appointments [phone]    Schedule Clinic Appointments [phone] # 1   M-F 7:30 - 5 pm    JARVIS Aguirre [phone]    Clinic Fax Number [phone]    Surgery Scheduling [phone]    My Chart is available 24 hours a day and is a secure way to access your records and communicate with your care team.  I strongly recommend signing up if you haven't already done so, if you are comfortable with computers.  If you would like to inquire about this or are having problems with My Chart access, you may call [phone] or go online at [email or web address].  Please allow at least 24 hours for a response and extra time on weekends and Holidays.    "

## 2023-04-24 NOTE — NURSING NOTE
"Chief Complaint   Patient presents with     Consult     Discuss colostomy reversal       Vitals:    04/24/23 1511   BP: (!) 146/93   BP Location: Left arm   Patient Position: Sitting   Cuff Size: Adult Regular   Pulse: 90   SpO2: 98%   Weight: 166 lb   Height: 5' 6\"       Body mass index is 26.79 kg/m .    Crystal Louis CMA    "

## 2023-04-26 ENCOUNTER — LAB (OUTPATIENT)
Dept: LAB | Facility: CLINIC | Age: 33
End: 2023-04-26
Payer: COMMERCIAL

## 2023-04-26 ENCOUNTER — ANCILLARY PROCEDURE (OUTPATIENT)
Dept: CT IMAGING | Facility: CLINIC | Age: 33
End: 2023-04-26
Attending: COLON & RECTAL SURGERY
Payer: COMMERCIAL

## 2023-04-26 ENCOUNTER — TELEPHONE (OUTPATIENT)
Dept: PLASTIC SURGERY | Facility: CLINIC | Age: 33
End: 2023-04-26

## 2023-04-26 DIAGNOSIS — K63.1 PERFORATION OF SIGMOID COLON (H): ICD-10-CM

## 2023-04-26 DIAGNOSIS — F64.0 TRANSSEXUALISM: ICD-10-CM

## 2023-04-26 DIAGNOSIS — Z93.3 COLOSTOMY STATUS (H): ICD-10-CM

## 2023-04-26 LAB
ALBUMIN SERPL BCG-MCNC: 4.5 G/DL (ref 3.5–5.2)
ALP SERPL-CCNC: 68 U/L (ref 35–129)
ALT SERPL W P-5'-P-CCNC: 10 U/L (ref 10–50)
ANION GAP SERPL CALCULATED.3IONS-SCNC: 8 MMOL/L (ref 7–15)
AST SERPL W P-5'-P-CCNC: 15 U/L (ref 10–50)
BASOPHILS # BLD AUTO: 0 10E3/UL (ref 0–0.2)
BASOPHILS NFR BLD AUTO: 0 %
BILIRUB SERPL-MCNC: 0.6 MG/DL
BUN SERPL-MCNC: 5.6 MG/DL (ref 6–20)
CALCIUM SERPL-MCNC: 9.7 MG/DL (ref 8.6–10)
CHLORIDE SERPL-SCNC: 104 MMOL/L (ref 98–107)
CREAT SERPL-MCNC: 0.82 MG/DL (ref 0.51–1.17)
DEPRECATED HCO3 PLAS-SCNC: 28 MMOL/L (ref 22–29)
EOSINOPHIL # BLD AUTO: 0.1 10E3/UL (ref 0–0.7)
EOSINOPHIL NFR BLD AUTO: 1 %
ERYTHROCYTE [DISTWIDTH] IN BLOOD BY AUTOMATED COUNT: 11.6 % (ref 10–15)
GFR SERPL CREATININE-BSD FRML MDRD: >90 ML/MIN/1.73M2
GLUCOSE SERPL-MCNC: 91 MG/DL (ref 70–99)
HCT VFR BLD AUTO: 39.7 % (ref 35–53)
HGB BLD-MCNC: 13.6 G/DL (ref 11.7–17.7)
IMM GRANULOCYTES # BLD: 0 10E3/UL
IMM GRANULOCYTES NFR BLD: 0 %
LYMPHOCYTES # BLD AUTO: 2.5 10E3/UL (ref 0.8–5.3)
LYMPHOCYTES NFR BLD AUTO: 38 %
MCH RBC QN AUTO: 29.4 PG (ref 26.5–33)
MCHC RBC AUTO-ENTMCNC: 34.3 G/DL (ref 31.5–36.5)
MCV RBC AUTO: 86 FL (ref 78–100)
MONOCYTES # BLD AUTO: 0.7 10E3/UL (ref 0–1.3)
MONOCYTES NFR BLD AUTO: 11 %
NEUTROPHILS # BLD AUTO: 3.4 10E3/UL (ref 1.6–8.3)
NEUTROPHILS NFR BLD AUTO: 50 %
NRBC # BLD AUTO: 0 10E3/UL
NRBC BLD AUTO-RTO: 0 /100
PLATELET # BLD AUTO: 189 10E3/UL (ref 150–450)
POTASSIUM SERPL-SCNC: 3.8 MMOL/L (ref 3.4–5.3)
PROT SERPL-MCNC: 7.4 G/DL (ref 6.4–8.3)
RBC # BLD AUTO: 4.63 10E6/UL (ref 3.8–5.9)
SODIUM SERPL-SCNC: 140 MMOL/L (ref 136–145)
WBC # BLD AUTO: 6.8 10E3/UL (ref 4–11)

## 2023-04-26 PROCEDURE — 80053 COMPREHEN METABOLIC PANEL: CPT | Performed by: PATHOLOGY

## 2023-04-26 PROCEDURE — 99000 SPECIMEN HANDLING OFFICE-LAB: CPT | Performed by: PATHOLOGY

## 2023-04-26 PROCEDURE — 36415 COLL VENOUS BLD VENIPUNCTURE: CPT | Performed by: PATHOLOGY

## 2023-04-26 PROCEDURE — 74177 CT ABD & PELVIS W/CONTRAST: CPT | Performed by: RADIOLOGY

## 2023-04-26 PROCEDURE — 85025 COMPLETE CBC W/AUTO DIFF WBC: CPT | Performed by: PATHOLOGY

## 2023-04-26 PROCEDURE — 84134 ASSAY OF PREALBUMIN: CPT | Mod: 90 | Performed by: PATHOLOGY

## 2023-04-26 RX ORDER — IOPAMIDOL 755 MG/ML
91 INJECTION, SOLUTION INTRAVASCULAR ONCE
Status: COMPLETED | OUTPATIENT
Start: 2023-04-26 | End: 2023-04-26

## 2023-04-26 RX ADMIN — IOPAMIDOL 91 ML: 755 INJECTION, SOLUTION INTRAVASCULAR at 16:52

## 2023-04-26 NOTE — TELEPHONE ENCOUNTER
Pt saw Dr Anna this week to discuss planning for combo case (colostomy reversal)  with Dr Dominguez (full depth vaginoplasty). Pt last saw Dr Dominguez in 2022 and had been working on hair removal in preparation for surgery. Called pt to discuss status with hair removal.     She states that she has made progress with hair removal. She is going to [facility] for weekly electrolysis appointments. Most of scrotum is done, have not started on shaft skin.    Pt has an appointment in  for cystoscopy. Informed pt that she should avoid shaving areas genitals before this appointment in order for Dr Dominguez to evaluate hair removal status. Hair removal must be completed before we can submit PA for full depth vaginoplasty, which the pt understands. Discussed the need for updated letters of support as well. Pt plans to ask insurance if they have adopted the new WPATH criteria, which indicates the need for one letter of support (rather than two) for gender affirming genital surgery. Pt will follow up with us after talking with her insurance. Pt had two letters of support for orchiectomy, but these will  soon. These letters cannot be easily updated, since pt no longer sees the writers. Pt understands the plan moving forward and has no other questions.    Discussed with Dr Dominguez, and he says that if pt's insurance is okay with one letter of support from her hormone provider, he would accept this as well.

## 2023-04-26 NOTE — TELEPHONE ENCOUNTER
RECORDS RECEIVED FROM: Care Everywhere   REASON FOR VISIT: Colostomy status   Date of Appt: 5/8/23   NOTES (FOR ALL VISITS) STATUS DETAILS   OFFICE NOTE from referring provider Internal 4/24/23 Amadou Anna MD @ [facility]       OFFICE NOTE from other specialist Care Everywhere 1/24/23 Hayden Cerda MD @ [facility]     OPERATIVE REPORT Care Everywhere [facility]  8/10/22 Colonoscopy   MEDICATION LIST Internal    IMAGING  (FOR ALL VISITS)     CT (HEAD, NECK, SPINE) Internal [facility]  8/12/22 CT Abdomen Pelvis    [facility]  2/21/22 CT Abdomen Pelvis

## 2023-04-27 ENCOUNTER — TELEPHONE (OUTPATIENT)
Dept: SURGERY | Facility: CLINIC | Age: 33
End: 2023-04-27
Payer: COMMERCIAL

## 2023-04-27 LAB — PREALB SERPL IA-MCNC: 20 MG/DL (ref 15–45)

## 2023-04-27 NOTE — TELEPHONE ENCOUNTER
[facility] Call Center    Phone Message    May a detailed message be left on voicemail: yes     Reason for Call: Other: Rashmi Stanford in Neurosurgery, called on behalf of Dr. Drew.  Dr. Drew would like to know  1) who has been managing the Balcofen pump and 2) why was patient referred to him instead of Dana to remove the pump.  Please follow up with Rashmi Stanford at [phone] or by private message.     Action Taken: Message routed to:  [facility]    Travel Screening: Not Applicable

## 2023-05-01 ENCOUNTER — DOCUMENTATION ONLY (OUTPATIENT)
Dept: SURGERY | Facility: CLINIC | Age: 33
End: 2023-05-01
Payer: COMMERCIAL

## 2023-05-01 NOTE — PROGRESS NOTES
Combo Surgery Scheduling (All day combo Dr. Dominguez & Dr. Anna): Waiting on green light from Dr. Dominguez - maybe September<    Per Supervisor Mira Sagastume who is currently scheduling for Dr. Dominguez, this is a Dr. Dominguez patient who has several steps to complete before she can move forward with this combo surgery. Maybe September 2023 at soonest. Dr. Dominguez's team will update Dr. Anna's team once patient is ready to be scheduled for this combo surgery.     Elana Monique  Johanne-op Coordinator  [facility]  Direct Phone: [phone]

## 2023-05-07 ASSESSMENT — ENCOUNTER SYMPTOMS
INCREASED ENERGY: 0
CHILLS: 0
DECREASED APPETITE: 0
POLYDIPSIA: 0
FLANK PAIN: 0
NIGHT SWEATS: 0
POLYPHAGIA: 0
DECREASED CONCENTRATION: 1
DIFFICULTY URINATING: 0
FEVER: 0
HEMATURIA: 0
WEIGHT GAIN: 0
WEIGHT LOSS: 0
DYSURIA: 0
DEPRESSION: 1
NERVOUS/ANXIOUS: 1
PANIC: 1
FATIGUE: 1
ALTERED TEMPERATURE REGULATION: 0
INSOMNIA: 1
HALLUCINATIONS: 0

## 2023-05-08 ENCOUNTER — PRE VISIT (OUTPATIENT)
Dept: NEUROSURGERY | Facility: CLINIC | Age: 33
End: 2023-05-08

## 2023-05-08 ENCOUNTER — OFFICE VISIT (OUTPATIENT)
Dept: NEUROSURGERY | Facility: CLINIC | Age: 33
End: 2023-05-08
Attending: COLON & RECTAL SURGERY
Payer: COMMERCIAL

## 2023-05-08 VITALS
HEART RATE: 98 BPM | OXYGEN SATURATION: 96 % | DIASTOLIC BLOOD PRESSURE: 88 MMHG | WEIGHT: 167.6 LBS | SYSTOLIC BLOOD PRESSURE: 130 MMHG | BODY MASS INDEX: 27.05 KG/M2

## 2023-05-08 DIAGNOSIS — Z93.3 COLOSTOMY STATUS (H): ICD-10-CM

## 2023-05-08 DIAGNOSIS — Z01.818 PREOPERATIVE EVALUATION TO RULE OUT SURGICAL CONTRAINDICATION: Primary | ICD-10-CM

## 2023-05-08 DIAGNOSIS — Z97.8 STATUS POST INSERTION OF INTRATHECAL BACLOFEN PUMP: ICD-10-CM

## 2023-05-08 DIAGNOSIS — R25.2 SPASTICITY: Primary | ICD-10-CM

## 2023-05-08 PROCEDURE — 99417 PROLNG OP E/M EACH 15 MIN: CPT | Performed by: NEUROLOGICAL SURGERY

## 2023-05-08 PROCEDURE — 99205 OFFICE O/P NEW HI 60 MIN: CPT | Performed by: NEUROLOGICAL SURGERY

## 2023-05-08 RX ORDER — QUETIAPINE FUMARATE 25 MG/1
25 TABLET, FILM COATED ORAL PRN
COMMUNITY
Start: 2022-11-16

## 2023-05-08 RX ORDER — PROGESTERONE 200 MG/1
1 CAPSULE ORAL EVERY MORNING
COMMUNITY
Start: 2022-04-26

## 2023-05-08 ASSESSMENT — PAIN SCALES - GENERAL: PAINLEVEL: NO PAIN (0)

## 2023-05-08 NOTE — LETTER
"5/8/2023       RE: Hannah Chavez  [address]  [place] [address]         Dear Colleague,    Thank you for referring your patient, Hannah Chavez, to the [facility]UROSURGERY [facility]IC [facility]. Please see a copy of my visit note below.    NEUROSURGERY    HISTORY AND PHYSICAL EXAM    Chief Complaint   Patient presents with    Consult     [facility] new - REFERRAL FOR REMOVAL OF EXISTING INTRATHECAL BACLOFEN PUMP SYSTEM       HISTORY OF PRESENT ILLNESS  Ms. Young \"Asher\" Kathy is a 32 year old transgender female who is referred by Hina nAna and Alberto for removal of the existing intrathecal baclofen pump system.  She is currently under consideration of reversing her colostomy as well as vaginoplasty.  Neurosurgery was asked to remove the intrathecal baclofen pump system.  Initially, the plan was to remove the pump during the other procedures but due to intraoperative positioning changes and the length of the planned surgeries, the decision was made to go with removal of the intrathecal pump system first.    In terms of her baclofen pump, it was placed long time ago for her spasticity.  It was done at [facility] and she continues to get her pump managed by the [facility].  She was last seen by MIKE Flor on 3/17/2023.  Her pump was placed for SCI lower extremity spasticity and it has been revised in the past.  She did have a catastrophic failure of her catheter some time ago and she was in a comatose state for a few days.     Otherwise, she currently reports no issues with the pump or the baclofen regimen.  She is being weaned off of her baclofen and she has been given an oral baclofen to take in case of any withdrawal symptoms but she has done well and she did not need to take any oral baclofen.  She is currently at 66.95 mcg/day dose and her pump has the 500 mcg/mL concentration baclofen in it.  She is scheduled to " have the pump decreased further with the ultimate goal to have it turned off.  She had plans to have it removed at [place] but since her surgeries are being done at the [facility], she wanted to have the pump removed here as well.    Patient states that she is in her usual state of health and she denies any new illness.  She is not on any anticoagulation or antiplatelet therapy.    Past Medical History:   Diagnosis Date    Asthma     Bladder incontinence     Self-catheterizes urinary bladder     Spinal cord injury of T10 vertebra (H)     Spinal cord injury of thoracic region without bone injury, initial encounter (H)     Uncomplicated asthma        Past Surgical History:   Procedure Laterality Date    CYSTOSCOPY N/A 9/1/2017    Procedure: CYSTOSCOPY;  Cystoscopy and Botox Injection into the Bladder;  Surgeon: Michael Mcnulty MD;  Location: [facility]    CYSTOSCOPY N/A 10/12/2018    Procedure: CYSTOSCOPY;  Cystoscopy, Botox;  Surgeon: Michael Mcnulty MD;  [facility]: [facility]    CYSTOSCOPY, INTRAVESICAL INJECTION N/A 3/2/2018    Procedure: CYSTOSCOPY, INTRAVESICAL INJECTION;  Cystoscopy And Botox Injection Into The Bladder  ;  Surgeon: Michael Mcnulty MD;  [facility]    INJECT BOTOX N/A 9/1/2017    Procedure: INJECT BOTOX;;  Surgeon: Michael Mcnulty MD;  [facility]    INJECT BOTOX N/A 10/12/2018    Procedure: INJECT BOTOX;;  Surgeon: Michael Mcnulty MD;  [facility]    ORCHIECTOMY SCROTAL Bilateral 8/18/2022    Procedure: Bilateral Simple Scrotal Orchiectomy;  Surgeon: Chilango Dominguez MD;  Location: [facility] OR       No family history on file.    Social History     Socioeconomic History    Marital status: Single     Spouse name: Not on file    Number of children: Not on file    Years of education: Not on file    Highest education level: Not on file   Occupational History    Not on file   Tobacco Use    Smoking status: Never    Smokeless tobacco: Never   Vaping Use    Vaping status: Not  "on file   Substance and Sexual Activity    Alcohol use: No    Drug use: No    Sexual activity: Not on file   Other Topics Concern    Not on file   Social History Narrative    Not on file     Social Determinants of Health     Financial Resource Strain: Not on file   Food Insecurity: Not on file   Transportation Needs: Not on file   Physical Activity: Not on file   Stress: Not on file   Social Connections: Not on file   Intimate Partner Violence: Not on file   Housing Stability: Not on file          Allergies   Allergen Reactions    Shellfish Allergy Nausea and Vomiting and Unknown     Patient states his mother has a \"violent reaction\" to shellfish, and that he has a feeling of nausea when he smells shellfish, so he has never consumed any. He states he has also not been tested.      Shellfish-Derived Products Other (See Comments)     Patient states her mother has a \"violent reaction\" to shellfish, and that she has a feeling of nausea when she smells shellfish, so she has never consumed any. She states she has also not been tested.  Patient states his mother has a \"violent reaction\" to shellfish, and that he has a feeling of nausea when he smells shellfish, so he has never consumed any. He states he has also not been tested.         Current Outpatient Medications   Medication    ALBUTEROL IN    baclofen (LIORESAL) 10 MG tablet    diazepam (VALIUM) 5 MG tablet    estradiol valerate (DELESTROGEN) 20 MG/ML injection    Insulin Syringe-Needle U-100 (B-D INSULIN SYRINGE) 25G X 1\" 1 ML MISC    medication given by implanted intrathecal pump    metroNIDAZOLE (FLAGYL) 500 MG tablet    neomycin (MYCIFRADIN) 500 MG tablet    ondansetron (ZOFRAN) 4 MG tablet    polyethylene glycol (MIRALAX) 17 g packet    progesterone (PROMETRIUM) 200 MG capsule    QUEtiapine (SEROQUEL) 25 MG tablet    [employer] Container MISC     Current Facility-Administered Medications   Medication    Botulinum Toxin Type A (BOTOX) 200 units injection 200 Units "       REVIEW OF SYSTEMS:  General: POSITIVE for difficulty sleeping.  Negative for chills/sweats/fever, headache, recent fatigue, or weight gain/loss.  Eyes: Negative for blurred vision, crossed eyes, double vision, recent eye infections, vision flashes, or vision halos.  Ears/Nose/Mouth/Throat: Negative for bleeding gums, difficulty swallowing, earache, ear discharge, hearing loss, hoarseness, nosebleeds, tinnitus, or sinus problems.  Respiratory: POSITIVE for shortness of breath from asthma. Negative for chronic cough, coughing blood, night sweats, Tuberculosis, or wheezing.  Cardiovascular: Negative for chest pain, dyspnea at night, heart murmur, palpitations, poor circulation, swollen legs/feet, or varicose veins.  Gastrointestinal: POSITIVE for colostomy, left lower quadrant.  Negative for melena, hematochezia, chronic diarrhea, heartburn, Hepatitis A/B/C, increasing constipation, Liver Disease, nausea, or vomiting.   Genitourinary: POSITIVE for frequent UTI. Negative for urinary retention, genital discharge, urinary incontinence, prostate problems, urgency.   Neurological: POSITIVE for hand weakness.  Negative for syncope, headaches, numbness of arms/legs, tingling in hands/arms/legs, memory problems, or seizures.  Psychological: POSITIVE for anxiety, depression, panic attacks, or restlessness.  Skin: Negative for chronic skin itching, color changes in hand/feet when cold, poor scarring, non-healing ulcers, skin rashes/hives, unusual moles.  Musculoskeletal: Negative for arthritis, joint swelling in hands/wrists/hips/knees/joints, muscle tenderness in arms/legs, or osteoporosis.  Endocrine: POSITIVE for hormone replacement therapy.  Negative for excessive thirst/hunger, intolerance for warm rooms, loss of libido, multiple broken bones, rapid weight gain/loss, galactorrhea, or thyroid issues.  Hematologic/Lymphatic: Negative for easy skin bruising, significant fatigue, prolonged bleeding, tender glands/lymph  nodes.  Allergies: POSITIVE for asthma which is seasonal and to dust mites.    Answers for HPI/ROS submitted by the patient on 5/7/2023  General Symptoms: Yes  Skin Symptoms: No  HENT Symptoms: No  EYE SYMPTOMS: No  HEART SYMPTOMS: No  LUNG SYMPTOMS: No  INTESTINAL SYMPTOMS: No  URINARY SYMPTOMS: Yes  GYNECOLOGIC SYMPTOMS: No  REPRODUCTIVE SYMPTOMS: No  BREAST SYMPTOMS: No  SKELETAL SYMPTOMS: No  BLOOD SYMPTOMS: No  NERVOUS SYSTEM SYMPTOMS: No  MENTAL HEALTH SYMPTOMS: Yes  Fever: No  Loss of appetite: No  Weight loss: No  Weight gain: No  Fatigue: Yes  Night sweats: No  Chills: No  Increased stress: Yes  Excessive hunger: No  Excessive thirst: No  Feeling hot or cold when others believe the temperature is normal: No  Loss of height: No  Post-operative complications: No  Surgical site pain: No  Hallucinations: No  Change in or Loss of Energy: No  Hyperactivity: Yes  Confusion: No  Trouble holding urine or incontinence: Yes  Pain or burning: No  Trouble starting or stopping: No  Increased frequency of urination: Yes  Blood in urine: No  Decreased frequency of urination: No  Frequent nighttime urination: No  Flank pain: No  Difficulty emptying bladder: No  Nervous or Anxious: Yes  Depression: Yes  Trouble sleeping: Yes  Trouble thinking or concentrating: Yes  Mood changes: Yes  Panic attacks: Yes      PHYSICAL EXAM  /88 (BP Location: Right arm, Patient Position: Sitting, Cuff Size: Adult Regular)   Pulse 98   Wt 76 kg (167 lb 9.6 oz)   SpO2 96%   BMI 27.05 kg/m      General: Awake, alert, oriented. Well nourished, well developed,  is not in any acute distress.  HEENT: Head normocephalic, atraumatic. No carotid bruit. Neck supple. Good range of motion. No palpable thyroid mass.  Heart: Regular rhythm and rate. No murmurs.  Lungs: Clear to auscultation and percussion bilaterally. No ronchi, rales or wheeze.  Abdomen: Soft, non-tender, non-distended. No hepatosplenomegaly.  Extremity: Warm with no clubbing or  cyanosis. No lower extremity edema.  Incisions: right lower quadrant incision - healed well.  It is wide.  No exposed hardware.  Midline lumbar spine is intact.  Well healed.    Neurological  Awake, alert and oriented to date, time, place and person. Speech fluent.   Pupils equal, round, reactive to light.  Extraocular movement intact.  Visual fields are full on confrontation.  Hearing is grossly normal to finger rub.   Facial sensation intact.  Face symmetric.  Tongue midline.  Uvula elevates equally.    Motor: full strength throughout, except 4+/5 bilateral  strength.  Sensation: intact to light touch and pinpoint.  Deep tendon reflexes: trace throughout. Negative for clonus. Negative for Mejía's sign. No dysmetria.      IMAGING  CT abdomen and pelvis with contrast from 4/26/2023 shows the intrathecal pump to be located at the right abdomen.  Her catheter goes to the back and enters the intrathecal space at the L3/4 interlaminar space.  She also has an anchor at the lumbar insertion site.  The catheter is ascending to the thoracic spine but the tip could not be visualized.         ASSESSMENT   32 year old transgender female  History of SCI and spasticity  Status post intrathecal baclofen pump placement  Status post intrathecal baclofen pump and catheter revision  Intrathecal baclofen therapy no longer needed  Intrathecal baclofen pump system removal request    Ms. Hannah Chavez presents to have her baclofen pump removed.  She is no longer in need of the intrathecal baclofen therapy and she has been working with [facility] to wean down her baclofen.  She reports that her dose was as high as 600 mcg/day and it is now 63 mcg/day.  She is scheduled to have it refilled on 5/12/2023 at which time she is also scheduled to have the pump programmed down to further wean the baclofen.    I suggested that she be weaned down as low as possible.  If the pump could be turned down to the non-therapeutic rate, that would  be the best for the removal.  I did discuss with her the risk of the withdrawal from baclofen, although this seem unlikely given she has been weaned down significantly without any issues.  She stated that she will discuss this with the [place] providers and she thought that this shouldn't be a problem./    During today's visit, we discussed the risks, benefits and alternative therapies of intrathecal drug delivery system removal.  Risks including but not limited to bleeding, infection, retained hardware, cerebrospinal fluid leak, headaches and need for further surgeries were discussed.  Risks of the general anesthesia were also discussed, including but not limited to pneumonia, stroke, MI, and death.  Surgical procedure for the intrathecal baclofen pump system was also discussed at length.  I did state that if there is a Dacron pouch present, it will be removed.  The goal of the surgery would be to remove all the components.  It is possible that a segment of the catheter might break off and cannot be retrieved.  If that is the case, it will be left behind.  She expressed understanding.  All questions were answered.     A full history and physical exam was performed during today's visit.  The plan is to have her surgery scheduled for June 2, 2023.      Her widened scar may also be revised during the removal process, as part of the surgery.  We also may consider keeping her overnight for observation if we feel that she may be at risk for baclofen withdrawal.    She is allergic to shrimps/shellfish.  However, she does not have any issues with Betadine.      PLAN  1.  Removal of intrathecal baclofen pump system, removal of the baclofen pump from the right abdomen and removal of the intrathecal catheter from the thoracolumbar spine.  2.  Preop labs.      50 minutes were spent face to face with the patient of which more than 50% of the time was spent counseling and discussing the above issues regarding diagnosis,  differential, treatment options, and steps for further evaluation.  10 minutes were spent reviewing patient's chart, imaging in PACS.  30 minutes were spent on documentation for this encounter.  90 minutes total were spent on this encounter today.          Again, thank you for allowing me to participate in the care of your patient.      Sincerely,    Que Drew MD

## 2023-05-08 NOTE — PROGRESS NOTES
"NEUROSURGERY    HISTORY AND PHYSICAL EXAM    Chief Complaint   Patient presents with     Consult     [facility] new - REFERRAL FOR REMOVAL OF EXISTING INTRATHECAL BACLOFEN PUMP SYSTEM       HISTORY OF PRESENT ILLNESS  Ms. Young \"Asher\" Kathy is a 32 year old transgender female who is referred by Hina Anna and Alberto for removal of the existing intrathecal baclofen pump system.  She is currently under consideration of reversing her colostomy as well as vaginoplasty.  Neurosurgery was asked to remove the intrathecal baclofen pump system.  Initially, the plan was to remove the pump during the other procedures but due to intraoperative positioning changes and the length of the planned surgeries, the decision was made to go with removal of the intrathecal pump system first.    In terms of her baclofen pump, it was placed long time ago for her spasticity.  It was done at [facility] and she continues to get her pump managed by the [place] [facility].  She was last seen by MIKE Flor on 3/17/2023.  Her pump was placed for SCI lower extremity spasticity and it has been revised in the past.  She did have a catastrophic failure of her catheter some time ago and she was in a comatose state for a few days.     Otherwise, she currently reports no issues with the pump or the baclofen regimen.  She is being weaned off of her baclofen and she has been given an oral baclofen to take in case of any withdrawal symptoms but she has done well and she did not need to take any oral baclofen.  She is currently at 66.95 mcg/day dose and her pump has the 500 mcg/mL concentration baclofen in it.  She is scheduled to have the pump decreased further with the ultimate goal to have it turned off.  She had plans to have it removed at [place] but since her surgeries are being done at the [facility], she wanted to have the pump removed here as well.    Patient states that she is in her usual state of health and she denies any new illness. "  She is not on any anticoagulation or antiplatelet therapy.    Past Medical History:   Diagnosis Date     Asthma      Bladder incontinence      Self-catheterizes urinary bladder      Spinal cord injury of T10 vertebra (H)      Spinal cord injury of thoracic region without bone injury, initial encounter (H)      Uncomplicated asthma        Past Surgical History:   Procedure Laterality Date     CYSTOSCOPY N/A 9/1/2017    Procedure: CYSTOSCOPY;  Cystoscopy and Botox Injection into the Bladder;  Surgeon: Michael Mcnulty MD;  [facility]: [facility]     CYSTOSCOPY N/A 10/12/2018    Procedure: CYSTOSCOPY;  Cystoscopy, Botox;  Surgeon: Michael Mcnulty MD;  [facility]     CYSTOSCOPY, INTRAVESICAL INJECTION N/A 3/2/2018    Procedure: CYSTOSCOPY, INTRAVESICAL INJECTION;  Cystoscopy And Botox Injection Into The Bladder  ;  Surgeon: Michael Mcnulty MD;  [facility]     INJECT BOTOX N/A 9/1/2017    Procedure: INJECT BOTOX;;  Surgeon: Michael Mcnulty MD;  [facility]     INJECT BOTOX N/A 10/12/2018    Procedure: INJECT BOTOX;;  Surgeon: iMchael Mcnulty MD;  [facility]     ORCHIECTOMY SCROTAL Bilateral 8/18/2022    Procedure: Bilateral Simple Scrotal Orchiectomy;  Surgeon: Chilango Dominguez MD;  Location: [facility] OR       No family history on file.    Social History     Socioeconomic History     Marital status: Single     Spouse name: Not on file     Number of children: Not on file     Years of education: Not on file     Highest education level: Not on file   Occupational History     Not on file   Tobacco Use     Smoking status: Never     Smokeless tobacco: Never   Vaping Use     Vaping status: Not on file   Substance and Sexual Activity     Alcohol use: No     Drug use: No     Sexual activity: Not on file   Other Topics Concern     Not on file   Social History Narrative     Not on file     Social Determinants of Health     Financial Resource Strain: Not on file   Food Insecurity: Not on  "file   Transportation Needs: Not on file   Physical Activity: Not on file   Stress: Not on file   Social Connections: Not on file   Intimate Partner Violence: Not on file   Housing Stability: Not on file          Allergies   Allergen Reactions     Shellfish Allergy Nausea and Vomiting and Unknown     Patient states his mother has a \"violent reaction\" to shellfish, and that he has a feeling of nausea when he smells shellfish, so he has never consumed any. He states he has also not been tested.       Shellfish-Derived Products Other (See Comments)     Patient states her mother has a \"violent reaction\" to shellfish, and that she has a feeling of nausea when she smells shellfish, so she has never consumed any. She states she has also not been tested.  Patient states his mother has a \"violent reaction\" to shellfish, and that he has a feeling of nausea when he smells shellfish, so he has never consumed any. He states he has also not been tested.         Current Outpatient Medications   Medication     ALBUTEROL IN     baclofen (LIORESAL) 10 MG tablet     diazepam (VALIUM) 5 MG tablet     estradiol valerate (DELESTROGEN) 20 MG/ML injection     Insulin Syringe-Needle U-100 (B-D INSULIN SYRINGE) 25G X 1\" 1 ML MISC     medication given by implanted intrathecal pump     metroNIDAZOLE (FLAGYL) 500 MG tablet     neomycin (MYCIFRADIN) 500 MG tablet     ondansetron (ZOFRAN) 4 MG tablet     polyethylene glycol (MIRALAX) 17 g packet     progesterone (PROMETRIUM) 200 MG capsule     QUEtiapine (SEROQUEL) 25 MG tablet     [employer] Container MISC     Current Facility-Administered Medications   Medication     Botulinum Toxin Type A (BOTOX) 200 units injection 200 Units       REVIEW OF SYSTEMS:  General: POSITIVE for difficulty sleeping.  Negative for chills/sweats/fever, headache, recent fatigue, or weight gain/loss.  Eyes: Negative for blurred vision, crossed eyes, double vision, recent eye infections, vision flashes, or vision " halos.  Ears/Nose/Mouth/Throat: Negative for bleeding gums, difficulty swallowing, earache, ear discharge, hearing loss, hoarseness, nosebleeds, tinnitus, or sinus problems.  Respiratory: POSITIVE for shortness of breath from asthma. Negative for chronic cough, coughing blood, night sweats, Tuberculosis, or wheezing.  Cardiovascular: Negative for chest pain, dyspnea at night, heart murmur, palpitations, poor circulation, swollen legs/feet, or varicose veins.  Gastrointestinal: POSITIVE for colostomy, left lower quadrant.  Negative for melena, hematochezia, chronic diarrhea, heartburn, Hepatitis A/B/C, increasing constipation, Liver Disease, nausea, or vomiting.   Genitourinary: POSITIVE for frequent UTI. Negative for urinary retention, genital discharge, urinary incontinence, prostate problems, urgency.   Neurological: POSITIVE for hand weakness.  Negative for syncope, headaches, numbness of arms/legs, tingling in hands/arms/legs, memory problems, or seizures.  Psychological: POSITIVE for anxiety, depression, panic attacks, or restlessness.  Skin: Negative for chronic skin itching, color changes in hand/feet when cold, poor scarring, non-healing ulcers, skin rashes/hives, unusual moles.  Musculoskeletal: Negative for arthritis, joint swelling in hands/wrists/hips/knees/joints, muscle tenderness in arms/legs, or osteoporosis.  Endocrine: POSITIVE for hormone replacement therapy.  Negative for excessive thirst/hunger, intolerance for warm rooms, loss of libido, multiple broken bones, rapid weight gain/loss, galactorrhea, or thyroid issues.  Hematologic/Lymphatic: Negative for easy skin bruising, significant fatigue, prolonged bleeding, tender glands/lymph nodes.  Allergies: POSITIVE for asthma which is seasonal and to dust mites.    Answers for HPI/ROS submitted by the patient on 5/7/2023  General Symptoms: Yes  Skin Symptoms: No  HENT Symptoms: No  EYE SYMPTOMS: No  HEART SYMPTOMS: No  LUNG SYMPTOMS: No  INTESTINAL  SYMPTOMS: No  URINARY SYMPTOMS: Yes  GYNECOLOGIC SYMPTOMS: No  REPRODUCTIVE SYMPTOMS: No  BREAST SYMPTOMS: No  SKELETAL SYMPTOMS: No  BLOOD SYMPTOMS: No  NERVOUS SYSTEM SYMPTOMS: No  MENTAL HEALTH SYMPTOMS: Yes  Fever: No  Loss of appetite: No  Weight loss: No  Weight gain: No  Fatigue: Yes  Night sweats: No  Chills: No  Increased stress: Yes  Excessive hunger: No  Excessive thirst: No  Feeling hot or cold when others believe the temperature is normal: No  Loss of height: No  Post-operative complications: No  Surgical site pain: No  Hallucinations: No  Change in or Loss of Energy: No  Hyperactivity: Yes  Confusion: No  Trouble holding urine or incontinence: Yes  Pain or burning: No  Trouble starting or stopping: No  Increased frequency of urination: Yes  Blood in urine: No  Decreased frequency of urination: No  Frequent nighttime urination: No  Flank pain: No  Difficulty emptying bladder: No  Nervous or Anxious: Yes  Depression: Yes  Trouble sleeping: Yes  Trouble thinking or concentrating: Yes  Mood changes: Yes  Panic attacks: Yes      PHYSICAL EXAM  /88 (BP Location: Right arm, Patient Position: Sitting, Cuff Size: Adult Regular)   Pulse 98   Wt 76 kg (167 lb 9.6 oz)   SpO2 96%   BMI 27.05 kg/m      General: Awake, alert, oriented. Well nourished, well developed,  is not in any acute distress.  HEENT: Head normocephalic, atraumatic. No carotid bruit. Neck supple. Good range of motion. No palpable thyroid mass.  Heart: Regular rhythm and rate. No murmurs.  Lungs: Clear to auscultation and percussion bilaterally. No ronchi, rales or wheeze.  Abdomen: Soft, non-tender, non-distended. No hepatosplenomegaly.  Extremity: Warm with no clubbing or cyanosis. No lower extremity edema.  Incisions: right lower quadrant incision - healed well.  It is wide.  No exposed hardware.  Midline lumbar spine is intact.  Well healed.    Neurological  Awake, alert and oriented to date, time, place and person. Speech fluent.    Pupils equal, round, reactive to light.  Extraocular movement intact.  Visual fields are full on confrontation.  Hearing is grossly normal to finger rub.   Facial sensation intact.  Face symmetric.  Tongue midline.  Uvula elevates equally.    Motor: full strength throughout, except 4+/5 bilateral  strength.  Sensation: intact to light touch and pinpoint.  Deep tendon reflexes: trace throughout. Negative for clonus. Negative for Mejía's sign. No dysmetria.      IMAGING  CT abdomen and pelvis with contrast from 4/26/2023 shows the intrathecal pump to be located at the right abdomen.  Her catheter goes to the back and enters the intrathecal space at the L3/4 interlaminar space.  She also has an anchor at the lumbar insertion site.  The catheter is ascending to the thoracic spine but the tip could not be visualized.         ASSESSMENT   32 year old transgender female  History of SCI and spasticity  Status post intrathecal baclofen pump placement  Status post intrathecal baclofen pump and catheter revision  Intrathecal baclofen therapy no longer needed  Intrathecal baclofen pump system removal request    Ms. Hannah Chavez presents to have her baclofen pump removed.  She is no longer in need of the intrathecal baclofen therapy and she has been working with [facility] to wean down her baclofen.  She reports that her dose was as high as 600 mcg/day and it is now 63 mcg/day.  She is scheduled to have it refilled on 5/12/2023 at which time she is also scheduled to have the pump programmed down to further wean the baclofen.    I suggested that she be weaned down as low as possible.  If the pump could be turned down to the non-therapeutic rate, that would be the best for the removal.  I did discuss with her the risk of the withdrawal from baclofen, although this seem unlikely given she has been weaned down significantly without any issues.  She stated that she will discuss this with the [facility] providers and she  thought that this shouldn't be a problem./    During today's visit, we discussed the risks, benefits and alternative therapies of intrathecal drug delivery system removal.  Risks including but not limited to bleeding, infection, retained hardware, cerebrospinal fluid leak, headaches and need for further surgeries were discussed.  Risks of the general anesthesia were also discussed, including but not limited to pneumonia, stroke, MI, and death.  Surgical procedure for the intrathecal baclofen pump system was also discussed at length.  I did state that if there is a Dacron pouch present, it will be removed.  The goal of the surgery would be to remove all the components.  It is possible that a segment of the catheter might break off and cannot be retrieved.  If that is the case, it will be left behind.  She expressed understanding.  All questions were answered.     A full history and physical exam was performed during today's visit.  The plan is to have her surgery scheduled for June 2, 2023.      Her widened scar may also be revised during the removal process, as part of the surgery.  We also may consider keeping her overnight for observation if we feel that she may be at risk for baclofen withdrawal.    She is allergic to shrimps/shellfish.  However, she does not have any issues with Betadine.      PLAN  1.  Removal of intrathecal baclofen pump system, removal of the baclofen pump from the right abdomen and removal of the intrathecal catheter from the thoracolumbar spine.  2.  Preop labs.      50 minutes were spent face to face with the patient of which more than 50% of the time was spent counseling and discussing the above issues regarding diagnosis, differential, treatment options, and steps for further evaluation.  10 minutes were spent reviewing patient's chart, imaging in PACS.  30 minutes were spent on documentation for this encounter.  90 minutes total were spent on this encounter today.

## 2023-05-09 ENCOUNTER — DOCUMENTATION ONLY (OUTPATIENT)
Dept: NEUROSURGERY | Facility: CLINIC | Age: 33
End: 2023-05-09
Payer: COMMERCIAL

## 2023-05-09 ENCOUNTER — TELEPHONE (OUTPATIENT)
Dept: NEUROSURGERY | Facility: CLINIC | Age: 33
End: 2023-05-09
Payer: COMMERCIAL

## 2023-05-09 NOTE — PROGRESS NOTES
Per pt request, preop lab orders faxed to [facility] at [phone]. Date of surgery, time frame for tests, return fax and my contact information included.

## 2023-05-09 NOTE — TELEPHONE ENCOUNTER
I called the patient in regards to scheduling surgery with Dr. Drew. Patient does not need a covid test unless requested by provider and pre op has been taken care of with H & P completed by surgeon . Patient is aware that the nursing team will be reaching out within the next few days. I did tell patient that a nurse will reach out within 2-3 days prior to surgery with arrival time and instructions.    Surgeon: Dr. Drew  Date of Surgery: 6/2/2023  Location of surgery: [facility]   Pre-Op H&P: Completed   Post-Op Appt Date: 6/12/2023   Imaging needed:  No  Discussed COVID-19 testing:  Yes  Pre-cert/Authorization completed:  Yes  Patient aware that pre-op RN will call 2-3 days prior to surgery with arrival time and instructions Yes         Phillip Pinzon on 5/9/2023 at 3:10 PM

## 2023-05-19 ENCOUNTER — TRANSFERRED RECORDS (OUTPATIENT)
Dept: MULTI SPECIALTY CLINIC | Facility: CLINIC | Age: 33
End: 2023-05-19

## 2023-05-19 LAB
CREATININE (EXTERNAL): 0.75 MG/DL (ref 0.55–1.18)
GFR ESTIMATED (EXTERNAL): >60 ML/MIN/1.73M2
GLUCOSE (EXTERNAL): 106 MG/DL (ref 70–100)
INR (EXTERNAL): 1 (ref 0.9–1.1)
POTASSIUM (EXTERNAL): 4.1 MMOL/L (ref 3.5–5.1)

## 2023-05-22 ENCOUNTER — TELEPHONE (OUTPATIENT)
Dept: NEUROSURGERY | Facility: CLINIC | Age: 33
End: 2023-05-22
Payer: COMMERCIAL

## 2023-05-22 NOTE — TELEPHONE ENCOUNTER
Pt is scheduled for removal of intrathecal pump and catheter on 6/2/23 (Dr. Drew). She would like to know if her nipple piercings need to be removed for sugery. I let her know that the OR instructions say to remove all jewelry, including piercings, prior to surgery. Pt said that she cannot take them out herself b/c they haven't been in for the requisite amount of time. She will have to have a piercing professional remove them and put in spacers, which she said is costly. Finances are a concern so she would like to know if she can leave them in. They are made of medical grade steel, per pt.     Will follow-up with pt. No further questions at this time.

## 2023-05-22 NOTE — TELEPHONE ENCOUNTER
Rashmi,  Piercing has to do with cautery use. Preop will recommend that they be removed. If they can't be, they will cover it, but the concern always is burn injury due to cautery used during the surgery.  You could reach out to 3C or anesthesia, but that's my understanding. We could limit the use to bipolar but that may be tricky for device removal. I don't have a good answer for you. Expose yourself to increased risk brandon use it costs to remove the jewelry? Tough situation.

## 2023-05-22 NOTE — TELEPHONE ENCOUNTER
Followed up with pt to let her know that Anesthesiology said that to maximize pt's safety for surgery, she should remove the piercings and have plastic spacers placed before surgery. Pt expressed understanding and will arrange to have this done. No further questions at this time.

## 2023-06-01 ENCOUNTER — ANESTHESIA EVENT (OUTPATIENT)
Dept: SURGERY | Facility: CLINIC | Age: 33
End: 2023-06-01
Payer: COMMERCIAL

## 2023-06-02 ENCOUNTER — HOSPITAL ENCOUNTER (OUTPATIENT)
Facility: CLINIC | Age: 33
Discharge: HOME | End: 2023-06-02
Attending: NEUROLOGICAL SURGERY | Admitting: NEUROLOGICAL SURGERY
Payer: COMMERCIAL

## 2023-06-02 ENCOUNTER — ANESTHESIA (OUTPATIENT)
Dept: SURGERY | Facility: CLINIC | Age: 33
End: 2023-06-02
Payer: COMMERCIAL

## 2023-06-02 VITALS
HEIGHT: 66 IN | HEART RATE: 93 BPM | BODY MASS INDEX: 26.75 KG/M2 | DIASTOLIC BLOOD PRESSURE: 84 MMHG | RESPIRATION RATE: 12 BRPM | OXYGEN SATURATION: 95 % | WEIGHT: 166.45 LBS | TEMPERATURE: 98.3 F | SYSTOLIC BLOOD PRESSURE: 126 MMHG

## 2023-06-02 DIAGNOSIS — F64.9 GENDER DYSPHORIA: Primary | ICD-10-CM

## 2023-06-02 DIAGNOSIS — R25.2 SPASTICITY: ICD-10-CM

## 2023-06-02 DIAGNOSIS — N31.9 NEUROGENIC BLADDER: ICD-10-CM

## 2023-06-02 LAB
ABO/RH(D): NORMAL
ANTIBODY SCREEN: NEGATIVE
SPECIMEN EXPIRATION DATE: NORMAL

## 2023-06-02 PROCEDURE — 250N000011 HC RX IP 250 OP 636: Performed by: NEUROLOGICAL SURGERY

## 2023-06-02 PROCEDURE — 250N000011 HC RX IP 250 OP 636: Performed by: ANESTHESIOLOGY

## 2023-06-02 PROCEDURE — 710N000012 HC RECOVERY PHASE 2, PER MINUTE: Performed by: NEUROLOGICAL SURGERY

## 2023-06-02 PROCEDURE — 250N000025 HC SEVOFLURANE, PER MIN: Performed by: NEUROLOGICAL SURGERY

## 2023-06-02 PROCEDURE — 86850 RBC ANTIBODY SCREEN: CPT | Performed by: NEUROLOGICAL SURGERY

## 2023-06-02 PROCEDURE — 370N000017 HC ANESTHESIA TECHNICAL FEE, PER MIN: Performed by: NEUROLOGICAL SURGERY

## 2023-06-02 PROCEDURE — 710N000010 HC RECOVERY PHASE 1, LEVEL 2, PER MIN: Performed by: NEUROLOGICAL SURGERY

## 2023-06-02 PROCEDURE — 62365 REMOVE SPINE INFUSION DEVICE: CPT | Mod: GC | Performed by: NEUROLOGICAL SURGERY

## 2023-06-02 PROCEDURE — 272N000001 HC OR GENERAL SUPPLY STERILE: Performed by: NEUROLOGICAL SURGERY

## 2023-06-02 PROCEDURE — 250N000009 HC RX 250: Performed by: NEUROLOGICAL SURGERY

## 2023-06-02 PROCEDURE — 258N000003 HC RX IP 258 OP 636: Performed by: ANESTHESIOLOGY

## 2023-06-02 PROCEDURE — 86901 BLOOD TYPING SEROLOGIC RH(D): CPT | Performed by: NEUROLOGICAL SURGERY

## 2023-06-02 PROCEDURE — 62355 REMOVE SPINAL CANAL CATHETER: CPT | Mod: GC | Performed by: NEUROLOGICAL SURGERY

## 2023-06-02 PROCEDURE — 36415 COLL VENOUS BLD VENIPUNCTURE: CPT | Performed by: NEUROLOGICAL SURGERY

## 2023-06-02 PROCEDURE — 999N000141 HC STATISTIC PRE-PROCEDURE NURSING ASSESSMENT: Performed by: NEUROLOGICAL SURGERY

## 2023-06-02 PROCEDURE — 360N000082 HC SURGERY LEVEL 2 W/ FLUORO, PER MIN: Performed by: NEUROLOGICAL SURGERY

## 2023-06-02 PROCEDURE — 250N000009 HC RX 250: Performed by: ANESTHESIOLOGY

## 2023-06-02 RX ORDER — ONDANSETRON 4 MG/1
4 TABLET, ORALLY DISINTEGRATING ORAL EVERY 30 MIN PRN
Status: DISCONTINUED | OUTPATIENT
Start: 2023-06-02 | End: 2023-06-02 | Stop reason: HOSPADM

## 2023-06-02 RX ORDER — HYDROMORPHONE HYDROCHLORIDE 1 MG/ML
0.2 INJECTION, SOLUTION INTRAMUSCULAR; INTRAVENOUS; SUBCUTANEOUS EVERY 5 MIN PRN
Status: DISCONTINUED | OUTPATIENT
Start: 2023-06-02 | End: 2023-06-02 | Stop reason: HOSPADM

## 2023-06-02 RX ORDER — POLYETHYLENE GLYCOL 3350 17 G/17G
1 POWDER, FOR SOLUTION ORAL DAILY
Qty: 7 PACKET | Refills: 0 | Status: SHIPPED | OUTPATIENT
Start: 2023-06-02 | End: 2023-06-09

## 2023-06-02 RX ORDER — OXYCODONE HYDROCHLORIDE 10 MG/1
10 TABLET ORAL
Status: DISCONTINUED | OUTPATIENT
Start: 2023-06-02 | End: 2023-06-02 | Stop reason: HOSPADM

## 2023-06-02 RX ORDER — ACETAMINOPHEN 325 MG/1
650 TABLET ORAL EVERY 6 HOURS PRN
Qty: 60 TABLET | Refills: 0 | Status: SHIPPED | OUTPATIENT
Start: 2023-06-02 | End: 2023-08-28

## 2023-06-02 RX ORDER — SENNA AND DOCUSATE SODIUM 50; 8.6 MG/1; MG/1
2 TABLET, FILM COATED ORAL AT BEDTIME
Qty: 14 TABLET | Refills: 0 | Status: SHIPPED | OUTPATIENT
Start: 2023-06-02 | End: 2023-06-09

## 2023-06-02 RX ORDER — CEFAZOLIN SODIUM/WATER 2 G/20 ML
2 SYRINGE (ML) INTRAVENOUS
Status: COMPLETED | OUTPATIENT
Start: 2023-06-02 | End: 2023-06-02

## 2023-06-02 RX ORDER — OXYCODONE HYDROCHLORIDE 5 MG/1
5 TABLET ORAL EVERY 6 HOURS PRN
Qty: 30 TABLET | Refills: 0 | Status: SHIPPED | OUTPATIENT
Start: 2023-06-02 | End: 2023-08-28

## 2023-06-02 RX ORDER — LIDOCAINE HYDROCHLORIDE 20 MG/ML
INJECTION, SOLUTION INFILTRATION; PERINEURAL PRN
Status: DISCONTINUED | OUTPATIENT
Start: 2023-06-02 | End: 2023-06-02

## 2023-06-02 RX ORDER — FENTANYL CITRATE 50 UG/ML
50 INJECTION, SOLUTION INTRAMUSCULAR; INTRAVENOUS EVERY 5 MIN PRN
Status: DISCONTINUED | OUTPATIENT
Start: 2023-06-02 | End: 2023-06-02 | Stop reason: HOSPADM

## 2023-06-02 RX ORDER — CEPHALEXIN 500 MG/1
500 CAPSULE ORAL 4 TIMES DAILY
Qty: 28 CAPSULE | Refills: 0 | Status: SHIPPED | OUTPATIENT
Start: 2023-06-02 | End: 2023-06-09

## 2023-06-02 RX ORDER — DEXAMETHASONE SODIUM PHOSPHATE 4 MG/ML
INJECTION, SOLUTION INTRA-ARTICULAR; INTRALESIONAL; INTRAMUSCULAR; INTRAVENOUS; SOFT TISSUE PRN
Status: DISCONTINUED | OUTPATIENT
Start: 2023-06-02 | End: 2023-06-02

## 2023-06-02 RX ORDER — HYDROMORPHONE HYDROCHLORIDE 1 MG/ML
0.4 INJECTION, SOLUTION INTRAMUSCULAR; INTRAVENOUS; SUBCUTANEOUS EVERY 5 MIN PRN
Status: DISCONTINUED | OUTPATIENT
Start: 2023-06-02 | End: 2023-06-02 | Stop reason: HOSPADM

## 2023-06-02 RX ORDER — PROPOFOL 10 MG/ML
INJECTION, EMULSION INTRAVENOUS PRN
Status: DISCONTINUED | OUTPATIENT
Start: 2023-06-02 | End: 2023-06-02

## 2023-06-02 RX ORDER — CEFAZOLIN SODIUM/WATER 2 G/20 ML
2 SYRINGE (ML) INTRAVENOUS SEE ADMIN INSTRUCTIONS
Status: DISCONTINUED | OUTPATIENT
Start: 2023-06-02 | End: 2023-06-02 | Stop reason: HOSPADM

## 2023-06-02 RX ORDER — ONDANSETRON 2 MG/ML
4 INJECTION INTRAMUSCULAR; INTRAVENOUS EVERY 30 MIN PRN
Status: DISCONTINUED | OUTPATIENT
Start: 2023-06-02 | End: 2023-06-02 | Stop reason: HOSPADM

## 2023-06-02 RX ORDER — ONDANSETRON 2 MG/ML
INJECTION INTRAMUSCULAR; INTRAVENOUS PRN
Status: DISCONTINUED | OUTPATIENT
Start: 2023-06-02 | End: 2023-06-02

## 2023-06-02 RX ORDER — OXYCODONE HYDROCHLORIDE 5 MG/1
5 TABLET ORAL
Status: DISCONTINUED | OUTPATIENT
Start: 2023-06-02 | End: 2023-06-02 | Stop reason: HOSPADM

## 2023-06-02 RX ORDER — FENTANYL CITRATE 50 UG/ML
25 INJECTION, SOLUTION INTRAMUSCULAR; INTRAVENOUS EVERY 5 MIN PRN
Status: DISCONTINUED | OUTPATIENT
Start: 2023-06-02 | End: 2023-06-02 | Stop reason: HOSPADM

## 2023-06-02 RX ORDER — SODIUM CHLORIDE, SODIUM LACTATE, POTASSIUM CHLORIDE, CALCIUM CHLORIDE 600; 310; 30; 20 MG/100ML; MG/100ML; MG/100ML; MG/100ML
INJECTION, SOLUTION INTRAVENOUS CONTINUOUS
Status: DISCONTINUED | OUTPATIENT
Start: 2023-06-02 | End: 2023-06-02 | Stop reason: HOSPADM

## 2023-06-02 RX ORDER — FENTANYL CITRATE 50 UG/ML
INJECTION, SOLUTION INTRAMUSCULAR; INTRAVENOUS PRN
Status: DISCONTINUED | OUTPATIENT
Start: 2023-06-02 | End: 2023-06-02

## 2023-06-02 RX ORDER — SODIUM CHLORIDE, SODIUM LACTATE, POTASSIUM CHLORIDE, CALCIUM CHLORIDE 600; 310; 30; 20 MG/100ML; MG/100ML; MG/100ML; MG/100ML
INJECTION, SOLUTION INTRAVENOUS CONTINUOUS PRN
Status: DISCONTINUED | OUTPATIENT
Start: 2023-06-02 | End: 2023-06-02

## 2023-06-02 RX ADMIN — PHENYLEPHRINE HYDROCHLORIDE 100 MCG: 10 INJECTION INTRAVENOUS at 07:50

## 2023-06-02 RX ADMIN — SODIUM CHLORIDE, POTASSIUM CHLORIDE, SODIUM LACTATE AND CALCIUM CHLORIDE: 600; 310; 30; 20 INJECTION, SOLUTION INTRAVENOUS at 08:52

## 2023-06-02 RX ADMIN — FENTANYL CITRATE 100 MCG: 50 INJECTION, SOLUTION INTRAMUSCULAR; INTRAVENOUS at 07:29

## 2023-06-02 RX ADMIN — HYDROMORPHONE HYDROCHLORIDE 0.5 MG: 1 INJECTION, SOLUTION INTRAMUSCULAR; INTRAVENOUS; SUBCUTANEOUS at 09:01

## 2023-06-02 RX ADMIN — PHENYLEPHRINE HYDROCHLORIDE 100 MCG: 10 INJECTION INTRAVENOUS at 07:39

## 2023-06-02 RX ADMIN — MIDAZOLAM 2 MG: 1 INJECTION INTRAMUSCULAR; INTRAVENOUS at 07:21

## 2023-06-02 RX ADMIN — DEXAMETHASONE SODIUM PHOSPHATE 8 MG: 4 INJECTION, SOLUTION INTRA-ARTICULAR; INTRALESIONAL; INTRAMUSCULAR; INTRAVENOUS; SOFT TISSUE at 07:37

## 2023-06-02 RX ADMIN — SODIUM CHLORIDE, POTASSIUM CHLORIDE, SODIUM LACTATE AND CALCIUM CHLORIDE: 600; 310; 30; 20 INJECTION, SOLUTION INTRAVENOUS at 07:27

## 2023-06-02 RX ADMIN — ONDANSETRON 4 MG: 2 INJECTION INTRAMUSCULAR; INTRAVENOUS at 07:37

## 2023-06-02 RX ADMIN — LIDOCAINE HYDROCHLORIDE 100 MG: 20 INJECTION, SOLUTION INFILTRATION; PERINEURAL at 07:29

## 2023-06-02 RX ADMIN — Medication 2 G: at 07:36

## 2023-06-02 RX ADMIN — SUGAMMADEX 200 MG: 100 INJECTION, SOLUTION INTRAVENOUS at 10:09

## 2023-06-02 RX ADMIN — PROPOFOL 200 MG: 10 INJECTION, EMULSION INTRAVENOUS at 07:29

## 2023-06-02 RX ADMIN — PHENYLEPHRINE HYDROCHLORIDE 100 MCG: 10 INJECTION INTRAVENOUS at 07:59

## 2023-06-02 RX ADMIN — Medication 20 MG: at 08:35

## 2023-06-02 RX ADMIN — Medication 50 MG: at 07:29

## 2023-06-02 ASSESSMENT — ACTIVITIES OF DAILY LIVING (ADL)
ADLS_ACUITY_SCORE: 37

## 2023-06-02 ASSESSMENT — VISUAL ACUITY
OU: NORMAL ACUITY

## 2023-06-02 NOTE — ANESTHESIA CARE TRANSFER NOTE
Patient: Hannah Chavez    Procedure: Procedure(s):  Removal of intrathecal drug delivery system - removal of the intrathecal drug pump from the right abdomen and removal of the intrathecal catheter from the thoracolumbar spine       Diagnosis: Status post insertion of intrathecal baclofen pump [Z97.8]  Colostomy status (H) [Z93.3]  Spasticity [R25.2]  Diagnosis Additional Information: No value filed.    Anesthesia Type:   General     Note:    Oropharynx: oropharynx clear of all foreign objects  Level of Consciousness: awake  Oxygen Supplementation: nasal cannula  Level of Supplemental Oxygen (L/min / FiO2): 3  Independent Airway: airway patency satisfactory and stable  Dentition: dentition unchanged  Vital Signs Stable: post-procedure vital signs reviewed and stable  Report to RN Given: handoff report given  Patient transferred to: PACU    Handoff Report: Identifed the Patient, Identified the Reponsible Provider, Reviewed the pertinent medical history, Discussed the surgical course, Reviewed Intra-OP anesthesia mangement and issues during anesthesia, Set expectations for post-procedure period and Allowed opportunity for questions and acknowledgement of understanding      Vitals:  Vitals Value Taken Time   /68 06/02/23 1017   Temp     Pulse 86 06/02/23 1020   Resp     SpO2 99 % 06/02/23 1020   Vitals shown include unvalidated device data.    Electronically Signed By: AUTUMN Peoples CRNA  June 2, 2023  10:21 AM

## 2023-06-02 NOTE — DISCHARGE INSTRUCTIONS
[facility]  Same-Day Surgery   Adult Discharge Orders & Instructions     For 24 hours after surgery    Get plenty of rest.  A responsible adult must stay with you for at least 24 hours after you leave the hospital.   Do not drive or use heavy equipment.  If you have weakness or tingling, don't drive or use heavy equipment until this feeling goes away.  Do not drink alcohol.  Avoid strenuous or risky activities.  Ask for help when climbing stairs.   You may feel lightheaded.  IF so, sit for a few minutes before standing.  Have someone help you get up.   If you have nausea (feel sick to your stomach): Drink only clear liquids such as apple juice, ginger ale, broth or 7-Up.  Rest may also help.  Be sure to drink enough fluids.  Move to a regular diet as you feel able.  You may have a slight fever. Call the doctor if your fever is over 100 F (37.7 C) (taken under the tongue) or lasts longer than 24 hours.  You may have a dry mouth, a sore throat, muscle aches or trouble sleeping.  These should go away after 24 hours.  Do not make important or legal decisions.   Call your doctor for any of the followin.  Signs of infection (fever, growing tenderness at the surgery site, a large amount of drainage or bleeding, severe pain, foul-smelling drainage, redness, swelling).    2. It has been over 8 to 10 hours since surgery and you are still not able to urinate (pass water).    3.  Headache for over 24 hours.    To contact a doctor, call Dr Drew at [phone] at the Neurosurgery Clinic from 8 am till 5 pm --   or:    '   [phone] and ask for the resident on call for   Neurosurgery (answered 24 hours a day)  '   Emergency Department:    [facility]: [phone]       (TTY for hearing impaired: [phone])

## 2023-06-02 NOTE — ANESTHESIA POSTPROCEDURE EVALUATION
Patient: Hannah Chavez    Procedure: Procedure(s):  Removal of intrathecal drug delivery system - removal of the intrathecal drug pump from the right abdomen and removal of the intrathecal catheter from the thoracolumbar spine       Anesthesia Type:  General    Note:  Disposition: Outpatient   Postop Pain Control: Uneventful            Sign Out: Well controlled pain   PONV: No   Neuro/Psych: Uneventful            Sign Out: Acceptable/Baseline neuro status   Airway/Respiratory: Uneventful            Sign Out: Acceptable/Baseline resp. status   CV/Hemodynamics: Uneventful            Sign Out: Acceptable CV status; No obvious hypovolemia; No obvious fluid overload   Other NRE: NONE   DID A NON-ROUTINE EVENT OCCUR? No           Last vitals:  Vitals Value Taken Time   /65 06/02/23 1020   Temp     Pulse 83 06/02/23 1022   Resp     SpO2 98 % 06/02/23 1022   Vitals shown include unvalidated device data.    Electronically Signed By: Hesham Gilbert MD  June 2, 2023  10:23 AM

## 2023-06-02 NOTE — ANESTHESIA PROCEDURE NOTES
Airway       Patient location during procedure: OR       Procedure Start/Stop Times: 6/2/2023 7:31 AM  Staff -        CRNA: David Augustin APRN CRNA       Performed By: CRNA  Consent for Airway        Urgency: elective  Indications and Patient Condition       Indications for airway management: selina-procedural       Induction type:intravenous       Mask difficulty assessment: 1 - vent by mask    Final Airway Details       Final airway type: endotracheal airway       Successful airway: ETT - single  Endotracheal Airway Details        ETT size (mm): 7.0       Cuffed: yes       Successful intubation technique: direct laryngoscopy       DL Blade Type: [place] 2       Grade View of Cords: 1       Adjucts: stylet       Position: Right       Measured from: lips       Secured at (cm): 22       Bite block used: None    Post intubation assessment        Placement verified by: capnometry, equal breath sounds and chest rise        Number of attempts at approach: 1       Number of other approaches attempted: 0       Secured with: plastic tape       Ease of procedure: easy       Dentition: Intact and Unchanged    Medication(s) Administered   Medication Administration Time: 6/2/2023 7:31 AM

## 2023-06-02 NOTE — ANESTHESIA PREPROCEDURE EVALUATION
"Anesthesia Pre-Procedure Evaluation    Patient: aHnnah Chavez   MRN: [id number] : 1990        Procedure : Procedure(s):  Removal of intrathecal drug delivery system - removal of the intrathecal drug pump from the right abdomen and removal of the intrathecal catheter from the thoracolumbar spine          Past Medical History:   Diagnosis Date     Asthma      Bladder incontinence      Self-catheterizes urinary bladder      Spinal cord injury of T10 vertebra (H)      Spinal cord injury of thoracic region without bone injury, initial encounter (H)      Uncomplicated asthma       Past Surgical History:   Procedure Laterality Date     CYSTOSCOPY N/A 2017    Procedure: CYSTOSCOPY;  Cystoscopy and Botox Injection into the Bladder;  Surgeon: Michael Mcnulty MD;  [facility]     CYSTOSCOPY N/A 10/12/2018    Procedure: CYSTOSCOPY;  Cystoscopy, Botox;  Surgeon: Michael Mcnulty MD;  [facility]     CYSTOSCOPY, INTRAVESICAL INJECTION N/A 3/2/2018    Procedure: CYSTOSCOPY, INTRAVESICAL INJECTION;  Cystoscopy And Botox Injection Into The Bladder  ;  Surgeon: Michael Mcnulty MD;  [facility]     INJECT BOTOX N/A 2017    Procedure: INJECT BOTOX;;  Surgeon: Michael Mcnulty MD;  [facility]     INJECT BOTOX N/A 10/12/2018    Procedure: INJECT BOTOX;;  Surgeon: Michael Mcnulty MD;  [facility]     ORCHIECTOMY SCROTAL Bilateral 2022    Procedure: Bilateral Simple Scrotal Orchiectomy;  Surgeon: Chilango Dominguez MD;  [facility]      Allergies   Allergen Reactions     Dust Mites      Shellfish Allergy Nausea and Vomiting and Unknown     Patient states his mother has a \"violent reaction\" to shellfish, and that he has a feeling of nausea when he smells shellfish, so he has never consumed any. He states he has also not been tested.       Shellfish-Derived Products Other (See Comments)     Patient states her mother has a \"violent reaction\" to shellfish, and that " "she has a feeling of nausea when she smells shellfish, so she has never consumed any. She states she has also not been tested.  Patient states his mother has a \"violent reaction\" to shellfish, and that he has a feeling of nausea when he smells shellfish, so he has never consumed any. He states he has also not been tested.        Social History     Tobacco Use     Smoking status: Never     Smokeless tobacco: Never   Vaping Use     Vaping status: Not on file   Substance Use Topics     Alcohol use: No      Wt Readings from Last 1 Encounters:   06/02/23 75.5 kg (166 lb 7.2 oz)        Anesthesia Evaluation   Pt has had prior anesthetic. Type: General.        ROS/MED HX  ENT/Pulmonary:  - neg pulmonary ROS   (+) asthma     Neurologic: Comment: Partial 10 spinal cord injury at 10 months of age.  Baclofen pump.    (+) Spinal cord injury, year sustained: 1993, level of injury: T10,     Cardiovascular:       METS/Exercise Tolerance:     Hematologic:  - neg hematologic  ROS     Musculoskeletal:  - neg musculoskeletal ROS     GI/Hepatic: Comment: Colostomy five years ago.      Renal/Genitourinary:  - neg Renal ROS     Endo:  - neg endo ROS     Psychiatric/Substance Use:  - neg psychiatric ROS     Infectious Disease:  - neg infectious disease ROS     Malignancy:  - neg malignancy ROS     Other:            Physical Exam    Airway  airway exam normal      Mallampati: I   TM distance: > 3 FB   Neck ROM: full   Mouth opening: > 3 cm    Respiratory Devices and Support         Dental       (+) Completely normal teeth      Cardiovascular   cardiovascular exam normal          Pulmonary   pulmonary exam normal                OUTSIDE LABS:  CBC:   Lab Results   Component Value Date    WBC 6.8 04/26/2023    HGB 13.6 04/26/2023    HCT 39.7 04/26/2023     04/26/2023     BMP:   Lab Results   Component Value Date     04/26/2023    POTASSIUM 3.8 04/26/2023    CHLORIDE 104 04/26/2023    CO2 28 04/26/2023    BUN 5.6 (L) 04/26/2023    " CR 0.82 04/26/2023    GLC 91 04/26/2023     COAGS: No results found for: PTT, INR, FIBR  POC: No results found for: BGM, HCG, HCGS  HEPATIC:   Lab Results   Component Value Date    ALBUMIN 4.5 04/26/2023    PROTTOTAL 7.4 04/26/2023    ALT 10 04/26/2023    AST 15 04/26/2023    ALKPHOS 68 04/26/2023    BILITOTAL 0.6 04/26/2023     OTHER:   Lab Results   Component Value Date    CHERIE 9.7 04/26/2023       Anesthesia Plan    ASA Status:  4   NPO Status:  NPO Appropriate    Anesthesia Type: General.              Consents    Anesthesia Plan(s) and associated risks, benefits, and realistic alternatives discussed. Questions answered and patient/representative(s) expressed understanding.    - Discussed: Risks, Benefits and Alternatives for the PROCEDURE were discussed     - Discussed with:       - Extended Intubation/Ventilatory Support Discussed: Yes.      - Patient is DNR/DNI Status: No    Use of blood products discussed: Yes.     Postoperative Care    Pain management: IV analgesics.   PONV prophylaxis: Ondansetron (or other 5HT-3), Dexamethasone or Solumedrol     Comments:                Hesham Gilbert MD

## 2023-06-02 NOTE — BRIEF OP NOTE
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 Tip of catheter removed intact..  Complications: None.  Implants: * No implants in log *

## 2023-06-05 ENCOUNTER — PATIENT OUTREACH (OUTPATIENT)
Dept: NEUROSURGERY | Facility: CLINIC | Age: 33
End: 2023-06-05
Payer: COMMERCIAL

## 2023-06-05 NOTE — PROGRESS NOTES
[facility]: Post-Discharge Note  SITUATION                                                      Admission:    Admission Date: 06/02/23   Reason for Admission: Removal of intrathecal drug delivery system - removal of the intrathecal drug pump from the right abdomen and removal of the intrathecal catheter from the thoracolumbar spine  Discharge:   Discharge Date: 06/02/23  Discharge Diagnosis: Status post insertion of intrathecal baclofen pump    BACKGROUND                                                      Per hospital discharge summary and inpatient provider notes:  Neurosurgery Discharge Coordination Note     Attending physician: Dr. Drew  Discharge to: Home     Current status: Patient states she is doing well since surgery. Pain is well controlled with oxycodone as needed. Denies redness, swelling, increased tenderness, drainage, incisions opening or elevated temp. Reports incisions CDI without signs of infection.  Denies current bowel or bladder issues.    Discharge instructions and medications reviewed with patient.  Follow up appointments/imaging/tests needed: 2 week post op with Mimi Talbot on 6/12/23 at 12:30.     RN triage/on call number given: [phone]/ [phone]        ASSESSMENT           Discharge Assessment  How are your symptoms? (Red Flag symptoms escalate to triage hotline per guidelines): Improved  Do you feel your condition is stable enough to be safe at home until your provider visit?: Yes  Does the patient have their discharge instructions? : Yes  Were you started on any new medications or were there changes to any of your previous medications? : Yes  Does the patient have all of their medications?: Yes  Do you have questions regarding any of your medications? : No  Discharge follow-up appointment scheduled within 14 calendar days? : Yes  Discharge Follow Up Appointment Date: 06/12/23  Discharge Follow Up Appointment Scheduled with?: Specialty Care Provider         Post-op  (Clinicians Only)  Did the patient have surgery or a procedure: Yes  Incision: closed;healing  Drainage: No  Bleeding: none  Fever: No  Chills: No  Redness: No  Warmth: No  Swelling: No  Incision site pain: Yes  Closure: suture;dissolving  Eating & Drinking: eating and drinking without complaints/concerns  PO Intake: regular diet  Bowel Function: normal  Urinary Status: voiding without complaint/concerns        PLAN                                                      Outpatient Plan:  Routine postop follow-up      Future Appointments   Date Time Provider Department Center   6/12/2023 12:30 PM [facility]bot NP [facility]   6/21/2023 10:30 AM [facility]         For any urgent concerns, please contact our 24 hour nurse triage line: [phone] ([phone])         EFRAIN JUNG RN

## 2023-06-12 ENCOUNTER — OFFICE VISIT (OUTPATIENT)
Dept: NEUROSURGERY | Facility: CLINIC | Age: 33
End: 2023-06-12
Payer: COMMERCIAL

## 2023-06-12 VITALS
WEIGHT: 168 LBS | HEART RATE: 107 BPM | BODY MASS INDEX: 27.12 KG/M2 | SYSTOLIC BLOOD PRESSURE: 138 MMHG | OXYGEN SATURATION: 96 % | RESPIRATION RATE: 16 BRPM | DIASTOLIC BLOOD PRESSURE: 79 MMHG

## 2023-06-12 DIAGNOSIS — Z09 FOLLOW-UP SURGERY CARE: ICD-10-CM

## 2023-06-12 DIAGNOSIS — R25.2 SPASTICITY: Primary | ICD-10-CM

## 2023-06-12 DIAGNOSIS — T85.610D BACLOFEN PUMP FAILURE, SUBSEQUENT ENCOUNTER: ICD-10-CM

## 2023-06-12 PROCEDURE — 99024 POSTOP FOLLOW-UP VISIT: CPT | Performed by: NURSE PRACTITIONER

## 2023-06-12 ASSESSMENT — PAIN SCALES - GENERAL: PAINLEVEL: MILD PAIN (2)

## 2023-06-12 NOTE — PROGRESS NOTES
NEUROSURGERY    HISTORY AND PHYSICAL EXAM    Chief Complaint   Patient presents with     RECHECK     Surgical Followup       HISTORY OF PRESENT ILLNESS  Ms. Chavez returns today for her follow-up after removal of the intrathecal drug pump from the right abdomen and removal of the intrathecal catheter from the thoracolumbar spine on 6/2/2023 by Dr. Drew.  Patient reports that she is doing well and there are no complaints. She reports minimal pain but stiffness which she notes is same as prior. She has not restarted physical therapy but was doing it twice weekly prior.  No changes in medications, she notes she has not required any additional pain medication. She is able to move around well. No changes in bowel or bladder. She notes a hardened area approximately fingerbreadth on midline portion closest to umbilicus and hard area of tissue approximately 5mm, the area underneath is soft and mildly swollen. She denies any fevers, chills, nausea, vomiting, dizziness, weakness, numbness or seizure like activity.  She reports that the incision is looking good and there is no redness, no drainage and no fluid collection noted although swelling per prior. It feels more lipose tissue than fluid or fluctuance.  Overall, she is quite happy with the recent surgery.        Past Medical History:   Diagnosis Date     Asthma      Bladder incontinence      Self-catheterizes urinary bladder      Spinal cord injury of T10 vertebra (H)      Spinal cord injury of thoracic region without bone injury, initial encounter (H)      Uncomplicated asthma        Past Surgical History:   Procedure Laterality Date     CYSTOSCOPY N/A 9/1/2017    Procedure: CYSTOSCOPY;  Cystoscopy and Botox Injection into the Bladder;  Surgeon: Michael Mcnulty MD;  [facility]     CYSTOSCOPY N/A 10/12/2018    Procedure: CYSTOSCOPY;  Cystoscopy, Botox;  Surgeon: Michael Mcnulty MD;  [facility]     CYSTOSCOPY, INTRAVESICAL INJECTION N/A 3/2/2018     Procedure: CYSTOSCOPY, INTRAVESICAL INJECTION;  Cystoscopy And Botox Injection Into The Bladder  ;  Surgeon: Michael Mcnulty MD;  [facility]     INJECT BOTOX N/A 9/1/2017    Procedure: INJECT BOTOX;;  Surgeon: Michael Mcnulty MD;  [facility]     INJECT BOTOX N/A 10/12/2018    Procedure: INJECT BOTOX;;  Surgeon: Michael Mcnulty MD;  [facility]     ORCHIECTOMY SCROTAL Bilateral 8/18/2022    Procedure: Bilateral Simple Scrotal Orchiectomy;  Surgeon: Chilango Dominguez MD;  [facility]     REMOVE INTRATHECAL PAIN PUMP N/A 6/2/2023    Procedure: Removal of intrathecal drug delivery system - removal of the intrathecal drug pump from the right abdomen and removal of the intrathecal catheter from the thoracolumbar spine;  Surgeon: Que Drew MD;  [facility]       Social History     Socioeconomic History     Marital status: Single     Spouse name: Not on file     Number of children: Not on file     Years of education: Not on file     Highest education level: Not on file   Occupational History     Not on file   Tobacco Use     Smoking status: Never     Smokeless tobacco: Never   Vaping Use     Vaping status: Not on file   Substance and Sexual Activity     Alcohol use: No     Drug use: No     Sexual activity: Not on file   Other Topics Concern     Not on file   Social History Narrative     Not on file     Social Determinants of Health     Financial Resource Strain: Not on file   Food Insecurity: Not on file   Transportation Needs: Not on file   Physical Activity: Not on file   Stress: Not on file   Social Connections: Not on file   Intimate Partner Violence: Not on file   Housing Stability: Not on file       No family history on file.    Current Outpatient Medications   Medication     acetaminophen (TYLENOL) 325 MG tablet     ALBUTEROL IN     baclofen (LIORESAL) 10 MG tablet     diazepam (VALIUM) 5 MG tablet     estradiol valerate (DELESTROGEN) 20 MG/ML injection      "Insulin Syringe-Needle U-100 (B-D INSULIN SYRINGE) 25G X 1\" 1 ML MISC     medication given by implanted intrathecal pump     metroNIDAZOLE (FLAGYL) 500 MG tablet     neomycin (MYCIFRADIN) 500 MG tablet     ondansetron (ZOFRAN) 4 MG tablet     oxyCODONE (ROXICODONE) 5 MG tablet     polyethylene glycol (MIRALAX) 17 g packet     progesterone (PROMETRIUM) 200 MG capsule     QUEtiapine (SEROQUEL) 25 MG tablet     [employer]ar[employer] Container MISC     Current Facility-Administered Medications   Medication     Botulinum Toxin Type A (BOTOX) 200 units injection 200 Units       Allergies   Allergen Reactions     Dust Mites      Shellfish Allergy Nausea and Vomiting and Unknown     Patient states his mother has a \"violent reaction\" to shellfish, and that he has a feeling of nausea when he smells shellfish, so he has never consumed any. He states he has also not been tested.       Shellfish-Derived Products Other (See Comments)     Patient states her mother has a \"violent reaction\" to shellfish, and that she has a feeling of nausea when she smells shellfish, so she has never consumed any. She states she has also not been tested.  Patient states his mother has a \"violent reaction\" to shellfish, and that he has a feeling of nausea when he smells shellfish, so he has never consumed any. He states he has also not been tested.           REVIEW OF SYSTEMS:   ROS: 10 point ROS neg other than the symptoms noted above in the HPI.    PHYSICAL EXAM  /79   Pulse 107   Resp 16   Wt 76.2 kg (168 lb)   SpO2 96%   BMI 27.12 kg/m       General: Awake, alert, oriented. Well nourished, well developed, she is not in any acute distress.  HEENT: Head normocephalic, atraumatic. No carotid bruit. Neck supple. Good range of motion.  Extremity: Warm with no clubbing or cyanosis. No lower extremity edema.    Incisions:  R lower abdominal incision with flue removed today, edges well approximated no redness, fluid collection or drainage noted, swelling " underneath incision and small hardened area noted under incision, when comparing prior images it looks similar to pre operatively. Midline back incision with glue removed, no redness, swelling or drainage noted    Neurological  Awake, alert and oriented to date, time, place and person.  Speech is fluent.   Face symmetric.    Motor: full strength throughout.  Sensation: intact to light touch and pinpoint.      ASSESSMENT       Patient is recovering well from the recent Baclofen pump removal surgery.  Her  Incisions are healing well with no signs of infection.  She is doing well overall.         PLAN:  1.  [facility] she will reach out, to see how her swelling has changed  2. Follow up with neurosurgery as needed.

## 2023-06-12 NOTE — LETTER
6/12/2023       RE: Hannah Chavez  [address]  [place] [address]     Dear Colleague,    Thank you for referring your patient, Hannah Chavez, to the [facility]. Please see a copy of my visit note below.    NEUROSURGERY    HISTORY AND PHYSICAL EXAM    Chief Complaint   Patient presents with    RECHECK    Surgical Followup       HISTORY OF PRESENT ILLNESS  Ms. Chavez returns today for her follow-up after removal of the intrathecal drug pump from the right abdomen and removal of the intrathecal catheter from the thoracolumbar spine on 6/2/2023 by Dr. Drew.  Patient reports that she is doing well and there are no complaints. She reports minimal pain but stiffness which she notes is same as prior. She has not restarted physical therapy but was doing it twice weekly prior.  No changes in medications, she notes she has not required any additional pain medication. She is able to move around well. No changes in bowel or bladder. She notes a hardened area approximately fingerbreadth on midline portion closest to umbilicus and hard area of tissue approximately 5mm, the area underneath is soft and mildly swollen. She denies any fevers, chills, nausea, vomiting, dizziness, weakness, numbness or seizure like activity.  She reports that the incision is looking good and there is no redness, no drainage and no fluid collection noted although swelling per prior. It feels more lipose tissue than fluid or fluctuance.  Overall, she is quite happy with the recent surgery.        Past Medical History:   Diagnosis Date    Asthma     Bladder incontinence     Self-catheterizes urinary bladder     Spinal cord injury of T10 vertebra (H)     Spinal cord injury of thoracic region without bone injury, initial encounter (H)     Uncomplicated asthma        Past Surgical History:   Procedure Laterality Date    CYSTOSCOPY N/A 9/1/2017     Procedure: CYSTOSCOPY;  Cystoscopy and Botox Injection into the Bladder;  Surgeon: Michael Mcnulty MD;  [facility]    CYSTOSCOPY N/A 10/12/2018    Procedure: CYSTOSCOPY;  Cystoscopy, Botox;  Surgeon: Michael Mcnulty MD;  [facility]    CYSTOSCOPY, INTRAVESICAL INJECTION N/A 3/2/2018    Procedure: CYSTOSCOPY, INTRAVESICAL INJECTION;  Cystoscopy And Botox Injection Into The Bladder  ;  Surgeon: Michael Mcnulty MD;  [facility]    INJECT BOTOX N/A 9/1/2017    Procedure: INJECT BOTOX;;  Surgeon: Michael Mcnulty MD;  [facility]    INJECT BOTOX N/A 10/12/2018    Procedure: INJECT BOTOX;;  Surgeon: Michael Mcnulty MD;  [facility]    ORCHIECTOMY SCROTAL Bilateral 8/18/2022    Procedure: Bilateral Simple Scrotal Orchiectomy;  Surgeon: Chilango Dominguez MD;  [facility]    REMOVE INTRATHECAL PAIN PUMP N/A 6/2/2023    Procedure: Removal of intrathecal drug delivery system - removal of the intrathecal drug pump from the right abdomen and removal of the intrathecal catheter from the thoracolumbar spine;  Surgeon: Que Drew MD;  [facility]       Social History     Socioeconomic History    Marital status: Single     Spouse name: Not on file    Number of children: Not on file    Years of education: Not on file    Highest education level: Not on file   Occupational History    Not on file   Tobacco Use    Smoking status: Never    Smokeless tobacco: Never   Vaping Use    Vaping status: Not on file   Substance and Sexual Activity    Alcohol use: No    Drug use: No    Sexual activity: Not on file   Other Topics Concern    Not on file   Social History Narrative    Not on file     Social Determinants of Health     Financial Resource Strain: Not on file   Food Insecurity: Not on file   Transportation Needs: Not on file   Physical Activity: Not on file   Stress: Not on file   Social Connections: Not on file   Intimate Partner Violence: Not on file   Housing  "Stability: Not on file       No family history on file.    Current Outpatient Medications   Medication    acetaminophen (TYLENOL) 325 MG tablet    ALBUTEROL IN    baclofen (LIORESAL) 10 MG tablet    diazepam (VALIUM) 5 MG tablet    estradiol valerate (DELESTROGEN) 20 MG/ML injection    Insulin Syringe-Needle U-100 (B-D INSULIN SYRINGE) 25G X 1\" 1 ML MISC    medication given by implanted intrathecal pump    metroNIDAZOLE (FLAGYL) 500 MG tablet    neomycin (MYCIFRADIN) 500 MG tablet    ondansetron (ZOFRAN) 4 MG tablet    oxyCODONE (ROXICODONE) 5 MG tablet    polyethylene glycol (MIRALAX) 17 g packet    progesterone (PROMETRIUM) 200 MG capsule    QUEtiapine (SEROQUEL) 25 MG tablet    [employer]ar[employer] Container MISC     Current Facility-Administered Medications   Medication    Botulinum Toxin Type A (BOTOX) 200 units injection 200 Units       Allergies   Allergen Reactions    Dust Mites     Shellfish Allergy Nausea and Vomiting and Unknown     Patient states his mother has a \"violent reaction\" to shellfish, and that he has a feeling of nausea when he smells shellfish, so he has never consumed any. He states he has also not been tested.      Shellfish-Derived Products Other (See Comments)     Patient states her mother has a \"violent reaction\" to shellfish, and that she has a feeling of nausea when she smells shellfish, so she has never consumed any. She states she has also not been tested.  Patient states his mother has a \"violent reaction\" to shellfish, and that he has a feeling of nausea when he smells shellfish, so he has never consumed any. He states he has also not been tested.           REVIEW OF SYSTEMS:   ROS: 10 point ROS neg other than the symptoms noted above in the HPI.    PHYSICAL EXAM  /79   Pulse 107   Resp 16   Wt 76.2 kg (168 lb)   SpO2 96%   BMI 27.12 kg/m       General: Awake, alert, oriented. Well nourished, well developed, she is not in any acute distress.  HEENT: Head normocephalic, atraumatic. No " carotid bruit. Neck supple. Good range of motion.  Extremity: Warm with no clubbing or cyanosis. No lower extremity edema.    Incisions:  R lower abdominal incision with flue removed today, edges well approximated no redness, fluid collection or drainage noted, swelling underneath incision and small hardened area noted under incision, when comparing prior images it looks similar to pre operatively. Midline back incision with glue removed, no redness, swelling or drainage noted    Neurological  Awake, alert and oriented to date, time, place and person.  Speech is fluent.   Face symmetric.    Motor: full strength throughout.  Sensation: intact to light touch and pinpoint.      ASSESSMENT       Patient is recovering well from the recent Baclofen pump removal surgery.  Her  Incisions are healing well with no signs of infection.  She is doing well overall.         PLAN:  1.  [facility], she will reach out, to see how her swelling has changed  2. Follow up with neurosurgery as needed.    Sincerely,    Mimi Talbot NP

## 2023-06-13 NOTE — OP NOTE
PATIENT NAME: TRINY WAYNE  YOB: 1990  MRN:   [id number]  ACCOUNT:  [id number]      DATE OF PROCEDURE:  06/02/2023    PREOPERATIVE DIAGNOSIS:    1.  History of spinal cord injury and spasticity  2.  Status post intrathecal baclofen pump placement  3.  Status post intrathecal baclofen pump and catheter revision  4.  Intrathecal baclofen therapy no longer needed - weaned off  5.  Intrathecal baclofen pump system removal request    POSTOPERATIVE DIAGNOSIS:   1.  History of spinal cord injury and spasticity  2.  Status post intrathecal baclofen pump placement  3.  Status post intrathecal baclofen pump and catheter revision  4.  Intrathecal baclofen therapy no longer needed - weaned off  5.  Intrathecal baclofen pump system removal request    PROCEDURES PERFORMED:   1.  Removal of intrathecal drug delivery system pump over the right abdomen  2.  Removal of intrathecal catheter from the thoracolumbar spine  3.  Wound revision    ATTENDING SURGEON:  Que Drew MD, PhD     RESIDENT SURGEON: Ady Holman MD     ANESTHESIA:  General endotracheal anesthesia.     ESTIMATED BLOOD LOSS:  2 mL.     COMPLICATIONS:  None.     FINDINGS:   1.  SynchroMed II pump in the right abdomen.  No sign of infection in the pocket.  2.  Remnants of previously implanted Dacron pouch could be seen integrated into the pocket wall scar.  Only small pieces which were visible and easily dissected were removed.  3.  Catheter anchor found at the lumbar spine.  4.  Catheter tip visualized upon removal of the catheter.  5.  No cerebrospinal fluid leak seen after the catheter removal.  6.  Entire catheter, anchor and pump removed.  No retained hardware.    EXPLANTS:    1.  Old Medtronic intrathecal drug delivery system pump - SynchroMed II, model 8637-40, S/N [id number].   2.  Old Medtronic intrathecal catheter - entire catheter removed.  Tip visualized.  3.  Old Medtronic intrathecal catheter anchor - removed from the lumbar  "spine.    INDICATIONS FOR PROCEDURE:  Ms. Young \"Asher\" Kathy is a 32 year old transgender female who is referred by Hina Anna and Alberto for removal of the existing intrathecal baclofen pump system.  She is currently under consideration of reversing her colostomy as well as vaginoplasty.  Neurosurgery was asked to remove the intrathecal baclofen pump system.  Initially, the plan was to remove the pump during the other procedures but due to intraoperative positioning changes and the length of the planned surgeries, the decision was made to go with removal of the intrathecal pump system first.  In terms of her baclofen pump, it was placed long time ago for her spasticity.  It was done at [facility] and she continues to get her pump managed by the [facility].  She was last seen by MIKE Flor on 3/17/2023.  Her pump was placed for SCI lower extremity spasticity and it has been revised in the past.  She did have a catastrophic failure of her catheter some time ago and she was in a comatose state for a few days.  Otherwise, she currently reports no issues with the pump or the baclofen regimen.  She is being weaned off of her baclofen and she has been given an oral baclofen to take in case of any withdrawal symptoms but she has done well and she did not need to take any oral baclofen.  She is currently at 66.95 mcg/day dose and her pump has the 500 mcg/mL concentration baclofen in it.  She is scheduled to have the pump decreased further with the ultimate goal to have it turned off.  She had plans to have it removed at [place] but since her surgeries are being done at the [facility], she wanted to have the pump removed here as well.  Patient states that she is in her usual state of health and she denies any new illness.  She is not on any anticoagulation or antiplatelet therapy.  In preparation for the surgery and as previously planned, her intrathecal baclofen therapy was weaned off.  She is " currently not getting any intrathecal baclofen. Her widened scar may also be revised during the removal process, as part of the surgery.  We discussed the risks, benefits and alternative therapies of intrathecal drug delivery system removal.  Risks including but not limited to bleeding, infection, retained hardware, cerebrospinal fluid leak, headaches and need for further surgeries were discussed.  Risks of the general anesthesia were also discussed, including but not limited to pneumonia, stroke, MI, and death.  Surgical procedure for the intrathecal baclofen pump system was also discussed at length.  I did state that if there is a Dacron pouch present, it will be removed.  The goal of the surgery would be to remove all the components.  It is possible that a segment of the catheter might break off and cannot be retrieved.  If that is the case, it will be left behind.  She expressed understanding.  All questions were answered.     DESCRIPTION OF PROCEDURE:  The patient was brought to the operating room and was laid supine on the operating table.  The patient was identified by me and with placement of endotracheal tube, general anesthesia was obtained.  The patient was then positioned in the left lateral decubitus position with the right side up on the flat Crow table.  All pressure points were padded appropriately to avoid pressure injury.  Patient was also secured to the table.  The patient's right abdomen and the lumbar region were prepped and draped in the usual sterile fashion.  The previous incisions in the right abdomen was marked.  She does have a widened scar and the plan is to make a curvilinear incision that incorporates the widened scar elliptically so that it would be resected.  Time out was performed confirming the patient's identity, procedure to be performed, site and side of the surgery, imaging corresponding to the patient and the administration of preoperative prophylactic antibiotic.  All the  incision sites was injected with local anesthetic and a total of 27 mL of 1% Lidocaine with epinephrine 1:100,000 mixed with 1/4% Marcaine, 50:50 mix, was used throughout the case.    Our attention was first turned to the right abdominal area.  Using a #10 blade, previous well healed incision for the pump pocket was opened.  The incision had been injected with the above named local anesthetic.  The incision was approximately 12 cm long.  It was made in a curvilinear fashion and towards the lateral aspect of it, the incision curved down.  It was also made in an elliptical fashion to incorporate the widened scar at the lateral aspect of the incision so that when the edges are brought together, the scar would be thin.  The widened scar was therefore resected and removed.    The incision was deepened with the #10 blade.  Then, a combination of blunt dissection and monopolar electrocautery was used to dissect down to the capsule of the pump pocket.  The capsule was opened using a monopolar cautery while lifting it away from the pump, thus minimizing touching the pump with the monopolar cautery.  The pump pocket appeared clean and we found that the three anchor points were detached.  The pump appeared to be anchored with Ethibond sutures.  The pump was mobile and we removed it out of the pocket.  The Ethibond suture anchors were also removed.  The pump along with the attached catheter which still remained on the pump were removed out of the pocket.  The connector of the catheter was released and the catheter finally  from the pump.  The pump was inspected and appeared to be without damage.  The pump pocket was also without any evidence of infection.  The pump was taken off the field.      Upon inspection of the pump pocket, remnants of previously implanted Dacron pouch could be seen integrated into the pocket wall scar.  The pocket wall or lining was thickened at several places, especially inferiorly.  In order  to ensure that entire Dacron pouch is removed, the entire pocket lining had to be dissected.  There was a concern that this would create a very thin layer at the anterior pocket lining towards the skin.  This would potentially create a problem with healing.  Since, the removal of the lining would create a very thin layer under the skin, the decision was made not to remove the pocket lining.  Therefore, only small pieces of Dacron which was visible and easily dissected were removed.    Attention was then turned to the lumbar spine, the previously well healed scar.  First, the intrathecal catheter was gently tugged at the pump pocket site.  By doing so, we could feel the catheter and the anchor at the lumbar spine.  The area was already injected with the above named local anesthetic.  Using a #10 blade, a 2 cm incision was made over the previous scar.    The dissection was slowly deepened, as we continued to tug on the catheter from the pump pocket, to guide our dissection.  Again, the catheter and more specifically the anchor, could be palpated.  Using a monopolar cautery, we continued our dissection towards the anchor until it was found.  The anchor was then dissected free and the suture holding it down was cut and removed.     At this time, the catheter going towards the lumbar spine was identified.  We then placed a 2-0 Vicryl suture in a figure of eight fashion around the catheter.  Then, the catheter was slowly removed.  The catheter came out easily without any difficulty.  When the catheter was completely removed, we did visualize the tip, confirming that the entire catheter was removed.  The 2-0 Vicryl suture was then tied, thus closing off the catheter track.  A second 2-0 Vicryl suture was placed in a purse string manner to further reinforce the closure.  At this time, we asked our anesthesia colleague to perform a Valsalva maneuver.  No cerebrospinal fluid leak was noted.    Attention was then turned to the  catheter and the anchor.  The catheter was then cut distal to the anchor and the anchor with the catheter that was pulled out of the intrathecal space was taken off the field.  Now only the catheter that is buried along the subcutaneous space going towards the pocket remained.    The catheter was slowly pulled from the pump site.  There was initially some resistance and the catheter became stretched.  However, with constant and slow pull, the catheter began pulling out and we were able to remove the entire catheter.  The cut edge of the catheter was visualized.  Therefore, the entire intrathecal drug delivery system was confirmed to be removed without any retained hardware.    With satisfactory removal of the entire system, we began closing all the wounds.  The lumbar incision was closed in the following manner after it was generously irrigated and dried.  Meticulous hemostasis was obtained.  No cerebrospinal fluid leak was noted.  2-0 Vicryl sutures were used to close the scar layer where the anchor and the catheter was removed to reduce any dead space.  3-0 Vicryl sutures were used to close the dermal layer in an inverted interrupted fashion.  Then, 4-0 Monocryl suture was used to close the skin using a subcuticular technique.  The right abdomen pocket was also generously irrigated and dried.  Meticulous hemostasis was obtained.  Then, 2-0 Vicryl sutures were used in an interrupted fashion within the pocket to collapse the pocket capsule and to reduce the dead space.  Then, 2-0 Vicryl sutures were used in an interrupted fashion to close the pocket capsule.  3-0 Vicryl sutures were used to reapproximate the deep subcutaneous layer.  Then, 3-0 Vicryl sutures were used to close the dermal layer in an inverted interrupted fashion.  Then, 4-0 Monocryl suture was used to close the skin using a subcuticular technique.  All the wounds were cleaned and dried and re-prepped with ChoraPrep.  Skin glue was applied to all the  incisions.    The patient was then positioned supine onto the bed.  The patient was extubated and taken to the recovery room in a stable condition.  At the end of the case, all counts including needle, sponge and instrument counts were correct x 2.  There were no complications.     Etienne VALLE M.D., Ph.D., Neurosurgery Attending, was present and scrubbed for the entire case and performed the key and critical portions of the case.      ETIENNE BROOKS MD, PHD

## 2023-06-15 ENCOUNTER — PRE VISIT (OUTPATIENT)
Dept: UROLOGY | Facility: CLINIC | Age: 33
End: 2023-06-15
Payer: COMMERCIAL

## 2023-06-15 DIAGNOSIS — N31.9 NEUROGENIC BLADDER: Primary | ICD-10-CM

## 2023-06-15 RX ORDER — CIPROFLOXACIN 500 MG/1
500 TABLET, FILM COATED ORAL DAILY
Qty: 3 TABLET | Refills: 0 | Status: SHIPPED | OUTPATIENT
Start: 2023-06-20 | End: 2023-06-23

## 2023-06-15 NOTE — TELEPHONE ENCOUNTER
Reason for visit: Cystoscopy with Botox     Relevant information: Neurogenic bladder    Records/imaging/labs/orders: orders pended for Botox and Cipro    Pt called: ebindle message sent    At Rooming: Verify mix, standard prep    Diamond Gabriel CMA  6/15/2023  9:17 AM

## 2023-06-21 ENCOUNTER — OFFICE VISIT (OUTPATIENT)
Dept: UROLOGY | Facility: CLINIC | Age: 33
End: 2023-06-21
Payer: COMMERCIAL

## 2023-06-21 VITALS
HEIGHT: 67 IN | BODY MASS INDEX: 26.68 KG/M2 | SYSTOLIC BLOOD PRESSURE: 116 MMHG | DIASTOLIC BLOOD PRESSURE: 82 MMHG | HEART RATE: 97 BPM | WEIGHT: 170 LBS

## 2023-06-21 DIAGNOSIS — N31.9 NEUROGENIC BLADDER: Primary | ICD-10-CM

## 2023-06-21 PROCEDURE — 52287 CYSTOSCOPY CHEMODENERVATION: CPT | Performed by: UROLOGY

## 2023-06-21 ASSESSMENT — PAIN SCALES - GENERAL: PAINLEVEL: NO PAIN (0)

## 2023-06-21 NOTE — NURSING NOTE
"Chief Complaint   Patient presents with     Cystoscopy     botox       Blood pressure 116/82, pulse 97, height 1.702 m (5' 7\"), weight 77.1 kg (170 lb). Body mass index is 26.63 kg/m .    Patient Active Problem List   Diagnosis     Neurogenic bladder     Gender dysphoria       Allergies   Allergen Reactions     Dust Mites      Shellfish Allergy Nausea and Vomiting and Unknown     Patient states his mother has a \"violent reaction\" to shellfish, and that he has a feeling of nausea when he smells shellfish, so he has never consumed any. He states he has also not been tested.       Shellfish-Derived Products Other (See Comments)     Patient states her mother has a \"violent reaction\" to shellfish, and that she has a feeling of nausea when she smells shellfish, so she has never consumed any. She states she has also not been tested.  Patient states his mother has a \"violent reaction\" to shellfish, and that he has a feeling of nausea when he smells shellfish, so he has never consumed any. He states he has also not been tested.         Current Outpatient Medications   Medication Sig Dispense Refill     ALBUTEROL IN Inhale into the lungs 4 times daily as needed       ciprofloxacin (CIPRO) 500 MG tablet Take 1 tablet (500 mg) by mouth daily for 3 days 3 tablet 0     diazepam (VALIUM) 5 MG tablet Take 5 mg by mouth       estradiol valerate (DELESTROGEN) 20 MG/ML injection estradiol valerate 20 mg/mL intramuscular oil       Insulin Syringe-Needle U-100 (B-D INSULIN SYRINGE) 25G X 1\" 1 ML MISC BD Insulin Syringe 1 mL 25 x 1\"       progesterone (PROMETRIUM) 200 MG capsule Take 1 capsule by mouth daily       QUEtiapine (SEROQUEL) 25 MG tablet Take 25 mg by mouth       Sharps Container MISC Sharps Container       acetaminophen (TYLENOL) 325 MG tablet Take 2 tablets (650 mg) by mouth every 6 hours as needed for mild pain (Patient not taking: Reported on 6/21/2023) 60 tablet 0     baclofen (LIORESAL) 10 MG tablet Take 10 mg by mouth " "(Patient not taking: Reported on 2023)       medication given by implanted intrathecal pump continuous Baclofen continuous pump 171 mcg/day       metroNIDAZOLE (FLAGYL) 500 MG tablet Take 1 tablet (500 mg) by mouth every 6 hours At 8:00 am, 2:00 pm, 8:00 pm the day prior to your surgery with neomycin and zofran. (Patient not taking: Reported on 2023) 3 tablet 0     neomycin (MYCIFRADIN) 500 MG tablet Take 2 tablets (1,000 mg) by mouth every 6 hours At 8:00 am, 2:00 pm, 8:00 pm the day prior to your surgery with flagyl and zofran. (Patient not taking: Reported on 2023) 6 tablet 0     ondansetron (ZOFRAN) 4 MG tablet Take 1 tablet (4 mg) by mouth every 6 hours At 8:00 am, 2:00 pm, 8:00 pm the day prior to your surgery with neomycin and flagyl. (Patient not taking: Reported on 2023) 3 tablet 0     oxyCODONE (ROXICODONE) 5 MG tablet Take 1 tablet (5 mg) by mouth every 6 hours as needed for pain 30 tablet 0     polyethylene glycol (MIRALAX) 17 g packet Take 238 g by mouth See Admin Instructions Starting at 4 pm night prior to surgery. Refer to \"Getting Ready for Surgery\" instructions. (Patient not taking: Reported on 2023) 14 packet 0       Social History     Tobacco Use     Smoking status: Never     Smokeless tobacco: Never   Substance Use Topics     Alcohol use: No     Drug use: No       Invasive Procedure Safety Checklist:    Procedure: Cystoscopy    Action: Complete sections and checkboxes as appropriate.    Pre-procedure:  1. Patient ID Verified with 2 identifiers (Asya and  or MRN) : YES    2. Procedure and site verified with patient/designee (when able) : YES    3. Accurate consent documentation in medical record : YES    4. H&P (or appropriate assessment) documented in medical record : N/A  H&P must be up to 30 days prior to procedure an updated within 24 hours of                 Procedure as applicable.     5. Relevant diagnostic and radiology test results appropriately labeled and " displayed as applicable : YES    6. Blood products, implants, devices, and/or special equipment available for the procedure as applicable : YES    7. Procedure site(s) marked with provider initials [Exclusions: none] : NO    8. Marking not required. Reason : Yes  Procedure does not require site marking    Time Out:     Time-Out performed immediately prior to starting procedure, including verbal and active participation of all team members addressing: YES    1. Correct patient identity.  2. Confirmed that the correct side and site are marked.  3. An accurate procedure to be done.  4. Agreement on the procedure to be done.  5. Correct patient position.  6. Relevant images and results are properly labeled and appropriately displayed.  7. The need to administer antibiotics or fluids for irrigation purposes during the procedure as applicable.  8. Safety precautions based on patient history or medication use.    During Procedure: Verification of correct person, site, and procedure occurs any time the responsibility for care of the patient is transferred to another member of the care team.    The following medication was given:     MEDICATION: Botox  ROUTE: Bladder  SITE: Bladder wall  DOSE: 200 units  LOT #: [id number]  : Arelis  EXPIRATION DATE: 2025/12  NDC#: 3731-0250-43   Was there drug waste? No    Prior to medication administration, verified patient identity using patient's name and date of birth.  Due to medication administration, patient instructed to remain in clinic for 15 minutes  afterwards, and to report any adverse reaction to me immediately.          Varsha Judge  6/21/2023  11:48 AM

## 2023-06-21 NOTE — LETTER
6/21/2023       RE: Hannah Chavez  [address]  [place] [address]     Dear Colleague,    Thank you for referring your patient, Hannah Chavez, to the [facility] [place] at [facility]. Please see a copy of my visit note below.    Cystoscopy Note    PRE-PROCEDURE DIAGNOSIS:  Neurogenic bladder    POST-PROCEDURE DIAGNOSIS:  Neurogenic bladder    PROCEDURE:   Cystoscopy with chemodenervation of bladder    HISTORY: Hannah Chavez is a 33 year old transgender female with neurogenic bladder.  She has a spinal cord injury.  She has a colostomy and a baclofen pump.  She does CIC and also voids sometimes spontaneously.  She has a colostomy. She is seeking full depth vaginoplasty + colostomy takedown  She is having preoperative required hair removal to scrotum.    She gets botox injection to bladder.  When it wears off, she notes increased urinary incontinence.    DESCRIPTION OF PROCEDURE:  After informed consent was obtained, the patient was brought to the procedure room where they were placed in the modified lithotomy position with all pressure points well padded.  The patient was prepped and draped in a sterile fashion. A flexible cystoscope was introduced through a well-lubricated urethra. Botox mixed 200u in 10cc saline. Using flexible needle through flexible scope, we did injection of bladder wall in 10 locations spread across bladder. Minimal bleeding. Scope removed.    ASSESSMENT AND PLAN:  Neurogenic bladder  Cystoscopic botox injection well tolerated today  Still working on hair removal.  Repeat injection in 6 months  Will RTC in 3 months for hair check in clinic.    Chilango Dominguez MD  Reconstructive Urology

## 2023-06-21 NOTE — PROGRESS NOTES
Cystoscopy Note    PRE-PROCEDURE DIAGNOSIS:  Neurogenic bladder    POST-PROCEDURE DIAGNOSIS:  Neurogenic bladder    PROCEDURE:   Cystoscopy with chemodenervation of bladder    HISTORY: Hannah Chavez is a 33 year old transgender female with neurogenic bladder.  She has a spinal cord injury.  She has a colostomy and a baclofen pump.  She does CIC and also voids sometimes spontaneously.  She has a colostomy. She is seeking full depth vaginoplasty + colostomy takedown  She is having preoperative required hair removal to scrotum.    She gets botox injection to bladder.  When it wears off, she notes increased urinary incontinence.    DESCRIPTION OF PROCEDURE:  After informed consent was obtained, the patient was brought to the procedure room where they were placed in the modified lithotomy position with all pressure points well padded.  The patient was prepped and draped in a sterile fashion. A flexible cystoscope was introduced through a well-lubricated urethra. Botox mixed 200u in 10cc saline. Using flexible needle through flexible scope, we did injection of bladder wall in 10 locations spread across bladder. Minimal bleeding. Scope removed.    ASSESSMENT AND PLAN:  Neurogenic bladder  Cystoscopic botox injection well tolerated today  Still working on hair removal.  Repeat injection in 6 months  Will RTC in 3 months for hair check in clinic.    Chilango Dominguez MD  Reconstructive Urology

## 2023-06-21 NOTE — PATIENT INSTRUCTIONS
"Please schedule a cystoscopy + botox with Dr. Dominguez in six months.     It was a pleasure meeting with you today.  Thank you for allowing me and my team the privilege of caring for you today.  YOU are the reason we are here, and I truly hope we provided you with the excellent service you deserve.  Please let us know if there is anything else we can do for you so that we can be sure you are leaving completely satisfied with your care experience.        AFTER YOUR CYSTOSCOPY        You have just completed a cystoscopy, or \"cysto\", which allowed your physician to learn more about your bladder (or to remove a stent placed after surgery). We suggest that you continue to avoid caffeine, fruit juice, and alcohol for the next 24 hours, however, you are encouraged to return to your normal activities.         A few things that are considered normal after your cystoscopy:     * Small amount of bleeding (or spotting) that clears within the next 24 hours     * Slight burning sensation with urination     * Sensation to of needing to avoid more frequently     * The feeling of \"air\" in your urine     * Mild discomfort that is relieved with Tylenol        Please contact our office promptly if you:     * Develop a fever above 101 degrees     * Are unable to urinate     * Develop bright red blood that does not stop     * Severe pain or swelling         Please contact our office with any concerns or questions @[email or web address].  "

## 2023-07-11 DIAGNOSIS — Z98.890 POST-OPERATIVE STATE: Primary | ICD-10-CM

## 2023-07-14 ENCOUNTER — ANCILLARY PROCEDURE (OUTPATIENT)
Dept: CT IMAGING | Facility: CLINIC | Age: 33
End: 2023-07-14
Attending: NURSE PRACTITIONER
Payer: COMMERCIAL

## 2023-07-14 DIAGNOSIS — Z98.890 POST-OPERATIVE STATE: ICD-10-CM

## 2023-07-14 PROCEDURE — 74177 CT ABD & PELVIS W/CONTRAST: CPT | Mod: GC | Performed by: STUDENT IN AN ORGANIZED HEALTH CARE EDUCATION/TRAINING PROGRAM

## 2023-07-14 RX ORDER — IOPAMIDOL 755 MG/ML
94 INJECTION, SOLUTION INTRAVASCULAR ONCE
Status: COMPLETED | OUTPATIENT
Start: 2023-07-14 | End: 2023-07-14

## 2023-07-14 RX ADMIN — IOPAMIDOL 94 ML: 755 INJECTION, SOLUTION INTRAVASCULAR at 07:15

## 2023-07-19 ENCOUNTER — VIRTUAL VISIT (OUTPATIENT)
Dept: NEUROSURGERY | Facility: CLINIC | Age: 33
End: 2023-07-19
Payer: COMMERCIAL

## 2023-07-19 DIAGNOSIS — Z09 FOLLOW-UP SURGERY CARE: Primary | ICD-10-CM

## 2023-07-19 PROCEDURE — 99213 OFFICE O/P EST LOW 20 MIN: CPT | Mod: VID | Performed by: NURSE PRACTITIONER

## 2023-07-19 NOTE — LETTER
7/19/2023       RE: Hannah Chavez  [address]  [place] [address]       Dear Colleague,    Thank you for referring your patient, Hannah Chavez, to the [facility]UROSURG[facility] [place] at [facility]. Please see a copy of my visit note below.           Neurosurgery Clinic    Thank you for referring Hannah Chavez to the neurosurgery clinic at the [facility] [place].   I had the opportunity to meet with Hannah Chavez on July 19, 2023.    As you know, Hannah is a 33 year old adult  removal of the intrathecal drug pump from the right abdomen and removal of the intrathecal catheter from the thoracolumbar spine on 6/2/2023 by Dr. Drew. Asher had reached out a week ago with concerns for soft tissue swelling on midline portion close to umbilicus and hard area of tissue noted at our last visit that she states is slightly worse. She notes that it has gotten slightly bigger since she was last seen in clinic, therefore underwent CT abd/pelvis on 7/14 to assess further. Imaging studies revealed 6.1 x 2.6 cm low-density subcutaneous fluid collection with partially calcified thick walls in the left lower pelvis in location of prior pain pump. She notes that it is not painful to the touch, or painful when moving, but notes that it is slightly uncomfortable. She denies any fevers, no redness, swelling or drainage. She notes it affects her mental health having this bump on her lower abdomen.     Past Medical History:   Diagnosis Date    Asthma     Bladder incontinence     Self-catheterizes urinary bladder     Spinal cord injury of T10 vertebra (H)     Spinal cord injury of thoracic region without bone injury, initial encounter (H)     Uncomplicated asthma        Past Surgical History:   Procedure Laterality Date    CYSTOSCOPY N/A 9/1/2017    Procedure: CYSTOSCOPY;  Cystoscopy and Botox Injection into the Bladder;  Surgeon: [place]  "Michael Pittman MD;  [facility]    CYSTOSCOPY N/A 10/12/2018    Procedure: CYSTOSCOPY;  Cystoscopy, Botox;  Surgeon: Michael Mcnulty MD;  [facility]    CYSTOSCOPY, INTRAVESICAL INJECTION N/A 3/2/2018    Procedure: CYSTOSCOPY, INTRAVESICAL INJECTION;  Cystoscopy And Botox Injection Into The Bladder  ;  Surgeon: Michael Mcnulty MD;  [facility]    INJECT BOTOX N/A 9/1/2017    Procedure: INJECT BOTOX;;  Surgeon: Michael Mcnulty MD;  [facility]    INJECT BOTOX N/A 10/12/2018    Procedure: INJECT BOTOX;;  Surgeon: Michael Mcnulty MD;  [facility]    ORCHIECTOMY SCROTAL Bilateral 8/18/2022    Procedure: Bilateral Simple Scrotal Orchiectomy;  Surgeon: Chilango Dominguez MD;  Location: [facility]    REMOVE INTRATHECAL PAIN PUMP N/A 6/2/2023    Procedure: Removal of intrathecal drug delivery system - removal of the intrathecal drug pump from the right abdomen and removal of the intrathecal catheter from the thoracolumbar spine;  Surgeon: Que Drew MD;  [facility]       ALLERGIES:  Dust mites, Shellfish allergy, and Shellfish-derived products    Current Outpatient Medications   Medication Sig Dispense Refill    ALBUTEROL IN Inhale into the lungs 4 times daily as needed      diazepam (VALIUM) 5 MG tablet Take 5 mg by mouth      estradiol valerate (DELESTROGEN) 20 MG/ML injection estradiol valerate 20 mg/mL intramuscular oil      Insulin Syringe-Needle U-100 (B-D INSULIN SYRINGE) 25G X 1\" 1 ML MISC BD Insulin Syringe 1 mL 25 x 1\"      progesterone (PROMETRIUM) 200 MG capsule Take 1 capsule by mouth daily      QUEtiapine (SEROQUEL) 25 MG tablet Take 25 mg by mouth      Sharps Container MISC Sharps Container      acetaminophen (TYLENOL) 325 MG tablet Take 2 tablets (650 mg) by mouth every 6 hours as needed for mild pain (Patient not taking: Reported on 6/21/2023) 60 tablet 0    baclofen (LIORESAL) 10 MG tablet Take 10 mg by mouth (Patient not taking: Reported on " "6/21/2023)      medication given by implanted intrathecal pump continuous Baclofen continuous pump 171 mcg/day      metroNIDAZOLE (FLAGYL) 500 MG tablet Take 1 tablet (500 mg) by mouth every 6 hours At 8:00 am, 2:00 pm, 8:00 pm the day prior to your surgery with neomycin and zofran. (Patient not taking: Reported on 6/21/2023) 3 tablet 0    neomycin (MYCIFRADIN) 500 MG tablet Take 2 tablets (1,000 mg) by mouth every 6 hours At 8:00 am, 2:00 pm, 8:00 pm the day prior to your surgery with flagyl and zofran. (Patient not taking: Reported on 6/21/2023) 6 tablet 0    ondansetron (ZOFRAN) 4 MG tablet Take 1 tablet (4 mg) by mouth every 6 hours At 8:00 am, 2:00 pm, 8:00 pm the day prior to your surgery with neomycin and flagyl. (Patient not taking: Reported on 6/21/2023) 3 tablet 0    oxyCODONE (ROXICODONE) 5 MG tablet Take 1 tablet (5 mg) by mouth every 6 hours as needed for pain 30 tablet 0    polyethylene glycol (MIRALAX) 17 g packet Take 238 g by mouth See Admin Instructions Starting at 4 pm night prior to surgery. Refer to \"Getting Ready for Surgery\" instructions. (Patient not taking: Reported on 6/21/2023) 14 packet 0       No family history on file.    Social History     Tobacco Use    Smoking status: Never    Smokeless tobacco: Never   Substance Use Topics    Alcohol use: No         PHYSICAL EXAM:   There were no vitals taken for this visit.  No exam due to the nature of the visit    IMAGES:   CT abdomen pelvis reviewed with patient  6.1 x 2.6 cm low-density subcutaneous fluid collection with partially calcified thick walls in the left lower pelvis in location of prior pain pump.    ASSESSMENT:  Hannah Chavez, 33 year old female with history of intrathecal pain pump removal and development of seroma, neurologically stable and progressing well    PLAN:  - I discussed this case with Dr. Drew which was relayed to the patient and we discussed that wearing an abdominal binder would be difficult due to her colostomy, she " could try a warm pack to assist with reabsorption, we could drain the fluid in clinic via needle, however the seroma or fluid collection would likely come back due to the nature of the pocket and this may be difficult due to scar tissue. Since she has no fevers, no pain, we decided to continue to watch it closely at this time, and she will reach out via mychart in 4-6 weeks or sooner to see how things continue to progress  - Hannah BHUMI Chavez and family were counseled to please contact us with any acute worsening of symptoms, or with any questions or concerns.       This patient was discussed with neurosurgery faculty, who agrees with the above.  Mimi Talbot NP on 7/19/2023 at 10:38 AM          Again, thank you for allowing me to participate in the care of your patient.      Sincerely,    Mimi Talbot NP

## 2023-07-19 NOTE — NURSING NOTE
Is the patient currently in the state of [place]? YES    Visit mode:VIDEO    If the visit is dropped, the patient can be reconnected by: TELEPHONE VISIT: Phone number: [phone]    Will anyone else be joining the visit? NO      How would you like to obtain your AVS? MyChart    Are changes needed to the allergy or medication list? NO    Reason for visit: Video Visit (Follow-up )

## 2023-07-19 NOTE — PROGRESS NOTES
Neurosurgery Clinic    Thank you for referring Hannah Chavez to the neurosurgery clinic at the [facility].   I had the opportunity to meet with Hannah Chavez on July 19, 2023.    As you know, Hannha is a 33 year old adult  removal of the intrathecal drug pump from the right abdomen and removal of the intrathecal catheter from the thoracolumbar spine on 6/2/2023 by Dr. Drew. Asher had reached out a week ago with concerns for soft tissue swelling on midline portion close to umbilicus and hard area of tissue noted at our last visit that she states is slightly worse. She notes that it has gotten slightly bigger since she was last seen in clinic, therefore underwent CT abd/pelvis on 7/14 to assess further. Imaging studies revealed 6.1 x 2.6 cm low-density subcutaneous fluid collection with partially calcified thick walls in the left lower pelvis in location of prior pain pump. She notes that it is not painful to the touch, or painful when moving, but notes that it is slightly uncomfortable. She denies any fevers, no redness, swelling or drainage. She notes it affects her mental health having this bump on her lower abdomen.     Past Medical History:   Diagnosis Date     Asthma      Bladder incontinence      Self-catheterizes urinary bladder      Spinal cord injury of T10 vertebra (H)      Spinal cord injury of thoracic region without bone injury, initial encounter (H)      Uncomplicated asthma        Past Surgical History:   Procedure Laterality Date     CYSTOSCOPY N/A 9/1/2017    Procedure: CYSTOSCOPY;  Cystoscopy and Botox Injection into the Bladder;  Surgeon: Michael Mcnulty MD;  [facility]     CYSTOSCOPY N/A 10/12/2018    Procedure: CYSTOSCOPY;  Cystoscopy, Botox;  Surgeon: Michael Mcnulty MD;  [facility]     CYSTOSCOPY, INTRAVESICAL INJECTION N/A 3/2/2018    Procedure: CYSTOSCOPY, INTRAVESICAL INJECTION;  Cystoscopy And Botox Injection Into The  "Bladder  ;  Surgeon: Michael Mcnulty MD;  [facility]     INJECT BOTOX N/A 9/1/2017    Procedure: INJECT BOTOX;;  Surgeon: Michael Mcnulty MD;  [facility]     INJECT BOTOX N/A 10/12/2018    Procedure: INJECT BOTOX;;  Surgeon: Michael Mcnulty MD;  [facility]     ORCHIECTOMY SCROTAL Bilateral 8/18/2022    Procedure: Bilateral Simple Scrotal Orchiectomy;  Surgeon: Chilango Dominguez MD;  Location: [facility] OR     REMOVE INTRATHECAL PAIN PUMP N/A 6/2/2023    Procedure: Removal of intrathecal drug delivery system - removal of the intrathecal drug pump from the right abdomen and removal of the intrathecal catheter from the thoracolumbar spine;  Surgeon: Que Drew MD;  [facility]       ALLERGIES:  Dust mites, Shellfish allergy, and Shellfish-derived products    Current Outpatient Medications   Medication Sig Dispense Refill     ALBUTEROL IN Inhale into the lungs 4 times daily as needed       diazepam (VALIUM) 5 MG tablet Take 5 mg by mouth       estradiol valerate (DELESTROGEN) 20 MG/ML injection estradiol valerate 20 mg/mL intramuscular oil       Insulin Syringe-Needle U-100 (B-D INSULIN SYRINGE) 25G X 1\" 1 ML MISC BD Insulin Syringe 1 mL 25 x 1\"       progesterone (PROMETRIUM) 200 MG capsule Take 1 capsule by mouth daily       QUEtiapine (SEROQUEL) 25 MG tablet Take 25 mg by mouth       Sharps Container MISC Sharps Container       acetaminophen (TYLENOL) 325 MG tablet Take 2 tablets (650 mg) by mouth every 6 hours as needed for mild pain (Patient not taking: Reported on 6/21/2023) 60 tablet 0     baclofen (LIORESAL) 10 MG tablet Take 10 mg by mouth (Patient not taking: Reported on 6/21/2023)       medication given by implanted intrathecal pump continuous Baclofen continuous pump 171 mcg/day       metroNIDAZOLE (FLAGYL) 500 MG tablet Take 1 tablet (500 mg) by mouth every 6 hours At 8:00 am, 2:00 pm, 8:00 pm the day prior to your surgery with neomycin and zofran. (Patient not " "taking: Reported on 6/21/2023) 3 tablet 0     neomycin (MYCIFRADIN) 500 MG tablet Take 2 tablets (1,000 mg) by mouth every 6 hours At 8:00 am, 2:00 pm, 8:00 pm the day prior to your surgery with flagyl and zofran. (Patient not taking: Reported on 6/21/2023) 6 tablet 0     ondansetron (ZOFRAN) 4 MG tablet Take 1 tablet (4 mg) by mouth every 6 hours At 8:00 am, 2:00 pm, 8:00 pm the day prior to your surgery with neomycin and flagyl. (Patient not taking: Reported on 6/21/2023) 3 tablet 0     oxyCODONE (ROXICODONE) 5 MG tablet Take 1 tablet (5 mg) by mouth every 6 hours as needed for pain 30 tablet 0     polyethylene glycol (MIRALAX) 17 g packet Take 238 g by mouth See Admin Instructions Starting at 4 pm night prior to surgery. Refer to \"Getting Ready for Surgery\" instructions. (Patient not taking: Reported on 6/21/2023) 14 packet 0       No family history on file.    Social History     Tobacco Use     Smoking status: Never     Smokeless tobacco: Never   Substance Use Topics     Alcohol use: No         PHYSICAL EXAM:   There were no vitals taken for this visit.  No exam due to the nature of the visit    IMAGES:   CT abdomen pelvis reviewed with patient  6.1 x 2.6 cm low-density subcutaneous fluid collection with partially calcified thick walls in the left lower pelvis in location of prior pain pump.    ASSESSMENT:  Hannah Chavez, 33 year old female with history of intrathecal pain pump removal and development of seroma, neurologically stable and progressing well    PLAN:  - I discussed this case with Dr. Drew which was relayed to the patient and we discussed that wearing an abdominal binder would be difficult due to her colostomy, she could try a warm pack to assist with reabsorption, we could drain the fluid in clinic via needle, however the seroma or fluid collection would likely come back due to the nature of the pocket and this may be difficult due to scar tissue. Since she has no fevers, no pain, we decided to " continue to watch it closely at this time, and [facility] weeks or sooner to see how things continue to progress  - Hannahchaz Chavez and family were counseled to please contact us with any acute worsening of symptoms, or with any questions or concerns.       Mimi Talbot NP  Department of Neurosurgery  [phone]    This patient was discussed with neurosurgery faculty, who agrees with the above.  Mimi Talbot NP on 7/19/2023 at 10:38 AM          Virtual Visit Details    Type of service:  Video Visit   Video Start Time: 10:38 AM  Video End Time:10:59 AM    Originating Location (pt. Location): Home    Distant Location (provider location):  On-site  Platform used for Video Visit: Gerard

## 2023-08-28 ENCOUNTER — OFFICE VISIT (OUTPATIENT)
Dept: NEUROSURGERY | Facility: CLINIC | Age: 33
End: 2023-08-28
Payer: COMMERCIAL

## 2023-08-28 VITALS
DIASTOLIC BLOOD PRESSURE: 79 MMHG | HEART RATE: 99 BPM | SYSTOLIC BLOOD PRESSURE: 109 MMHG | OXYGEN SATURATION: 99 % | BODY MASS INDEX: 26.68 KG/M2 | RESPIRATION RATE: 16 BRPM | HEIGHT: 67 IN | WEIGHT: 170 LBS

## 2023-08-28 DIAGNOSIS — R25.2 SPASTICITY: ICD-10-CM

## 2023-08-28 DIAGNOSIS — S30.1XXD ABDOMINAL WALL SEROMA, SUBSEQUENT ENCOUNTER: Primary | ICD-10-CM

## 2023-08-28 DIAGNOSIS — T85.610D BACLOFEN PUMP FAILURE, SUBSEQUENT ENCOUNTER: ICD-10-CM

## 2023-08-28 PROCEDURE — 10140 I&D HMTMA SEROMA/FLUID COLLJ: CPT | Performed by: NEUROLOGICAL SURGERY

## 2023-08-28 PROCEDURE — 87205 SMEAR GRAM STAIN: CPT | Performed by: NEUROLOGICAL SURGERY

## 2023-08-28 PROCEDURE — 87070 CULTURE OTHR SPECIMN AEROBIC: CPT | Performed by: NEUROLOGICAL SURGERY

## 2023-08-28 PROCEDURE — 87075 CULTR BACTERIA EXCEPT BLOOD: CPT | Performed by: NEUROLOGICAL SURGERY

## 2023-08-28 PROCEDURE — 99215 OFFICE O/P EST HI 40 MIN: CPT | Mod: 25 | Performed by: NEUROLOGICAL SURGERY

## 2023-08-28 PROCEDURE — 99000 SPECIMEN HANDLING OFFICE-LAB: CPT | Performed by: PATHOLOGY

## 2023-08-28 ASSESSMENT — PAIN SCALES - GENERAL: PAINLEVEL: NO PAIN (0)

## 2023-08-28 NOTE — PROGRESS NOTES
NEUROSURGERY    HISTORY AND PHYSICAL EXAM    Chief Complaint   Patient presents with    Follow Up     Abdominal wall seroma/fluid collection.  S/p removal of intrathecal baclofen pump and catheter on 6/2/2023.       HISTORY OF PRESENT ILLNESS  Ms. Asher Chavez returns today for evaluation of her persistent abdominal wall seroma/fluid collection that is present on the right side.  She is s/p removal of her intrathecal baclofen pump and catheter (entire system) on 6/2/2023.  There were no complications and patient tolerated the procedure well.  She was subsequently followed up in our neurosurgery clinic with AUTUMN Zuniga on several occasions.  She was found to have persistent right abdominal wall fluid collection since the removal of her pump system.  Patient states that the fluid collection is getting larger and it is quite uncomfortable.  It is also painful at times.  She denies any fevers and chills and she denies any drainage or wound breakdown.  She denies the area being warm or red.  She states that the pain is mainly from the collection being present there and is related to movement.  She denies any headaches.  We did obtain CT of her abdomen back in 7/14/2023 and it did show a larger left lower quadrant seroma in the location of prior pump.      Past Medical History:   Diagnosis Date    Asthma     Bladder incontinence     Self-catheterizes urinary bladder     Spinal cord injury of T10 vertebra (H)     Spinal cord injury of thoracic region without bone injury, initial encounter (H)     Uncomplicated asthma        Past Surgical History:   Procedure Laterality Date    CYSTOSCOPY N/A 9/1/2017    Procedure: CYSTOSCOPY;  Cystoscopy and Botox Injection into the Bladder;  Surgeon: Michael Mcnulty MD;  Location: [facility] OR    CYSTOSCOPY N/A 10/12/2018    Procedure: CYSTOSCOPY;  Cystoscopy, Botox;  Surgeon: Michael Mcnulty MD;  Location: [facility]    CYSTOSCOPY, INTRAVESICAL INJECTION N/A 3/2/2018     "Procedure: CYSTOSCOPY, INTRAVESICAL INJECTION;  Cystoscopy And Botox Injection Into The Bladder  ;  Surgeon: Michael Mcnulty MD;  Location: [facility]    INJECT BOTOX N/A 9/1/2017    Procedure: INJECT BOTOX;;  Surgeon: Michael Mcnulty MD;  Location: [facility]    INJECT BOTOX N/A 10/12/2018    Procedure: INJECT BOTOX;;  Surgeon: Michael Mcnulty MD;  Location: [facility]    ORCHIECTOMY SCROTAL Bilateral 8/18/2022    Procedure: Bilateral Simple Scrotal Orchiectomy;  Surgeon: Chilango Dominguez MD;  Location: [facility] OR    REMOVE INTRATHECAL PAIN PUMP N/A 6/2/2023    Procedure: Removal of intrathecal drug delivery system - removal of the intrathecal drug pump from the right abdomen and removal of the intrathecal catheter from the thoracolumbar spine;  Surgeon: Que Drew MD;  Location: [facility] OR       Social History     Socioeconomic History    Marital status: Single     Spouse name: Not on file    Number of children: Not on file    Years of education: Not on file    Highest education level: Not on file   Occupational History    Not on file   Tobacco Use    Smoking status: Never    Smokeless tobacco: Never   Substance and Sexual Activity    Alcohol use: No    Drug use: No    Sexual activity: Not on file   Other Topics Concern    Not on file   Social History Narrative    Not on file     Social Determinants of Health     Financial Resource Strain: Not on file   Food Insecurity: Not on file   Transportation Needs: Not on file   Physical Activity: Not on file   Stress: Not on file   Social Connections: Not on file   Intimate Partner Violence: Not on file   Housing Stability: Not on file       No family history on file.    Current Outpatient Medications   Medication    ALBUTEROL IN    diazepam (VALIUM) 5 MG tablet    estradiol valerate (DELESTROGEN) 20 MG/ML injection    Insulin Syringe-Needle U-100 (B-D INSULIN SYRINGE) 25G X 1\" 1 ML MISC    progesterone (PROMETRIUM) 200 MG capsule    QUEtiapine (SEROQUEL) 25 MG " "tablet    [employer] Container MISC    acetaminophen (TYLENOL) 325 MG tablet    baclofen (LIORESAL) 10 MG tablet    medication given by implanted intrathecal pump    metroNIDAZOLE (FLAGYL) 500 MG tablet    neomycin (MYCIFRADIN) 500 MG tablet    ondansetron (ZOFRAN) 4 MG tablet    oxyCODONE (ROXICODONE) 5 MG tablet    polyethylene glycol (MIRALAX) 17 g packet     Current Facility-Administered Medications   Medication    Botulinum Toxin Type A (BOTOX) 200 units injection 200 Units    Botulinum Toxin Type A (BOTOX) 200 units injection 200 Units       Allergies   Allergen Reactions    Dust Mites     Shellfish Allergy Nausea and Vomiting and Unknown     Patient states his mother has a \"violent reaction\" to shellfish, and that he has a feeling of nausea when he smells shellfish, so he has never consumed any. He states he has also not been tested.      Shellfish-Derived Products Other (See Comments)     Patient states her mother has a \"violent reaction\" to shellfish, and that she has a feeling of nausea when she smells shellfish, so she has never consumed any. She states she has also not been tested.  Patient states his mother has a \"violent reaction\" to shellfish, and that he has a feeling of nausea when he smells shellfish, so he has never consumed any. He states he has also not been tested.         REVIEW OF SYSTEMS:  Also per HPI.  General: POSITIVE for difficulty sleeping.  Negative for chills/sweats/fever, headache, recent fatigue, or weight gain/loss.  Eyes: Negative for blurred vision, crossed eyes, double vision, recent eye infections, vision flashes, or vision halos.  Ears/Nose/Mouth/Throat: Negative for bleeding gums, difficulty swallowing, earache, ear discharge, hearing loss, hoarseness, nosebleeds, tinnitus, or sinus problems.  Respiratory: POSITIVE for shortness of breath from asthma. Negative for chronic cough, coughing blood, night sweats, Tuberculosis, or wheezing.  Cardiovascular: Negative for chest pain, " dyspnea at night, heart murmur, palpitations, poor circulation, swollen legs/feet, or varicose veins.  Gastrointestinal: POSITIVE for colostomy, left lower quadrant.  Negative for melena, hematochezia, chronic diarrhea, heartburn, Hepatitis A/B/C, increasing constipation, Liver Disease, nausea, or vomiting.   Genitourinary: POSITIVE for frequent UTI. Negative for urinary retention, genital discharge, urinary incontinence, prostate problems, urgency.   Neurological: POSITIVE for hand weakness.  Negative for syncope, headaches, numbness of arms/legs, tingling in hands/arms/legs, memory problems, or seizures.  Psychological: POSITIVE for anxiety, depression, panic attacks, or restlessness.  Skin: Negative for chronic skin itching, color changes in hand/feet when cold, poor scarring, non-healing ulcers, skin rashes/hives, unusual moles.  Musculoskeletal: Negative for arthritis, joint swelling in hands/wrists/hips/knees/joints, muscle tenderness in arms/legs, or osteoporosis.  Endocrine: POSITIVE for hormone replacement therapy.  Negative for excessive thirst/hunger, intolerance for warm rooms, loss of libido, multiple broken bones, rapid weight gain/loss, galactorrhea, or thyroid issues.  Hematologic/Lymphatic: Negative for easy skin bruising, significant fatigue, prolonged bleeding, tender glands/lymph nodes.  Allergies: POSITIVE for asthma which is seasonal and to dust mites.    Answers for HPI/ROS submitted by the patient on 5/7/2023 - updated 8/28/2023.  Also per HPI.  General Symptoms: Yes  Skin Symptoms: No  HENT Symptoms: No  EYE SYMPTOMS: No  HEART SYMPTOMS: No  LUNG SYMPTOMS: No  INTESTINAL SYMPTOMS: No  URINARY SYMPTOMS: Yes  GYNECOLOGIC SYMPTOMS: No  REPRODUCTIVE SYMPTOMS: No  BREAST SYMPTOMS: No  SKELETAL SYMPTOMS: No  BLOOD SYMPTOMS: No  NERVOUS SYSTEM SYMPTOMS: No  MENTAL HEALTH SYMPTOMS: Yes  Fever: No  Loss of appetite: No  Weight loss: No  Weight gain: No  Fatigue: Yes  Night sweats: No  Chills:  "No  Increased stress: Yes  Excessive hunger: No  Excessive thirst: No  Feeling hot or cold when others believe the temperature is normal: No  Loss of height: No  Post-operative complications: No  Surgical site pain: No  Hallucinations: No  Change in or Loss of Energy: No  Hyperactivity: Yes  Confusion: No  Trouble holding urine or incontinence: Yes  Pain or burning: No  Trouble starting or stopping: No  Increased frequency of urination: Yes  Blood in urine: No  Decreased frequency of urination: No  Frequent nighttime urination: No  Flank pain: No  Difficulty emptying bladder: No  Nervous or Anxious: Yes  Depression: Yes  Trouble sleeping: Yes  Trouble thinking or concentrating: Yes  Mood changes: Yes  Panic attacks: Yes      PHYSICAL EXAM  /79 (BP Location: Left arm)   Pulse 99   Resp 16   Ht 1.702 m (5' 7\")   Wt 77.1 kg (170 lb)   SpO2 99%   BMI 26.63 kg/m      General: Awake, alert, oriented. Well nourished, well developed, not in any acute distress.  HEENT: Head normocephalic, atraumatic. No carotid bruit. Neck supple. Good range of motion. No palpable thyroid mass.  Incisions: right lower quadrant incision - healed well.  It is wide.  No wound breakdown.  No redness.  No warmth.  There is a firm and palpable mass in the subcutaneous space at the location of the previous pump.    Neurological  Awake, alert and oriented to date, time, place and person. Speech fluent.   Pupils equal, round, reactive to light.  Extraocular movement intact.  Face symmetric.    Motor: in a wheelchair.  Can walk but with some difficulty.  Spasticity present.  Sensation: intact to light touch and pinpoint.      IMAGING  CT abdomen and pelvis with contrast from 7/14/2023 shows the subcutaneous seroma at the right abdomen.         ASSESSMENT   32 year old transgender female  History of SCI and spasticity  Status post intrathecal baclofen pump placement  Status post intrathecal baclofen pump and catheter revision  Intrathecal " baclofen therapy no longer needed  Intrathecal baclofen pump system removal request  Status post removal of intrathecal baclofen pump system, which includes the intrathecal pump from the right abdomen and intrathecal catheter from the thoracolumbar spine, on 6/2/2023  Seroma, right lower quadrant    Ms. Asher Chavez presents with persistent right lower quadrant fluid collection, presumed seroma, which is reportedly getting larger.  CT of the abdomen does show and confirm a right lower quadrant fluid collection at the previous pump location.  On exam, she seems to have a firm fluid collection.    Normally, it is not unusual to have a postoperative pump pocket seroma.  Also, this usually resolves over time.  However, this seroma has not resolved in the past two months.    Based on the appearance on the CT and lack of other symptoms, I do not think that his is an abscess or infection.  Intraoperatively, it was found that she previously had a Dacron pouch surrounding the pump.  This may be contributing to the persistent seroma, as the surrounding tissue is not absorbing the fluid.    I did discuss the option of bedside needle aspiration of the fluid and the patient was in agreement.      PLAN  1.  Bedside needle aspiration of the right lower quadrant seroma.  2.  Send the fluid for infectious workup.      PROCEDURE NOTE:    After informed consent was obtained, I cleansed the area over the fluid collection located over the right abdomen, more specifically right lower quadrant, with Betadine and then ChlorPrep. Time out was performed confirming the patient's identity, procedure to be performed, site and side of the procedure. I then attempted to access the fluid collection first using the 16 gauge angiocatheter.  It was met with some resistance.  After the withdrawal of the needle, the catheter was connected to the IV tubing and a syringe.  Aspiration attempts were not successful.  The catheter was removed.  23 gauge  "butterfly needle was inserted at the same access point.  When connected to the IV tubing and the catheter, nothing could be aspirated.  The needle was removed.  Then, 14 gauge angiocatheter was inserted into the same insertion point.  It was slowly advanced until a \"pop\" could be felt.  Upon entry into the fluid and removal of the inner needle, rush of fluid under pressure was noted.  Brownish clear liquid was noted to be rushing out.  This was connected to the IV tubing and a syringe.  The fluid was then easily aspirated.  Approximately 40 mL of the brownish fluid was aspirated.  Towards the end, the abdomen was pushed on to facilitate the aspiration of all the fluid.  Approximately 10 mL of the fluid was sent for infectious workup.   Catherter was withdrawn. No active bleeding was noted. The area was cleaned and Bandaid dressing was applied.  Ace wrap dressing with sponges over the right lower quadrant was placed for pressure dressing.      20 minutes were spent face to face with the patient of which more than 50% of the time was spent counseling and discussing the above issues regarding diagnosis, differential, treatment options, and steps for further evaluation.  20 minutes were spent on the needle aspiration procedure.  10 minutes were spent on documentation for this encounter and procedure.  Total of 50 minutes were spent on this encounter today, 30 minutes of which were spent on E&M and 20 minutes of which was spent on the aspiration procedure.   "

## 2023-08-28 NOTE — LETTER
8/28/2023       RE: Hannah Chavez  [address]  [place] [address]     Dear Colleague,    Thank you for referring your patient, Hannah Chavez, to the [facility] [place] [facility]. Please see a copy of my visit note below.    NEUROSURGERY    HISTORY AND PHYSICAL EXAM    Chief Complaint   Patient presents with    Follow Up     Abdominal wall seroma/fluid collection.  S/p removal of intrathecal baclofen pump and catheter on 6/2/2023.       HISTORY OF PRESENT ILLNESS  Ms. Asher Chavez returns today for evaluation of her persistent abdominal wall seroma/fluid collection that is present on the right side.  She is s/p removal of her intrathecal baclofen pump and catheter (entire system) on 6/2/2023.  There were no complications and patient tolerated the procedure well.  She was subsequently followed up in our neurosurgery clinic with AUTUMN Zuniga on several occasions.  She was found to have persistent right abdominal wall fluid collection since the removal of her pump system.  Patient states that the fluid collection is getting larger and it is quite uncomfortable.  It is also painful at times.  She denies any fevers and chills and she denies any drainage or wound breakdown.  She denies the area being warm or red.  She states that the pain is mainly from the collection being present there and is related to movement.  She denies any headaches.  We did obtain CT of her abdomen back in 7/14/2023 and it did show a larger left lower quadrant seroma in the location of prior pump.      Past Medical History:   Diagnosis Date    Asthma     Bladder incontinence     Self-catheterizes urinary bladder     Spinal cord injury of T10 vertebra (H)     Spinal cord injury of thoracic region without bone injury, initial encounter (H)     Uncomplicated asthma        Past Surgical History:   Procedure Laterality Date    CYSTOSCOPY N/A 9/1/2017     Procedure: CYSTOSCOPY;  Cystoscopy and Botox Injection into the Bladder;  Surgeon: Michael Mcnulty MD;  [facility]    CYSTOSCOPY N/A 10/12/2018    Procedure: CYSTOSCOPY;  Cystoscopy, Botox;  Surgeon: Michael Mcnulty MD;  [facility]    CYSTOSCOPY, INTRAVESICAL INJECTION N/A 3/2/2018    Procedure: CYSTOSCOPY, INTRAVESICAL INJECTION;  Cystoscopy And Botox Injection Into The Bladder  ;  Surgeon: Michael Mcnulty MD;  [facility]    INJECT BOTOX N/A 9/1/2017    Procedure: INJECT BOTOX;;  Surgeon: Michael Mcnulty MD;  [facility]    INJECT BOTOX N/A 10/12/2018    Procedure: INJECT BOTOX;;  Surgeon: Michael Mcnulty MD;  [facility]    ORCHIECTOMY SCROTAL Bilateral 8/18/2022    Procedure: Bilateral Simple Scrotal Orchiectomy;  Surgeon: Chilango Dominguez MD;  Location: [facility]    REMOVE INTRATHECAL PAIN PUMP N/A 6/2/2023    Procedure: Removal of intrathecal drug delivery system - removal of the intrathecal drug pump from the right abdomen and removal of the intrathecal catheter from the thoracolumbar spine;  Surgeon: Que Drew MD;  [facility]       Social History     Socioeconomic History    Marital status: Single     Spouse name: Not on file    Number of children: Not on file    Years of education: Not on file    Highest education level: Not on file   Occupational History    Not on file   Tobacco Use    Smoking status: Never    Smokeless tobacco: Never   Substance and Sexual Activity    Alcohol use: No    Drug use: No    Sexual activity: Not on file   Other Topics Concern    Not on file   Social History Narrative    Not on file     Social Determinants of Health     Financial Resource Strain: Not on file   Food Insecurity: Not on file   Transportation Needs: Not on file   Physical Activity: Not on file   Stress: Not on file   Social Connections: Not on file   Intimate Partner Violence: Not on file   Housing Stability: Not on file       No family  "history on file.    Current Outpatient Medications   Medication    ALBUTEROL IN    diazepam (VALIUM) 5 MG tablet    estradiol valerate (DELESTROGEN) 20 MG/ML injection    Insulin Syringe-Needle U-100 (B-D INSULIN SYRINGE) 25G X 1\" 1 ML MISC    progesterone (PROMETRIUM) 200 MG capsule    QUEtiapine (SEROQUEL) 25 MG tablet    Sharps Container MISC    acetaminophen (TYLENOL) 325 MG tablet    baclofen (LIORESAL) 10 MG tablet    medication given by implanted intrathecal pump    metroNIDAZOLE (FLAGYL) 500 MG tablet    neomycin (MYCIFRADIN) 500 MG tablet    ondansetron (ZOFRAN) 4 MG tablet    oxyCODONE (ROXICODONE) 5 MG tablet    polyethylene glycol (MIRALAX) 17 g packet     Current Facility-Administered Medications   Medication    Botulinum Toxin Type A (BOTOX) 200 units injection 200 Units    Botulinum Toxin Type A (BOTOX) 200 units injection 200 Units       Allergies   Allergen Reactions    Dust Mites     Shellfish Allergy Nausea and Vomiting and Unknown     Patient states his mother has a \"violent reaction\" to shellfish, and that he has a feeling of nausea when he smells shellfish, so he has never consumed any. He states he has also not been tested.      Shellfish-Derived Products Other (See Comments)     Patient states her mother has a \"violent reaction\" to shellfish, and that she has a feeling of nausea when she smells shellfish, so she has never consumed any. She states she has also not been tested.  Patient states his mother has a \"violent reaction\" to shellfish, and that he has a feeling of nausea when he smells shellfish, so he has never consumed any. He states he has also not been tested.         REVIEW OF SYSTEMS:  Also per HPI.  General: POSITIVE for difficulty sleeping.  Negative for chills/sweats/fever, headache, recent fatigue, or weight gain/loss.  Eyes: Negative for blurred vision, crossed eyes, double vision, recent eye infections, vision flashes, or vision halos.  Ears/Nose/Mouth/Throat: Negative for " bleeding gums, difficulty swallowing, earache, ear discharge, hearing loss, hoarseness, nosebleeds, tinnitus, or sinus problems.  Respiratory: POSITIVE for shortness of breath from asthma. Negative for chronic cough, coughing blood, night sweats, Tuberculosis, or wheezing.  Cardiovascular: Negative for chest pain, dyspnea at night, heart murmur, palpitations, poor circulation, swollen legs/feet, or varicose veins.  Gastrointestinal: POSITIVE for colostomy, left lower quadrant.  Negative for melena, hematochezia, chronic diarrhea, heartburn, Hepatitis A/B/C, increasing constipation, Liver Disease, nausea, or vomiting.   Genitourinary: POSITIVE for frequent UTI. Negative for urinary retention, genital discharge, urinary incontinence, prostate problems, urgency.   Neurological: POSITIVE for hand weakness.  Negative for syncope, headaches, numbness of arms/legs, tingling in hands/arms/legs, memory problems, or seizures.  Psychological: POSITIVE for anxiety, depression, panic attacks, or restlessness.  Skin: Negative for chronic skin itching, color changes in hand/feet when cold, poor scarring, non-healing ulcers, skin rashes/hives, unusual moles.  Musculoskeletal: Negative for arthritis, joint swelling in hands/wrists/hips/knees/joints, muscle tenderness in arms/legs, or osteoporosis.  Endocrine: POSITIVE for hormone replacement therapy.  Negative for excessive thirst/hunger, intolerance for warm rooms, loss of libido, multiple broken bones, rapid weight gain/loss, galactorrhea, or thyroid issues.  Hematologic/Lymphatic: Negative for easy skin bruising, significant fatigue, prolonged bleeding, tender glands/lymph nodes.  Allergies: POSITIVE for asthma which is seasonal and to dust mites.    Answers for HPI/ROS submitted by the patient on 5/7/2023 - updated 8/28/2023.  Also per HPI.  General Symptoms: Yes  Skin Symptoms: No  HENT Symptoms: No  EYE SYMPTOMS: No  HEART SYMPTOMS: No  LUNG SYMPTOMS: No  INTESTINAL SYMPTOMS:  "No  URINARY SYMPTOMS: Yes  GYNECOLOGIC SYMPTOMS: No  REPRODUCTIVE SYMPTOMS: No  BREAST SYMPTOMS: No  SKELETAL SYMPTOMS: No  BLOOD SYMPTOMS: No  NERVOUS SYSTEM SYMPTOMS: No  MENTAL HEALTH SYMPTOMS: Yes  Fever: No  Loss of appetite: No  Weight loss: No  Weight gain: No  Fatigue: Yes  Night sweats: No  Chills: No  Increased stress: Yes  Excessive hunger: No  Excessive thirst: No  Feeling hot or cold when others believe the temperature is normal: No  Loss of height: No  Post-operative complications: No  Surgical site pain: No  Hallucinations: No  Change in or Loss of Energy: No  Hyperactivity: Yes  Confusion: No  Trouble holding urine or incontinence: Yes  Pain or burning: No  Trouble starting or stopping: No  Increased frequency of urination: Yes  Blood in urine: No  Decreased frequency of urination: No  Frequent nighttime urination: No  Flank pain: No  Difficulty emptying bladder: No  Nervous or Anxious: Yes  Depression: Yes  Trouble sleeping: Yes  Trouble thinking or concentrating: Yes  Mood changes: Yes  Panic attacks: Yes      PHYSICAL EXAM  /79 (BP Location: Left arm)   Pulse 99   Resp 16   Ht 1.702 m (5' 7\")   Wt 77.1 kg (170 lb)   SpO2 99%   BMI 26.63 kg/m      General: Awake, alert, oriented. Well nourished, well developed, not in any acute distress.  HEENT: Head normocephalic, atraumatic. No carotid bruit. Neck supple. Good range of motion. No palpable thyroid mass.  Incisions: right lower quadrant incision - healed well.  It is wide.  No wound breakdown.  No redness.  No warmth.  There is a firm and palpable mass in the subcutaneous space at the location of the previous pump.    Neurological  Awake, alert and oriented to date, time, place and person. Speech fluent.   Pupils equal, round, reactive to light.  Extraocular movement intact.  Face symmetric.    Motor: in a wheelchair.  Can walk but with some difficulty.  Spasticity present.  Sensation: intact to light touch and " pinpoint.      IMAGING  CT abdomen and pelvis with contrast from 7/14/2023 shows the subcutaneous seroma at the right abdomen.         ASSESSMENT   32 year old transgender female  History of SCI and spasticity  Status post intrathecal baclofen pump placement  Status post intrathecal baclofen pump and catheter revision  Intrathecal baclofen therapy no longer needed  Intrathecal baclofen pump system removal request  Status post removal of intrathecal baclofen pump system, which includes the intrathecal pump from the right abdomen and intrathecal catheter from the thoracolumbar spine, on 6/2/2023  Seroma, right lower quadrant    Ms. Asher Chavez presents with persistent right lower quadrant fluid collection, presumed seroma, which is reportedly getting larger.  CT of the abdomen does show and confirm a right lower quadrant fluid collection at the previous pump location.  On exam, she seems to have a firm fluid collection.    Normally, it is not unusual to have a postoperative pump pocket seroma.  Also, this usually resolves over time.  However, this seroma has not resolved in the past two months.    Based on the appearance on the CT and lack of other symptoms, I do not think that his is an abscess or infection.  Intraoperatively, it was found that she previously had a Dacron pouch surrounding the pump.  This may be contributing to the persistent seroma, as the surrounding tissue is not absorbing the fluid.    I did discuss the option of bedside needle aspiration of the fluid and the patient was in agreement.      PLAN  1.  Bedside needle aspiration of the right lower quadrant seroma.  2.  Send the fluid for infectious workup.      PROCEDURE NOTE:    After informed consent was obtained, I cleansed the area over the fluid collection located over the right abdomen, more specifically right lower quadrant, with Betadine and then ChlorPrep. Time out was performed confirming the patient's identity, procedure to be performed,  "site and side of the procedure. I then attempted to access the fluid collection first using the 16 gauge angiocatheter.  It was met with some resistance.  After the withdrawal of the needle, the catheter was connected to the IV tubing and a syringe.  Aspiration attempts were not successful.  The catheter was removed.  23 gauge butterfly needle was inserted at the same access point.  When connected to the IV tubing and the catheter, nothing could be aspirated.  The needle was removed.  Then, 14 gauge angiocatheter was inserted into the same insertion point.  It was slowly advanced until a \"pop\" could be felt.  Upon entry into the fluid and removal of the inner needle, rush of fluid under pressure was noted.  Brownish clear liquid was noted to be rushing out.  This was connected to the IV tubing and a syringe.  The fluid was then easily aspirated.  Approximately 40 mL of the brownish fluid was aspirated.  Towards the end, the abdomen was pushed on to facilitate the aspiration of all the fluid.  Approximately 10 mL of the fluid was sent for infectious workup.   Catherter was withdrawn. No active bleeding was noted. The area was cleaned and Bandaid dressing was applied.  Ace wrap dressing with sponges over the right lower quadrant was placed for pressure dressing.      20 minutes were spent face to face with the patient of which more than 50% of the time was spent counseling and discussing the above issues regarding diagnosis, differential, treatment options, and steps for further evaluation.  20 minutes were spent on the needle aspiration procedure.  10 minutes were spent on documentation for this encounter and procedure.  Total of 50 minutes were spent on this encounter today, 30 minutes of which were spent on E&M and 20 minutes of which was spent on the aspiration procedure.         Again, thank you for allowing me to participate in the care of your patient.      Sincerely,    Que Drew MD    "

## 2023-08-29 NOTE — PROCEDURES
"PROCEDURE NOTE:      After informed consent was obtained, I cleansed the area over the fluid collection located over the right abdomen, more specifically right lower quadrant, with Betadine and then ChlorPrep. Time out was performed confirming the patient's identity, procedure to be performed, site and side of the procedure. I then attempted to access the fluid collection first using the 16 gauge angiocatheter.  It was met with some resistance.  After the withdrawal of the needle, the catheter was connected to the IV tubing and a syringe.  Aspiration attempts were not successful.  The catheter was removed.  23 gauge butterfly needle was inserted at the same access point.  When connected to the IV tubing and the catheter, nothing could be aspirated.  The needle was removed.  Then, 14 gauge angiocatheter was inserted into the same insertion point.  It was slowly advanced until a \"pop\" could be felt.  Upon entry into the fluid and removal of the inner needle, rush of fluid under pressure was noted.  Brownish clear liquid was noted to be rushing out.  This was connected to the IV tubing and a syringe.  The fluid was then easily aspirated.  Approximately 40 mL of the brownish fluid was aspirated.  Towards the end, the abdomen was pushed on to facilitate the aspiration of all the fluid.  Approximately 10 mL of the fluid was sent for infectious workup.   Catherter was withdrawn. No active bleeding was noted. The area was cleaned and Bandaid dressing was applied.  Ace wrap dressing with sponges over the right lower quadrant was placed for pressure dressing.      ETIENNE BROOKS MD, PHD  "

## 2023-09-02 LAB
BACTERIA FLD CULT: NO GROWTH
GRAM STAIN RESULT: NORMAL
GRAM STAIN RESULT: NORMAL

## 2023-09-04 LAB — BACTERIA FLD CULT: NORMAL

## 2023-10-17 ENCOUNTER — OFFICE VISIT (OUTPATIENT)
Dept: PLASTIC SURGERY | Facility: CLINIC | Age: 33
End: 2023-10-17
Payer: COMMERCIAL

## 2023-10-17 VITALS
SYSTOLIC BLOOD PRESSURE: 145 MMHG | OXYGEN SATURATION: 98 % | HEIGHT: 67 IN | WEIGHT: 176.2 LBS | DIASTOLIC BLOOD PRESSURE: 92 MMHG | BODY MASS INDEX: 27.65 KG/M2 | HEART RATE: 88 BPM

## 2023-10-17 DIAGNOSIS — F64.9 GENDER DYSPHORIA: Primary | ICD-10-CM

## 2023-10-17 PROCEDURE — 99215 OFFICE O/P EST HI 40 MIN: CPT | Performed by: UROLOGY

## 2023-10-17 RX ORDER — BACLOFEN 10 MG/1
TABLET ORAL
COMMUNITY
Start: 2023-09-05 | End: 2024-04-10

## 2023-10-17 ASSESSMENT — PAIN SCALES - GENERAL: PAINLEVEL: NO PAIN (0)
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
## 2023-10-17 NOTE — NURSING NOTE
"Chief Complaint   Patient presents with    ROSANA Jonesy is being seen today for a hair removal check.       Vitals:    10/17/23 1238   BP: (!) 145/92   BP Location: Right arm   Patient Position: Sitting   Cuff Size: Adult Regular   Pulse: 88   SpO2: 98%   Weight: 79.9 kg (176 lb 3.2 oz)   Height: 1.702 m (5' 7\")       Body mass index is 27.6 kg/m .'    No pain, 0/10. Patient reports BP is elevated due to walking to get into wheelchair.    Emil Giron, EMT    "

## 2023-10-26 ENCOUNTER — DOCUMENTATION ONLY (OUTPATIENT)
Dept: PLASTIC SURGERY | Facility: CLINIC | Age: 33
End: 2023-10-26
Payer: COMMERCIAL

## 2023-10-26 NOTE — PROGRESS NOTES
[facility] :  Care Coordination Note     SITUATION   Asher Chavez (she/her) is a 33 year old adult who is receiving support for:  Care Team  .    BACKGROUND     LOS received and meets criteria. Pt will also need to upload DA. Sent message to pt.    12/20/23  DA received and meets criteria. Sent message to RNCC to request PA for full depth vaginoplasty from surgeon.    ASSESSMENT     Surgery              CGC Assessment  [facility] ([facility] Enrollment: Enrolled  Patient has a therapist: Yes  Name of therapist: Duyen Lehman  Letter of support #1: Received  Letter #1 Date: 06/29/23  Surgery being considered: Yes  Vaginoplasty: Yes      PLAN          Nursing Interventions:       Follow-up plan:  RNCC will request PA from surgeon.       MARIA D Acosta

## 2023-11-02 ENCOUNTER — TRANSFERRED RECORDS (OUTPATIENT)
Dept: HEALTH INFORMATION MANAGEMENT | Facility: CLINIC | Age: 33
End: 2023-11-02
Payer: COMMERCIAL

## 2023-11-05 ENCOUNTER — HEALTH MAINTENANCE LETTER (OUTPATIENT)
Age: 33
End: 2023-11-05

## 2023-11-21 ENCOUNTER — DOCUMENTATION ONLY (OUTPATIENT)
Dept: UROLOGY | Facility: CLINIC | Age: 33
End: 2023-11-21
Payer: COMMERCIAL

## 2023-11-21 NOTE — PROGRESS NOTES
Type of Form Received: Order (gloves)    Form Received (Date) 11/21/23   Form Filled out Yes, date 11/21/23   Placed in provider folder Yes       Received Completed forms Yes   Faxed Forms Faxed To: Court[facility]  Fax Number: [phone]   Sent to HIM (Date) 11/22/23

## 2023-11-22 ENCOUNTER — MEDICAL CORRESPONDENCE (OUTPATIENT)
Dept: HEALTH INFORMATION MANAGEMENT | Facility: CLINIC | Age: 33
End: 2023-11-22
Payer: COMMERCIAL

## 2023-12-05 ENCOUNTER — PRE VISIT (OUTPATIENT)
Dept: UROLOGY | Facility: CLINIC | Age: 33
End: 2023-12-05
Payer: COMMERCIAL

## 2023-12-05 DIAGNOSIS — N31.9 NEUROGENIC BLADDER: Primary | ICD-10-CM

## 2023-12-05 NOTE — TELEPHONE ENCOUNTER
Reason for visit: cystoscopy + botox 200units (verify w provider)     Dx/Hx/Sx: neurogenic bladder     Records/imaging/labs/orders: in epic    At Rooming: standard --have botox - botox needle set up

## 2023-12-12 ENCOUNTER — TELEPHONE (OUTPATIENT)
Dept: PLASTIC SURGERY | Facility: CLINIC | Age: 33
End: 2023-12-12
Payer: COMMERCIAL

## 2023-12-12 NOTE — CONFIDENTIAL NOTE
Pt called to check that we received  DA from [facility]. Writer confirmed we have and sent Mehnaz a message for review.

## 2023-12-18 ENCOUNTER — DOCUMENTATION ONLY (OUTPATIENT)
Dept: UROLOGY | Facility: CLINIC | Age: 33
End: 2023-12-18
Payer: COMMERCIAL

## 2023-12-18 NOTE — PROGRESS NOTES
Type of Form Received: Order (pull-on)    Form Received (Date) 12/18/23   Form Filled out Yes, date 12/18/23   Placed in provider folder Yes       Received Completed forms Yes   Faxed Forms Faxed To: Court[facility]  Fax Number: [phone]   Sent to HIM (Date) 12/20/23

## 2023-12-20 ENCOUNTER — OFFICE VISIT (OUTPATIENT)
Dept: UROLOGY | Facility: CLINIC | Age: 33
End: 2023-12-20
Attending: UROLOGY
Payer: COMMERCIAL

## 2023-12-20 ENCOUNTER — MEDICAL CORRESPONDENCE (OUTPATIENT)
Dept: HEALTH INFORMATION MANAGEMENT | Facility: CLINIC | Age: 33
End: 2023-12-20

## 2023-12-20 VITALS
HEIGHT: 67 IN | SYSTOLIC BLOOD PRESSURE: 130 MMHG | WEIGHT: 182 LBS | BODY MASS INDEX: 28.56 KG/M2 | DIASTOLIC BLOOD PRESSURE: 91 MMHG | HEART RATE: 97 BPM

## 2023-12-20 DIAGNOSIS — F64.9 GENDER DYSPHORIA: Primary | ICD-10-CM

## 2023-12-20 DIAGNOSIS — N31.9 NEUROGENIC BLADDER: ICD-10-CM

## 2023-12-20 PROCEDURE — 52287 CYSTOSCOPY CHEMODENERVATION: CPT | Performed by: UROLOGY

## 2023-12-20 PROCEDURE — 99214 OFFICE O/P EST MOD 30 MIN: CPT | Mod: 25 | Performed by: UROLOGY

## 2023-12-20 RX ORDER — LIDOCAINE HYDROCHLORIDE 20 MG/ML
JELLY TOPICAL ONCE
Status: COMPLETED | OUTPATIENT
Start: 2023-12-20 | End: 2023-12-20

## 2023-12-20 RX ADMIN — LIDOCAINE HYDROCHLORIDE: 20 JELLY TOPICAL at 13:11

## 2023-12-20 ASSESSMENT — PAIN SCALES - GENERAL: PAINLEVEL: NO PAIN (0)

## 2023-12-20 NOTE — NURSING NOTE
"Chief Complaint   Patient presents with    Cyst     Botox 6 month follow up         Blood pressure (!) 130/91, pulse 97, height 1.702 m (5' 7\"), weight 82.6 kg (182 lb). Body mass index is 28.51 kg/m .    Patient Active Problem List   Diagnosis    Neurogenic bladder    Gender dysphoria       Allergies   Allergen Reactions    Dust Mites     Shellfish Allergy Nausea and Vomiting and Unknown     Patient states his mother has a \"violent reaction\" to shellfish, and that he has a feeling of nausea when he smells shellfish, so he has never consumed any. He states he has also not been tested.      Shellfish-Derived Products Other (See Comments)     Patient states her mother has a \"violent reaction\" to shellfish, and that she has a feeling of nausea when she smells shellfish, so she has never consumed any. She states she has also not been tested.  Patient states his mother has a \"violent reaction\" to shellfish, and that he has a feeling of nausea when he smells shellfish, so he has never consumed any. He states he has also not been tested.         Current Outpatient Medications   Medication Sig Dispense Refill    ALBUTEROL IN Inhale into the lungs 4 times daily as needed      diazepam (VALIUM) 5 MG tablet Take 5 mg by mouth      estradiol valerate (DELESTROGEN) 20 MG/ML injection estradiol valerate 20 mg/mL intramuscular oil      Insulin Syringe-Needle U-100 (B-D INSULIN SYRINGE) 25G X 1\" 1 ML MISC       progesterone (PROMETRIUM) 200 MG capsule Take 1 capsule by mouth daily      QUEtiapine (SEROQUEL) 25 MG tablet Take 25 mg by mouth      Sharps Container MISC Sharps Container      baclofen (LIORESAL) 10 MG tablet  (Patient not taking: Reported on 12/20/2023)         Social History     Tobacco Use    Smoking status: Never    Smokeless tobacco: Never   Substance Use Topics    Alcohol use: No    Drug use: No       Invasive Procedure Safety Checklist:    Procedure: Cystoscopy    Action: Complete sections and checkboxes as " appropriate.    Pre-procedure:  1. Patient ID Verified with 2 identifiers (Asya and  or MRN) : YES    2. Procedure and site verified with patient/designee (when able) : YES    3. Accurate consent documentation in medical record : YES    4. H&P (or appropriate assessment) documented in medical record : N/A  H&P must be up to 30 days prior to procedure an updated within 24 hours of                 Procedure as applicable.     5. Relevant diagnostic and radiology test results appropriately labeled and displayed as applicable : YES    6. Blood products, implants, devices, and/or special equipment available for the procedure as applicable : YES    7. Procedure site(s) marked with provider initials [Exclusions: none] : NO    8. Marking not required. Reason : Yes  Procedure does not require site marking    Time Out:     Time-Out performed immediately prior to starting procedure, including verbal and active participation of all team members addressing: YES    1. Correct patient identity.  2. Confirmed that the correct side and site are marked.  3. An accurate procedure to be done.  4. Agreement on the procedure to be done.  5. Correct patient position.  6. Relevant images and results are properly labeled and appropriately displayed.  7. The need to administer antibiotics or fluids for irrigation purposes during the procedure as applicable.  8. Safety precautions based on patient history or medication use.    During Procedure: Verification of correct person, site, and procedure occurs any time the responsibility for care of the patient is transferred to another member of the care team.    The following medication was given:     MEDICATION:  Lidocaine without epinephrine 2% jelly  ROUTE: urethral   SITE: urethral   DOSE: 10 mL  LOT #: [id number]  : [employer]  EXPIRATION DATE:   NDC#: [address]-0   Was there drug waste? No    Prior to med admin, verified patient identity using  patient's name and date of birth.  Due to med administration, patient instructed to remain in clinic for 15 minutes  afterwards, and to report any adverse reaction to me immediately.    Drug Amount Wasted:  None.  Vial/Syringe: Syringe    The following medication was given:     MEDICATION:  botox  ROUTE: bladder wall  SITE: Medication was given bladder wall  DOSE:  200 units  LOT #: [id number]  :  [employer]  EXPIRATION DATE: 2025/12  NDC#: 1194-7405-21     Was there drug waste? No    Prior to medication administration, verified patient identity using patient's name and date of birth.  Due to medication administration, patient instructed to remain in clinic for 15 minutes  afterwards, and to report any adverse reaction to me immediately.        Varsha Judge  12/20/2023  1:09 PM
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
## 2024-01-16 ENCOUNTER — DOCUMENTATION ONLY (OUTPATIENT)
Dept: UROLOGY | Facility: CLINIC | Age: 34
End: 2024-01-16
Payer: COMMERCIAL

## 2024-01-16 NOTE — PROGRESS NOTES
Type of Form Received: Order (disposable liner)    Form Received (Date) 1/16/24   Form Filled out Yes, date 1/16/24   Placed in provider folder Yes       Received Completed forms Yes   Faxed Forms Faxed To: [facility]  Fax Number: [phone]   Sent to HIM (Date) 1/17/24

## 2024-01-17 ENCOUNTER — MEDICAL CORRESPONDENCE (OUTPATIENT)
Dept: HEALTH INFORMATION MANAGEMENT | Facility: CLINIC | Age: 34
End: 2024-01-17
Payer: COMMERCIAL

## 2024-01-23 DIAGNOSIS — F64.9 GENDER DYSPHORIA: Primary | ICD-10-CM

## 2024-01-24 ENCOUNTER — PREP FOR PROCEDURE (OUTPATIENT)
Dept: SURGERY | Facility: CLINIC | Age: 34
End: 2024-01-24
Payer: COMMERCIAL

## 2024-01-24 ENCOUNTER — TELEPHONE (OUTPATIENT)
Dept: UROLOGY | Facility: CLINIC | Age: 34
End: 2024-01-24
Payer: COMMERCIAL

## 2024-01-24 ENCOUNTER — TELEPHONE (OUTPATIENT)
Dept: SURGERY | Facility: CLINIC | Age: 34
End: 2024-01-24
Payer: COMMERCIAL

## 2024-01-24 DIAGNOSIS — Z93.3 COLOSTOMY STATUS (H): Primary | ICD-10-CM

## 2024-01-24 DIAGNOSIS — K63.1 PERFORATION OF SIGMOID COLON (H): ICD-10-CM

## 2024-01-24 RX ORDER — METRONIDAZOLE 500 MG/1
500 TABLET ORAL EVERY 6 HOURS
Qty: 3 TABLET | Refills: 0 | Status: ON HOLD | OUTPATIENT
Start: 2024-01-24 | End: 2024-04-25

## 2024-01-24 RX ORDER — ONDANSETRON 4 MG/1
4 TABLET, FILM COATED ORAL EVERY 6 HOURS
Qty: 3 TABLET | Refills: 0 | Status: ON HOLD | OUTPATIENT
Start: 2024-01-24 | End: 2024-04-25

## 2024-01-24 RX ORDER — MAGNESIUM CARB/ALUMINUM HYDROX 105-160MG
296 TABLET,CHEWABLE ORAL ONCE
Qty: 296 ML | Refills: 0 | Status: SHIPPED | OUTPATIENT
Start: 2024-01-24 | End: 2024-01-24

## 2024-01-24 RX ORDER — POLYETHYLENE GLYCOL 3350 17 G/17G
POWDER, FOR SOLUTION ORAL
Qty: 238 G | Refills: 0 | Status: ON HOLD | OUTPATIENT
Start: 2024-01-24 | End: 2024-04-25

## 2024-01-24 RX ORDER — NEOMYCIN SULFATE 500 MG/1
1000 TABLET ORAL EVERY 6 HOURS
Qty: 6 TABLET | Refills: 0 | Status: ON HOLD | OUTPATIENT
Start: 2024-01-24 | End: 2024-04-25

## 2024-01-24 NOTE — TELEPHONE ENCOUNTER
On 1/24/2024:  Communicated with Dr. Chilango Dominguez's  Octavia, combo surgery with Dr. Chilango Dominguez and Dr. Amadou Anna is being scheduled on Thurs 4/25/2024. Dr. Dominguez will start 1st case, and Dr. Anna will follow Dr. Dominguez.    Dr. Dominguez's  Octavia will reach out to patient to get her Pre-op H&P and Labs scheduled.    Patient's Post-ops appts needed with the Colon & Rectal team scheduled so that the 1st one coordinates with patient's 1st post-op appt with Dr. Dominguez on 5/14/2024.    Patient's Bowel Prep instructions will be sent to patient via [employer] and Mail.    Elana Monique  Johanne-op Coordinator  [facility]  Direct Phone: [phone]

## 2024-01-24 NOTE — TELEPHONE ENCOUNTER
Left voicemail for patient regarding scheduling surgery with Dr. Dominguez. Offering 4/25. Combo w/ Wilfrido  Provided contact number to discuss.  [phone]    Octavia Perdomo, on 1/24/2024 at 10:05 AM

## 2024-01-25 NOTE — TELEPHONE ENCOUNTER
Patient is schedule for surgery with: Dr. Dominguez & Dr. Anna    Surgery Date: 4/25     Location: [facility]    H&P: to be completed by PAC clinic - scheduled on 4/10  LABS to be completed on - 4/10 (Dr. Anna ordered)     Post-op:  5/14, in-person visit, with Dr. Dominguez    Patient will receive a phone call from pre-admission nurses 3-5 days prior to surgery with arrival time and NPO instructions.    Patient aware times are subject to change up until day before surgery.     Patient questions/concerns: N/A     Surgery packet was sent via US mail on 1/25      Octavia Perdomo on 1/25/2024 at 8:51 AM

## 2024-01-26 NOTE — TELEPHONE ENCOUNTER
FUTURE VISIT INFORMATION      SURGERY INFORMATION:  Date: 24  [facility]  Surgeon:  Chilango Dominguez MD Gaertner, Wolfgang Bernd, MD   Anesthesia Type:  general  Procedure: Robotic assisted Full Depth Vaginoplasty Cystoscopy with Botox Injection to Bladder ROBOT ASSISTED COLOSTOMY CLOSURE FLEXIBLE SIGMOIDOSCOPY   Consult: ov 23    RECORDS REQUESTED FROM:       Primary Care Provider: Hayden Cerda MD - [facility]    Most recent EKG+ Tracin/10/23- [facility]    Most recent Sleep Study:   10/18/22- [facility]

## 2024-03-06 DIAGNOSIS — S30.1XXD ABDOMINAL WALL SEROMA, SUBSEQUENT ENCOUNTER: Primary | ICD-10-CM

## 2024-03-13 ENCOUNTER — ANCILLARY PROCEDURE (OUTPATIENT)
Dept: CT IMAGING | Facility: CLINIC | Age: 34
End: 2024-03-13
Attending: NEUROLOGICAL SURGERY
Payer: COMMERCIAL

## 2024-03-13 DIAGNOSIS — S30.1XXD ABDOMINAL WALL SEROMA, SUBSEQUENT ENCOUNTER: ICD-10-CM

## 2024-03-13 PROCEDURE — 74177 CT ABD & PELVIS W/CONTRAST: CPT | Mod: TC | Performed by: RADIOLOGY

## 2024-03-13 RX ORDER — IOPAMIDOL 755 MG/ML
112 INJECTION, SOLUTION INTRAVASCULAR ONCE
Status: COMPLETED | OUTPATIENT
Start: 2024-03-13 | End: 2024-03-13

## 2024-03-13 RX ADMIN — IOPAMIDOL 112 ML: 755 INJECTION, SOLUTION INTRAVASCULAR at 09:48

## 2024-03-18 ENCOUNTER — OFFICE VISIT (OUTPATIENT)
Dept: NEUROSURGERY | Facility: CLINIC | Age: 34
End: 2024-03-18
Attending: NEUROLOGICAL SURGERY
Payer: COMMERCIAL

## 2024-03-18 VITALS
HEIGHT: 67 IN | RESPIRATION RATE: 16 BRPM | DIASTOLIC BLOOD PRESSURE: 81 MMHG | HEART RATE: 91 BPM | SYSTOLIC BLOOD PRESSURE: 122 MMHG | OXYGEN SATURATION: 98 % | WEIGHT: 180 LBS | BODY MASS INDEX: 28.25 KG/M2

## 2024-03-18 DIAGNOSIS — S30.1XXD ABDOMINAL WALL SEROMA, SUBSEQUENT ENCOUNTER: Primary | ICD-10-CM

## 2024-03-18 DIAGNOSIS — R25.2 SPASTICITY: ICD-10-CM

## 2024-03-18 PROCEDURE — 99215 OFFICE O/P EST HI 40 MIN: CPT | Performed by: NEUROLOGICAL SURGERY

## 2024-03-18 ASSESSMENT — PAIN SCALES - GENERAL: PAINLEVEL: NO PAIN (0)

## 2024-03-18 NOTE — LETTER
"3/18/2024       RE: Hannah Chavez  [address]  [place] [address]     Dear Colleague,    Thank you for referring your patient, Hannah Chavez, to the [facility] [place] at [facility]. Please see a copy of my visit note below.    NEUROSURGERY    HISTORY AND PHYSICAL EXAM    Chief Complaint   Patient presents with    Follow Up     ONGOING FLUID COLLECTION AT THE BACLOFEN PUMP REMOVAL SITE, RIGHT LOWER ABDOMEN       HISTORY OF PRESENT ILLNESS  Ms. Asher Chavez returns today again for her persistent abdominal wall seroma/fluid collection that is present on the right side, the site of her previous baclofen pump which was subsequently removed.  She did present to me for evaluation for similar issues back in 8/28/2023 and the pump was aspirated at bedside which yielded approximately 40 mL of seroma.  The infectious workup was negative for any growth.  She again reached out to us stating that she is noticing the fluid collection getting bigger.  She was asked to come in for further evaluation.  Patient states that the fluid collection is rather uncomfortable and at times painful.  She is also noticing firm mass around the area where the pump was removed.  She denies any changes in the wound or the skin and she denies any wound breakdown.  She denies any headaches, especially positional headaches.  She did get a CT of the abdomen recently.      In summary, Ms. Young \"Asher\" Kathy is a 33 year old transgender female who was referred by Hina Anna and Alberto for removal of the existing intrathecal baclofen pump system.  She was and still is currently under consideration of reversing her colostomy as well as vaginoplasty.  Last year, neurosurgery was asked to remove the intrathecal baclofen pump system.  Initially, the plan was to remove the pump during the other procedures but due to intraoperative positioning changes and the length of the " planned surgeries, the decision was made to go with removal of the intrathecal pump system first.  In terms of her baclofen pump, it was placed long time ago for her spasticity.  It was done at [facility] and she continues to get her pump managed by the [facility].  She was last seen by MIKE Flor on 3/17/2023.  Her pump was placed for SCI lower extremity spasticity and it has been revised in the past.  She did have a catastrophic failure of her catheter some time ago and she was in a comatose state for a few days.  Otherwise, she reported no issues with the pump or the baclofen regimen.  She was weaned off of her baclofen and she was taking oral baclofen in case of any withdrawal symptoms but she did well and she did not need to take any oral baclofen.  Intraoperatively, it was noted that there was Dacron pouch.    Past Medical History:   Diagnosis Date    Asthma     Bladder incontinence     Self-catheterizes urinary bladder     Spinal cord injury of T10 vertebra (H)     Spinal cord injury of thoracic region without bone injury, initial encounter (H)     Uncomplicated asthma        Past Surgical History:   Procedure Laterality Date    CYSTOSCOPY N/A 9/1/2017    Procedure: CYSTOSCOPY;  Cystoscopy and Botox Injection into the Bladder;  Surgeon: Michael Mcnulty MD;  Location: [facility] OR    CYSTOSCOPY N/A 10/12/2018    Procedure: CYSTOSCOPY;  Cystoscopy, Botox;  Surgeon: Michael Mcnulty MD;  Location: [facility] OR    CYSTOSCOPY, INTRAVESICAL INJECTION N/A 3/2/2018    Procedure: CYSTOSCOPY, INTRAVESICAL INJECTION;  Cystoscopy And Botox Injection Into The Bladder  ;  Surgeon: Michael Mcnulty MD;  Location: [facility] OR    INJECT BOTOX N/A 9/1/2017    Procedure: INJECT BOTOX;;  Surgeon: Michael Mcnulty MD;  Location: [facility]    INJECT BOTOX N/A 10/12/2018    Procedure: INJECT BOTOX;;  Surgeon: Michael Mcnulty MD;  Location: [facility]    ORCHIECTOMY SCROTAL Bilateral 8/18/2022     "Procedure: Bilateral Simple Scrotal Orchiectomy;  Surgeon: Chilango Dominguez MD;  Location: [facility] OR    REMOVE INTRATHECAL PAIN PUMP N/A 6/2/2023    Procedure: Removal of intrathecal drug delivery system - removal of the intrathecal drug pump from the right abdomen and removal of the intrathecal catheter from the thoracolumbar spine;  Surgeon: Que Drew MD;  Location: [facility] OR       Social History     Socioeconomic History    Marital status: Single     Spouse name: Not on file    Number of children: Not on file    Years of education: Not on file    Highest education level: Not on file   Occupational History    Not on file   Tobacco Use    Smoking status: Never    Smokeless tobacco: Never   Substance and Sexual Activity    Alcohol use: No    Drug use: No    Sexual activity: Not on file   Other Topics Concern    Not on file   Social History Narrative    Not on file     Social Determinants of Health     Financial Resource Strain: Not on file   Food Insecurity: Not on file   Transportation Needs: Not on file   Physical Activity: Not on file   Stress: Not on file   Social Connections: Not on file   Interpersonal Safety: Not on file   Housing Stability: Not on file       No family history on file.    Current Outpatient Medications   Medication    ALBUTEROL IN    baclofen (LIORESAL) 10 MG tablet    diazepam (VALIUM) 5 MG tablet    estradiol valerate (DELESTROGEN) 20 MG/ML injection    Insulin Syringe-Needle U-100 (B-D INSULIN SYRINGE) 25G X 1\" 1 ML MISC    metroNIDAZOLE (FLAGYL) 500 MG tablet    neomycin (MYCIFRADIN) 500 MG tablet    ondansetron (ZOFRAN) 4 MG tablet    polyethylene glycol (MIRALAX) 17 GM/Dose powder    progesterone (PROMETRIUM) 200 MG capsule    QUEtiapine (SEROQUEL) 25 MG tablet    [employer] Container MISC     Current Facility-Administered Medications   Medication    Botulinum Toxin Type A (BOTOX) 200 units injection 200 Units    Botulinum Toxin Type A (BOTOX) 200 units injection 200 Units " "      Allergies   Allergen Reactions    Dust Mites     Shellfish Allergy Nausea and Vomiting and Unknown     Patient states his mother has a \"violent reaction\" to shellfish, and that he has a feeling of nausea when he smells shellfish, so he has never consumed any. He states he has also not been tested.      Shellfish-Derived Products Other (See Comments)     Patient states her mother has a \"violent reaction\" to shellfish, and that she has a feeling of nausea when she smells shellfish, so she has never consumed any. She states she has also not been tested.  Patient states his mother has a \"violent reaction\" to shellfish, and that he has a feeling of nausea when he smells shellfish, so he has never consumed any. He states he has also not been tested.         REVIEW OF SYSTEMS:  Also per HPI.  General: POSITIVE for difficulty sleeping.  Negative for chills/sweats/fever, headache, recent fatigue, or weight gain/loss.  Eyes: Negative for blurred vision, crossed eyes, double vision, recent eye infections, vision flashes, or vision halos.  Ears/Nose/Mouth/Throat: Negative for bleeding gums, difficulty swallowing, earache, ear discharge, hearing loss, hoarseness, nosebleeds, tinnitus, or sinus problems.  Respiratory: POSITIVE for shortness of breath from asthma. Negative for chronic cough, coughing blood, night sweats, Tuberculosis, or wheezing.  Cardiovascular: Negative for chest pain, dyspnea at night, heart murmur, palpitations, poor circulation, swollen legs/feet, or varicose veins.  Gastrointestinal: POSITIVE for colostomy, left lower quadrant.  Negative for melena, hematochezia, chronic diarrhea, heartburn, Hepatitis A/B/C, increasing constipation, Liver Disease, nausea, or vomiting.   Genitourinary: POSITIVE for frequent UTI. Negative for urinary retention, genital discharge, urinary incontinence, prostate problems, urgency.   Neurological: POSITIVE for hand weakness.  Negative for syncope, headaches, numbness of " "arms/legs, tingling in hands/arms/legs, memory problems, or seizures.  Psychological: POSITIVE for anxiety, depression, panic attacks, or restlessness.  Skin: Negative for chronic skin itching, color changes in hand/feet when cold, poor scarring, non-healing ulcers, skin rashes/hives, unusual moles.  Musculoskeletal: Negative for arthritis, joint swelling in hands/wrists/hips/knees/joints, muscle tenderness in arms/legs, or osteoporosis.  Endocrine: POSITIVE for hormone replacement therapy.  Negative for excessive thirst/hunger, intolerance for warm rooms, loss of libido, multiple broken bones, rapid weight gain/loss, galactorrhea, or thyroid issues.  Hematologic/Lymphatic: Negative for easy skin bruising, significant fatigue, prolonged bleeding, tender glands/lymph nodes.  Allergies: POSITIVE for asthma which is seasonal and to dust mites.      PHYSICAL EXAM  /81 (BP Location: Right arm, Patient Position: Sitting)   Pulse 91   Resp 16   Ht 1.702 m (5' 7\")   Wt 81.6 kg (180 lb)   SpO2 98%   BMI 28.19 kg/m      General: Awake, alert, oriented. Well nourished, well developed, she is not in any acute distress.  She is in a wheelchair.  HEENT: Head normocephalic, atraumatic. No carotid bruit. Neck supple. Good range of motion. No palpable thyroid mass.  Extremity: Warm with no clubbing or cyanosis. No lower extremity edema.    Incisions: right lower quadrant incision - healed well.  No wound breakdown.  No redness.  No warmth.  There is a firm and palpable mass in the subcutaneous space at the location of the previous pump.    Neurological  Awake, alert and oriented to date, time, place and person. Speech fluent.   Pupils equal, round, reactive to light.  Extraocular movement intact.  Face symmetric.     Motor: in a wheelchair.  Can walk but with some difficulty.  Spasticity present.  Sensation: intact to light touch and pinpoint.      IMAGING  CT abdomen/pelvis with contrast 3/13/2024    ADDITIONAL " FINDINGS: Left abdominal wall fluid collection anterolaterally with a thick wall now measures 5.0 x 1.4 cm, previously 6.6 x 2.4 cm.                                                            IMPRESSION:   1.  Decreased size of the left lower quadrant superficial fluid collection since comparison.      ASSESSMENT   32 year old transgender female  History of SCI and spasticity  Status post intrathecal baclofen pump placement  Status post intrathecal baclofen pump and catheter revision  Intrathecal baclofen therapy no longer needed  Intrathecal baclofen pump system removal request  Status post removal of intrathecal baclofen pump system, which includes the intrathecal pump from the right abdomen and intrathecal catheter from the thoracolumbar spine, on 6/2/2023  Seroma, right lower quadrant, status post fine needle aspiration on 8/28/2023  Persistent seroma, right lower quadrant, with scar formation    Ms. Asher Chavez presents with persistent right lower quadrant fluid collection, seroma, which has persisted and re-accumulated despite fine needle aspiration back in 8/28/2023.  CT of the abdomen/pelvis from 3/13/2024 does show and confirm a right lower quadrant fluid collection at the previous pump location, which is smaller than the previous size.  However, this is after the seroma was aspirated so it is likely that the fluid re-accumulated.  Also, the lining of the fluid, the previous pump pocket lining, has thickened.  On exam, she seems to have a firm fluid collection.    Normally, it is not unusual to have a postoperative pump pocket seroma.  Also, this usually resolves over time.  However, this seroma has not resolved in the past year, even after reducing the fluid content with aspiration.  The workup did not reveal any infection so I do not feel that this is infected.  It is likely that her pocket lining has scarred down and with Dacron pouch being present, it is not being absorbed.  There is a likelihood that it  may persist.  Patient states that this continues to bother her and it is somewhat painful.    Given that this seroma has persisted, the best way to address it would be to decompress and also remove the pocket lining that has thickened over time.  I did recommend re-opening the incision and resecting the pocket lining and decompressing the fluid collection.    Patient did state that she is scheduled for colostomy reversal and vaginoplasty with Hina Anna and Alberto on 4/25/2024.  She did state that Dr. Anna may take a look at the seroma and address it.  I told the patient that I could be available to assist both Hina Anna and Alberto in addressing the seroma/scar and if possible remove it during her planned surgery on 4/25/2024.  I told the patient that I would reach out to Hina Anna and Alberto to see if they would allow me to get involved, be present, and perform the above mentioned procedure during the planned surgery on 4/25/2024.  I did send them a staff message but I will also send them today's progress note to continue our discussion.    I did discuss the surgical procedure with the patient.  The incision will be opened and the previous scar and the pump pocket as well as the seroma will be removed.  It will be mostly subcutaneous dissection.  Risks, benefits and alternative therapies were discussed with the patient, including but not limited to infection, bleeding, reformation of scar and reformation of seroma, as well as risks associated with anesthesia.  Patient expressed understanding.  All questions were answered.    I will wait for the response from Hina Anna and Alberto.      PLAN  1.  Right lower abdomen wound exploration and resection of previous baclofen pump pocket scar and decompression of seroma.  2.  Await response from Hina Anna and Alberto regarding the possibility of performing the procedure during their planned surgery on 4/25/2024.      19 minutes were spent face to  face with the patient of which more than 50% of the time was spent counseling and discussing the above issues regarding diagnosis, differential, treatment options, and steps for further evaluation.  4 minutes were spent reviewing patient's chart, imaging in PACS.  27 minutes were spent on documentation for this encounter.  50 minutes total were spent on this encounter today.        Again, thank you for allowing me to participate in the care of your patient.      Sincerely,    Que Drew MD

## 2024-03-18 NOTE — PROGRESS NOTES
"NEUROSURGERY    HISTORY AND PHYSICAL EXAM    Chief Complaint   Patient presents with    Follow Up     ONGOING FLUID COLLECTION AT THE BACLOFEN PUMP REMOVAL SITE, RIGHT LOWER ABDOMEN       HISTORY OF PRESENT ILLNESS  Ms. Asher Chavez returns today again for her persistent abdominal wall seroma/fluid collection that is present on the right side, the site of her previous baclofen pump which was subsequently removed.  She did present to me for evaluation for similar issues back in 8/28/2023 and the pump was aspirated at bedside which yielded approximately 40 mL of seroma.  The infectious workup was negative for any growth.  She again reached out to us stating that she is noticing the fluid collection getting bigger.  She was asked to come in for further evaluation.  Patient states that the fluid collection is rather uncomfortable and at times painful.  She is also noticing firm mass around the area where the pump was removed.  She denies any changes in the wound or the skin and she denies any wound breakdown.  She denies any headaches, especially positional headaches.  She did get a CT of the abdomen recently.      In summary, Ms. Young \"Asher\"Kathy is a 33 year old transgender female who was referred by Hina Anna and Alberto for removal of the existing intrathecal baclofen pump system.  She was and still is currently under consideration of reversing her colostomy as well as vaginoplasty.  Last year, neurosurgery was asked to remove the intrathecal baclofen pump system.  Initially, the plan was to remove the pump during the other procedures but due to intraoperative positioning changes and the length of the planned surgeries, the decision was made to go with removal of the intrathecal pump system first.  In terms of her baclofen pump, it was placed long time ago for her spasticity.  It was done at [facility] and she continues to get her pump managed by the [facility].  She was last seen by Ravi" MIKE Denney on 3/17/2023.  Her pump was placed for SCI lower extremity spasticity and it has been revised in the past.  She did have a catastrophic failure of her catheter some time ago and she was in a comatose state for a few days.  Otherwise, she reported no issues with the pump or the baclofen regimen.  She was weaned off of her baclofen and she was taking oral baclofen in case of any withdrawal symptoms but she did well and she did not need to take any oral baclofen.  Intraoperatively, it was noted that there was Dacron pouch.    Past Medical History:   Diagnosis Date    Asthma     Bladder incontinence     Self-catheterizes urinary bladder     Spinal cord injury of T10 vertebra (H)     Spinal cord injury of thoracic region without bone injury, initial encounter (H)     Uncomplicated asthma        Past Surgical History:   Procedure Laterality Date    CYSTOSCOPY N/A 9/1/2017    Procedure: CYSTOSCOPY;  Cystoscopy and Botox Injection into the Bladder;  Surgeon: Michael Mcnulty MD;  Location: [facility] OR    CYSTOSCOPY N/A 10/12/2018    Procedure: CYSTOSCOPY;  Cystoscopy, Botox;  Surgeon: Michael Mcnulty MD;  Location: [facility] OR    CYSTOSCOPY, INTRAVESICAL INJECTION N/A 3/2/2018    Procedure: CYSTOSCOPY, INTRAVESICAL INJECTION;  Cystoscopy And Botox Injection Into The Bladder  ;  Surgeon: Michael Mcnulty MD;  Location: [facility] OR    INJECT BOTOX N/A 9/1/2017    Procedure: INJECT BOTOX;;  Surgeon: Michael Mcnulty MD;  Location: [facility] OR    INJECT BOTOX N/A 10/12/2018    Procedure: INJECT BOTOX;;  Surgeon: Michael Mcnulty MD;  Location: [facility]    ORCHIECTOMY SCROTAL Bilateral 8/18/2022    Procedure: Bilateral Simple Scrotal Orchiectomy;  Surgeon: Chilango Dominguez MD;  Location: [facility] OR    REMOVE INTRATHECAL PAIN PUMP N/A 6/2/2023    Procedure: Removal of intrathecal drug delivery system - removal of the intrathecal drug pump from the right abdomen and removal of the intrathecal catheter from the  "thoracolumbar spine;  Surgeon: Que Drew MD;  Location:  OR       Social History     Socioeconomic History    Marital status: Single     Spouse name: Not on file    Number of children: Not on file    Years of education: Not on file    Highest education level: Not on file   Occupational History    Not on file   Tobacco Use    Smoking status: Never    Smokeless tobacco: Never   Substance and Sexual Activity    Alcohol use: No    Drug use: No    Sexual activity: Not on file   Other Topics Concern    Not on file   Social History Narrative    Not on file     Social Determinants of Health     Financial Resource Strain: Not on file   Food Insecurity: Not on file   Transportation Needs: Not on file   Physical Activity: Not on file   Stress: Not on file   Social Connections: Not on file   Interpersonal Safety: Not on file   Housing Stability: Not on file       No family history on file.    Current Outpatient Medications   Medication    ALBUTEROL IN    baclofen (LIORESAL) 10 MG tablet    diazepam (VALIUM) 5 MG tablet    estradiol valerate (DELESTROGEN) 20 MG/ML injection    Insulin Syringe-Needle U-100 (B-D INSULIN SYRINGE) 25G X 1\" 1 ML MISC    metroNIDAZOLE (FLAGYL) 500 MG tablet    neomycin (MYCIFRADIN) 500 MG tablet    ondansetron (ZOFRAN) 4 MG tablet    polyethylene glycol (MIRALAX) 17 GM/Dose powder    progesterone (PROMETRIUM) 200 MG capsule    QUEtiapine (SEROQUEL) 25 MG tablet    [employer] Container MISC     Current Facility-Administered Medications   Medication    Botulinum Toxin Type A (BOTOX) 200 units injection 200 Units    Botulinum Toxin Type A (BOTOX) 200 units injection 200 Units       Allergies   Allergen Reactions    Dust Mites     Shellfish Allergy Nausea and Vomiting and Unknown     Patient states his mother has a \"violent reaction\" to shellfish, and that he has a feeling of nausea when he smells shellfish, so he has never consumed any. He states he has also not been tested.      " "Shellfish-Derived Products Other (See Comments)     Patient states her mother has a \"violent reaction\" to shellfish, and that she has a feeling of nausea when she smells shellfish, so she has never consumed any. She states she has also not been tested.  Patient states his mother has a \"violent reaction\" to shellfish, and that he has a feeling of nausea when he smells shellfish, so he has never consumed any. He states he has also not been tested.         REVIEW OF SYSTEMS:  Also per HPI.  General: POSITIVE for difficulty sleeping.  Negative for chills/sweats/fever, headache, recent fatigue, or weight gain/loss.  Eyes: Negative for blurred vision, crossed eyes, double vision, recent eye infections, vision flashes, or vision halos.  Ears/Nose/Mouth/Throat: Negative for bleeding gums, difficulty swallowing, earache, ear discharge, hearing loss, hoarseness, nosebleeds, tinnitus, or sinus problems.  Respiratory: POSITIVE for shortness of breath from asthma. Negative for chronic cough, coughing blood, night sweats, Tuberculosis, or wheezing.  Cardiovascular: Negative for chest pain, dyspnea at night, heart murmur, palpitations, poor circulation, swollen legs/feet, or varicose veins.  Gastrointestinal: POSITIVE for colostomy, left lower quadrant.  Negative for melena, hematochezia, chronic diarrhea, heartburn, Hepatitis A/B/C, increasing constipation, Liver Disease, nausea, or vomiting.   Genitourinary: POSITIVE for frequent UTI. Negative for urinary retention, genital discharge, urinary incontinence, prostate problems, urgency.   Neurological: POSITIVE for hand weakness.  Negative for syncope, headaches, numbness of arms/legs, tingling in hands/arms/legs, memory problems, or seizures.  Psychological: POSITIVE for anxiety, depression, panic attacks, or restlessness.  Skin: Negative for chronic skin itching, color changes in hand/feet when cold, poor scarring, non-healing ulcers, skin rashes/hives, unusual " "moles.  Musculoskeletal: Negative for arthritis, joint swelling in hands/wrists/hips/knees/joints, muscle tenderness in arms/legs, or osteoporosis.  Endocrine: POSITIVE for hormone replacement therapy.  Negative for excessive thirst/hunger, intolerance for warm rooms, loss of libido, multiple broken bones, rapid weight gain/loss, galactorrhea, or thyroid issues.  Hematologic/Lymphatic: Negative for easy skin bruising, significant fatigue, prolonged bleeding, tender glands/lymph nodes.  Allergies: POSITIVE for asthma which is seasonal and to dust mites.      PHYSICAL EXAM  /81 (BP Location: Right arm, Patient Position: Sitting)   Pulse 91   Resp 16   Ht 1.702 m (5' 7\")   Wt 81.6 kg (180 lb)   SpO2 98%   BMI 28.19 kg/m      General: Awake, alert, oriented. Well nourished, well developed, she is not in any acute distress.  She is in a wheelchair.  HEENT: Head normocephalic, atraumatic. No carotid bruit. Neck supple. Good range of motion. No palpable thyroid mass.  Extremity: Warm with no clubbing or cyanosis. No lower extremity edema.    Incisions: right lower quadrant incision - healed well.  No wound breakdown.  No redness.  No warmth.  There is a firm and palpable mass in the subcutaneous space at the location of the previous pump.    Neurological  Awake, alert and oriented to date, time, place and person. Speech fluent.   Pupils equal, round, reactive to light.  Extraocular movement intact.  Face symmetric.     Motor: in a wheelchair.  Can walk but with some difficulty.  Spasticity present.  Sensation: intact to light touch and pinpoint.      IMAGING  CT abdomen/pelvis with contrast 3/13/2024    ADDITIONAL FINDINGS: Left abdominal wall fluid collection anterolaterally with a thick wall now measures 5.0 x 1.4 cm, previously 6.6 x 2.4 cm.                                                            IMPRESSION:   1.  Decreased size of the left lower quadrant superficial fluid collection since " comparison.      ASSESSMENT   32 year old transgender female  History of SCI and spasticity  Status post intrathecal baclofen pump placement  Status post intrathecal baclofen pump and catheter revision  Intrathecal baclofen therapy no longer needed  Intrathecal baclofen pump system removal request  Status post removal of intrathecal baclofen pump system, which includes the intrathecal pump from the right abdomen and intrathecal catheter from the thoracolumbar spine, on 6/2/2023  Seroma, right lower quadrant, status post fine needle aspiration on 8/28/2023  Persistent seroma, right lower quadrant, with scar formation    Ms. Asher Chavez presents with persistent right lower quadrant fluid collection, seroma, which has persisted and re-accumulated despite fine needle aspiration back in 8/28/2023.  CT of the abdomen/pelvis from 3/13/2024 does show and confirm a right lower quadrant fluid collection at the previous pump location, which is smaller than the previous size.  However, this is after the seroma was aspirated so it is likely that the fluid re-accumulated.  Also, the lining of the fluid, the previous pump pocket lining, has thickened.  On exam, she seems to have a firm fluid collection.    Normally, it is not unusual to have a postoperative pump pocket seroma.  Also, this usually resolves over time.  However, this seroma has not resolved in the past year, even after reducing the fluid content with aspiration.  The workup did not reveal any infection so I do not feel that this is infected.  It is likely that her pocket lining has scarred down and with Dacron pouch being present, it is not being absorbed.  There is a likelihood that it may persist.  Patient states that this continues to bother her and it is somewhat painful.    Given that this seroma has persisted, the best way to address it would be to decompress and also remove the pocket lining that has thickened over time.  I did recommend re-opening the incision  and resecting the pocket lining and decompressing the fluid collection.    Patient did state that she is scheduled for colostomy reversal and vaginoplasty with Hina Anna and Alberto on 4/25/2024.  She did state that Dr. Anna may take a look at the seroma and address it.  I told the patient that I could be available to assist both Hina Anna and Alberto in addressing the seroma/scar and if possible remove it during her planned surgery on 4/25/2024.  I told the patient that I would reach out to Hina Anna and Alberto to see if they would allow me to get involved, be present, and perform the above mentioned procedure during the planned surgery on 4/25/2024.  I did send them a staff message but I will also send them today's progress note to continue our discussion.    I did discuss the surgical procedure with the patient.  The incision will be opened and the previous scar and the pump pocket as well as the seroma will be removed.  It will be mostly subcutaneous dissection.  Risks, benefits and alternative therapies were discussed with the patient, including but not limited to infection, bleeding, reformation of scar and reformation of seroma, as well as risks associated with anesthesia.  Patient expressed understanding.  All questions were answered.    I will wait for the response from Hina Anna and Alberto.      PLAN  1.  Right lower abdomen wound exploration and resection of previous baclofen pump pocket scar and decompression of seroma.  2.  Await response from Hina Anna and Alberto regarding the possibility of performing the procedure during their planned surgery on 4/25/2024.      19 minutes were spent face to face with the patient of which more than 50% of the time was spent counseling and discussing the above issues regarding diagnosis, differential, treatment options, and steps for further evaluation.  4 minutes were spent reviewing patient's chart, imaging in PACS.  27 minutes were spent  on documentation for this encounter.  50 minutes total were spent on this encounter today.

## 2024-04-03 ENCOUNTER — TELEPHONE (OUTPATIENT)
Dept: PLASTIC SURGERY | Facility: CLINIC | Age: 34
End: 2024-04-03
Payer: COMMERCIAL

## 2024-04-03 DIAGNOSIS — F64.9 GENDER DYSPHORIA: Primary | ICD-10-CM

## 2024-04-03 NOTE — TELEPHONE ENCOUNTER
Pre and Post Op Patient Education                                       Diagnosis: gender dysphoria  Teaching pre and post op for: Full depth vaginoplasty, cystoscopy with botox injection to bladder with Dr. Dominguez. (Combo case with Dr. Anna: Colostomy closure. Teaching for this part of the procedure done with colorectal).     Person involved in teaching: patient    Patient demonstrates an understanding of the following:  - Date of surgery:  4/24/24 - Thursday  - Surgery time: 7:30 am (pt understands that this time could change)  - Location of surgery: [place] [address]     - Pre-operative history physical: PAC 4/10/24 - pt will also submit pre-op urine culture in lab after this appointment.      - Post-op follow-up:   5/8/24 - 8:15 am with Dr Dominguez  6/11/24 - 2:20 pm with Dr Dominguez      Patient verbalizes an understanding of the following:  - The need for a responsible adult  and someone to stay with them for the first 24 hours post-operatively: Pt to be in hospital for at least 1 week after surgery. Family will be driving her home afterward  - Pre-op bowel prep: N/a - pt to follow colorectal instructions for bowel prep due to colostomy closure  - NPO per anesthesia guidelines: Yes  - Pre-op showering x2 with Hibiclens/chlorhexadine soap: Yes  - The need to stop/discontinue all hormones 1 weeks before surgery and may restart upon return home after surgery: Pt's last estradiol injection will be 4/13/24. Last PO progesterone dose 4/18/24.    Discussed   - Pain management after surgery  - Infection prevention and hand hygiene  - Surgical procedure site care taught  - Signs and symptoms of infection  - Wound care and will be taught at the time of discharge  - Importance of dilation, technique, and schedule   - Reviewed : Pre and Post Operative Instructions packet in full  - Information about how to contact the hospital, nurse, and clinic if  needed    Postoperative Plans:  Pt has no obligations from which she needs to take a break after surgery. She is able to take the time to recover fully as needed. She lives with family, who will be able to help with post-op needs and transportation. She has family who lives nearby who will be helping as well. She feels comfortable with her recovery plan.    Orders placed for physical therapy after surgery. Pt will call and schedule an appointment 3-4 weeks after surgery     Surgical instructions given to patient via phone.    Total time with patient: 75 minutes    Parth Patel RN

## 2024-04-07 ENCOUNTER — MYC MEDICAL ADVICE (OUTPATIENT)
Dept: UROLOGY | Facility: CLINIC | Age: 34
End: 2024-04-07
Payer: COMMERCIAL

## 2024-04-07 DIAGNOSIS — N31.9 NEUROGENIC BLADDER: Primary | ICD-10-CM

## 2024-04-08 ENCOUNTER — PRE VISIT (OUTPATIENT)
Dept: UROLOGY | Facility: CLINIC | Age: 34
End: 2024-04-08
Payer: COMMERCIAL

## 2024-04-08 NOTE — TELEPHONE ENCOUNTER
Reason for visit: post op      Dx/Hx/Sx: full depth vaginoplasty     Records/imaging/labs/orders: in epic     At Rooming: standard

## 2024-04-08 NOTE — PROGRESS NOTES
DME order for supplies sent to [facility].      Jenna De Leon RN   9:58 AM

## 2024-04-09 LAB
ABO/RH(D): NORMAL
ANTIBODY SCREEN: NEGATIVE
SPECIMEN EXPIRATION DATE: NORMAL

## 2024-04-10 ENCOUNTER — LAB (OUTPATIENT)
Dept: LAB | Facility: CLINIC | Age: 34
End: 2024-04-10
Payer: COMMERCIAL

## 2024-04-10 ENCOUNTER — PRE VISIT (OUTPATIENT)
Dept: SURGERY | Facility: CLINIC | Age: 34
End: 2024-04-10

## 2024-04-10 ENCOUNTER — ANESTHESIA EVENT (OUTPATIENT)
Dept: SURGERY | Facility: CLINIC | Age: 34
DRG: 876 | End: 2024-04-10
Payer: COMMERCIAL

## 2024-04-10 ENCOUNTER — OFFICE VISIT (OUTPATIENT)
Dept: SURGERY | Facility: CLINIC | Age: 34
End: 2024-04-10
Payer: COMMERCIAL

## 2024-04-10 VITALS
OXYGEN SATURATION: 99 % | TEMPERATURE: 98.1 F | HEIGHT: 67 IN | DIASTOLIC BLOOD PRESSURE: 81 MMHG | BODY MASS INDEX: 29.35 KG/M2 | HEART RATE: 89 BPM | SYSTOLIC BLOOD PRESSURE: 132 MMHG | WEIGHT: 187 LBS

## 2024-04-10 DIAGNOSIS — Z01.818 PREOP EXAMINATION: Primary | ICD-10-CM

## 2024-04-10 DIAGNOSIS — Z01.818 PREOP EXAMINATION: ICD-10-CM

## 2024-04-10 DIAGNOSIS — F64.9 GENDER DYSPHORIA: ICD-10-CM

## 2024-04-10 DIAGNOSIS — Z93.3 COLOSTOMY STATUS (H): ICD-10-CM

## 2024-04-10 LAB
ANION GAP SERPL CALCULATED.3IONS-SCNC: 10 MMOL/L (ref 7–15)
BUN SERPL-MCNC: 7.5 MG/DL (ref 6–20)
CALCIUM SERPL-MCNC: 9.3 MG/DL (ref 8.6–10)
CHLORIDE SERPL-SCNC: 103 MMOL/L (ref 98–107)
CREAT SERPL-MCNC: 0.77 MG/DL (ref 0.51–1.17)
DEPRECATED HCO3 PLAS-SCNC: 27 MMOL/L (ref 22–29)
EGFRCR SERPLBLD CKD-EPI 2021: >90 ML/MIN/1.73M2
ERYTHROCYTE [DISTWIDTH] IN BLOOD BY AUTOMATED COUNT: 12 % (ref 10–15)
GLUCOSE SERPL-MCNC: 95 MG/DL (ref 70–99)
HCT VFR BLD AUTO: 38.3 % (ref 35–53)
HGB BLD-MCNC: 13.5 G/DL (ref 13.3–17.7)
MCH RBC QN AUTO: 30.3 PG (ref 26.5–33)
MCHC RBC AUTO-ENTMCNC: 35.2 G/DL (ref 31.5–36.5)
MCV RBC AUTO: 86 FL (ref 78–100)
PLATELET # BLD AUTO: 196 10E3/UL (ref 150–450)
POTASSIUM SERPL-SCNC: 3.7 MMOL/L (ref 3.4–5.3)
RBC # BLD AUTO: 4.46 10E6/UL (ref 3.8–5.9)
SODIUM SERPL-SCNC: 140 MMOL/L (ref 135–145)
WBC # BLD AUTO: 5.5 10E3/UL (ref 4–11)

## 2024-04-10 PROCEDURE — 86900 BLOOD TYPING SEROLOGIC ABO: CPT | Performed by: NURSE PRACTITIONER

## 2024-04-10 PROCEDURE — 85027 COMPLETE CBC AUTOMATED: CPT | Performed by: PATHOLOGY

## 2024-04-10 PROCEDURE — 99000 SPECIMEN HANDLING OFFICE-LAB: CPT | Performed by: PATHOLOGY

## 2024-04-10 PROCEDURE — 36415 COLL VENOUS BLD VENIPUNCTURE: CPT | Performed by: PATHOLOGY

## 2024-04-10 PROCEDURE — 99203 OFFICE O/P NEW LOW 30 MIN: CPT | Performed by: NURSE PRACTITIONER

## 2024-04-10 PROCEDURE — 87086 URINE CULTURE/COLONY COUNT: CPT | Performed by: UROLOGY

## 2024-04-10 PROCEDURE — 80048 BASIC METABOLIC PNL TOTAL CA: CPT | Performed by: PATHOLOGY

## 2024-04-10 ASSESSMENT — PAIN SCALES - GENERAL: PAINLEVEL: NO PAIN (0)

## 2024-04-10 NOTE — H&P
Pre-Operative H & P     CC:  Preoperative exam to assess for increased cardiopulmonary risk while undergoing surgery and anesthesia.    Date of Encounter: 4/10/2024  Primary Care Physician:  Hayden Cerda     Reason for visit:   Encounter Diagnoses   Name Primary?    Preop examination Yes    Gender dysphoria     Colostomy status (H)        HPI  Hannah Chavez is a 33 year old adult who presents for pre-operative H & P in preparation for  Procedure Information       Case: [id number] Date/Time: 04/25/24 0730    Procedures:       Robotic assisted Full Depth Vaginoplasty (Abdomen)      Cystoscopy with Botox Injection to Bladder (Urethra)      ROBOT ASSISTED COLOSTOMY CLOSURE (Pelvis)      FLEXIBLE SIGMOIDOSCOPY (Anus)    Anesthesia type: General    Diagnosis: Gender dysphoria [F64.9]    Pre-op diagnosis: Gender dysphoria [F64.9]    Location:  OR  /  OR    Providers: Chilango Dominguez MD; Amadou Anna MD            Hannah Chavez is a 33 year old adult with asthma, history of incomplete T10 injury, neurogenic bladder, anxiety and depression that has gender dysphoria and a current colostomy.  She is s/p sigmoid resection in 2018 for a perforated sigmoid colon.  She has consulted with Dr. Anna with interest in colostomy take down  Additionally, she has gender dysphoria and transitioned approximately 3 years ago.  She has been on HRT since 2021 and she had an orchiectomy by Dr. Dominguez on 8/18/22.  She has continued to follow with Dr. Dominguez and has discussed her interest in vaginoplasty.  The above listed combined surgery has now been planned.     History is obtained from the patient and chart review    Hx of abnormal bleeding or anti-platelet use: none    Menstrual history: No LMP recorded.:      Past Medical History  Past Medical History:   Diagnosis Date    Asthma     Bladder incontinence     Colostomy status (H)     Gender dysphoria     Neurogenic bladder     Self-catheterizes urinary bladder      Spinal cord injury of T10 vertebra (H)     Spinal cord injury of thoracic region without bone injury, initial encounter (H)     Uncomplicated asthma        Past Surgical History  Past Surgical History:   Procedure Laterality Date    ABDOMEN SURGERY      segmental seigmoid resection, end colostomy    CYSTOSCOPY N/A 09/01/2017    Procedure: CYSTOSCOPY;  Cystoscopy and Botox Injection into the Bladder;  Surgeon: Michael Mcnulty MD;  Location: [facility] OR    CYSTOSCOPY N/A 10/12/2018    Procedure: CYSTOSCOPY;  Cystoscopy, Botox;  Surgeon: Michael Mcnulty MD;  Location: [facility]    CYSTOSCOPY, INTRAVESICAL INJECTION N/A 03/02/2018    Procedure: CYSTOSCOPY, INTRAVESICAL INJECTION;  Cystoscopy And Botox Injection Into The Bladder  ;  Surgeon: Michael Mcnulty MD;  [facility]    INJECT BOTOX N/A 09/01/2017    Procedure: INJECT BOTOX;;  Surgeon: Michael Mcnulty MD;  [facility]    INJECT BOTOX N/A 10/12/2018    Procedure: INJECT BOTOX;;  Surgeon: Michael Mcnulty MD;  [facility]    ORCHIECTOMY SCROTAL Bilateral 08/18/2022    Procedure: Bilateral Simple Scrotal Orchiectomy;  Surgeon: Chilango Dominguez MD;  Location: [facility] OR    REMOVE INTRATHECAL PAIN PUMP N/A 06/02/2023    Procedure: Removal of intrathecal drug delivery system - removal of the intrathecal drug pump from the right abdomen and removal of the intrathecal catheter from the thoracolumbar spine;  Surgeon: Que Drew MD;  Location: [facility] OR       Prior to Admission Medications  Current Outpatient Medications   Medication Sig Dispense Refill    ALBUTEROL IN Inhale into the lungs 4 times daily as needed      diazepam (VALIUM) 5 MG tablet Take 5 mg by mouth every 6 hours as needed      estradiol valerate (DELESTROGEN) 20 MG/ML injection Inject into the muscle once a week Saturday      progesterone (PROMETRIUM) 200 MG capsule Take 1 capsule by mouth every morning      QUEtiapine (SEROQUEL) 25 MG tablet Take 25 mg by mouth  "as needed      Insulin Syringe-Needle U-100 (B-D INSULIN SYRINGE) 25G X 1\" 1 ML MISC       metroNIDAZOLE (FLAGYL) 500 MG tablet Take 1 tablet (500 mg) by mouth every 6 hours At 8:00 am, 2:00 pm, 8:00 pm the day prior to your surgery with neomycin and zofran. 3 tablet 0    neomycin (MYCIFRADIN) 500 MG tablet Take 2 tablets (1,000 mg) by mouth every 6 hours At 8:00 am, 2:00 pm, 8:00 pm the day prior to your surgery with flagyl and zofran. 6 tablet 0    ondansetron (ZOFRAN) 4 MG tablet Take 1 tablet (4 mg) by mouth every 6 hours At 8:00 am, 2:00 pm, 8:00 pm the day prior to your surgery with neomycin and flagyl. 3 tablet 0    polyethylene glycol (MIRALAX) 17 GM/Dose powder Please take 238 grams mixed with 64 oz of Gatorade at 4pm the night before surgery 238 g 0    Sharps Container MISC Sharps Container         Allergies  Allergies   Allergen Reactions    Dust Mites     Shellfish Allergy Nausea and Vomiting and Unknown     Patient states his mother has a \"violent reaction\" to shellfish, and that he has a feeling of nausea when he smells shellfish, so he has never consumed any. He states he has also not been tested.      Shellfish-Derived Products Other (See Comments)     Patient states her mother has a \"violent reaction\" to shellfish, and that she has a feeling of nausea when she smells shellfish, so she has never consumed any. She states she has also not been tested.  Patient states his mother has a \"violent reaction\" to shellfish, and that he has a feeling of nausea when he smells shellfish, so he has never consumed any. He states he has also not been tested.         Social History  Social History     Socioeconomic History    Marital status: Single     Spouse name: Not on file    Number of children: 0    Years of education: Not on file    Highest education level: Not on file   Occupational History    Occupation: unemployed   Tobacco Use    Smoking status: Never     Passive exposure: Never    Smokeless tobacco: Never "   Substance and Sexual Activity    Alcohol use: No    Drug use: No    Sexual activity: Not on file   Other Topics Concern    Not on file   Social History Narrative    Not on file     Social Determinants of Health     Financial Resource Strain: Medium Risk (4/6/2022)    Received from [facility]    Overall Financial Resource Strain (CARDIA)     Difficulty of Paying Living Expenses: Somewhat hard   Food Insecurity: No Food Insecurity (4/6/2022)    Received from [facility]    Hunger Vital Sign     Worried About Running Out of Food in the Last Year: Never true     Ran Out of Food in the Last Year: Never true   Transportation Needs: Unmet Transportation Needs (4/6/2022)    Received from [facility]    PRAPARE - Transportation     Lack of Transportation (Medical): Yes     Lack of Transportation (Non-Medical): No   Physical Activity: Inactive (4/6/2022)    Received from [facility]    Exercise Vital Sign     Days of Exercise per Week: 0 days     Minutes of Exercise per Session: 0 min   Stress: No Stress Concern Present (4/6/2022)    Received from [facility]    [employer] - Occupational Stress Questionnaire     Feeling of Stress : Only a little   Social Connections: Socially Isolated (4/6/2022)    Received from [facility]    Social Connection and Isolation Panel [NHANES]     Frequency of Communication with Friends and Family: More than three times a week     Frequency of Social Gatherings with Friends and Family: Once a week     Attends Sabianism Services: Never     Active Member of Clubs or Organizations: No     Attends Club or Organization Meetings: Patient declined     Marital Status: Never    Interpersonal Safety: Not At Risk (4/6/2022)    Received from [facility]    Humiliation, Afraid, Rape, and Kick questionnaire     Fear of Current or Ex-Partner: No     Emotionally Abused: No     Physically Abused: No     Sexually Abused: No   Housing Stability: Low Risk  (4/6/2022)     Received from [facility]    Housing Stability Vital Sign     Unable to Pay for Housing in the Last Year: No     Number of Places Lived in the Last Year: 1     In the last 12 months, was there a time when you did not have a steady place to sleep or slept in a shelter (including now)?: No       Family History  Family History   Problem Relation Age of Onset    Cancer Mother         unknown type    Depression Father     Fibromyalgia Father     Anxiety Disorder Brother     Anesthesia Reaction No family hx of     Thrombosis No family hx of        Review of Systems  The complete review of systems is negative other than noted in the HPI or here.   Anesthesia Evaluation   Pt has had prior anesthetic.     No history of anesthetic complications       ROS/MED HX  ENT/Pulmonary:     (+)                     Intermittent, asthma  Treatment: Inhaler prn,              (-) recent URI   Neurologic: Comment: Incomplete T10 spinal injury.  Not paralyzed.  +neurogenic bladder and spastic gait.  Can walk, but typically pushes a wheelchair to walk.      Unassisted, walking distance is very limited, but with pushing a wheelchair, she can walk long distances.      (+)                     Spinal cord injury, year sustained: 1993, level of injury: T10 incomplete,         Cardiovascular:  - neg cardiovascular ROS   (+)  - -   -  - -                                 Previous cardiac testing   Echo: Date: Results:    Stress Test:  Date: Results:    ECG Reviewed:  Date: 6/2022 Results:  Normal sinus rhythm   Right axis deviation   Pulmonary disease pattern   Prolonged QT   Abnormal ECG     Cath:  Date: Results:      METS/Exercise Tolerance: >4 METS Comment: Is doing formal physical therapy currently - does weight training and rides a stationary bike.    Walks on a treadmill at home at 2mph for up to 30 minutes.      Denies any exertional dyspnea or angina.    Hematologic:  - neg hematologic  ROS     Musculoskeletal:  - neg musculoskeletal ROS    "  GI/Hepatic:  - neg GI/hepatic ROS     Renal/Genitourinary: Comment: Neurogenic bladder - self cath 4-6x per day.       Recurrent UTIs      Endo:  - neg endo ROS     Psychiatric/Substance Use:     (+) psychiatric history anxiety and depression (adjustment disorder)       Infectious Disease:  - neg infectious disease ROS     Malignancy:  - neg malignancy ROS     Other:  - neg other ROS          /81 (BP Location: Right arm, Patient Position: Sitting, Cuff Size: Adult Large)   Pulse 89   Temp 98.1  F (36.7  C) (Oral)   Ht 1.702 m (5' 7\")   Wt 84.8 kg (187 lb)   SpO2 99%   BMI 29.29 kg/m      Physical Exam   Constitutional: Awake, alert, cooperative, no apparent distress, and appears stated age.  Eyes: Pupils equal, round and reactive to light, extra ocular muscles intact, sclera clear, conjunctiva normal.  HENT: Normocephalic, oral pharynx with moist mucus membranes.  Upper denture.  Missing lower teeth.  No goiter appreciated.   Respiratory: Clear to auscultation bilaterally, no crackles or wheezing.  Cardiovascular: Regular rate and rhythm, normal S1 and S2, and no murmur noted.  Carotids +2, no bruits. No edema. Palpable pulses to radial.  Diminished pedal pulses.   GI: Normal bowel sounds, soft, non-distended, non-tender, no masses palpated, no hepatosplenomegaly. Colostomy present.  Lymph/Hematologic: No cervical lymphadenopathy and no supraclavicular lymphadenopathy.  Genitourinary:  deferred  Skin: Warm and dry.  No rashes at anticipated surgical site.   Musculoskeletal: Full ROM of neck. There is no redness, warmth, or swelling of the exposed joints. Gross motor strength is normal.    Neurologic: Awake, alert, oriented to name, place and time. Cranial nerves II-XII are grossly intact. Gait is impaired.  Neuropsychiatric: Calm, cooperative. Normal affect.     Prior Labs/Diagnostic Studies   All labs and imaging personally reviewed     EKG/ stress test - if available please see in ROS above       The " patient's records and results personally reviewed by this provider.     Outside records reviewed from: Care Everywhere    LAB/DIAGNOSTIC STUDIES TODAY:    Component      Latest Ref Rng 4/10/2024  2:44 PM   Sodium      135 - 145 mmol/L 140    Potassium      3.4 - 5.3 mmol/L 3.7    Chloride      98 - 107 mmol/L 103    Carbon Dioxide (CO2)      22 - 29 mmol/L 27    Anion Gap      7 - 15 mmol/L 10    Urea Nitrogen      6.0 - 20.0 mg/dL 7.5    Creatinine      0.51 - 1.17 mg/dL 0.77    GFR Estimate      >60 mL/min/1.73m2 >90    Calcium      8.6 - 10.0 mg/dL 9.3    Glucose      70 - 99 mg/dL 95    WBC      4.0 - 11.0 10e3/uL 5.5    RBC Count      3.80 - 5.90 10e6/uL 4.46    Hemoglobin      13.3 - 17.7 g/dL 13.5    Hematocrit      35.0 - 53.0 % 38.3    MCV      78 - 100 fL 86    MCH      26.5 - 33.0 pg 30.3    MCHC      31.5 - 36.5 g/dL 35.2    RDW      10.0 - 15.0 % 12.0    Platelet Count      150 - 450 10e3/uL 196          Assessment    Hannah Chavez is a 33 year old adult seen as a PAC referral for risk assessment and optimization for anesthesia.    Plan/Recommendations  Pt will be optimized for the proposed procedure.  See below for details on the assessment, risk, and preoperative recommendations    NEUROLOGY  - No history of TIA, CVA or seizure  - incomplete T10 injury at age 3.  Residual impaired gait and neurogenic bladder.  Consider fall risk precautions.     -Post Op delirium risk factors:  No risk identified    ENT  - No current airway concerns.  Will need to be reassessed day of surgery.  Mallampati: I  TM: > 3    CARDIAC  - No history of CAD, Hypertension, and Afib  - METS (Metabolic Equivalents)  Patient performs 4 or more METS exercise without symptoms             Total Score: 0      RCRI-Low risk: Class 2 0.9% complication rate             Total Score: 1    RCRI: High Risk Surgery        PULMONARY    NEIL Low Risk             Total Score: 1    NEIL: Male      - Asthma  Well controlled  - Tobacco History   "  History   Smoking Status    Never   Smokeless Tobacco    Never       GI  - denies GERD  - colostomy status.  Surgery planned as above.   PONV High Risk  Total Score: 3           1 AN PONV: Pt is Female    1 AN PONV: Patient is not a current smoker    1 AN PONV: Intended Post Op Opioids        /RENAL  - neurogenic bladder.  Self caths 4-6x per day.     ENDOCRINE    - BMI: Estimated body mass index is 29.29 kg/m  as calculated from the following:    Height as of this encounter: 1.702 m (5' 7\").    Weight as of this encounter: 84.8 kg (187 lb).  Overweight (BMI 25.0-29.9)  - No history of Diabetes Mellitus    - gender dysphoria.  Surgery planned as above.  Patient plans to hold HRT for 1 week prior to surgery as per Dr. Dominguez's instructions.    HEME  VTE Low Risk 0.5%             Total Score: 2    VTE: Male      - No history of abnormal bleeding or antiplatelet use.      MSK  - impaired gait.  Consider fall risk precautions.     PSYCH  - continue mental health meds without interruption.        Different anesthesia methods/types have been discussed with the patient, but they are aware that the final plan will be decided by the assigned anesthesia provider on the date of service.      The patient is optimized for their procedure. AVS with information on surgery time/arrival time, meds and NPO status given by nursing staff. No further diagnostic testing indicated.      On the day of service:     Prep time: 8 minutes  Visit time: 20 minutes  Documentation time: 15 minutes  ------------------------------------------  Total time: 43 minutes      AUTUMN Jiang CNP  Preoperative Assessment Center  [facility]  Phone: [phone]  Fax: [phone]    "

## 2024-04-11 ENCOUNTER — TEAM CONFERENCE (OUTPATIENT)
Dept: SURGERY | Facility: CLINIC | Age: 34
End: 2024-04-11
Payer: COMMERCIAL

## 2024-04-11 NOTE — PROGRESS NOTES
COLON AND RECTAL SURGERY HUDDLE:    Patient was reviewed in preparation for their surgery the following was reviewed and has been completed:    Surgeon: Dr. Amadou Anna    Surgery & Date:   ROBOT ASSISTED COLOSTOMY CLOSURE N/A General   FLEXIBLE SIGMOIDOSCOPY N/A General       4/25    Last MD Note: reviewed    Anesthesia Type: General    Other Providers: Yes    PAC: Yes    WOC: No    Labs: Yes    Bowel Prep: Yes MiraLAX / Gatorade , Antibiotic, and Magnesium Citrate    Packet: Yes    Imaging: No    Post-Op Appointments: Yes    Pre op soap: Yes Questions about shower: no    Is patient on TPN?: No   If yes, I contacted the TPN pharmacist by paging the  pharmacy at [phone] or calling [phone]. I also contacted Alka MALIK with inpatient colon and rectal team.     Pre op call complete: Yes

## 2024-04-11 NOTE — RESULT ENCOUNTER NOTE
Gregory Young,    Your test results are attached.  All of your labs are good for surgery.         Brittney Sagastume DNP, RN, ANP-C

## 2024-04-12 DIAGNOSIS — N31.9 NEUROGENIC BLADDER: ICD-10-CM

## 2024-04-12 DIAGNOSIS — F64.9 GENDER DYSPHORIA: Primary | ICD-10-CM

## 2024-04-12 LAB — BACTERIA UR CULT: ABNORMAL

## 2024-04-12 RX ORDER — SULFAMETHOXAZOLE/TRIMETHOPRIM 800-160 MG
1 TABLET ORAL 2 TIMES DAILY
Qty: 10 TABLET | Refills: 0 | Status: ON HOLD | OUTPATIENT
Start: 2024-04-12 | End: 2024-04-25

## 2024-04-21 NOTE — PATIENT INSTRUCTIONS
Preparing for Your Surgery      Name:  Hannah Chavez   MRN:  [id number]   :  1990   Today's Date:  4/10/2024       Arriving for surgery:  Surgery date:  24  Arrival time:  5.30AM    Please come to:     Please come to:       [facility]loride [facility]    [address] [place]  [address]     The [facility] Patient/Visitor Ramp is at [address]. Patients and visitors who self-park will receive the reduced hospital parking rate. If the Patient /Visitor Ramp is full, please follow the signs to the Rewarding Return car park located at the main hospital entrance.       parking is available (24 hours/ 7 days a week)      Discounted parking pass options are available for patients and visitors. They can be purchased at the Trendabl desk at the main hospital entrance.     -    Stop at the security desk and they will direct surgery patients to the [facility] Check [facility]. [phone]        - If you need directions, a wheelchair or an escort please stop at the Information/security desk in the lobby.     What can I eat or drink?  -  Please follow the diet and bowel prep instructions by Dr. Anna.  -  You may have clear liquids until 2 hours prior to arrival time. (Until 3.30AM)    Examples of clear liquids:  Water  Clear broth  Juices (apple, white grape, white cranberry  and cider) without pulp  Noncarbonated, powder based beverages  (lemonade and Eros-Aid)  Sodas (Sprite, 7-Up, ginger ale and seltzer)  Coffee or tea (without milk or cream)  Gatorade    -  No Alcohol or cannabis products for at least 24 hours before surgery.     Which medicines can I take?    Hold Aspirin for 7 days before surgery.   Hold Multivitamins for 7 days before surgery.  Hold Supplements for 7 days before surgery.  Hold Ibuprofen (Advil, Motrin) for 1 day(s) before surgery--unless otherwise directed by  surgeon.  Hold Naproxen (Aleve) for 4 days before surgery.    Hold Progesterone and Delestrogene 7 days before surgery per Dr. Dominguez's recommendations.    -  DO NOT take these medications the day of surgery:  See above.    -  PLEASE TAKE these medications the day of surgery:  Morning medications per usual except for the list mentioned above.    How do I prepare myself?  - Please take 2 showers (one the night prior to surgery and one the morning of surgery) using Scrubcare or Hibiclens soap.    Use this soap only from the neck to your toes.     Leave the soap on your skin for one minute--then rinse thoroughly.      You may use your own shampoo and conditioner. No other hair products.   - Please remove all jewelry and body piercings.  - No lotions, deodorants or fragrance.  - No makeup or fingernail polish.   - Bring your ID and insurance card.    -If you use a CPAP machine, please bring the CPAP machine, tubing, and mask to hospital.    -If you have a Deep Brain Stimulator, Spinal Cord Stimulator, or any Neuro Stimulator device---you must bring the remote control to the hospital.      ALL PATIENTS GOING HOME THE SAME DAY OF SURGERY ARE REQUIRED TO HAVE A RESPONSIBLE ADULT TO DRIVE AND BE IN ATTENDANCE WITH THEM FOR 24 HOURS FOLLOWING SURGERY.    Covid testing policy as of 12/06/2022  Your surgeon will notify and schedule you for a COVID test if one is needed before surgery--please direct any questions or COVID symptoms to your surgeon      Questions or Concerns:    - For any questions regarding the day of surgery or your hospital stay, please contact the Pre Admission Nursing Office at [phone].       - If you have health changes between today and your surgery, please call your surgeon.       - For questions after surgery, please call your surgeons office.           Current Visitor Guidelines    You may have 2 visitors in the pre op area.    Visiting hours: 8 a.m. to 8:30 p.m.    Patients confirmed or suspected  to have symptoms of COVID 19 or flu:     No visitors allowed for adult patients.   Children (under age 18) can have 1 named visitor.     People who are sick or showing symptoms of COVID 19 or flu:    Are not allowed to visit patients--we can only make exceptions in special situations.       Please follow these guidelines for your visit:          Please maintain social distance          Masking is optional--however at times you may be asked to wear a mask for the safety of yourself and others     Clean your hands with alcohol hand . Do this when you arrive at and leave the building and patient room,    And again after you touch your mask or anything in the room.     Go directly to and from the room you are visiting.     Stay in the patient s room during your visit. Limit going to other places in the hospital as much as possible     Leave bags and jackets at home or in the car.     For everyone s health, please don t come and go during your visit. That includes for smoking   during your visit.            No

## 2024-04-25 ENCOUNTER — ANESTHESIA (OUTPATIENT)
Dept: SURGERY | Facility: CLINIC | Age: 34
DRG: 876 | End: 2024-04-25
Payer: COMMERCIAL

## 2024-04-25 ENCOUNTER — HOSPITAL ENCOUNTER (INPATIENT)
Facility: CLINIC | Age: 34
LOS: 6 days | Discharge: HOME | DRG: 876 | End: 2024-05-01
Attending: UROLOGY | Admitting: COLON & RECTAL SURGERY
Payer: COMMERCIAL

## 2024-04-25 DIAGNOSIS — Z93.3 COLOSTOMY STATUS (H): Primary | ICD-10-CM

## 2024-04-25 DIAGNOSIS — F64.9 GENDER DYSPHORIA: ICD-10-CM

## 2024-04-25 DIAGNOSIS — G89.18 ACUTE POST-OPERATIVE PAIN: ICD-10-CM

## 2024-04-25 LAB
ERYTHROCYTE [DISTWIDTH] IN BLOOD BY AUTOMATED COUNT: 11.9 % (ref 10–15)
GLUCOSE BLDC GLUCOMTR-MCNC: 192 MG/DL (ref 70–99)
GLUCOSE BLDC GLUCOMTR-MCNC: 97 MG/DL (ref 70–99)
HCT VFR BLD AUTO: 24.1 % (ref 35–53)
HGB BLD-MCNC: 8.5 G/DL (ref 13.3–17.7)
HGB BLD-MCNC: 8.8 G/DL (ref 13.3–17.7)
MCH RBC QN AUTO: 29.8 PG (ref 26.5–33)
MCHC RBC AUTO-ENTMCNC: 35.3 G/DL (ref 31.5–36.5)
MCV RBC AUTO: 85 FL (ref 78–100)
PLATELET # BLD AUTO: 156 10E3/UL (ref 150–450)
RBC # BLD AUTO: 2.85 10E6/UL (ref 3.8–5.9)
WBC # BLD AUTO: 17.9 10E3/UL (ref 4–11)

## 2024-04-25 PROCEDURE — 54125 REMOVAL OF PENIS: CPT | Mod: KX | Performed by: UROLOGY

## 2024-04-25 PROCEDURE — 0DJD8ZZ INSPECTION OF LOWER INTESTINAL TRACT, VIA NATURAL OR ARTIFICIAL OPENING ENDOSCOPIC: ICD-10-PCS | Performed by: COLON & RECTAL SURGERY

## 2024-04-25 PROCEDURE — 250N000009 HC RX 250: Performed by: NURSE ANESTHETIST, CERTIFIED REGISTERED

## 2024-04-25 PROCEDURE — 250N000009 HC RX 250: Performed by: ANESTHESIOLOGY

## 2024-04-25 PROCEDURE — 0DBP4ZZ EXCISION OF RECTUM, PERCUTANEOUS ENDOSCOPIC APPROACH: ICD-10-PCS | Performed by: COLON & RECTAL SURGERY

## 2024-04-25 PROCEDURE — C2617 STENT, NON-COR, TEM W/O DEL: HCPCS | Performed by: UROLOGY

## 2024-04-25 PROCEDURE — 250N000011 HC RX IP 250 OP 636: Performed by: ANESTHESIOLOGY

## 2024-04-25 PROCEDURE — 250N000011 HC RX IP 250 OP 636: Mod: JZ | Performed by: STUDENT IN AN ORGANIZED HEALTH CARE EDUCATION/TRAINING PROGRAM

## 2024-04-25 PROCEDURE — 250N000013 HC RX MED GY IP 250 OP 250 PS 637: Performed by: COLON & RECTAL SURGERY

## 2024-04-25 PROCEDURE — 53410 RECONSTRUCTION OF URETHRA: CPT | Mod: KX | Performed by: UROLOGY

## 2024-04-25 PROCEDURE — 250N000011 HC RX IP 250 OP 636: Performed by: COLON & RECTAL SURGERY

## 2024-04-25 PROCEDURE — 250N000011 HC RX IP 250 OP 636: Performed by: STUDENT IN AN ORGANIZED HEALTH CARE EDUCATION/TRAINING PROGRAM

## 2024-04-25 PROCEDURE — 56805 CLITOROPLASTY INTERSEX STATE: CPT | Performed by: STUDENT IN AN ORGANIZED HEALTH CARE EDUCATION/TRAINING PROGRAM

## 2024-04-25 PROCEDURE — 4A1BXSH MONITORING OF GASTROINTESTINAL VASCULAR PERFUSION USING INDOCYANINE GREEN DYE, EXTERNAL APPROACH: ICD-10-PCS | Performed by: COLON & RECTAL SURGERY

## 2024-04-25 PROCEDURE — 55150 REMOVAL OF SCROTUM: CPT | Mod: KX | Performed by: UROLOGY

## 2024-04-25 PROCEDURE — C9290 INJ, BUPIVACAINE LIPOSOME: HCPCS | Performed by: STUDENT IN AN ORGANIZED HEALTH CARE EDUCATION/TRAINING PROGRAM

## 2024-04-25 PROCEDURE — 258N000003 HC RX IP 258 OP 636: Performed by: ANESTHESIOLOGY

## 2024-04-25 PROCEDURE — 370N000017 HC ANESTHESIA TECHNICAL FEE, PER MIN: Performed by: UROLOGY

## 2024-04-25 PROCEDURE — 999N000141 HC STATISTIC PRE-PROCEDURE NURSING ASSESSMENT: Performed by: UROLOGY

## 2024-04-25 PROCEDURE — 57425 LAPAROSCOPY SURG COLPOPEXY: CPT | Mod: KX | Performed by: UROLOGY

## 2024-04-25 PROCEDURE — 250N000009 HC RX 250: Performed by: STUDENT IN AN ORGANIZED HEALTH CARE EDUCATION/TRAINING PROGRAM

## 2024-04-25 PROCEDURE — 360N000080 HC SURGERY LEVEL 7, PER MIN: Performed by: UROLOGY

## 2024-04-25 PROCEDURE — 120N000002 HC R&B MED SURG/OB UMMC

## 2024-04-25 PROCEDURE — 88302 TISSUE EXAM BY PATHOLOGIST: CPT | Mod: TC | Performed by: UROLOGY

## 2024-04-25 PROCEDURE — 36415 COLL VENOUS BLD VENIPUNCTURE: CPT | Performed by: COLON & RECTAL SURGERY

## 2024-04-25 PROCEDURE — 250N000013 HC RX MED GY IP 250 OP 250 PS 637: Performed by: UROLOGY

## 2024-04-25 PROCEDURE — 86923 COMPATIBILITY TEST ELECTRIC: CPT | Performed by: PHYSICIAN ASSISTANT

## 2024-04-25 PROCEDURE — 14301 TIS TRNFR ANY 30.1-60 SQ CM: CPT | Mod: XS | Performed by: UROLOGY

## 2024-04-25 PROCEDURE — 0DBM4ZZ EXCISION OF DESCENDING COLON, PERCUTANEOUS ENDOSCOPIC APPROACH: ICD-10-PCS | Performed by: COLON & RECTAL SURGERY

## 2024-04-25 PROCEDURE — 250N000011 HC RX IP 250 OP 636: Performed by: UROLOGY

## 2024-04-25 PROCEDURE — 44227 LAP CLOSE ENTEROSTOMY: CPT | Mod: GC | Performed by: COLON & RECTAL SURGERY

## 2024-04-25 PROCEDURE — 88304 TISSUE EXAM BY PATHOLOGIST: CPT | Mod: 26 | Performed by: PATHOLOGY

## 2024-04-25 PROCEDURE — 0W4M070 CREATION OF VAGINA IN MALE PERINEUM WITH AUTOLOGOUS TISSUE SUBSTITUTE, OPEN APPROACH: ICD-10-PCS | Performed by: UROLOGY

## 2024-04-25 PROCEDURE — 710N000010 HC RECOVERY PHASE 1, LEVEL 2, PER MIN: Performed by: UROLOGY

## 2024-04-25 PROCEDURE — 258N000003 HC RX IP 258 OP 636: Performed by: COLON & RECTAL SURGERY

## 2024-04-25 PROCEDURE — 0DNW4ZZ RELEASE PERITONEUM, PERCUTANEOUS ENDOSCOPIC APPROACH: ICD-10-PCS | Performed by: COLON & RECTAL SURGERY

## 2024-04-25 PROCEDURE — 56805 CLITOROPLASTY INTERSEX STATE: CPT | Mod: KX | Performed by: UROLOGY

## 2024-04-25 PROCEDURE — P9045 ALBUMIN (HUMAN), 5%, 250 ML: HCPCS | Mod: JZ | Performed by: STUDENT IN AN ORGANIZED HEALTH CARE EDUCATION/TRAINING PROGRAM

## 2024-04-25 PROCEDURE — 8E0W4CZ ROBOTIC ASSISTED PROCEDURE OF TRUNK REGION, PERCUTANEOUS ENDOSCOPIC APPROACH: ICD-10-PCS | Performed by: COLON & RECTAL SURGERY

## 2024-04-25 PROCEDURE — 272N000001 HC OR GENERAL SUPPLY STERILE: Performed by: UROLOGY

## 2024-04-25 PROCEDURE — 250N000009 HC RX 250: Performed by: UROLOGY

## 2024-04-25 PROCEDURE — 250N000020 HC RX IP 250 OP 636 J0585: Mod: JZ | Performed by: UROLOGY

## 2024-04-25 PROCEDURE — 85027 COMPLETE CBC AUTOMATED: CPT | Performed by: NURSE ANESTHETIST, CERTIFIED REGISTERED

## 2024-04-25 PROCEDURE — 250N000025 HC SEVOFLURANE, PER MIN: Performed by: UROLOGY

## 2024-04-25 PROCEDURE — 88305 TISSUE EXAM BY PATHOLOGIST: CPT | Mod: 26 | Performed by: PATHOLOGY

## 2024-04-25 PROCEDURE — 58999 UNLISTED PX FML GENITAL SYS: CPT | Mod: KX | Performed by: UROLOGY

## 2024-04-25 PROCEDURE — 258N000003 HC RX IP 258 OP 636: Performed by: STUDENT IN AN ORGANIZED HEALTH CARE EDUCATION/TRAINING PROGRAM

## 2024-04-25 PROCEDURE — 3E0K8GC INTRODUCTION OF OTHER THERAPEUTIC SUBSTANCE INTO GENITOURINARY TRACT, VIA NATURAL OR ARTIFICIAL OPENING ENDOSCOPIC: ICD-10-PCS | Performed by: UROLOGY

## 2024-04-25 PROCEDURE — 52287 CYSTOSCOPY CHEMODENERVATION: CPT | Mod: GC | Performed by: UROLOGY

## 2024-04-25 PROCEDURE — 56805 CLITOROPLASTY INTERSEX STATE: CPT | Performed by: ANESTHESIOLOGY

## 2024-04-25 PROCEDURE — 85018 HEMOGLOBIN: CPT | Performed by: COLON & RECTAL SURGERY

## 2024-04-25 DEVICE — IMPLANTABLE DEVICE: Type: IMPLANTABLE DEVICE | Site: VAGINA | Status: FUNCTIONAL

## 2024-04-25 RX ORDER — EPHEDRINE SULFATE 50 MG/ML
INJECTION, SOLUTION INTRAMUSCULAR; INTRAVENOUS; SUBCUTANEOUS PRN
Status: DISCONTINUED | OUTPATIENT
Start: 2024-04-25 | End: 2024-04-25

## 2024-04-25 RX ORDER — HYDROMORPHONE HCL IN WATER/PF 6 MG/30 ML
0.2 PATIENT CONTROLLED ANALGESIA SYRINGE INTRAVENOUS EVERY 5 MIN PRN
Status: DISCONTINUED | OUTPATIENT
Start: 2024-04-25 | End: 2024-04-25 | Stop reason: HOSPADM

## 2024-04-25 RX ORDER — SODIUM CHLORIDE, SODIUM LACTATE, POTASSIUM CHLORIDE, CALCIUM CHLORIDE 600; 310; 30; 20 MG/100ML; MG/100ML; MG/100ML; MG/100ML
INJECTION, SOLUTION INTRAVENOUS CONTINUOUS PRN
Status: DISCONTINUED | OUTPATIENT
Start: 2024-04-25 | End: 2024-04-25

## 2024-04-25 RX ORDER — CEFAZOLIN SODIUM/WATER 2 G/20 ML
2 SYRINGE (ML) INTRAVENOUS
Status: COMPLETED | OUTPATIENT
Start: 2024-04-25 | End: 2024-04-25

## 2024-04-25 RX ORDER — OXYCODONE HYDROCHLORIDE 10 MG/1
10 TABLET ORAL EVERY 4 HOURS PRN
Status: DISCONTINUED | OUTPATIENT
Start: 2024-04-25 | End: 2024-05-01 | Stop reason: HOSPADM

## 2024-04-25 RX ORDER — HYDROMORPHONE HCL IN WATER/PF 6 MG/30 ML
0.4 PATIENT CONTROLLED ANALGESIA SYRINGE INTRAVENOUS
Status: DISCONTINUED | OUTPATIENT
Start: 2024-04-25 | End: 2024-04-30

## 2024-04-25 RX ORDER — LIDOCAINE 40 MG/G
CREAM TOPICAL
Status: DISCONTINUED | OUTPATIENT
Start: 2024-04-25 | End: 2024-04-25 | Stop reason: HOSPADM

## 2024-04-25 RX ORDER — QUETIAPINE FUMARATE 25 MG/1
25 TABLET, FILM COATED ORAL 2 TIMES DAILY
Status: DISCONTINUED | OUTPATIENT
Start: 2024-04-25 | End: 2024-04-26

## 2024-04-25 RX ORDER — DEXAMETHASONE SODIUM PHOSPHATE 4 MG/ML
INJECTION, SOLUTION INTRA-ARTICULAR; INTRALESIONAL; INTRAMUSCULAR; INTRAVENOUS; SOFT TISSUE PRN
Status: DISCONTINUED | OUTPATIENT
Start: 2024-04-25 | End: 2024-04-25

## 2024-04-25 RX ORDER — LIDOCAINE HYDROCHLORIDE 20 MG/ML
INJECTION, SOLUTION INFILTRATION; PERINEURAL PRN
Status: DISCONTINUED | OUTPATIENT
Start: 2024-04-25 | End: 2024-04-25

## 2024-04-25 RX ORDER — ALBUTEROL SULFATE 90 UG/1
1-2 AEROSOL, METERED RESPIRATORY (INHALATION) EVERY 6 HOURS PRN
Status: DISCONTINUED | OUTPATIENT
Start: 2024-04-25 | End: 2024-04-26

## 2024-04-25 RX ORDER — SODIUM CHLORIDE, SODIUM LACTATE, POTASSIUM CHLORIDE, CALCIUM CHLORIDE 600; 310; 30; 20 MG/100ML; MG/100ML; MG/100ML; MG/100ML
INJECTION, SOLUTION INTRAVENOUS CONTINUOUS
Status: DISCONTINUED | OUTPATIENT
Start: 2024-04-25 | End: 2024-04-25

## 2024-04-25 RX ORDER — INDOCYANINE GREEN AND WATER 25 MG
KIT INJECTION PRN
Status: DISCONTINUED | OUTPATIENT
Start: 2024-04-25 | End: 2024-04-25

## 2024-04-25 RX ORDER — NALOXONE HYDROCHLORIDE 0.4 MG/ML
0.4 INJECTION, SOLUTION INTRAMUSCULAR; INTRAVENOUS; SUBCUTANEOUS
Status: DISCONTINUED | OUTPATIENT
Start: 2024-04-25 | End: 2024-04-25 | Stop reason: HOSPADM

## 2024-04-25 RX ORDER — NALOXONE HYDROCHLORIDE 0.4 MG/ML
0.4 INJECTION, SOLUTION INTRAMUSCULAR; INTRAVENOUS; SUBCUTANEOUS
Status: DISCONTINUED | OUTPATIENT
Start: 2024-04-25 | End: 2024-05-01 | Stop reason: HOSPADM

## 2024-04-25 RX ORDER — CEFAZOLIN SODIUM/WATER 2 G/20 ML
2 SYRINGE (ML) INTRAVENOUS SEE ADMIN INSTRUCTIONS
Status: DISCONTINUED | OUTPATIENT
Start: 2024-04-25 | End: 2024-04-25 | Stop reason: HOSPADM

## 2024-04-25 RX ORDER — OXYCODONE HYDROCHLORIDE 5 MG/1
5 TABLET ORAL EVERY 4 HOURS PRN
Status: DISCONTINUED | OUTPATIENT
Start: 2024-04-25 | End: 2024-05-01 | Stop reason: HOSPADM

## 2024-04-25 RX ORDER — FENTANYL CITRATE 50 UG/ML
25 INJECTION, SOLUTION INTRAMUSCULAR; INTRAVENOUS EVERY 5 MIN PRN
Status: DISCONTINUED | OUTPATIENT
Start: 2024-04-25 | End: 2024-04-25 | Stop reason: HOSPADM

## 2024-04-25 RX ORDER — PROPOFOL 10 MG/ML
INJECTION, EMULSION INTRAVENOUS CONTINUOUS PRN
Status: DISCONTINUED | OUTPATIENT
Start: 2024-04-25 | End: 2024-04-25

## 2024-04-25 RX ORDER — BUPIVACAINE HYDROCHLORIDE 2.5 MG/ML
INJECTION, SOLUTION EPIDURAL; INFILTRATION; INTRACAUDAL
Status: COMPLETED | OUTPATIENT
Start: 2024-04-25 | End: 2024-04-25

## 2024-04-25 RX ORDER — NALOXONE HYDROCHLORIDE 0.4 MG/ML
0.2 INJECTION, SOLUTION INTRAMUSCULAR; INTRAVENOUS; SUBCUTANEOUS
Status: DISCONTINUED | OUTPATIENT
Start: 2024-04-25 | End: 2024-05-01 | Stop reason: HOSPADM

## 2024-04-25 RX ORDER — GRANISETRON HYDROCHLORIDE 1 MG/ML
1 INJECTION INTRAVENOUS ONCE
Status: COMPLETED | OUTPATIENT
Start: 2024-04-25 | End: 2024-04-25

## 2024-04-25 RX ORDER — METHOCARBAMOL 500 MG/1
500 TABLET, FILM COATED ORAL 3 TIMES DAILY PRN
Status: DISCONTINUED | OUTPATIENT
Start: 2024-04-25 | End: 2024-04-26

## 2024-04-25 RX ORDER — FENTANYL CITRATE 50 UG/ML
50 INJECTION, SOLUTION INTRAMUSCULAR; INTRAVENOUS EVERY 5 MIN PRN
Status: DISCONTINUED | OUTPATIENT
Start: 2024-04-25 | End: 2024-04-25 | Stop reason: HOSPADM

## 2024-04-25 RX ORDER — KETOROLAC TROMETHAMINE 15 MG/ML
15 INJECTION, SOLUTION INTRAMUSCULAR; INTRAVENOUS EVERY 6 HOURS PRN
Status: DISCONTINUED | OUTPATIENT
Start: 2024-04-25 | End: 2024-04-26

## 2024-04-25 RX ORDER — NALOXONE HYDROCHLORIDE 0.4 MG/ML
0.2 INJECTION, SOLUTION INTRAMUSCULAR; INTRAVENOUS; SUBCUTANEOUS
Status: DISCONTINUED | OUTPATIENT
Start: 2024-04-25 | End: 2024-04-25 | Stop reason: HOSPADM

## 2024-04-25 RX ORDER — CELECOXIB 200 MG/1
200 CAPSULE ORAL ONCE
Status: COMPLETED | OUTPATIENT
Start: 2024-04-25 | End: 2024-04-25

## 2024-04-25 RX ORDER — METRONIDAZOLE 7.5 MG/G
GEL VAGINAL 2 TIMES DAILY
Status: DISCONTINUED | OUTPATIENT
Start: 2024-04-25 | End: 2024-04-25 | Stop reason: HOSPADM

## 2024-04-25 RX ORDER — VASOPRESSIN IN 0.9 % NACL 2 UNIT/2ML
SYRINGE (ML) INTRAVENOUS PRN
Status: DISCONTINUED | OUTPATIENT
Start: 2024-04-25 | End: 2024-04-25

## 2024-04-25 RX ORDER — LORAZEPAM 2 MG/ML
0.5 INJECTION INTRAMUSCULAR EVERY 4 HOURS PRN
Status: DISCONTINUED | OUTPATIENT
Start: 2024-04-25 | End: 2024-04-27

## 2024-04-25 RX ORDER — FLUMAZENIL 0.1 MG/ML
0.2 INJECTION, SOLUTION INTRAVENOUS
Status: DISCONTINUED | OUTPATIENT
Start: 2024-04-25 | End: 2024-04-25 | Stop reason: HOSPADM

## 2024-04-25 RX ORDER — LIDOCAINE 40 MG/G
CREAM TOPICAL
Status: DISCONTINUED | OUTPATIENT
Start: 2024-04-25 | End: 2024-05-01 | Stop reason: HOSPADM

## 2024-04-25 RX ORDER — ONDANSETRON 4 MG/1
4 TABLET, ORALLY DISINTEGRATING ORAL EVERY 30 MIN PRN
Status: DISCONTINUED | OUTPATIENT
Start: 2024-04-25 | End: 2024-04-25

## 2024-04-25 RX ORDER — NALOXONE HYDROCHLORIDE 0.4 MG/ML
0.1 INJECTION, SOLUTION INTRAMUSCULAR; INTRAVENOUS; SUBCUTANEOUS
Status: DISCONTINUED | OUTPATIENT
Start: 2024-04-25 | End: 2024-04-25 | Stop reason: HOSPADM

## 2024-04-25 RX ORDER — ONDANSETRON 2 MG/ML
4 INJECTION INTRAMUSCULAR; INTRAVENOUS EVERY 30 MIN PRN
Status: DISCONTINUED | OUTPATIENT
Start: 2024-04-25 | End: 2024-04-25 | Stop reason: HOSPADM

## 2024-04-25 RX ORDER — FENTANYL CITRATE 50 UG/ML
25-50 INJECTION, SOLUTION INTRAMUSCULAR; INTRAVENOUS
Status: DISCONTINUED | OUTPATIENT
Start: 2024-04-25 | End: 2024-04-25 | Stop reason: HOSPADM

## 2024-04-25 RX ORDER — FENTANYL CITRATE 50 UG/ML
INJECTION, SOLUTION INTRAMUSCULAR; INTRAVENOUS PRN
Status: DISCONTINUED | OUTPATIENT
Start: 2024-04-25 | End: 2024-04-25

## 2024-04-25 RX ORDER — METOPROLOL TARTRATE 1 MG/ML
1-2 INJECTION, SOLUTION INTRAVENOUS EVERY 5 MIN PRN
Status: DISCONTINUED | OUTPATIENT
Start: 2024-04-25 | End: 2024-04-25 | Stop reason: HOSPADM

## 2024-04-25 RX ORDER — SODIUM CHLORIDE, SODIUM LACTATE, POTASSIUM CHLORIDE, CALCIUM CHLORIDE 600; 310; 30; 20 MG/100ML; MG/100ML; MG/100ML; MG/100ML
INJECTION, SOLUTION INTRAVENOUS CONTINUOUS
Status: DISCONTINUED | OUTPATIENT
Start: 2024-04-25 | End: 2024-04-27

## 2024-04-25 RX ORDER — PROGESTERONE 100 MG/1
200 CAPSULE ORAL EVERY MORNING
Status: DISCONTINUED | OUTPATIENT
Start: 2024-04-26 | End: 2024-05-01 | Stop reason: HOSPADM

## 2024-04-25 RX ORDER — DIPHENHYDRAMINE HYDROCHLORIDE 50 MG/ML
25 INJECTION INTRAMUSCULAR; INTRAVENOUS EVERY 6 HOURS PRN
Status: DISCONTINUED | OUTPATIENT
Start: 2024-04-25 | End: 2024-05-01 | Stop reason: HOSPADM

## 2024-04-25 RX ORDER — PROPOFOL 10 MG/ML
INJECTION, EMULSION INTRAVENOUS PRN
Status: DISCONTINUED | OUTPATIENT
Start: 2024-04-25 | End: 2024-04-25

## 2024-04-25 RX ORDER — ENOXAPARIN SODIUM 100 MG/ML
40 INJECTION SUBCUTANEOUS EVERY 24 HOURS
Status: DISCONTINUED | OUTPATIENT
Start: 2024-04-26 | End: 2024-04-26

## 2024-04-25 RX ORDER — ENOXAPARIN SODIUM 100 MG/ML
40 INJECTION SUBCUTANEOUS
Status: COMPLETED | OUTPATIENT
Start: 2024-04-25 | End: 2024-04-25

## 2024-04-25 RX ORDER — CALCIUM GLUCONATE 20 MG/ML
2 INJECTION, SOLUTION INTRAVENOUS ONCE
Status: COMPLETED | OUTPATIENT
Start: 2024-04-25 | End: 2024-04-26

## 2024-04-25 RX ORDER — DEXMEDETOMIDINE HYDROCHLORIDE 4 UG/ML
INJECTION, SOLUTION INTRAVENOUS CONTINUOUS PRN
Status: DISCONTINUED | OUTPATIENT
Start: 2024-04-25 | End: 2024-04-25

## 2024-04-25 RX ORDER — HYDROMORPHONE HCL IN WATER/PF 6 MG/30 ML
0.2 PATIENT CONTROLLED ANALGESIA SYRINGE INTRAVENOUS
Status: DISCONTINUED | OUTPATIENT
Start: 2024-04-25 | End: 2024-04-30

## 2024-04-25 RX ORDER — ERTAPENEM 1 G/1
1 INJECTION, POWDER, LYOPHILIZED, FOR SOLUTION INTRAMUSCULAR; INTRAVENOUS EVERY 24 HOURS
Status: COMPLETED | OUTPATIENT
Start: 2024-04-26 | End: 2024-04-30

## 2024-04-25 RX ORDER — HYDROMORPHONE HCL IN WATER/PF 6 MG/30 ML
0.4 PATIENT CONTROLLED ANALGESIA SYRINGE INTRAVENOUS EVERY 5 MIN PRN
Status: DISCONTINUED | OUTPATIENT
Start: 2024-04-25 | End: 2024-04-25 | Stop reason: HOSPADM

## 2024-04-25 RX ORDER — METRONIDAZOLE 7.5 MG/G
GEL VAGINAL PRN
Status: DISCONTINUED | OUTPATIENT
Start: 2024-04-25 | End: 2024-04-25 | Stop reason: HOSPADM

## 2024-04-25 RX ORDER — SODIUM CHLORIDE, SODIUM LACTATE, POTASSIUM CHLORIDE, CALCIUM CHLORIDE 600; 310; 30; 20 MG/100ML; MG/100ML; MG/100ML; MG/100ML
INJECTION, SOLUTION INTRAVENOUS CONTINUOUS
Status: DISCONTINUED | OUTPATIENT
Start: 2024-04-25 | End: 2024-04-25 | Stop reason: HOSPADM

## 2024-04-25 RX ORDER — METRONIDAZOLE 500 MG/100ML
500 INJECTION, SOLUTION INTRAVENOUS
Status: COMPLETED | OUTPATIENT
Start: 2024-04-25 | End: 2024-04-25

## 2024-04-25 RX ADMIN — PHENYLEPHRINE HYDROCHLORIDE 100 MCG: 10 INJECTION INTRAVENOUS at 08:18

## 2024-04-25 RX ADMIN — Medication 20 MG: at 14:01

## 2024-04-25 RX ADMIN — Medication 2 G: at 07:45

## 2024-04-25 RX ADMIN — Medication 20 MG: at 10:16

## 2024-04-25 RX ADMIN — PROPOFOL 20 MCG/KG/MIN: 10 INJECTION, EMULSION INTRAVENOUS at 08:08

## 2024-04-25 RX ADMIN — PHENYLEPHRINE HYDROCHLORIDE 100 MCG: 10 INJECTION INTRAVENOUS at 08:01

## 2024-04-25 RX ADMIN — METHOCARBAMOL 500 MG: 500 TABLET ORAL at 22:40

## 2024-04-25 RX ADMIN — FENTANYL CITRATE 50 MCG: 50 INJECTION INTRAMUSCULAR; INTRAVENOUS at 08:43

## 2024-04-25 RX ADMIN — FENTANYL CITRATE 50 MCG: 50 INJECTION INTRAMUSCULAR; INTRAVENOUS at 15:14

## 2024-04-25 RX ADMIN — NOREPINEPHRINE BITARTRATE 12.8 MCG: 1 INJECTION, SOLUTION, CONCENTRATE INTRAVENOUS at 12:49

## 2024-04-25 RX ADMIN — EPHEDRINE SULFATE 5 MG: 5 INJECTION INTRAVENOUS at 12:28

## 2024-04-25 RX ADMIN — Medication 50 MG: at 07:52

## 2024-04-25 RX ADMIN — PHENYLEPHRINE HYDROCHLORIDE 100 MCG: 10 INJECTION INTRAVENOUS at 08:38

## 2024-04-25 RX ADMIN — PHENYLEPHRINE HYDROCHLORIDE 100 MCG: 10 INJECTION INTRAVENOUS at 11:14

## 2024-04-25 RX ADMIN — Medication 2 G: at 15:45

## 2024-04-25 RX ADMIN — ALBUMIN (HUMAN): 12.5 SOLUTION INTRAVENOUS at 15:40

## 2024-04-25 RX ADMIN — NOREPINEPHRINE BITARTRATE 6.4 MCG: 1 INJECTION, SOLUTION, CONCENTRATE INTRAVENOUS at 12:45

## 2024-04-25 RX ADMIN — PHENYLEPHRINE HYDROCHLORIDE 150 MCG: 10 INJECTION INTRAVENOUS at 09:41

## 2024-04-25 RX ADMIN — FENTANYL CITRATE 50 MCG: 50 INJECTION INTRAMUSCULAR; INTRAVENOUS at 07:49

## 2024-04-25 RX ADMIN — BUPIVACAINE HYDROCHLORIDE 20 ML: 2.5 INJECTION, SOLUTION EPIDURAL; INFILTRATION; INTRACAUDAL; PERINEURAL at 07:15

## 2024-04-25 RX ADMIN — SODIUM CHLORIDE, SODIUM LACTATE, POTASSIUM CHLORIDE, CALCIUM CHLORIDE: 600; 310; 30; 20 INJECTION, SOLUTION INTRAVENOUS at 12:59

## 2024-04-25 RX ADMIN — PHENYLEPHRINE HYDROCHLORIDE 0.5 MCG/KG/MIN: 10 INJECTION INTRAVENOUS at 13:13

## 2024-04-25 RX ADMIN — HYDROMORPHONE HYDROCHLORIDE 0.2 MG: 1 INJECTION, SOLUTION INTRAMUSCULAR; INTRAVENOUS; SUBCUTANEOUS at 14:18

## 2024-04-25 RX ADMIN — Medication 20 MG: at 12:50

## 2024-04-25 RX ADMIN — ENOXAPARIN SODIUM 40 MG: 40 INJECTION SUBCUTANEOUS at 07:25

## 2024-04-25 RX ADMIN — GRANISETRON HYDROCHLORIDE 1 MG: 1 INJECTION, SOLUTION INTRAVENOUS at 07:36

## 2024-04-25 RX ADMIN — HYDROMORPHONE HYDROCHLORIDE 0.3 MG: 1 INJECTION, SOLUTION INTRAMUSCULAR; INTRAVENOUS; SUBCUTANEOUS at 13:54

## 2024-04-25 RX ADMIN — METHOCARBAMOL 500 MG: 500 TABLET ORAL at 18:23

## 2024-04-25 RX ADMIN — MIDAZOLAM 1 MG: 1 INJECTION INTRAMUSCULAR; INTRAVENOUS at 07:22

## 2024-04-25 RX ADMIN — EPHEDRINE SULFATE 5 MG: 5 INJECTION INTRAVENOUS at 11:44

## 2024-04-25 RX ADMIN — HYDROMORPHONE HYDROCHLORIDE 0.5 MG: 1 INJECTION, SOLUTION INTRAMUSCULAR; INTRAVENOUS; SUBCUTANEOUS at 11:06

## 2024-04-25 RX ADMIN — PHENYLEPHRINE HYDROCHLORIDE 150 MCG: 10 INJECTION INTRAVENOUS at 12:11

## 2024-04-25 RX ADMIN — PHENYLEPHRINE HYDROCHLORIDE 100 MCG: 10 INJECTION INTRAVENOUS at 11:26

## 2024-04-25 RX ADMIN — Medication 2 G: at 11:45

## 2024-04-25 RX ADMIN — Medication 20 MG: at 08:39

## 2024-04-25 RX ADMIN — EPHEDRINE SULFATE 7.5 MG: 5 INJECTION INTRAVENOUS at 12:32

## 2024-04-25 RX ADMIN — Medication 1 UNITS: at 16:34

## 2024-04-25 RX ADMIN — PHENYLEPHRINE HYDROCHLORIDE 100 MCG: 10 INJECTION INTRAVENOUS at 11:31

## 2024-04-25 RX ADMIN — PHENYLEPHRINE HYDROCHLORIDE 100 MCG: 10 INJECTION INTRAVENOUS at 08:28

## 2024-04-25 RX ADMIN — PHENYLEPHRINE HYDROCHLORIDE 100 MCG: 10 INJECTION INTRAVENOUS at 09:14

## 2024-04-25 RX ADMIN — NOREPINEPHRINE BITARTRATE 6.4 MCG: 1 INJECTION, SOLUTION, CONCENTRATE INTRAVENOUS at 12:42

## 2024-04-25 RX ADMIN — PHENYLEPHRINE HYDROCHLORIDE 200 MCG: 10 INJECTION INTRAVENOUS at 11:36

## 2024-04-25 RX ADMIN — PHENYLEPHRINE HYDROCHLORIDE 100 MCG: 10 INJECTION INTRAVENOUS at 08:12

## 2024-04-25 RX ADMIN — PHENYLEPHRINE HYDROCHLORIDE 200 MCG: 10 INJECTION INTRAVENOUS at 12:32

## 2024-04-25 RX ADMIN — PHENYLEPHRINE HYDROCHLORIDE 100 MCG: 10 INJECTION INTRAVENOUS at 12:19

## 2024-04-25 RX ADMIN — SODIUM CHLORIDE, POTASSIUM CHLORIDE, SODIUM LACTATE AND CALCIUM CHLORIDE: 600; 310; 30; 20 INJECTION, SOLUTION INTRAVENOUS at 07:43

## 2024-04-25 RX ADMIN — Medication 20 MG: at 14:32

## 2024-04-25 RX ADMIN — MIDAZOLAM 1 MG: 1 INJECTION INTRAMUSCULAR; INTRAVENOUS at 07:40

## 2024-04-25 RX ADMIN — METRONIDAZOLE 500 MG: 500 INJECTION, SOLUTION INTRAVENOUS at 06:50

## 2024-04-25 RX ADMIN — FENTANYL CITRATE 50 MCG: 50 INJECTION, SOLUTION INTRAMUSCULAR; INTRAVENOUS at 17:01

## 2024-04-25 RX ADMIN — FENTANYL CITRATE 50 MCG: 50 INJECTION INTRAMUSCULAR; INTRAVENOUS at 15:40

## 2024-04-25 RX ADMIN — OXYCODONE HYDROCHLORIDE 5 MG: 5 TABLET ORAL at 22:30

## 2024-04-25 RX ADMIN — BUPIVACAINE 20 ML: 13.3 INJECTION, SUSPENSION, LIPOSOMAL INFILTRATION at 07:15

## 2024-04-25 RX ADMIN — DEXMEDETOMIDINE HYDROCHLORIDE 0.5 MCG/KG/HR: 4 INJECTION, SOLUTION INTRAVENOUS at 08:08

## 2024-04-25 RX ADMIN — LIDOCAINE HYDROCHLORIDE 80 MG: 20 INJECTION, SOLUTION INFILTRATION; PERINEURAL at 07:49

## 2024-04-25 RX ADMIN — CELECOXIB 200 MG: 200 CAPSULE ORAL at 06:49

## 2024-04-25 RX ADMIN — FENTANYL CITRATE 50 MCG: 50 INJECTION INTRAMUSCULAR; INTRAVENOUS at 10:33

## 2024-04-25 RX ADMIN — PHENYLEPHRINE HYDROCHLORIDE 100 MCG: 10 INJECTION INTRAVENOUS at 10:26

## 2024-04-25 RX ADMIN — FENTANYL CITRATE 25 MCG: 50 INJECTION, SOLUTION INTRAMUSCULAR; INTRAVENOUS at 17:59

## 2024-04-25 RX ADMIN — PHENYLEPHRINE HYDROCHLORIDE 150 MCG: 10 INJECTION INTRAVENOUS at 09:47

## 2024-04-25 RX ADMIN — SUGAMMADEX 200 MG: 100 INJECTION, SOLUTION INTRAVENOUS at 16:27

## 2024-04-25 RX ADMIN — SODIUM CHLORIDE, SODIUM LACTATE, POTASSIUM CHLORIDE, CALCIUM CHLORIDE: 600; 310; 30; 20 INJECTION, SOLUTION INTRAVENOUS at 08:12

## 2024-04-25 RX ADMIN — SODIUM CHLORIDE, POTASSIUM CHLORIDE, SODIUM LACTATE AND CALCIUM CHLORIDE: 600; 310; 30; 20 INJECTION, SOLUTION INTRAVENOUS at 20:45

## 2024-04-25 RX ADMIN — FENTANYL CITRATE 25 MCG: 50 INJECTION, SOLUTION INTRAMUSCULAR; INTRAVENOUS at 17:34

## 2024-04-25 RX ADMIN — INDOCYANINE GREEN AND WATER 25 MG: KIT at 15:15

## 2024-04-25 RX ADMIN — EPHEDRINE SULFATE 2.5 MG: 5 INJECTION INTRAVENOUS at 12:24

## 2024-04-25 RX ADMIN — SODIUM CHLORIDE, POTASSIUM CHLORIDE, SODIUM LACTATE AND CALCIUM CHLORIDE: 600; 310; 30; 20 INJECTION, SOLUTION INTRAVENOUS at 17:12

## 2024-04-25 RX ADMIN — SODIUM CHLORIDE, POTASSIUM CHLORIDE, SODIUM LACTATE AND CALCIUM CHLORIDE: 600; 310; 30; 20 INJECTION, SOLUTION INTRAVENOUS at 14:25

## 2024-04-25 RX ADMIN — PROPOFOL 100 MG: 10 INJECTION, EMULSION INTRAVENOUS at 07:49

## 2024-04-25 RX ADMIN — PHENYLEPHRINE HYDROCHLORIDE 200 MCG: 10 INJECTION INTRAVENOUS at 16:12

## 2024-04-25 RX ADMIN — SODIUM CHLORIDE, POTASSIUM CHLORIDE, SODIUM LACTATE AND CALCIUM CHLORIDE: 600; 310; 30; 20 INJECTION, SOLUTION INTRAVENOUS at 12:29

## 2024-04-25 RX ADMIN — PHENYLEPHRINE HYDROCHLORIDE 200 MCG: 10 INJECTION INTRAVENOUS at 12:06

## 2024-04-25 RX ADMIN — ALBUMIN (HUMAN): 12.5 SOLUTION INTRAVENOUS at 13:49

## 2024-04-25 RX ADMIN — Medication 20 MG: at 11:31

## 2024-04-25 RX ADMIN — FENTANYL CITRATE 25 MCG: 50 INJECTION, SOLUTION INTRAMUSCULAR; INTRAVENOUS at 17:11

## 2024-04-25 RX ADMIN — OXYCODONE HYDROCHLORIDE 5 MG: 5 TABLET ORAL at 18:23

## 2024-04-25 RX ADMIN — FENTANYL CITRATE 50 MCG: 50 INJECTION, SOLUTION INTRAMUSCULAR; INTRAVENOUS at 07:21

## 2024-04-25 RX ADMIN — PROPOFOL 50 MG: 10 INJECTION, EMULSION INTRAVENOUS at 07:50

## 2024-04-25 RX ADMIN — Medication 10 MG: at 15:34

## 2024-04-25 RX ADMIN — EPHEDRINE SULFATE 5 MG: 5 INJECTION INTRAVENOUS at 12:56

## 2024-04-25 RX ADMIN — DEXAMETHASONE SODIUM PHOSPHATE 10 MG: 4 INJECTION, SOLUTION INTRA-ARTICULAR; INTRALESIONAL; INTRAMUSCULAR; INTRAVENOUS; SOFT TISSUE at 08:01

## 2024-04-25 RX ADMIN — FENTANYL CITRATE 50 MCG: 50 INJECTION INTRAMUSCULAR; INTRAVENOUS at 09:00

## 2024-04-25 RX ADMIN — PHENYLEPHRINE HYDROCHLORIDE 200 MCG: 10 INJECTION INTRAVENOUS at 13:05

## 2024-04-25 ASSESSMENT — ACTIVITIES OF DAILY LIVING (ADL)
DOING_ERRANDS_INDEPENDENTLY_DIFFICULTY: NO
ADLS_ACUITY_SCORE: 18
DRESSING/BATHING_DIFFICULTY: NO
ADLS_ACUITY_SCORE: 25
ADLS_ACUITY_SCORE: 18
ADLS_ACUITY_SCORE: 21
ADLS_ACUITY_SCORE: 18
WEAR_GLASSES_OR_BLIND: NO
ADLS_ACUITY_SCORE: 19
WALKING_OR_CLIMBING_STAIRS_DIFFICULTY: YES
WALKING_OR_CLIMBING_STAIRS: AMBULATION DIFFICULTY, ASSISTANCE 1 PERSON;STAIR CLIMBING DIFFICULTY, ASSISTANCE 1 PERSON
TOILETING_ISSUES: NO
CONCENTRATING,_REMEMBERING_OR_MAKING_DECISIONS_DIFFICULTY: NO
ADLS_ACUITY_SCORE: 21
DIFFICULTY_COMMUNICATING: NO
ADLS_ACUITY_SCORE: 19
ADLS_ACUITY_SCORE: 18
CHANGE_IN_FUNCTIONAL_STATUS_SINCE_ONSET_OF_CURRENT_ILLNESS/INJURY: NO
ADLS_ACUITY_SCORE: 18
ADLS_ACUITY_SCORE: 18
FALL_HISTORY_WITHIN_LAST_SIX_MONTHS: NO
ADLS_ACUITY_SCORE: 18
ADLS_ACUITY_SCORE: 18
DIFFICULTY_EATING/SWALLOWING: NO
ADLS_ACUITY_SCORE: 18

## 2024-04-25 NOTE — ANESTHESIA PREPROCEDURE EVALUATION
Anesthesia Pre-Procedure Evaluation    Patient: Hannah Chavez   MRN: [id number] : 1990        Procedure : Procedure(s):  Robotic assisted Full Depth Vaginoplasty  Cystoscopy with Botox Injection to Bladder  ROBOT ASSISTED COLOSTOMY CLOSURE  FLEXIBLE SIGMOIDOSCOPY          Past Medical History:   Diagnosis Date    Asthma     Bladder incontinence     Colostomy status (H)     Gender dysphoria     Neurogenic bladder     Self-catheterizes urinary bladder     Spinal cord injury of T10 vertebra (H)     Spinal cord injury of thoracic region without bone injury, initial encounter (H)     Uncomplicated asthma       Past Surgical History:   Procedure Laterality Date    ABDOMEN SURGERY      segmental seigmoid resection, end colostomy    CYSTOSCOPY N/A 2017    Procedure: CYSTOSCOPY;  Cystoscopy and Botox Injection into the Bladder;  Surgeon: Michael Mcnulty MD;  [facility]    CYSTOSCOPY N/A 10/12/2018    Procedure: CYSTOSCOPY;  Cystoscopy, Botox;  Surgeon: Michael Mcnulty MD;  [facility]    CYSTOSCOPY, INTRAVESICAL INJECTION N/A 2018    Procedure: CYSTOSCOPY, INTRAVESICAL INJECTION;  Cystoscopy And Botox Injection Into The Bladder  ;  Surgeon: Michael Mcnulty MD;  [facility]    INJECT BOTOX N/A 2017    Procedure: INJECT BOTOX;;  Surgeon: Michael Mcnulty MD;  [facility]    INJECT BOTOX N/A 10/12/2018    Procedure: INJECT BOTOX;;  Surgeon: Michael Mcnulty MD;  [facility]    ORCHIECTOMY SCROTAL Bilateral 2022    Procedure: Bilateral Simple Scrotal Orchiectomy;  Surgeon: Chilango Dominguez MD;  Location: [facility]    REMOVE INTRATHECAL PAIN PUMP N/A 2023    Procedure: Removal of intrathecal drug delivery system - removal of the intrathecal drug pump from the right abdomen and removal of the intrathecal catheter from the thoracolumbar spine;  Surgeon: Que Drew MD;  Location: [facility]      Allergies   Allergen Reactions    Dust  "Mites     Shellfish Allergy Nausea and Vomiting and Unknown     Patient states his mother has a \"violent reaction\" to shellfish, and that he has a feeling of nausea when he smells shellfish, so he has never consumed any. He states he has also not been tested.      Shellfish-Derived Products Other (See Comments)     Patient states her mother has a \"violent reaction\" to shellfish, and that she has a feeling of nausea when she smells shellfish, so she has never consumed any. She states she has also not been tested.  Patient states his mother has a \"violent reaction\" to shellfish, and that he has a feeling of nausea when he smells shellfish, so he has never consumed any. He states he has also not been tested.        Social History     Tobacco Use    Smoking status: Never     Passive exposure: Never    Smokeless tobacco: Never   Substance Use Topics    Alcohol use: No      Wt Readings from Last 1 Encounters:   04/25/24 80.7 kg (177 lb 14.6 oz)        Anesthesia Evaluation   Pt has had prior anesthetic. Type: General.    No history of anesthetic complications       ROS/MED HX  ENT/Pulmonary:     (+)                     Intermittent, asthma  Treatment: Inhaler prn,              (-) recent URI   Neurologic: Comment: Incomplete T10 spinal injury.  Not paralyzed.  +neurogenic bladder and spastic gait.  Can walk, but typically pushes a wheelchair to walk.      Unassisted, walking distance is very limited, but with pushing a wheelchair, she can walk long distances.      (+)                     Spinal cord injury, year sustained: 1993, level of injury: T10 incomplete,         Cardiovascular:  - neg cardiovascular ROS   (+)  - -   -  - -                                 Previous cardiac testing   Echo: Date: Results:    Stress Test:  Date: Results:    ECG Reviewed:  Date: 6/2022 Results:  Normal sinus rhythm   Right axis deviation   Pulmonary disease pattern   Prolonged QT   Abnormal ECG     Cath:  Date: Results:    " "  METS/Exercise Tolerance: >4 METS Comment: Is doing formal physical therapy currently - does weight training and rides a stationary bike.    Walks on a treadmill at home at 2mph for up to 30 minutes.      Denies any exertional dyspnea or angina.    Hematologic:  - neg hematologic  ROS     Musculoskeletal:  - neg musculoskeletal ROS     GI/Hepatic:  - neg GI/hepatic ROS     Renal/Genitourinary: Comment: Neurogenic bladder - self cath 4-6x per day.       Recurrent UTIs      Endo:  - neg endo ROS     Psychiatric/Substance Use:     (+) psychiatric history anxiety and depression (adjustment disorder)       Infectious Disease:  - neg infectious disease ROS     Malignancy:  - neg malignancy ROS     Other:  - neg other ROS          Physical Exam    Airway        Mallampati: I   TM distance: > 3 FB   Neck ROM: full   Mouth opening: > 3 cm    Respiratory Devices and Support         Dental       (+) Multiple visibly decayed, broken teeth      Cardiovascular          Rhythm and rate: regular and normal     Pulmonary           breath sounds clear to auscultation           OUTSIDE LABS:  CBC:   Lab Results   Component Value Date    WBC 5.5 04/10/2024    WBC 6.8 04/26/2023    HGB 13.5 04/10/2024    HGB 13.6 04/26/2023    HCT 38.3 04/10/2024    HCT 39.7 04/26/2023     04/10/2024     04/26/2023     BMP:   Lab Results   Component Value Date     04/10/2024     04/26/2023    POTASSIUM 3.7 04/10/2024    POTASSIUM 3.8 04/26/2023    CHLORIDE 103 04/10/2024    CHLORIDE 104 04/26/2023    CO2 27 04/10/2024    CO2 28 04/26/2023    BUN 7.5 04/10/2024    BUN 5.6 (L) 04/26/2023    CR 0.77 04/10/2024    CR 0.82 04/26/2023    GLC 95 04/10/2024    GLC 91 04/26/2023     COAGS:   Lab Results   Component Value Date    INR 1.0 05/19/2023     POC: No results found for: \"BGM\", \"HCG\", \"HCGS\"  HEPATIC:   Lab Results   Component Value Date    ALBUMIN 4.5 04/26/2023    PROTTOTAL 7.4 04/26/2023    ALT 10 04/26/2023    AST 15 " "04/26/2023    ALKPHOS 68 04/26/2023    BILITOTAL 0.6 04/26/2023     OTHER:   Lab Results   Component Value Date    CHERIE 9.3 04/10/2024       Anesthesia Plan    ASA Status:  2    NPO Status:  NPO Appropriate    Anesthesia Type: General.     - Airway: ETT   Induction: Intravenous.   Maintenance: Balanced.   Techniques and Equipment:     - Lines/Monitors: 2nd IV     Consents    Anesthesia Plan(s) and associated risks, benefits, and realistic alternatives discussed. Questions answered and patient/representative(s) expressed understanding.     - Discussed:     - Discussed with:  Patient      - Extended Intubation/Ventilatory Support Discussed: No.      - Patient is DNR/DNI Status: No     Use of blood products discussed: No .     Postoperative Care    Pain management: IV analgesics.   PONV prophylaxis: Ondansetron (or other 5HT-3), Dexamethasone or Solumedrol     Comments:               Yuli Ray MD    I have reviewed the pertinent notes and labs in the chart from the past 30 days and (re)examined the patient.  Any updates or changes from those notes are reflected in this note.              # Overweight: Estimated body mass index is 27.86 kg/m  as calculated from the following:    Height as of this encounter: 1.702 m (5' 7\").    Weight as of this encounter: 80.7 kg (177 lb 14.6 oz).      "

## 2024-04-25 NOTE — ANESTHESIA PROCEDURE NOTES
"TAP Procedure Note    Pre-Procedure   Staff -        Anesthesiologist:  Isac Gerard MD       Performed By: fellow       Location: pre-op       Procedure Start/Stop Times: 4/25/2024 7:15 AM and 4/25/2024 7:25 AM       Pre-Anesthestic Checklist: patient identified, IV checked, site marked, risks and benefits discussed, informed consent, monitors and equipment checked, pre-op evaluation, at physician/surgeon's request and post-op pain management  Timeout:       Correct Patient: Yes        Correct Procedure: Yes        Correct Site: Yes        Correct Position: Yes        Correct Laterality: Yes        Site Marked: Yes  Procedure Documentation  Procedure: TAP       Diagnosis: POST OPERATIVE PAIN CONTROL       Laterality: bilateral       Patient Position: supine       Patient Prep/Sterile Barriers: sterile gloves, mask       Skin prep: Chloraprep       Needle Type: insulated       Needle Gauge: 21.        Needle Length (Inches): 4        Ultrasound guided       1. Ultrasound was used to identify targeted nerve, plexus, vascular marker, or fascial plane and place a needle adjacent to it in real-time.       2. Ultrasound was used to visualize the spread of anesthetic in close proximity to the above referenced structure.       3. A permanent image is entered into the patient's record.    Assessment/Narrative         The placement was negative for: blood aspirated, painful injection and site bleeding       Paresthesias: No.       Bolus given via needle..        Secured via.        Insertion/Infusion Method: Single Shot    Medication(s) Administered   Bupivacaine 0.25% PF (Infiltration) - Infiltration   20 mL - 4/25/2024 7:15:00 AM  Bupivacaine liposome (Exparel) 1.3% LA inj susp (Infiltration) - Infiltration   20 mL - 4/25/2024 7:15:00 AM  Medication Administration Time: 4/25/2024 7:15 AM      FOR [facility] ([facility]) ONLY:   Pain Team Contact information: please page the Pain Team Via [employer]. Search \"Pain\". During daytime " hours, please page the attending first. At night please page the resident first.

## 2024-04-25 NOTE — ANESTHESIA CARE TRANSFER NOTE
Patient: Hannah Chavez    Procedure: Procedure(s):  Robotic assisted Full Depth Vaginoplasty  Cystoscopy with Botox Injection to Bladder  ROBOT ASSISTED COLOSTOMY CLOSURE, takedown of splenic flexure.  FLEXIBLE SIGMOIDOSCOPY       Diagnosis: Gender dysphoria [F64.9]  Diagnosis Additional Information: No value filed.    Anesthesia Type:   General     Note:    Oropharynx: oropharynx clear of all foreign objects and spontaneously breathing  Level of Consciousness: drowsy  Oxygen Supplementation: face mask  Level of Supplemental Oxygen (L/min / FiO2): 6  Independent Airway: airway patency satisfactory and stable  Dentition: dentition unchanged  Vital Signs Stable: post-procedure vital signs reviewed and stable  Report to RN Given: handoff report given  Patient transferred to: PACU    Handoff Report: Identifed the Patient, Identified the Reponsible Provider, Reviewed the pertinent medical history, Discussed the surgical course, Reviewed Intra-OP anesthesia mangement and issues during anesthesia, Set expectations for post-procedure period and Allowed opportunity for questions and acknowledgement of understanding  Vitals:  Vitals Value Taken Time   /65 04/25/24 1645   Temp     Pulse 94 04/25/24 1647   Resp 14 04/25/24 1647   SpO2 100 % 04/25/24 1647   Vitals shown include unfiled device data.    Electronically Signed By: AUTUMN Lujan CRNA  April 25, 2024  4:47 PM

## 2024-04-25 NOTE — LETTER
Transition Communication Hand-off for Care Transitions to Next Level of Care Provider    Name: Hannah Chavez  : 1990  MRN #: [id number]  Primary Care Provider: Hayden Cerda     Primary Clinic: [id number] [place] [id number]     Reason for Hospitalization:  Gender dysphoria [F64.9]  Admit Date/Time: 2024  5:33 AM  Discharge Date: 24    Payor Source: Payor: [facility] / [facility] / Product Type: HMO /          Reason for Communication Hand-off Referral: Other inpatient to outpatient    Discharge Plan:  Follow-up Outpatietn  Concern for non-adherence with plan of care: No   Discharge Needs Assessment:  Needs      Flowsheet Row Most Recent Value   Equipment Currently Used at Home walker, standard   # of Referrals Placed by CM External Care Coordination          Follow-up specialty is recommended:     Follow-up plan:    Future Appointments   Date Time Provider Department Center   2024  7:00 PM Kalyin Mcnulty OTR [facility]   2024  8:15 AM Chilango Dominguez MD [facility]   2024 10:45 AM Freida Chen APRN [facility]   2024 12:30 PM Jenna Gallagher, PT [facility]   2024  1:10 PM Jenna Gallagher, PT [facility]   2024  2:20 PM Chilango Dominguez MD [facility]   2024  1:50 PM Jenna Gallagher, PT [facility]   2024  9:30 AM Amadou Anna MD [place]   2024 10:30 AM Chilango Dominguez MD [facility]       Any outstanding tests or procedures:            Supplies       Future Labs/Procedures    Miscellaneous DME Order     Process Instructions:    Please limit the use of the Miscellaneous Order as a replacement for already existing DME orders. Please use an existing DME order when ever possible.      Comments:    DME Documentation:   Describe the reason for need to support medical necessity: s/p abdominal and pelvic surgery    I, the undersigned, certify that  the above prescribed supplies are medically necessary for this patient and is both reasonable and necessary in reference to accepted standards of medical and necessary in reference to accepted standards of medical practice in the treatment of this patient's condition and is not prescribed as a convenience.    Reacher Order     Comments:    Access Ramp Documentation:   Describe the reason for need: s/p abdominal and pelvic surgery    I, the undersigned, certify that the above prescribed supplies are medically necessary for this patient and is both reasonable and necessary in reference to accepted standards of medical and necessary in reference to accepted standards of medical practice in the treatment of this patient's condition and is not prescribed as a convenience.    Walker Order for DME - ONLY FOR DME             [facility] Recommendations:    Please schedule post-hospital visit with PCP.     Carissa Sommers    AVS/Discharge Summary is the source of truth; this is a helpful guide for improved communication of patient story

## 2024-04-25 NOTE — OP NOTE
OPERATIVE NOTE    2024    PREOPERATIVE DIAGNOSIS:  Gender Dysphoria  Colostomy Status after sigmoid perforation  Neurogenic Bladder    POSTOPERATIVE DIAGNOSIS:  Gender Dysphoria  Colostomy Status after sigmoid perforation  Neurogenic Bladder      Robotic Assisted Laparoscopic Penile inversion vaginoplasty, which includes the followin. Robotic Assisted Laparoscopic Creation of Artificial Vagina with Graft (penile skin flap + scrotal full thickness grafts)  2. Total Penectomy  3. Urethroplasty  4. Robotic Assisted Laparoscopy, surgical, colpopexy (suspension of vaginal apex)  5. Scrotectomy  6. Bilateral labiaplasty via adjacent tissue transfer of scrotal and mons skin flaps, size 12 x 5 cm on left and 12 x 5 cm on right.    7. Clitoroplasty  8. Cystoscopy with chemodenervation of bladder (200u)  9. Colostomy Takedown by Dr. Anna      SURGEON: Chilango Dominguez MD  FELLOW: Brook Yoon MD  RESIDENT: Juventino Sagastume MD    ANESTHESIA: General  EBL: 150 mL  DRAINS: 16 Fr Evangelista and 15 Fr vaginal drain x 2, Vaginal Stent. DINORA drain through abdomen  SPECIMENS: Penis, Urethra     INDICATIONS:   Hannah Chavez is a 33 year old year old transgender female with gender dysphoria. She completed all appropriate preoperative workup prior to surgery today. After a discussion of all risks, benefits, and alternatives, the patient elected to proceed with the above listed procedures. We discussed concomitant colostomy takedown. Perhaps diverting loop.She understands the unique nature of colostomy takedown in the setting of vaginoplasty with risks of neovaginal fistula. However, she is willing to accept this increased risk.      DESCRIPTION OF PROCEDURE: After informed consent was obtained and preoperative antibiotics were given, the patient was taken to the operating room, placed supine on the operating table. SCDs and preoperative subcutaneous heparin were utilized for VTE prophylaxis. General anesthesia was induced  and she was intubated. The patient was placed in lithotomy. The genitalia and lower abdomen were prepped and draped in a sterile fashion. A timeout was performed.     A cystoscopy was performed with flexible cystoscope. Urethra relatively narrowed throughout but scoped passed. Bladder without lesions masses or stones but cloudy and with minimal debris. 200U botox was injected in a templated fashion throughout the bladder. Minimal bleeding. Bladder was emptied and instrumentation was removed. A 16Fr catheter was then placed.     Of note, the procedure was made difficult by the patient's limited hip range of motion (they wouldn't adduct).    During the next portion of the procedure, Dr. Anna performed colostomy takedown and laparoscopic splenic flexure colon mobilization and lysis of adhesions.   Please see colorectal surgery note for details regarding entry into the abdomen, colostomy takedown and robotic port site placement.     We opted for an early excision approach to scrotectomy. A butterfly shaped incision was made over the scrotum to excise extra skin.  We then thinned the full thickness scrotal skin grafts on the back table. The scrotum was removed in entirety using electrocautery.    The scrotal graft was formed over a large dilator into a conical vaginal construct with 3-0 Vicryl suture.    We then made a circumcising skin incision with care to leave a mucosal collar beneath the glans. This was dissected down to Corrales's fascia. The penis was degloved, which left a circumferential penile skin tube. We then turned our attention to the glans in order to perform a clitoroplasty. A W-shaped incision was marked along the glans to create the clitoris. Bovie cautery was used to delineate the lateral borders of the neurovascular bundle along the penile shaft. A combination of blunt and sharp dissection with tenotomy scissors was used to dissect the penile glans and its neurovascular bundle. Dissection was  performed along the under surface of the tunica albuginea - freeing the corporal spongy tissue. This ensured that the neurovascular bundle was left unharmed. We shaped and sutured the neoclitoris in a narrow tubular stricture and reshaping the mucosal collar to form a clitoral daniel.    The anterior urethra was freed from the corporal bodies. Circumferential mobilization was performed to the distal bulbar urethra. This was done using sharp dissection. This  the erectile tissue from the urethra completely.      We then dissected the cavernosal bodies to the level of the pubic symphysis and  then amputated the ventral corporal bodies bilaterally at this level and passed the specimen off to pathology. The stumps were closed with running PDS suture.    The neoclitoris was sewn into its appropriate midline location in Vicryl suture.      We then performed further dissection between the urethra and the bulbospongiosus muscles. The muscle was removed with electrocautery and passed off the field.     The urethra was transected at the level of the distal bulbar urethra. The urethra was spatulated for about 3cm ventrally, allowing the dorsal aspect to be sewn to the neoclitoral and clitoral daniel skin dorsally using 3-0 Vicryl interrupted suture - thus creating a mucosal, lubricated inner vulvar vestibule.  The urethral spatulation ended at the mid bulbar urethra. The edges of the urethrotomy was oversewn with Vicryl to decrease bleeding and yanet the mucosa.  We left the urethral meatus somewhat high to improve the ability for patient to self catherize.     We then turned our attention to creating a midline opening superior to the penile skin tube. This midline opening would later become the opening for the clitoris and urethra. The scrotal graft was approximated into a tube over a dilator and then sewn to the penile skin tube over the same dilator with interrupted vicryl suture.     The wound was then irrigated with  hemostasis obtained. The penile skin tube was then sewn to the scrotal skin graft over the Purple vaginal dilator.        Using port sites previously placed by the colorectal team, a peritoneal incision was made in the posterior cul de sac. Vasa and seminal vesicles were encountered, and we dissected beneath these on Denonvillier's fascia. We traversed as distal as possible with the prostate up and the rectum down. This was noted to be extremely scarred with poor tissue planes and essentially no ability to separate Denonvillier's from the rectum.     We then incised inferiorly to the perineal body. Midline dissection was used to identify the corpus spongiosum and the bulbospongiosus muscle. Muscle was left in place during this portion. We traveled inferior to bulbospongiosus muscle. We transected the central tendon. Dissection then continued to develop a space between the urethra and prostate anteriorly and the rectum posteriorly from the perineum. This would ultimately become a space for the neovagina. We performed the peritoneal and perineal dissections simultaneously until the two planes met      A vicryl stitch was used to tyron 12 o'clock on the vaginal construct. .The vaginal construct with graft was then inverted into the vaginal cavity and visualized with the robot. The apex of the graft was tagged with a suture to aid in identification robotically.    Using 3-0 V Loc, we sutured the skin graft to the peritoneal edge across the midline to close the peritoneal opening as well as suspend the vaginal construct/canal internally to suspend it internally to prevent prolapse. This completed the colpopexy.      The ventral urethra was then sutured to the posterior aspect of the superiorly placed opening to complete the urethroplasty and clitoral hooding using Vicryl suture. There was excellent urethral mucosal eversion with pink mucosa evident.     A drain was placed underneath each labia majora for two drains total.  Drain sutures were placed. We designed the labia majora using anteriorly perfused scrotal and mons skin. These labial flaps were 12x5cm per side. These were fashioned to create labia majora. These were sutured into place using 3-0 Vicryl and Monocryl sutures. The inferior aspect of the penile skin tube was attached to the inferior aspect of the perineal incision to complete the closure.       The skin was closed with 3-0 Monocryl.    We added additional labia minora folds using 3-0 PDS sutures.    A vaginal stent (4x13cm) was placed and filled with air under direct vision using the robotic camera. The robotic ports were closed using 4-0 monocryl suture and covered with skin glue.    At this point colorectal returned to perform the rest of their portion of the case, please see their operative report for details. Essentially they performed the colorectal anastomosis intraperitoneally using an end to end transanal stapler/anvil. They left an intraperitoneal drain and tested anastomosis with sigmoidoscopy.     All counts were correct at the end of the case.  The clitoris was pink and viable at the end of the case.  The Evangelista was intact and draining clear urine.  A pressure dressing was applied and sutured in place using Prolene sutures.  The patient was then extubated, awakened, and transferred to the postop area in stable condition.      As attending surgeon, TORI, Chilango Dominguez MD, was present for all critical portions of the case and all urologic portion of the case. TORI was immediately available for entire case.

## 2024-04-25 NOTE — OP NOTE
PREOPERATIVE DIAGNOSIS:  1.  Colostomy status.  2.  History of sigmoid perforation status post end sigmoid colostomy.  3.  Gender dysphoria.  4.  History of T10 spinal cord injury with neurogenic bladder.    PROCEDURE:  Laparoscopic lysis of adhesions (45 minutes).    Laparoscopic robotic assisted colostomy takedown.  Laparoscopic robotic assisted takedown of splenic flexure.  Flexible sigmoidoscopy.  Intraoperative ICG microangiography.    Robotic assisted gender affirmation surgery (per Dr. Dominguez).    ANESTHESIA: General endotracheal anesthesia plus bilateral TAP blocks.      SURGEON:  Amadou Anna M.D.    ASSISTANTS: Xiomy Brown MD (gynecology oncology fellow), and HELENA Salguero (in addition to a fellow, a midlevel provider was required for this complex operation for additional assistance with bedside assistance, hemostasis, laparoscopic suctioning, and exposure).    General risks related to abdominal surgery were reviewed with the patient. These include, but are not limited to, death, myocardial infarction, pneumonia, urinary tract infection, deep venous thrombosis with or without pulmonary embolus, abdominal infection from bowel injury or abscess, fistula, anastomotic leak that may require reoperation and stoma, ureteral injury, urinary dysfunction, sexual dysfunction, bowel obstruction, wound infection, and bleeding.    OPERATIVE PROCEDURE: After obtaining form consent, patient brought to the operating room placed in the modified lithotomy position.  General endotracheal anesthesia was gently dosed without difficulty.  The patient underwent preoperative placement of bilateral tap blocks.  She also received appropriate preoperative antibiotic prophylaxis as well as mechanical and chemical DVT prophylaxis.  Bilateral lower extremity pneumatic compression devices were applied and all pressure points were cushioned.  The abdomen and perineum were prepped and draped in the standard sterile  fashion.  A timeout was performed.  Dr. Dominguez performed cystoscopy with Botox injection initially.  Please see his operative report for full details.  A 12 mm supraumbilical incision was placed and the peritoneal cavity was entered under direct visualization.  There was some omental adhesions given that she had a previous lower midline incision and these were taken down bluntly.  A balloontipped 12 mm trocar was placed and pneumoperitoneum was established up to 15 mmHg without difficulty.  The peritoneal cavity was then assessed with a 10 mm 30 degree angle robotic laparoscope and there was no evidence of organ injury or bleeding.  Additional trocars were placed at the left abdomen (8 mm x 2); and the right abdomen (8 mm AirSeal trocar x 1 and 12 mm robotic trocar x 1).  These were all placed under direct visualization without difficulty.  We then performed laparoscopic adhesiolysis for approximately 45 minutes given that there was quite a bit of omental adhesions at the lower midline.  We then dissected the left-sided colostomy free of all of its attachments.  The mesentery was transected with a vessel sealing device.  I subsequently divided the colon flush with the abdominal wall with an Endo KARL stapler (60 mm-blue load).  The patient was then positioned and all the small bowel was retracted to the right upper abdomen.  The greater omentum was dissected off of the descending and sigmoid colon.  The entire descending colon was mobilized using a lateral to medial approach.  The left ureter was clearly identified and care was taken to not injure the structure during the dissection.  After this, the entire splenic flexure was taken down using a vessel sealing device.  After this, the da Quan Xi robot was docked and all robotic instruments were placed under direct visualization.  Dr. Dominguez performed vaginoplasty.  Please see his operative report for full details.  The rectal stump was identified and this was  dissected free circumferentially all the way down to the mid rectum.  I did have to divide the superior rectal artery with a robotic vessel sealing device to achieve full mobility.  The mesentery of the previous staple line was dissected free and I restapled the rectal stump for approximately 2 cm in order to freshen up the rectal stump.  This was performed with a robotic stapler (60 mm-blue load), with 1 staple load.  I also took down some additional retroperitoneal attachments of the descending colon.  The descending colon had adequate length for a tension-free coloproctostomy.  The anvil of a 29 mm EEA stapler was then brought into the peritoneal cavity.  The previous staple line on the descending colon was excised and the anvil was delivered at the antimesenteric aspect of the descending colon.  The descending colon was restapled with a robotic stapler (60 mm-blue load x 1).  We did give indocyanine green to assess the microcirculation of the colon conduit and this appeared to be excellent as well as the blood supply to the rectal stump.  The previous descending colon staple line as well as the rectal stump staple line were removed from the peritoneal cavity and sent for permanent pathology.  A flexible sigmoidoscopy was then performed with a pediatric colonoscope and all of the inspissated mucus in the rectal stump was irrigated out with dilute Betadine solution.  The rectal stump was then leak tested and this was negative.  The rectal stump was gently dilated with EEA sizers.  A 29 mm EEA stapler was then passed transanally all the way up to the staple line.  The post was delivered at one of the corners to only have 1 intersecting staple line.  The anvil was then connected to the post and the post was retrieved.  There was absolutely no evidence of tension, torsion, or inadequate blood supply to the colorectal anastomosis.  The EEA stapler was then fired and retrieved.  Both anastomotic rings appeared intact.   Flexible sigmoidoscopy was repeated and the leak test was negative.  The colonoscope was then removed.  Hemostasis was corroborated.  A 19 [place] Francisco drain was left in the pelvis and brought up through the left abdomen at a previous 8 mm robotic trocar site.  This was fixed in place with a 2-0 nylon suture.  Hemostasis was corroborated.  Instrument, sponge, needle counts were all correct as reported to me.  The peritoneal cavity was irrigated with approximately 500 mL of warm sterile water and subsequently suctioned out.  The da Quan Xi robot was undocked and all robotic instruments were removed under direct visualization.  The previous colostomy was excised using monopolar cautery and this was sent for permanent pathology.  The right lower quadrant 12 mm trocar site was closed at the fascial level with a PMI device using 0 Vicryl suture in a figure-of-eight fashion.  The supraumbilical incision was closed at the fascial level with 2 figure-of-eight 0 Vicryl sutures.  The previous colostomy site was closed at the fascial level with multiple figure-of-eight 0 PDS sutures.  All incisions were irrigated with dilute peroxide solution.  The previous colostomy site was loosely reapproximated with a 2-0 Vicryl pursestring suture.  A three-quarter inch Penrose drain was left at this incision and secured with a 2-0 nylon suture.  Skin incisions were reapproximated with running 4-0 Monocryl subcuticular sutures and Dermabond Prineo.  Sterile dressings were applied at the previous colostomy site as well as the drain.  The patient tolerated procedure well.  The Evangelista catheter was left in place.    COMPLICATIONS: none, immediately.    ESTIMATED BLOOD LOSS: 500 mL.    REPLACEMENT:     - Crystalloid: 3000 mL.     - Colloid: 0 mL.     - Blood products: None.    SPECIMEN(S): Previous rectal staple line and colostomy.    DRAINS/TUBES: Evangelista catheter and 19 [place] Francisco drain in the pelvis.  Additional peritoneal drains per  urology.    OPERATIVE FINDINGS: Stapled side to end colorectal anastomosis at approximately 15 cm from the anal verge with negative leak test.  Intact anastomotic rings.    DISPOSITION: PACU.    This procedure was not performed to treat colon cancer through resection    Amadou Anna M.D., M.Sc.    Division of Colon & Rectal Surgery  [facility]  [phone] (p)  [phone] (o)    CC:  [facility] Surgery NOAH Griffiths PA Joseph Pariser, MD Lysne, Paul J

## 2024-04-25 NOTE — BRIEF OP NOTE
[facility]    Brief Operative Note    Pre-operative diagnosis: Gender dysphoria [F64.9]  Post-operative diagnosis Same as pre-operative diagnosis    Procedure: Robotic assisted Full Depth Vaginoplasty, N/A - Abdomen  Cystoscopy with Botox Injection to Bladder, N/A - Urethra  ROBOT ASSISTED COLOSTOMY CLOSURE, takedown of splenic flexure. Revision of abdominal scar, N/A - Pelvis  FLEXIBLE SIGMOIDOSCOPY, N/A - Anus    Surgeon: Surgeons and Role:  Panel 1:     * Chilango Dominguez MD - Primary     * Brook Yoon MD - Fellow - Assisting  Panel 2:     * Amadou Anna MD - Primary  Anesthesia: General with Block   Estimated Blood Loss: 1L    Drains: 2x Crow-Ramirez and 16Fr contreras  Specimens:   ID Type Source Tests Collected by Time Destination   1 : penis and urethra Tissue Penis SURGICAL PATHOLOGY EXAM Chilango Dominguez MD 4/25/2024 10:08 AM    2 : Colostomy Tissue Large Intestine, Colon SURGICAL PATHOLOGY EXAM Chilango Dominguez MD 4/25/2024  3:01 PM    3 : Rectal stump Tissue Rectum SURGICAL PATHOLOGY EXAM Chilango Dominguez MD 4/25/2024  3:05 PM      Findings:   None.  Complications: None.  Implants:   Implant Name Type Inv. Item Serial No.  Lot No. LRB No. Used Action   STENT VAGINAL CYLINDRICAL 4X13CM 157CC FILL 3321-13 - [id number] Other STENT VAGINAL CYLINDRICAL 4X13CM 157CC FILL 3321-13  [employer]  N/A 1 Implanted       - Contreras to remain for 2 weeks  - Antibiotics while bolster in place (we typically do ancef x5 days)  - Vaginal bolster x5 days  - Schedule tolterodone LA 4mg    Juventino Sagastume MD  Urology PGY-4

## 2024-04-25 NOTE — OR NURSING
Temp:  [98.9  F (37.2  C)] 98.9  F (37.2  C)  Pulse:  [88-92] 89  Resp:  [13-20] 20  BP: (111-140)/(75-97) 129/75  SpO2:  [98 %-99 %] 99 %    Bilateral TAP block performed without complications, 1mg versed, 50mcg fentanyl given.  NSR, VSS, 2L O2 via NC.  Pt tolerated well.  Will continue to monitor.

## 2024-04-26 ENCOUNTER — APPOINTMENT (OUTPATIENT)
Dept: PHYSICAL THERAPY | Facility: CLINIC | Age: 34
DRG: 876 | End: 2024-04-26
Attending: COLON & RECTAL SURGERY
Payer: COMMERCIAL

## 2024-04-26 LAB
ANION GAP SERPL CALCULATED.3IONS-SCNC: 10 MMOL/L (ref 7–15)
BLD PROD TYP BPU: NORMAL
BLOOD COMPONENT TYPE: NORMAL
BUN SERPL-MCNC: 11.4 MG/DL (ref 6–20)
CALCIUM SERPL-MCNC: 8.8 MG/DL (ref 8.6–10)
CHLORIDE SERPL-SCNC: 105 MMOL/L (ref 98–107)
CODING SYSTEM: NORMAL
CREAT SERPL-MCNC: 0.95 MG/DL (ref 0.51–1.17)
CROSSMATCH: NORMAL
DEPRECATED HCO3 PLAS-SCNC: 21 MMOL/L (ref 22–29)
EGFRCR SERPLBLD CKD-EPI 2021: 82 ML/MIN/1.73M2
GLUCOSE SERPL-MCNC: 126 MG/DL (ref 70–99)
HGB BLD-MCNC: 8.1 G/DL (ref 13.3–17.7)
HGB BLD-MCNC: 8.1 G/DL (ref 13.3–17.7)
ISSUE DATE AND TIME: NORMAL
MAGNESIUM SERPL-MCNC: 1.9 MG/DL (ref 1.7–2.3)
PHOSPHATE SERPL-MCNC: 2 MG/DL (ref 2.5–4.5)
POTASSIUM SERPL-SCNC: 4.1 MMOL/L (ref 3.4–5.3)
SODIUM SERPL-SCNC: 136 MMOL/L (ref 135–145)
UNIT ABO/RH: NORMAL
UNIT NUMBER: NORMAL
UNIT STATUS: NORMAL
UNIT TYPE ISBT: 7300

## 2024-04-26 PROCEDURE — P9016 RBC LEUKOCYTES REDUCED: HCPCS | Performed by: PHYSICIAN ASSISTANT

## 2024-04-26 PROCEDURE — 80048 BASIC METABOLIC PNL TOTAL CA: CPT | Performed by: COLON & RECTAL SURGERY

## 2024-04-26 PROCEDURE — 258N000003 HC RX IP 258 OP 636: Performed by: COLON & RECTAL SURGERY

## 2024-04-26 PROCEDURE — 250N000011 HC RX IP 250 OP 636: Performed by: COLON & RECTAL SURGERY

## 2024-04-26 PROCEDURE — 85018 HEMOGLOBIN: CPT | Performed by: COLON & RECTAL SURGERY

## 2024-04-26 PROCEDURE — 36415 COLL VENOUS BLD VENIPUNCTURE: CPT | Performed by: COLON & RECTAL SURGERY

## 2024-04-26 PROCEDURE — 250N000013 HC RX MED GY IP 250 OP 250 PS 637: Performed by: COLON & RECTAL SURGERY

## 2024-04-26 PROCEDURE — 97161 PT EVAL LOW COMPLEX 20 MIN: CPT | Mod: GP

## 2024-04-26 PROCEDURE — 250N000009 HC RX 250: Performed by: COLON & RECTAL SURGERY

## 2024-04-26 PROCEDURE — 84100 ASSAY OF PHOSPHORUS: CPT | Performed by: COLON & RECTAL SURGERY

## 2024-04-26 PROCEDURE — 120N000002 HC R&B MED SURG/OB UMMC

## 2024-04-26 PROCEDURE — 83735 ASSAY OF MAGNESIUM: CPT | Performed by: COLON & RECTAL SURGERY

## 2024-04-26 RX ORDER — ACETAMINOPHEN 500 MG
1000 TABLET ORAL 4 TIMES DAILY
Status: DISCONTINUED | OUTPATIENT
Start: 2024-04-26 | End: 2024-05-01 | Stop reason: HOSPADM

## 2024-04-26 RX ORDER — CALCIUM GLUCONATE 20 MG/ML
2 INJECTION, SOLUTION INTRAVENOUS ONCE
Status: COMPLETED | OUTPATIENT
Start: 2024-04-26 | End: 2024-04-26

## 2024-04-26 RX ORDER — ALBUTEROL SULFATE 90 UG/1
1-2 AEROSOL, METERED RESPIRATORY (INHALATION) EVERY 6 HOURS PRN
Status: DISCONTINUED | OUTPATIENT
Start: 2024-04-26 | End: 2024-05-01 | Stop reason: HOSPADM

## 2024-04-26 RX ORDER — QUETIAPINE FUMARATE 25 MG/1
25 TABLET, FILM COATED ORAL 2 TIMES DAILY PRN
Status: DISCONTINUED | OUTPATIENT
Start: 2024-04-26 | End: 2024-05-01 | Stop reason: HOSPADM

## 2024-04-26 RX ORDER — POTASSIUM PHOS IN 0.9 % NACL 15MMOL/250
15 PLASTIC BAG, INJECTION (ML) INTRAVENOUS ONCE
Qty: 250 ML | Refills: 0 | Status: COMPLETED | OUTPATIENT
Start: 2024-04-26 | End: 2024-04-26

## 2024-04-26 RX ORDER — METHOCARBAMOL 100 MG/ML
1000 INJECTION, SOLUTION INTRAMUSCULAR; INTRAVENOUS EVERY 6 HOURS
Status: COMPLETED | OUTPATIENT
Start: 2024-04-26 | End: 2024-04-26

## 2024-04-26 RX ORDER — MIRABEGRON 25 MG/1
25 TABLET, FILM COATED, EXTENDED RELEASE ORAL DAILY
Status: DISCONTINUED | OUTPATIENT
Start: 2024-04-26 | End: 2024-04-26

## 2024-04-26 RX ADMIN — OXYCODONE HYDROCHLORIDE 10 MG: 10 TABLET ORAL at 06:01

## 2024-04-26 RX ADMIN — PROCHLORPERAZINE EDISYLATE 10 MG: 5 INJECTION INTRAMUSCULAR; INTRAVENOUS at 15:58

## 2024-04-26 RX ADMIN — PROCHLORPERAZINE EDISYLATE 10 MG: 5 INJECTION INTRAMUSCULAR; INTRAVENOUS at 01:09

## 2024-04-26 RX ADMIN — LORAZEPAM 0.5 MG: 2 INJECTION, SOLUTION INTRAMUSCULAR; INTRAVENOUS at 01:59

## 2024-04-26 RX ADMIN — ACETAMINOPHEN 1000 MG: 500 TABLET ORAL at 16:15

## 2024-04-26 RX ADMIN — OXYCODONE HYDROCHLORIDE 10 MG: 10 TABLET ORAL at 18:09

## 2024-04-26 RX ADMIN — CALCIUM GLUCONATE 2 G: 20 INJECTION, SOLUTION INTRAVENOUS at 00:06

## 2024-04-26 RX ADMIN — ACETAMINOPHEN 1000 MG: 500 TABLET ORAL at 08:51

## 2024-04-26 RX ADMIN — OXYCODONE HYDROCHLORIDE 10 MG: 10 TABLET ORAL at 11:56

## 2024-04-26 RX ADMIN — ACETAMINOPHEN 1000 MG: 500 TABLET ORAL at 11:56

## 2024-04-26 RX ADMIN — OXYCODONE HYDROCHLORIDE 10 MG: 10 TABLET ORAL at 22:45

## 2024-04-26 RX ADMIN — QUETIAPINE FUMARATE 25 MG: 25 TABLET ORAL at 00:05

## 2024-04-26 RX ADMIN — SODIUM CHLORIDE, POTASSIUM CHLORIDE, SODIUM LACTATE AND CALCIUM CHLORIDE: 600; 310; 30; 20 INJECTION, SOLUTION INTRAVENOUS at 16:31

## 2024-04-26 RX ADMIN — PROGESTERONE 200 MG: 100 CAPSULE ORAL at 08:51

## 2024-04-26 RX ADMIN — QUETIAPINE FUMARATE 25 MG: 25 TABLET ORAL at 08:51

## 2024-04-26 RX ADMIN — ACETAMINOPHEN 1000 MG: 500 TABLET ORAL at 19:56

## 2024-04-26 RX ADMIN — SODIUM CHLORIDE, POTASSIUM CHLORIDE, SODIUM LACTATE AND CALCIUM CHLORIDE: 600; 310; 30; 20 INJECTION, SOLUTION INTRAVENOUS at 04:30

## 2024-04-26 RX ADMIN — METHOCARBAMOL 1000 MG: 100 INJECTION, SOLUTION INTRAMUSCULAR; INTRAVENOUS at 09:53

## 2024-04-26 RX ADMIN — CALCIUM GLUCONATE 2 G: 20 INJECTION, SOLUTION INTRAVENOUS at 12:37

## 2024-04-26 RX ADMIN — METHOCARBAMOL 1000 MG: 100 INJECTION, SOLUTION INTRAMUSCULAR; INTRAVENOUS at 16:01

## 2024-04-26 RX ADMIN — KETOROLAC TROMETHAMINE 15 MG: 15 INJECTION, SOLUTION INTRAMUSCULAR; INTRAVENOUS at 00:03

## 2024-04-26 RX ADMIN — ERTAPENEM SODIUM 1 G: 1 INJECTION, POWDER, LYOPHILIZED, FOR SOLUTION INTRAMUSCULAR; INTRAVENOUS at 08:49

## 2024-04-26 RX ADMIN — POTASSIUM PHOSPHATE, MONOBASIC POTASSIUM PHOSPHATE, DIBASIC 15 MMOL: 224; 236 INJECTION, SOLUTION, CONCENTRATE INTRAVENOUS at 16:29

## 2024-04-26 RX ADMIN — METHOCARBAMOL 1000 MG: 100 INJECTION, SOLUTION INTRAMUSCULAR; INTRAVENOUS at 21:04

## 2024-04-26 ASSESSMENT — ACTIVITIES OF DAILY LIVING (ADL)
ADLS_ACUITY_SCORE: 31
ADLS_ACUITY_SCORE: 25
ADLS_ACUITY_SCORE: 32
ADLS_ACUITY_SCORE: 31
ADLS_ACUITY_SCORE: 32
ADLS_ACUITY_SCORE: 33
ADLS_ACUITY_SCORE: 33
ADLS_ACUITY_SCORE: 31
ADLS_ACUITY_SCORE: 31
ADLS_ACUITY_SCORE: 25
ADLS_ACUITY_SCORE: 31
ADLS_ACUITY_SCORE: 33
ADLS_ACUITY_SCORE: 32
ADLS_ACUITY_SCORE: 31
ADLS_ACUITY_SCORE: 33
ADLS_ACUITY_SCORE: 31
ADLS_ACUITY_SCORE: 31
ADLS_ACUITY_SCORE: 33
ADLS_ACUITY_SCORE: 31

## 2024-04-26 NOTE — PROGRESS NOTES
"Urology  Progress Note    NAEO  Pain moderately managed    Exam  /66   Pulse 89   Temp 97.8  F (36.6  C) (Oral)   Resp 18   Ht 1.702 m (5' 7\")   Wt 80.7 kg (177 lb 14.6 oz)   SpO2 97%   BMI 27.86 kg/m    No acute distress  Unlabored breathing  Abdomen soft,  appropriately tender, nondistended. Incisions CDI, bolster in place  DINORA serosanguinous  Contreras with jeremías urine in tubing      /140  Emesis -/600  DINORA   L abd 50/0  L labial 20/-  R labial 25/-    Labs  Recent Labs   Lab Test 04/25/24  2148 04/25/24  1659 04/10/24  1444 04/26/23  1640   WBC  --  17.9* 5.5 6.8   HGB 8.8* 8.5* 13.5 13.6   CR  --   --  0.77 0.82            Assessment/Plan  33 year old adult with PMH of gender dysphoria, 1 Day Post-Op s/p full depth vaginoplasty and colostomy takedown. Currently she has significant back pain and abdominal muscle cramping/spasms. She has had bladder spasms in the past, but her current symptoms she states are not similar to bladder spasms, and she did have botox placed intraoperatively. At this time likely does not require medication for bladder spasms, but given concurrent colorectal surgery, if concern arose for bladder spasms would recommend mirabegron. Otherwise, remaining excellent cares per primary team.    - Continue contreras catheter for 2 weeks  - No indication for mirabegron at this time - consider if patient were to develop symptoms consistent with bladder spasms  - Bolster in place until reveal  - Ertapenem x5 days while bolster in place  - Urology will follow closely    Seen and examined with the chief resident. Will discuss with Dr. Dominguez.    Stew Campuzano MD  Urology Resident    Addended by Fredo Waite MD  Urology Resident, PGY-5     Contacting the Urology Team     Please use the following job codes to reach the Urology Team. Note that you must use an in house phone and that job codes cannot receive text pages.   On weekdays, dial [phone] (or star-star-star [phone] on the new [employer] " telephones) then [phone] to reach the Adult Urology resident or PA on call  On weekdays, dial [phone] (or star-star-star 777 on the new [employer] telephones) then [phone] to reach the Pediatric Urology resident  On weeknights and weekends, dial [phone] (or star-star-star 777 on the new [employer] telephones) then [phone] to reach the Urology resident on call (for both Adult and Pediatrics)

## 2024-04-26 NOTE — PROGRESS NOTES
"CLINICAL NUTRITION SERVICES    Received provider order consult - \"Low residue diet teaching \".  Per chart, pt NPO still.  Pt with emesis and uncontrolled pain overnight.  Not appropriate for diet education at this time. RD will attempt back for diet education at later time as appropriate.  Per interdisciplinary rounds, pt not expected to discharge over weekend. If RD needed over weekend, please re-consult.    RD will follow per protocol    Kayla Jimenes RD, , LD  Weekday Units covered: 5A (non-Heme Onc pts) and 7B ([phone])  Available by 5A or 7B Clinical Dietitijyotsna Giron  Weekend Coverage: Weekend Clinical Dietitian Mack    Clinical Dietitians no longer available via paging   "

## 2024-04-26 NOTE — PHARMACY-ADMISSION MEDICATION HISTORY
Pharmacist Admission Medication History    Admission medication history is complete. The information provided in this note is only as accurate as the sources available at the time of the update.    Information Source(s): Patient via phone    Pertinent Information: Patient usually takes quetiapine just as needed, but currently ordered scheduled twice per day. Patient is aware of this and OK with it.    Changes made to PTA medication list:  Added: None  Deleted: All the pre-op meds (neomycin, metronidazole, Bactrim, ondansetron, Miralax)  Changed: None    Allergies reviewed with patient and updates made in EHR: yes    Medication History Completed By: Andy J. Kurtzweil, [facility] 4/25/2024 8:32 PM    Current Facility-Administered Medications for the 4/25/24 encounter (Hospital Encounter)   Medication    Botulinum Toxin Type A (BOTOX) 200 units injection 200 Units    Botulinum Toxin Type A (BOTOX) 200 units injection 200 Units     PTA Med List   Medication Sig Last Dose    ALBUTEROL IN Inhale 1-2 puffs into the lungs 4 times daily as needed (shortness of breath) 4/25/2024 at 0525    diazepam (VALIUM) 5 MG tablet Take 5 mg by mouth every 6 hours as needed More than a month    estradiol valerate (DELESTROGEN) 20 MG/ML injection Inject into the muscle once a week Saturday Past Week    progesterone (PROMETRIUM) 200 MG capsule Take 1 capsule by mouth every morning 4/18/2024    QUEtiapine (SEROQUEL) 25 MG tablet Take 25 mg by mouth as needed 4/19/2024

## 2024-04-26 NOTE — ANESTHESIA POSTPROCEDURE EVALUATION
Patient: Hannah Chavez    Procedure: Procedure(s):  Robotic assisted Full Depth Vaginoplasty  Cystoscopy with Botox Injection to Bladder  ROBOT ASSISTED COLOSTOMY CLOSURE, takedown of splenic flexure.  FLEXIBLE SIGMOIDOSCOPY       Anesthesia Type:  General    Note:  Disposition: Outpatient   Postop Pain Control: Uneventful            Sign Out: Well controlled pain   PONV: No   Neuro/Psych: Uneventful            Sign Out: Acceptable/Baseline neuro status   Airway/Respiratory: Uneventful            Sign Out: Acceptable/Baseline resp. status   CV/Hemodynamics: Uneventful            Sign Out: Acceptable CV status; No obvious hypovolemia; No obvious fluid overload   Other NRE: NONE   DID A NON-ROUTINE EVENT OCCUR? No    Event details/Postop Comments:  No complications.           Last vitals:  Vitals Value Taken Time   /65 04/25/24 1930   Temp 36.7  C (98  F) 04/25/24 1745   Pulse 90 04/25/24 1957   Resp 15 04/25/24 1957   SpO2 97 % 04/25/24 1957   Vitals shown include unfiled device data.    Electronically Signed By: Hesham Gilbert MD  April 26, 2024  3:14 PM

## 2024-04-26 NOTE — PROGRESS NOTES
Colorectal Surgery Progress Note  [facility]  POD#1      Subjective:  pain is uncontrolled.  Having spasms everywhere on abdomen and back and legs.  Had episode of emesis overnight.  Pt feels that the emesis is due to uncontrolled pain.     Vitals:  Vitals:    04/25/24 2130 04/25/24 2200 04/25/24 2201 04/25/24 2301   BP:  112/66     BP Location:       Cuff Size:       Pulse: 97  86 89   Resp: 18  18 18   Temp:    97.8  F (36.6  C)   TempSrc:    Oral   SpO2:  97%     Weight:       Height:         I/O:  I/O last 3 completed shifts:  In: 6274 [I.V.:5774]  Out: 2005 [Urine:810; Emesis/NG output:600; Drains:95; Blood:500]    Physical Exam:  Gen: AAOx3, uncomfortable appearing.  Wincing with any movement  Pulm: Non-labored breathing  Abd: Soft, mildly distended but also in fetal position, moderately tender,   Incision C/D/I    Stoma take down site dressed with penrose drain in place.    L abd DINORA drain - dark serosang   L labial DINORA Drain - serosang   R labial DINORA Drain - serosang  Ext:  Warm and well-perfused    BMP  Recent Labs   Lab 04/26/24  0552 04/25/24  1821 04/25/24  0659     --   --    POTASSIUM 4.1  --   --    CHLORIDE 105  --   --    CO2 21*  --   --    BUN 11.4  --   --    CR 0.95  --   --    * 192* 97     CBC  Recent Labs   Lab 04/26/24  0552 04/25/24  2148 04/25/24  1659   WBC  --   --  17.9*   HGB 8.1* 8.8* 8.5*   HCT  --   --  24.1*   PLT  --   --  156         ASSESSMENT: This is a 33 year old PMH of incomplete T10 spinal cord injury with neurogenic bladder and straight caths at baseline, sigmoid perforation of unclear etiology s/p prior end sigmoid resection and colostomy in 2018, gender dysphoria s/p transition 3 years ago on HRT since 2021, s/p orchiectomy in 2022.  Now s/p Lap FLOYD, robotic colostomy take down, flex sig; Lap robotic assisted artifical vagina with graft (penile skin flap & scrotal full thickness grafts), total penectomy, urethroplasty, robotic  "assisted colpopexy (suspension of vaginal apex), scotectomy, bilat labiaplasty via adj tissue transfer of scotal and mons skin flaps, clitoroplasty, cystoscopy w/ chemodenervation of bladder on 4/25/2024.    Neuro/Pain: oxycodone prn, tylenol, robaxin, IV dilaudid.  Hold toradol due to acute blood loss anemia  CV: no acute concerns at this time  PULM: encourage IS.  GI/FEN:   - NPO.  If better this afternoon, possible CLD-FLD pending nausea  - IVF @ 100  - ambulate  - replete electrolytes  : continue contreras x2 weeks per uro.  Can start Mirabegron daily for bladder spasms if needed but ok to hold off for now.   - bolster in for at least 5 days per uro.  Heme: acute blood loss anemia.  Most recent baseline in the 13s.  Down trending now to 8.1.    - 1U PRBC today  - trend  ID: no acute concerns at this time  Endocrine: no acute concerns at this time    Activity: as tolerated.  Ppx: hold Lovenox ppx due to acute blood loss anemia  Dispo: remain inpatient At least 5 days from surgery and then pending ROBF, pain control.  Likely will have DINORA drains on discharge.      Clinically Significant Risk Factors                         # Overweight: Estimated body mass index is 27.86 kg/m  as calculated from the following:    Height as of this encounter: 1.702 m (5' 7\").    Weight as of this encounter: 80.7 kg (177 lb 14.6 oz)., PRESENT ON ADMISSION            Lore Collier PA-C ..................4/26/2024   7:34 AM  Colon and Rectal Surgery    Patient was seen and discussed with Dr. Rossi    The above plan of care was performed and communicated to me by Dr. Anna    I spent 30 minute face-to-face or coordinating care of Hannah Chavez. Over 50% of our time on the unit was spent counseling the patient and/or coordinating care as documented in the assessment and plan.     [id number] post op hospital visit    "

## 2024-04-26 NOTE — PROGRESS NOTES
"   04/26/24 1124   Appointment Info   Signing Clinician's Name / Credentials (PT) BRITTANI Maher   Student Supervision Direct supervision provided;Therapy services provided with the co-signing licensed therapist guiding and directing the services, and providing the skilled judgement and assessment throughout the session   Rehab Comments (PT) Abdominal and penguin walk prec   Living Environment   People in Home parent(s);sibling(s)   Current Living Arrangements mobile home   Home Accessibility stairs to enter home   Number of Stairs, Main Entrance 4   Stair Railings, Main Entrance railings on both sides of stairs   Living Environment Comments Pt lives in a mobile home with her dad and brother who will be able to provide support at home. 4 FLAKITO with B rail. All needs met on one level once inside.   Self-Care   Usual Activity Tolerance good   Current Activity Tolerance fair   Equipment Currently Used at Home none   Fall history within last six months no   Activity/Exercise/Self-Care Comment Pt IND with mobility and ADLs at baseline   General Information   Onset of Illness/Injury or Date of Surgery 04/25/24   Referring Physician Amadou Anna MD   Patient/Family Therapy Goals Statement (PT) Return home   Pertinent History of Current Problem (include personal factors and/or comorbidities that impact the POC) Per EMR \"33 year old PMH of incomplete T10 spinal cord injury with neurogenic bladder and straight caths at baseline, sigmoid perforation of unclear etiology s/p prior end sigmoid resection and colostomy in 2018, gender dysphoria s/p transition 3 years ago on HRT since 2021, s/p orchiectomy in 2022.  Now s/p Lap FLOYD, robotic colostomy take down, flex sig; Lap robotic assisted artifical vagina with graft (penile skin flap & scrotal full thickness grafts), total penectomy, urethroplasty, robotic assisted colpopexy (suspension of vaginal apex), scotectomy, bilat labiaplasty via adj tissue transfer of " "scotal and mons skin flaps, clitoroplasty, cystoscopy w/ chemodenervation of bladder on 4/25/202\"   Existing Precautions/Restrictions abdominal;other (see comments)  (Penguin walk)   Cognition   Affect/Mental Status (Cognition) low arousal/lethargic   Orientation Status (Cognition) oriented x 4   Follows Commands (Cognition) WNL   Posture    Posture Protracted shoulders;Forward head position   Range of Motion (ROM)   ROM Comment Decreased BLE ROM due to previous incomplete T10 spinal cord injury   Strength (Manual Muscle Testing)   Strength (Manual Muscle Testing) strength is WFL   Bed Mobility   Bed Mobility supine-sit;sit-supine   Supine-Sit [place] (Bed Mobility) moderate assist (50% patient effort)   Sit-Supine [place] (Bed Mobility) minimum assist (75% patient effort)   Impairments Contributing to Impaired Bed Mobility decreased ROM;abnormal muscle tone   Assistive Device (Bed Mobility) bed rails   Comment, (Bed Mobility) Required assist for LE stretching to prep for supine to sit due to spasticity, HHA for supine to sit, LE manangement for sit to supine   Transfers   Transfers sit-stand transfer   Sit-Stand Transfer   Sit-Stand [place] (Transfers) minimum assist (75% patient effort)   Comment, (Sit-Stand Transfer) MinAx1 with HHA, first attempt only stood ~1/2 way due to spasticity, second attempt stood fully, very slow due to spasticity   Gait/Stairs (Locomotion)   [place] Level (Gait) unable to assess   Comment, (Gait/Stairs) Unable to assess due to high level of muscle spasms in abdomen, back, and BLE   Balance   Balance no deficits were identified   Sensory Examination   Sensory Perception patient reports no sensory changes   Muscle Tone   Muscle Tone Comments High level of muscle tone in BLE, pt reports this is her baseline   Clinical Impression   Criteria for Skilled Therapeutic Intervention Yes, treatment indicated   PT Diagnosis (PT) Impaired functional mobility   Influenced by the " following impairments Pain, increased muscle tone   Functional limitations due to impairments Bed mobility, transfers, gait   Clinical Presentation (PT Evaluation Complexity) stable   Clinical Presentation Rationale Clinical judgement   Clinical Decision Making (Complexity) low complexity   Planned Therapy Interventions (PT) balance training;bed mobility training;gait training;neuromuscular re-education;ROM (range of motion);stair training;strengthening;stretching;transfer training   Risk & Benefits of therapy have been explained evaluation/treatment results reviewed;care plan/treatment goals reviewed;risks/benefits reviewed;current/potential barriers reviewed;participants voiced agreement with care plan;participants included;patient   PT Total Evaluation Time   PT Eval, Low Complexity Minutes (50930) 20   Physical Therapy Goals   PT Frequency 6x/week   PT Predicted Duration/Target Date for Goal Attainment 05/26/24   PT Goals Bed Mobility;Transfers;Gait;Stairs   PT: Bed Mobility Supervision/stand-by assist;Within precautions   PT: Transfers Supervision/stand-by assist;Within precautions   PT: Gait Supervision/stand-by assist;Within precautions   PT: Stairs Supervision/stand-by assist   PT Discharge Planning   PT Plan STS and gait with FWW, review precations   PT Discharge Recommendation (DC Rec) home with assist;Transitional Care Facility   PT Rationale for DC Rec Pt currently up to ModA for bed mobility and transfers. Pt lives with brother and father who are able to provide support as needed. Recommend home with assist vs TCU pending assessment of gait and progress with IP rehab.   PT Brief overview of current status Up to ModA   Total Session Time   Total Session Time (sum of timed and untimed services) 20

## 2024-04-26 NOTE — PLAN OF CARE
Pt arrived from PACU approx 2000. A/Ox4. VSS on RA. On capno. Not yet out of bed. At baseline has mild mobility issues with long distances due to incomplete T10 injury from childhood; ambulates at home without equipment. Full liquid diet, tolerating well. Pain managed with routine medications and PRN oxycodone and PRN robaxin. Lap sites x4, RUQ former baclofen pump site, colostomy takedown site with penrose drain covered in dressing. Vaginal bolster in place. Evangelista with low but adequate urine output. Not yet passing gas/no BM. L PIV with LR at 125mL/hr; R PIV SL. IS given, SCDs on. Hgb recheck due to EBL of 1L, back at 8.8.

## 2024-04-26 NOTE — PLAN OF CARE
Goal Outcome Evaluation:      Plan of Care Reviewed With: patient    Overall Patient Progress: improvingOverall Patient Progress: improving    Outcome Evaluation: Progressing slowly    POD# 1 Robotic assisted Full Depth Vaginoplasty, Cystoscopy with Botox Injection to Bladder, ROBOT ASSISTED COLOSTOMY CLOSURE, takedown of splenic flexure. FLEXIBLE SIGMOIDOSCOPY    - Alert and oriented, lethargic. Up with SBA of 1, worked with PT this morning. Up in the room. Report of some nausea and lightheadedness.   - Pain is controlled with the current pain regimen.   - Abdomen soft, TD site with dressing, slightly extended from this morning. 4 lap sites noted with derma bond, CDI/PRIMO. Bowel sound faint, not passing gas, no BM. Surgical incision, anterior perineal area with small serosanguinous drainage. Bolster in place, contreras in place with adequate urine volume. Cath cares done per unit policy. 3 JPs with serosanguinous drainage.   - on ERP, lab reviewed. Phos replaced, recheck in am. Hgb 8.1 this am, a unit of PRBC given. Recheck at 1400H 8.1. No other order for blood transfusion.   - OVSS, soft BP, Alka Collier PA-C informed. On RA. Using IS with encouragement.   - Diet advance to full liquid diet this afternoon. Had some intermittent nausea this morning and afternoon. PRN compazine given with some relief. Able to tolerate 1/4 of apple sauce for dinner.     PLAN: Continue with the POC. Big reveal on POD 5, contreras to stay for 2 weeks even after the big reveal. Pain and nausea management. DINORA and contreras teaching. Increase activity, waiting for the ROBF.

## 2024-04-26 NOTE — PROGRESS NOTES
"  Surgery Cross-Cover  Post Op Check    04/25/2024    Hannah Chavez is a 33 year old adult POD#0 s/p Procedure(s):  Robotic assisted Full Depth Vaginoplasty  Cystoscopy with Botox Injection to Bladder  ROBOT ASSISTED COLOSTOMY CLOSURE, takedown of splenic flexure.  FLEXIBLE SIGMOIDOSCOPY for Pre-Op Diagnosis Codes:     * Gender dysphoria [F64.9]    Pt reports their pain is controlled with current regimen. Denies nausea, SOB, chest pain, or dizziness. Patient Is voiding via urinary catheter.     /61 (BP Location: Left arm)   Pulse 93   Temp 98  F (36.7  C) (Oral)   Resp 18   Ht 1.702 m (5' 7\")   Wt 80.7 kg (177 lb 14.6 oz)   SpO2 97%   BMI 27.86 kg/m      Gen: A&O x4, NAD   Chest: breathing non-labored on room air  Abdomen: soft, appropriately-tender, non-distended  Incision: clean, dry, intact, former colostomy site covered with dressing with mild drainage as expected.  Extremities: warm and well perfused  Devices: DINORA in abdomen with sanguineous appearing output. 2 JPs in groin with small amount of sanguineous appearing output.    A/P: No acute post-op issues. Hbg check at 10 PM. Continue current plan of care. Please call with any questions.    Vidal Kaur MD  PGY1  "

## 2024-04-26 NOTE — PROGRESS NOTES
Admitted/transferred from: PACU  2 RN skin assessment completed by Shirlene Harvey, RN and Karlee DE RN  Skin assessment finding: lap sites x4, former baclofen pump area, colostomy takedown site, vaginoplasty site, DINORA x3, PIV x2  Interventions/actions: continue to monitor    Will continue to monitor

## 2024-04-26 NOTE — PLAN OF CARE
Goal Outcome Evaluation:    POD# 0 s/p Vaginoplasty. Pt is A&O x 4, reports good pain control with current regimen. Intermittent nausea with relief after intervention, had 600 ml emesis. MD notified, order for NPO. Pt was awake most the night, reports feeling anxious and panicking, Lorazepam given x 1. Also, she was complaining of some muscle spasms that improved around 5 am. DINORA x 3 with minimal output. Evangelista in place. PIV infusing. Pt dangled at bedside, tolerated well. SCDs in place,

## 2024-04-27 ENCOUNTER — APPOINTMENT (OUTPATIENT)
Dept: PHYSICAL THERAPY | Facility: CLINIC | Age: 34
DRG: 876 | End: 2024-04-27
Attending: UROLOGY
Payer: COMMERCIAL

## 2024-04-27 LAB
HCT VFR BLD AUTO: 23.8 % (ref 35–53)
HGB BLD-MCNC: 7.9 G/DL (ref 13.3–17.7)
HOLD SPECIMEN: NORMAL
MAGNESIUM SERPL-MCNC: 1.8 MG/DL (ref 1.7–2.3)
PHOSPHATE SERPL-MCNC: 1.3 MG/DL (ref 2.5–4.5)
PHOSPHATE SERPL-MCNC: 1.6 MG/DL (ref 2.5–4.5)
PHOSPHATE SERPL-MCNC: 2.7 MG/DL (ref 2.5–4.5)
POTASSIUM SERPL-SCNC: 3.7 MMOL/L (ref 3.4–5.3)

## 2024-04-27 PROCEDURE — 250N000011 HC RX IP 250 OP 636: Performed by: STUDENT IN AN ORGANIZED HEALTH CARE EDUCATION/TRAINING PROGRAM

## 2024-04-27 PROCEDURE — 258N000003 HC RX IP 258 OP 636: Performed by: COLON & RECTAL SURGERY

## 2024-04-27 PROCEDURE — 84132 ASSAY OF SERUM POTASSIUM: CPT | Performed by: STUDENT IN AN ORGANIZED HEALTH CARE EDUCATION/TRAINING PROGRAM

## 2024-04-27 PROCEDURE — 36415 COLL VENOUS BLD VENIPUNCTURE: CPT | Performed by: COLON & RECTAL SURGERY

## 2024-04-27 PROCEDURE — 250N000011 HC RX IP 250 OP 636: Performed by: SURGERY

## 2024-04-27 PROCEDURE — 120N000002 HC R&B MED SURG/OB UMMC

## 2024-04-27 PROCEDURE — 85014 HEMATOCRIT: CPT | Performed by: STUDENT IN AN ORGANIZED HEALTH CARE EDUCATION/TRAINING PROGRAM

## 2024-04-27 PROCEDURE — 250N000013 HC RX MED GY IP 250 OP 250 PS 637: Performed by: COLON & RECTAL SURGERY

## 2024-04-27 PROCEDURE — 97116 GAIT TRAINING THERAPY: CPT | Mod: GP

## 2024-04-27 PROCEDURE — 250N000011 HC RX IP 250 OP 636: Performed by: COLON & RECTAL SURGERY

## 2024-04-27 PROCEDURE — 250N000013 HC RX MED GY IP 250 OP 250 PS 637: Performed by: SURGERY

## 2024-04-27 PROCEDURE — 83735 ASSAY OF MAGNESIUM: CPT | Performed by: COLON & RECTAL SURGERY

## 2024-04-27 PROCEDURE — 999N000248 HC STATISTIC IV INSERT WITH US BY RN

## 2024-04-27 PROCEDURE — 84100 ASSAY OF PHOSPHORUS: CPT | Performed by: COLON & RECTAL SURGERY

## 2024-04-27 PROCEDURE — 250N000009 HC RX 250: Performed by: STUDENT IN AN ORGANIZED HEALTH CARE EDUCATION/TRAINING PROGRAM

## 2024-04-27 PROCEDURE — 97530 THERAPEUTIC ACTIVITIES: CPT | Mod: GP

## 2024-04-27 RX ORDER — LIDOCAINE 4 G/G
2 PATCH TOPICAL
Status: DISCONTINUED | OUTPATIENT
Start: 2024-04-27 | End: 2024-05-01 | Stop reason: HOSPADM

## 2024-04-27 RX ORDER — ENOXAPARIN SODIUM 100 MG/ML
40 INJECTION SUBCUTANEOUS EVERY 24 HOURS
Status: DISCONTINUED | OUTPATIENT
Start: 2024-04-27 | End: 2024-04-27

## 2024-04-27 RX ORDER — POTASSIUM PHOS IN 0.9 % NACL 15MMOL/250
30 PLASTIC BAG, INJECTION (ML) INTRAVENOUS ONCE
Status: COMPLETED | OUTPATIENT
Start: 2024-04-27 | End: 2024-04-27

## 2024-04-27 RX ORDER — METHOCARBAMOL 100 MG/ML
1000 INJECTION, SOLUTION INTRAMUSCULAR; INTRAVENOUS EVERY 8 HOURS PRN
Status: DISCONTINUED | OUTPATIENT
Start: 2024-04-27 | End: 2024-04-30

## 2024-04-27 RX ORDER — LORAZEPAM 2 MG/ML
0.5 INJECTION INTRAMUSCULAR EVERY 6 HOURS PRN
Status: DISCONTINUED | OUTPATIENT
Start: 2024-04-27 | End: 2024-04-30

## 2024-04-27 RX ORDER — LORAZEPAM 2 MG/ML
0.5 INJECTION INTRAMUSCULAR 2 TIMES DAILY PRN
Status: DISCONTINUED | OUTPATIENT
Start: 2024-04-27 | End: 2024-04-27

## 2024-04-27 RX ORDER — DEXTROSE MONOHYDRATE, SODIUM CHLORIDE, AND POTASSIUM CHLORIDE 50; 1.49; 4.5 G/1000ML; G/1000ML; G/1000ML
INJECTION, SOLUTION INTRAVENOUS CONTINUOUS
Status: DISCONTINUED | OUTPATIENT
Start: 2024-04-27 | End: 2024-04-28

## 2024-04-27 RX ADMIN — ACETAMINOPHEN 1000 MG: 500 TABLET ORAL at 19:57

## 2024-04-27 RX ADMIN — OXYCODONE HYDROCHLORIDE 10 MG: 10 TABLET ORAL at 04:42

## 2024-04-27 RX ADMIN — Medication 30 MMOL: at 09:31

## 2024-04-27 RX ADMIN — ALBUTEROL SULFATE 1 PUFF: 90 INHALANT RESPIRATORY (INHALATION) at 12:44

## 2024-04-27 RX ADMIN — PROCHLORPERAZINE EDISYLATE 10 MG: 5 INJECTION INTRAMUSCULAR; INTRAVENOUS at 10:09

## 2024-04-27 RX ADMIN — OXYCODONE HYDROCHLORIDE 10 MG: 10 TABLET ORAL at 23:15

## 2024-04-27 RX ADMIN — PROGESTERONE 200 MG: 100 CAPSULE ORAL at 09:16

## 2024-04-27 RX ADMIN — ERTAPENEM SODIUM 1 G: 1 INJECTION, POWDER, LYOPHILIZED, FOR SOLUTION INTRAMUSCULAR; INTRAVENOUS at 09:25

## 2024-04-27 RX ADMIN — LIDOCAINE 4% 2 PATCH: 40 PATCH TOPICAL at 09:15

## 2024-04-27 RX ADMIN — ACETAMINOPHEN 1000 MG: 500 TABLET ORAL at 09:16

## 2024-04-27 RX ADMIN — HYDROMORPHONE HYDROCHLORIDE 0.2 MG: 0.2 INJECTION, SOLUTION INTRAMUSCULAR; INTRAVENOUS; SUBCUTANEOUS at 10:12

## 2024-04-27 RX ADMIN — OXYCODONE HYDROCHLORIDE 10 MG: 10 TABLET ORAL at 09:16

## 2024-04-27 RX ADMIN — ACETAMINOPHEN 1000 MG: 500 TABLET ORAL at 16:00

## 2024-04-27 RX ADMIN — ACETAMINOPHEN 1000 MG: 500 TABLET ORAL at 12:41

## 2024-04-27 RX ADMIN — HYDROMORPHONE HYDROCHLORIDE 0.2 MG: 0.2 INJECTION, SOLUTION INTRAMUSCULAR; INTRAVENOUS; SUBCUTANEOUS at 00:20

## 2024-04-27 RX ADMIN — DEXTROSE AND SODIUM CHLORIDE: 5; 450 INJECTION, SOLUTION INTRAVENOUS at 10:11

## 2024-04-27 RX ADMIN — LORAZEPAM 0.5 MG: 2 INJECTION, SOLUTION INTRAMUSCULAR; INTRAVENOUS at 06:51

## 2024-04-27 RX ADMIN — QUETIAPINE FUMARATE 25 MG: 25 TABLET ORAL at 09:16

## 2024-04-27 RX ADMIN — POTASSIUM CHLORIDE, DEXTROSE MONOHYDRATE AND SODIUM CHLORIDE: 150; 5; 450 INJECTION, SOLUTION INTRAVENOUS at 12:40

## 2024-04-27 ASSESSMENT — ACTIVITIES OF DAILY LIVING (ADL)
ADLS_ACUITY_SCORE: 32

## 2024-04-27 NOTE — PROGRESS NOTES
Social Work: Missed Visit  SW attempted to visit the pt, but she was sleeping, opened eyes briefly, but then returned to sleep. SW following Pt and family to provide ongoing supportive counseling, assistance with resources and discharge planning as needed. SW will continue to collaborate with multidisciplinary team regarding Pt's plan of care.     ROZINA Schmidt, [facility] HospitalSW  [facility]  Weekend Coverage- Units 5A and 5C  [facility] Adult Social Work- unit 5A/C  Vocera:    5A Onc 5201 thru 5219     5A Onc 5220 thru 5240    5C OFF SERVICE 5401 thru 5416    5C OFF SERVICE 5417 thru 5432

## 2024-04-27 NOTE — PLAN OF CARE
Goal Outcome Evaluation:      Plan of Care Reviewed With: patient    Overall Patient Progress: improvingOverall Patient Progress: improving    Outcome Evaluation: Slowly progressing, post-operatively    POD# 2 Robotic assisted Full Depth Vaginoplasty, Cystoscopy with Botox Injection to Bladder, ROBOT ASSISTED COLOSTOMY CLOSURE, takedown of splenic flexure. FLEXIBLE SIGMOIDOSCOPY    - Alert and oriented, lethargic. Up with SBA of 1, worked with PT this morning. Up in the room with few steps, slowly progressing. Needs a lot of encouragement. - Pain is controlled with the current pain regimen. Patient reported more of spasm lower extremities. Resting/sleeping in between cares and activity.   - Abdomen soft, TD site with dressing changed this morning. 4 lap sites noted with derma bond, CDI/PRIMO. Bowel sound faint, not passing gas, no BM. Surgical incision, anterior perineal area with small serosanguinous drainage. Bolster in place, contreras in place with adequate urine volume. Cath cares done per unit policy. 3 JPs with serosanguinous drainage, minimal output.   - on ERP, lab reviewed. Phos replaced, per provider order with potassium phosphate 15 mmol/250 ml x 2 bags. Recheck at 5:47 pm: still in process. Hgb 7.9 this am from 8.1 from yesterday.    - OVSS, soft BP, not new. Using IS with encouragement.   - On full liquid diet this afternoon. No appetite. Needs a lot of encouragement. Report of some intermittent nausea and lightheadedness. Prn compazine given with relief.  - No report of SOB, CP. Lung sounds audible and clear.     PLAN: Continue with the POC. Big reveal on POD 5, contreras to stay for 2 weeks even after the big reveal. Pain and nausea management. DINORA and contreras teaching. Increase activity, waiting for the ROBF.

## 2024-04-27 NOTE — PROGRESS NOTES
COLON & RECTAL SURGERY PROGRESS NOTE  POD# 2    S:  still having muscle cramps/spasms, but pain overall better than yesterday. No nausea or emesis this AM. Minimal PO intake, ARBF.     Vitals:  Vitals:    04/26/24 1535 04/26/24 1958 04/27/24 0011 04/27/24 0423   BP: 100/68 107/57 114/66 105/70   BP Location: Right arm Right arm Right arm Right arm   Cuff Size:  Adult Regular     Pulse: 89 89 108 93   Resp: 18 16 16 16   Temp: 98.2  F (36.8  C) 98.2  F (36.8  C) 98.3  F (36.8  C) 98.1  F (36.7  C)   TempSrc: Oral Oral Oral Oral   SpO2: 99% 94% 95% 99%   Weight:       Height:         I/O:  I/O last 3 completed shifts:  In: 1754.17 [I.V.:1454.17]  Out: 2005 [Urine:1775; Drains:230]    PE:  General Appearance: alert and oriented, no acute distress  Abdomen: soft, tender throughout, distended; incision clean and dry; stoma takedown clean and dry; DINORA in left abdomen serosang   : deferred exam, DINORA drains are serosang, contreras with clear yellow urine  EXT: warm and well-perfused, intact sensation     Labs:  Recent Labs   Lab 04/27/24  0609 04/26/24  1402 04/26/24  0552 04/25/24  2148 04/25/24  1821 04/25/24  1659 04/25/24  0659   WBC  --   --   --   --   --  17.9*  --    HGB  --  8.1* 8.1* 8.8*  --  8.5*  --    PLT  --   --   --   --   --  156  --    NA  --   --  136  --   --   --   --    POTASSIUM  --   --  4.1  --   --   --   --    CHLORIDE  --   --  105  --   --   --   --    CO2  --   --  21*  --   --   --   --    BUN  --   --  11.4  --   --   --   --    CR  --   --  0.95  --   --   --   --    GLC  --   --  126*  --  192*  --  97   MAG 1.8  --  1.9  --   --   --   --    PHOS 1.3*  --  2.0*  --   --   --   --      A/P: 33 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    Clinically stable and making progress. ARBF, will continue CLD for now. Will switch to mIVF. Excellent UOP. Contreras x 2 weeks per urology.     Abx x 5 days  total.     Primary complaints are anxiety and muscle spasms -- will add on lidocaine patch. Patient requesting Ativan and we discussed trying two small doses today.     Will also get OOB and continue ambulating. Will discuss LVX pending AM hgb. I wrote for IV sodium phosphate this AM, will need recheck later.    Adrianne Wright MD MPH Colorectal Surgery Fellow     For follow-up, please first page the on-call colorectal resident listed in the paging system.

## 2024-04-27 NOTE — PROGRESS NOTES
"Urology  Progress Note    NAEO  Pain improving though still present  No gas yet    Exam  /68 (BP Location: Right arm)   Pulse 98   Temp 98.3  F (36.8  C) (Oral)   Resp 18   Ht 1.702 m (5' 7\")   Wt 80.7 kg (177 lb 14.6 oz)   SpO2 97%   BMI 27.86 kg/m    No acute distress  Unlabored breathing  Abdomen soft,  appropriately tender, nondistended. Incisions CDI, bolster in place  DINORA serosanguinous  Contreras with jeremías urine in tubing      UOP 1725/200  DINORA   L abd 93/30  L labial 42/10  R labial 50/5    Labs  Recent Labs   Lab Test 04/27/24  0609 04/26/24  1402 04/26/24  0552 04/25/24  2148 04/25/24  1659 04/10/24  1444 04/26/23  1640   WBC  --   --   --   --  17.9* 5.5 6.8   HGB 7.9* 8.1* 8.1*   < > 8.5* 13.5 13.6   CR  --   --  0.95  --   --  0.77 0.82    < > = values in this interval not displayed.            Assessment/Plan  33 year old adult with PMH of gender dysphoria, 2 Days Post-Op s/p full depth vaginoplasty, bladder botox and colostomy takedown.  Otherwise, remaining excellent cares per primary team.    - Continue contreras catheter for 2 weeks  - No indication for mirabegron at this time - consider if patient were to develop symptoms consistent with bladder spasms  - Bolster in place until reveal  - Abx x5 days  - Urology will follow     Seen and examined with Dr. Cardona, will discuss with Dr. Dominguez.    Juventino Sagastume MD  Urology PGY-4    Patient was seen, evaluated and plan was formulated in conjunction with me and I agree with the above.  Marcela Cardona MD       Contacting the Urology Team     Please use the following job codes to reach the Urology Team. Note that you must use an in house phone and that job codes cannot receive text pages.   On weekdays, dial [phone] (or star-star-star [phone] on the new [employer] Private telephones) then [phone] to reach the Adult Urology resident or PA on call  On weekdays, dial [phone] (or star-star-star [phone] on the new [employer] Private telephones) then [phone] to reach the Pediatric Urology " resident  On weeknights and weekends, dial [phone] (or star-star-star [phone] on the new [employer] telephones) then [phone] to reach the Urology resident on call (for both Adult and Pediatrics)

## 2024-04-27 NOTE — PROGRESS NOTES
[id number] VSS. A&Ox4. C/O moderate pain, managed w/ PRN oxycodone and dilaudid. Denies nausea. Jpx3 w/ serosanguinous output. U/O via contreras good. Pt resting comfortably, no other complaints at this time. Will continue w/ POC.

## 2024-04-28 ENCOUNTER — APPOINTMENT (OUTPATIENT)
Dept: OCCUPATIONAL THERAPY | Facility: CLINIC | Age: 34
DRG: 876 | End: 2024-04-28
Attending: COLON & RECTAL SURGERY
Payer: COMMERCIAL

## 2024-04-28 ENCOUNTER — APPOINTMENT (OUTPATIENT)
Dept: PHYSICAL THERAPY | Facility: CLINIC | Age: 34
DRG: 876 | End: 2024-04-28
Attending: UROLOGY
Payer: COMMERCIAL

## 2024-04-28 LAB
HGB BLD-MCNC: 8.1 G/DL (ref 13.3–17.7)
HOLD SPECIMEN: NORMAL
MAGNESIUM SERPL-MCNC: 1.8 MG/DL (ref 1.7–2.3)
PHOSPHATE SERPL-MCNC: 1.3 MG/DL (ref 2.5–4.5)
PHOSPHATE SERPL-MCNC: 2.9 MG/DL (ref 2.5–4.5)
POTASSIUM SERPL-SCNC: 3.7 MMOL/L (ref 3.4–5.3)

## 2024-04-28 PROCEDURE — 250N000011 HC RX IP 250 OP 636: Performed by: COLON & RECTAL SURGERY

## 2024-04-28 PROCEDURE — 85018 HEMOGLOBIN: CPT | Performed by: COLON & RECTAL SURGERY

## 2024-04-28 PROCEDURE — 250N000009 HC RX 250: Performed by: COLON & RECTAL SURGERY

## 2024-04-28 PROCEDURE — 84132 ASSAY OF SERUM POTASSIUM: CPT | Performed by: COLON & RECTAL SURGERY

## 2024-04-28 PROCEDURE — 250N000011 HC RX IP 250 OP 636: Performed by: STUDENT IN AN ORGANIZED HEALTH CARE EDUCATION/TRAINING PROGRAM

## 2024-04-28 PROCEDURE — 84100 ASSAY OF PHOSPHORUS: CPT | Performed by: COLON & RECTAL SURGERY

## 2024-04-28 PROCEDURE — 97116 GAIT TRAINING THERAPY: CPT | Mod: GP

## 2024-04-28 PROCEDURE — 250N000013 HC RX MED GY IP 250 OP 250 PS 637: Performed by: COLON & RECTAL SURGERY

## 2024-04-28 PROCEDURE — 120N000002 HC R&B MED SURG/OB UMMC

## 2024-04-28 PROCEDURE — 97530 THERAPEUTIC ACTIVITIES: CPT | Mod: GO

## 2024-04-28 PROCEDURE — 258N000003 HC RX IP 258 OP 636: Performed by: COLON & RECTAL SURGERY

## 2024-04-28 PROCEDURE — 36415 COLL VENOUS BLD VENIPUNCTURE: CPT | Performed by: COLON & RECTAL SURGERY

## 2024-04-28 PROCEDURE — 83735 ASSAY OF MAGNESIUM: CPT | Performed by: COLON & RECTAL SURGERY

## 2024-04-28 PROCEDURE — 97165 OT EVAL LOW COMPLEX 30 MIN: CPT | Mod: GO

## 2024-04-28 PROCEDURE — 250N000013 HC RX MED GY IP 250 OP 250 PS 637: Performed by: SURGERY

## 2024-04-28 RX ORDER — ENOXAPARIN SODIUM 100 MG/ML
40 INJECTION SUBCUTANEOUS EVERY 24 HOURS
Status: DISCONTINUED | OUTPATIENT
Start: 2024-04-28 | End: 2024-05-01 | Stop reason: HOSPADM

## 2024-04-28 RX ADMIN — POTASSIUM PHOSPHATE, MONOBASIC POTASSIUM PHOSPHATE, DIBASIC 9 MMOL: 224; 236 INJECTION, SOLUTION, CONCENTRATE INTRAVENOUS at 13:20

## 2024-04-28 RX ADMIN — OXYCODONE HYDROCHLORIDE 10 MG: 10 TABLET ORAL at 22:41

## 2024-04-28 RX ADMIN — ACETAMINOPHEN 1000 MG: 500 TABLET ORAL at 12:29

## 2024-04-28 RX ADMIN — ERTAPENEM SODIUM 1 G: 1 INJECTION, POWDER, LYOPHILIZED, FOR SOLUTION INTRAMUSCULAR; INTRAVENOUS at 07:48

## 2024-04-28 RX ADMIN — HYDROMORPHONE HYDROCHLORIDE 0.2 MG: 0.2 INJECTION, SOLUTION INTRAMUSCULAR; INTRAVENOUS; SUBCUTANEOUS at 19:43

## 2024-04-28 RX ADMIN — LIDOCAINE 4% 2 PATCH: 40 PATCH TOPICAL at 08:03

## 2024-04-28 RX ADMIN — OXYCODONE HYDROCHLORIDE 10 MG: 10 TABLET ORAL at 04:37

## 2024-04-28 RX ADMIN — PROGESTERONE 200 MG: 100 CAPSULE ORAL at 07:49

## 2024-04-28 RX ADMIN — OXYCODONE HYDROCHLORIDE 10 MG: 10 TABLET ORAL at 15:07

## 2024-04-28 RX ADMIN — ENOXAPARIN SODIUM 40 MG: 40 INJECTION SUBCUTANEOUS at 08:42

## 2024-04-28 RX ADMIN — HYDROMORPHONE HYDROCHLORIDE 0.2 MG: 0.2 INJECTION, SOLUTION INTRAMUSCULAR; INTRAVENOUS; SUBCUTANEOUS at 12:28

## 2024-04-28 RX ADMIN — HYDROMORPHONE HYDROCHLORIDE 0.2 MG: 0.2 INJECTION, SOLUTION INTRAMUSCULAR; INTRAVENOUS; SUBCUTANEOUS at 10:06

## 2024-04-28 RX ADMIN — HYDROMORPHONE HYDROCHLORIDE 0.2 MG: 0.2 INJECTION, SOLUTION INTRAMUSCULAR; INTRAVENOUS; SUBCUTANEOUS at 06:03

## 2024-04-28 RX ADMIN — ACETAMINOPHEN 1000 MG: 500 TABLET ORAL at 16:31

## 2024-04-28 RX ADMIN — OXYCODONE HYDROCHLORIDE 10 MG: 10 TABLET ORAL at 08:42

## 2024-04-28 RX ADMIN — POTASSIUM PHOSPHATE, MONOBASIC POTASSIUM PHOSPHATE, DIBASIC 9 MMOL: 224; 236 INJECTION, SOLUTION, CONCENTRATE INTRAVENOUS at 16:31

## 2024-04-28 RX ADMIN — LORAZEPAM 0.5 MG: 2 INJECTION, SOLUTION INTRAMUSCULAR; INTRAVENOUS at 22:56

## 2024-04-28 RX ADMIN — ACETAMINOPHEN 1000 MG: 500 TABLET ORAL at 19:44

## 2024-04-28 ASSESSMENT — ACTIVITIES OF DAILY LIVING (ADL)
ADLS_ACUITY_SCORE: 32
DEPENDENT_IADLS:: INDEPENDENT
ADLS_ACUITY_SCORE: 32

## 2024-04-28 NOTE — CONSULTS
Care Management Initial Consult    General Information  Assessment completed with: Patient,    Type of CM/SW Visit: Initial Assessment    Primary Care Provider verified and updated as needed: Yes   Readmission within the last 30 days: no previous admission in last 30 days      Reason for Consult: discharge planning, emotional/coping/adjustment concerns  Advance Care Planning: Advance Care Planning Reviewed: no concerns identified          Communication Assessment  Patient's communication style: spoken language (English or Bilingual)    Hearing Difficulty or Deaf: no   Wear Glasses or Blind: no    Cognitive  Cognitive/Neuro/Behavioral: WDL  Level of Consciousness: alert  Arousal Level: opens eyes spontaneously  Orientation: oriented x 4  Mood/Behavior: calm, cooperative, behavior appropriate to situation, anxious  Best Language: 0 - No aphasia  Speech: logical, spontaneous, clear    Living Environment:   People in home: parent(s), sibling(s)  Father- Chilango and brother  Current living Arrangements: house      Able to return to prior arrangements: yes       Family/Social Support:  Care provided by: parent(s)  Provides care for: no one  Marital Status: Single  Parent(s), Grandparent(s), Sibling(s)          Description of Support System: Involved, Supportive    Support Assessment: Adequate social supports, Adequate family and caregiver support    Current Resources:   Patient receiving home care services: No     Community Resources: None  Equipment currently used at home: none  Supplies currently used at home: None    Employment/Financial:  Employment Status: unemployed        Financial Concerns: none   Referral to Financial Worker: No       Does the patient's insurance plan have a 3 day qualifying hospital stay waiver?  No    Lifestyle & Psychosocial Needs:  Social Determinants of Health     Food Insecurity: No Food Insecurity (4/6/2022)    Received from [facility]    Hunger Vital Sign     Worried About Running Out of  Food in the Last Year: Never true     Ran Out of Food in the Last Year: Never true   Depression: Not at risk (3/18/2024)    PHQ-2     PHQ-2 Score: 0   Housing Stability: Low Risk  (4/6/2022)    Received from [facility]    Housing Stability Vital Sign     Unable to Pay for Housing in the Last Year: No     Number of Places Lived in the Last Year: 1     In the last 12 months, was there a time when you did not have a steady place to sleep or slept in a shelter (including now)?: No   Tobacco Use: Low Risk  (4/25/2024)    Patient History     Smoking Tobacco Use: Never     Smokeless Tobacco Use: Never     Passive Exposure: Never   Financial Resource Strain: Medium Risk (4/6/2022)    Received from [facility]    Overall Financial Resource Strain (CARDIA)     Difficulty of Paying Living Expenses: Somewhat hard   Alcohol Use: Not At Risk (4/6/2022)    Received from [facility]    AUDIT-C     Frequency of Alcohol Consumption: Monthly or less     Average Number of Drinks: 1 or 2     Frequency of Binge Drinking: Never   Transportation Needs: Unmet Transportation Needs (4/6/2022)    Received from [facility]    PRAPARE - Transportation     Lack of Transportation (Medical): Yes     Lack of Transportation (Non-Medical): No   Physical Activity: Inactive (4/6/2022)    Received from [facility]    Exercise Vital Sign     Days of Exercise per Week: 0 days     Minutes of Exercise per Session: 0 min   Interpersonal Safety: Not At Risk (4/6/2022)    Received from [facility]    Humiliation, Afraid, Rape, and Kick questionnaire     Fear of Current or Ex-Partner: No     Emotionally Abused: No     Physically Abused: No     Sexually Abused: No   Stress: No Stress Concern Present (4/6/2022)    Received from [facility]    [employer] - Occupational Stress Questionnaire     Feeling of Stress : Only a little   Social Connections: Socially Isolated (4/6/2022)    Received from [facility]    Social Connection and  Isolation Panel [NHANES]     Frequency of Communication with Friends and Family: More than three times a week     Frequency of Social Gatherings with Friends and Family: Once a week     Attends Zoroastrianism Services: Never     Active Member of Clubs or Organizations: No     Attends Club or Organization Meetings: Patient declined     Marital Status: Never    Health Literacy: Not on file       Functional Status:  Prior to admission patient needed assistance:   Dependent ADLs:: Independent  Dependent IADLs:: Independent  Assesssment of Functional Status: Not at baseline with ADL Functioning    Mental Health Status:  Mental Health Status: Past Concern  Mental Health Management: Medication, Individual Therapy, Group Therapy    Chemical Dependency Status:  Chemical Dependency Status: No Current Concerns             Values/Beliefs:  Spiritual, Cultural Beliefs, Zoroastrianism Practices, Values that affect care: no               Additional Information:  Per chart pt is a 33 year old PMH of incomplete T10 spinal cord injury with neurogenic bladder and straight caths at baseline, sigmoid perforation of unclear etiology s/p prior end sigmoid resection and colostomy in 2018, gender dysphoria s/p transition 3 years ago on HRT since 2021, s/p orchiectomy in 2022. Now s/p Lap FLOYD, robotic colostomy take down, flex sig; Lap robotic assisted artifical vagina with graft (penile skin flap & scrotal full thickness grafts), total penectomy, urethroplasty, robotic assisted colpopexy (suspension of vaginal apex), scotectomy, bilat labiaplasty via adj tissue transfer of scotal and mons skin flaps, clitoroplasty, cystoscopy w/ chemodenervation of bladder on 4/25/2024.     SW met with the pt to introduce self and CM/SW role. The pt shared that she lives with her brother and father, her mother and grandparents live close by and are all very supportive. The pt discussed a strong MH history of depression and anxiety. She had a past suicide  attempt that lead to hospitalization, but no thoughts or attempts in multiple years. She shares that she has been to therapy in the past that was helpful, but with her therapist approval has determined to stop at this time. She notes 1 anxiety attack during her current hospital stay which she attributes to pain, but overall has been doing well. She discussed her biggest worry was how she would adapt during her stay and so far she has been doing good. She discussed positive coping skills which she has been able to utilize here. The pt did share that a close friend recently passed away, she is trying to grieve, but also worries that she may focus more on this then her own healing and is trying to be cautious of this. We talked through ways to help honor her friend during this time to allow her self time now to process this vs pushing her feelings down. She did note that she may have some friends visit that knew this friend to help support each other.     The pt notes good support at home, although her father and brother both have their own medical issues. The pt shared her brother has Asperger and about a year ago stopped talking. She noted this has been hard for them to process on top of her own health issues and surgeries.The pt identifies good support in her life with friends and her volunteer work with LGBTQ programs.    The pt noted no current SW/CM needs, but did ask for check-in regarding her coping and mental health. She feels being able to talk and process things when needed has been very helpful for her. Updated weekday staff to provide support as needed.    Aye Linares, MSW, [facility] HospitalSW  [facility]  Weekend Coverage- Units 5A and 5C  [facility] Adult Social Work- unit 5A/C  Vocera:    5A Onc 5201 thru 5219 SW    5A Onc 5220 thru 5240 SW   5C OFF SERVICE 5401 thru 5416 SW   5C OFF SERVICE 5417 thru 5432 SW

## 2024-04-28 NOTE — PROGRESS NOTES
"Urology  Progress Note    NAEO  Pain improving though still present  No gas yet  Tolerating clears  Up to chair yesterday    Exam  /79 (BP Location: Left arm)   Pulse 91   Temp 98.3  F (36.8  C) (Oral)   Resp 18   Ht 1.702 m (5' 7\")   Wt 87.4 kg (192 lb 9.6 oz)   SpO2 100%   BMI 30.17 kg/m    No acute distress  Unlabored breathing  Abdomen soft,  appropriately tender, nondistended. Incisions CDI, bolster in place  DINORA serosanguinous  Contreras with jeremías urine in tubing      UOP 1100/450  DINORA   L abd 75/40  L labial 30/0  R labial 15/0    Labs  Recent Labs   Lab Test 04/27/24  0609 04/26/24  1402 04/26/24  0552 04/25/24  2148 04/25/24  1659 04/10/24  1444 04/26/23  1640   WBC  --   --   --   --  17.9* 5.5 6.8   HGB 7.9* 8.1* 8.1*   < > 8.5* 13.5 13.6   CR  --   --  0.95  --   --  0.77 0.82    < > = values in this interval not displayed.            Assessment/Plan  33 year old adult with PMH of gender dysphoria, 3 Days Post-Op s/p full depth vaginoplasty, bladder botox and colostomy takedown.  Otherwise, remaining excellent cares per primary team.    - Continue contreras catheter for 2 weeks  - No indication for mirabegron at this time - consider if patient were to develop symptoms consistent with bladder spasms  - Bolster in place until reveal  - Abx x5 days  - Urology will follow     Will discuss with Dr. Dominguez.    MD Saurabh Fonseca  PGY-4  [phone]       Contacting the Urology Team     Please use the following job codes to reach the Urology Team. Note that you must use an in house phone and that job codes cannot receive text pages.   On weekdays, dial [phone] (or star-star-star 777 on the new [employer] telephones) then [phone] to reach the Adult Urology resident or PA on call  On weekdays, dial [phone] (or star-star-star 777 on the new [employer] telephones) then 0818 to reach the Pediatric Urology resident  On weeknights and weekends, dial [phone] (or star-star-star 777 on the new [employer] telephones) then [phone] to " reach the Urology resident on call (for both Adult and Pediatrics)

## 2024-04-28 NOTE — PROGRESS NOTES
[id number] VSS. A&Ox4. C/O mild pain, managed w/ PRN oxycodone. Denies nausea. Jpx3 w/ serosanginous output. U/O via contreras good. Pt resting comfortably, no other complaints at this time. Will continue w/ POC.

## 2024-04-28 NOTE — PROGRESS NOTES
Colorectal Surgery Progress Note  [facility]  POD#3      Subjective:  No acute events overnight. Feeling much better today, pain spasms have decreased with use of Ativan. Tolerating full liquids, ambulating. No nausea or vomiting. Urinating well with Evangelista. Not yet passing gas or having bowel movements.    Vitals:  Vitals:    04/27/24 0907 04/27/24 1608 04/27/24 2000 04/28/24 0346   BP: 117/68 102/64 111/68 116/69   BP Location: Right arm Right arm Right arm Right arm   Cuff Size:  Adult Regular Adult Regular    Pulse: 98 88 80 81   Resp: 18 18 15 16   Temp: 98.3  F (36.8  C) 98  F (36.7  C) 98.2  F (36.8  C) 98.1  F (36.7  C)   TempSrc: Oral Oral Oral Oral   SpO2: 97% 97% 98% 100%   Weight:       Height:         I/O:  I/O last 3 completed shifts:  In: 116.67 [I.V.:116.67]  Out: 1455 [Urine:1350; Drains:105]    Physical Exam:  Gen: AAOx3, no acute distress, sitting comfortably in chair  Pulm: Non-labored breathing on room air  Abd: Soft, mildly distended, moderately tender,   Incision C/D/I    Stoma take down site with dressing in place.    L abd DINORA drain - serosang   L labial DINORA Drain - serosang   R labial DINORA Drain - serosang  Ext:  Warm and well-perfused    BMP  Recent Labs   Lab 04/27/24  1747 04/27/24  0912 04/27/24  0609 04/26/24  0552 04/25/24  1821 04/25/24  0659   NA  --   --   --  136  --   --    POTASSIUM  --   --  3.7 4.1  --   --    CHLORIDE  --   --   --  105  --   --    CO2  --   --   --  21*  --   --    BUN  --   --   --  11.4  --   --    CR  --   --   --  0.95  --   --    GLC  --   --   --  126* 192* 97   MAG  --   --  1.8 1.9  --   --    PHOS 2.7 1.6* 1.3* 2.0*  --   --      CBC  Recent Labs   Lab 04/27/24  0609 04/26/24  1402 04/26/24  0552 04/25/24  2148 04/25/24  1659   WBC  --   --   --   --  17.9*   HGB 7.9* 8.1* 8.1* 8.8* 8.5*   HCT 23.8*  --   --   --  24.1*   PLT  --   --   --   --  156         ASSESSMENT: This is a 33 year old PMH of incomplete T10 spinal cord  "injury with neurogenic bladder and straight caths at baseline, sigmoid perforation of unclear etiology s/p prior end sigmoid resection and colostomy in 2018, gender dysphoria s/p transition 3 years ago on HRT since 2021, s/p orchiectomy in 2022.  Now s/p Lap FLOYD, robotic colostomy take down, flex sig; Lap robotic assisted artifical vagina with graft (penile skin flap & scrotal full thickness grafts), total penectomy, urethroplasty, robotic assisted colpopexy (suspension of vaginal apex), scotectomy, bilat labiaplasty via adj tissue transfer of scotal and mons skin flaps, clitoroplasty, cystoscopy w/ chemodenervation of bladder on 4/25/2024.    - recheck phos today  - lovenox  - possibly increase diet to low fiber this afternoon if passing gas  - discontinue IVF    Neuro/Pain: oxycodone prn, tylenol, robaxin, IV dilaudid. Ativan and lidocaine patch for muscle spasms  CV: no acute concerns at this time  PULM: encourage IS.  GI/FEN:   - continue full liquids, possibly advance diet this PM  - ambulate  - replete electrolytes  : continue contreras x2 weeks per uro.  Can start Mirabegron daily for bladder spasms if needed but ok to hold off for now.   - bolster in for at least 5 days per uro.  Heme: acute blood loss anemia.  Most recent baseline in the 13s.  Down trending now to 8.1, stabilized at 7.9.    - trend  ID: no acute concerns at this time  Endocrine: no acute concerns at this time    Activity: as tolerated.  Ppx: resume lovenox today  Dispo: remain inpatient At least 5 days from surgery and then pending ROBF, pain control.  Likely will have DINORA drains on discharge.      Clinically Significant Risk Factors                         # Overweight: Estimated body mass index is 27.86 kg/m  as calculated from the following:    Height as of this encounter: 1.702 m (5' 7\").    Weight as of this encounter: 80.7 kg (177 lb 14.6 oz)., PRESENT ON ADMISSION            Tabatha Almonte MD  General Surgery, PGY1    Patient was seen " and discussed with Dr. Wright, to be discussed with staff.

## 2024-04-28 NOTE — PLAN OF CARE
Goal Outcome Evaluation:      Plan of Care Reviewed With: patient    Overall Patient Progress: no changeOverall Patient Progress: no change    Outcome Evaluation: IDT will continue to follow and support the pt with safe d/c planning and emotional support.

## 2024-04-28 NOTE — PLAN OF CARE
Pain is managed with scheduled Tylenol and prn Oxycodone, did require IV dilaudid for breakthrough pain. Up ambulating and in recliner, is working with PT & OT. Left abdomen takedown site with penrose drain changed this afternoon. DINORA X 3 with small output. Evangelista has adequate urine output, will stay in for two weeks. Patient straight cathed for urine prior to surgery. No gas or BM, vaginal bolster in place. Phos is being replaced.

## 2024-04-29 ENCOUNTER — APPOINTMENT (OUTPATIENT)
Dept: PHYSICAL THERAPY | Facility: CLINIC | Age: 34
DRG: 876 | End: 2024-04-29
Attending: UROLOGY
Payer: COMMERCIAL

## 2024-04-29 ENCOUNTER — APPOINTMENT (OUTPATIENT)
Dept: OCCUPATIONAL THERAPY | Facility: CLINIC | Age: 34
DRG: 876 | End: 2024-04-29
Attending: UROLOGY
Payer: COMMERCIAL

## 2024-04-29 LAB
MAGNESIUM SERPL-MCNC: 1.8 MG/DL (ref 1.7–2.3)
PATH REPORT.COMMENTS IMP SPEC: NORMAL
PATH REPORT.COMMENTS IMP SPEC: NORMAL
PATH REPORT.FINAL DX SPEC: NORMAL
PATH REPORT.GROSS SPEC: NORMAL
PATH REPORT.RELEVANT HX SPEC: NORMAL
PHOTO IMAGE: NORMAL
POTASSIUM SERPL-SCNC: 3.6 MMOL/L (ref 3.4–5.3)

## 2024-04-29 PROCEDURE — 120N000002 HC R&B MED SURG/OB UMMC

## 2024-04-29 PROCEDURE — 97530 THERAPEUTIC ACTIVITIES: CPT | Mod: GP

## 2024-04-29 PROCEDURE — 999N000128 HC STATISTIC PERIPHERAL IV START W/O US GUIDANCE

## 2024-04-29 PROCEDURE — 84132 ASSAY OF SERUM POTASSIUM: CPT | Performed by: COLON & RECTAL SURGERY

## 2024-04-29 PROCEDURE — 250N000013 HC RX MED GY IP 250 OP 250 PS 637

## 2024-04-29 PROCEDURE — 97535 SELF CARE MNGMENT TRAINING: CPT | Mod: GO

## 2024-04-29 PROCEDURE — 250N000013 HC RX MED GY IP 250 OP 250 PS 637: Performed by: SURGERY

## 2024-04-29 PROCEDURE — 250N000011 HC RX IP 250 OP 636: Performed by: COLON & RECTAL SURGERY

## 2024-04-29 PROCEDURE — 250N000013 HC RX MED GY IP 250 OP 250 PS 637: Performed by: COLON & RECTAL SURGERY

## 2024-04-29 PROCEDURE — 83735 ASSAY OF MAGNESIUM: CPT | Performed by: COLON & RECTAL SURGERY

## 2024-04-29 PROCEDURE — 250N000011 HC RX IP 250 OP 636: Performed by: STUDENT IN AN ORGANIZED HEALTH CARE EDUCATION/TRAINING PROGRAM

## 2024-04-29 PROCEDURE — 36415 COLL VENOUS BLD VENIPUNCTURE: CPT | Performed by: COLON & RECTAL SURGERY

## 2024-04-29 PROCEDURE — 97116 GAIT TRAINING THERAPY: CPT | Mod: GP

## 2024-04-29 RX ORDER — HYDROXYZINE HYDROCHLORIDE 25 MG/1
25 TABLET, FILM COATED ORAL EVERY 6 HOURS PRN
Status: DISCONTINUED | OUTPATIENT
Start: 2024-04-29 | End: 2024-05-01 | Stop reason: HOSPADM

## 2024-04-29 RX ORDER — METOCLOPRAMIDE HYDROCHLORIDE 5 MG/ML
5 INJECTION INTRAMUSCULAR; INTRAVENOUS EVERY 8 HOURS
Status: DISCONTINUED | OUTPATIENT
Start: 2024-04-29 | End: 2024-04-30

## 2024-04-29 RX ORDER — HYDROXYZINE HYDROCHLORIDE 50 MG/1
50 TABLET, FILM COATED ORAL EVERY 6 HOURS PRN
Status: DISCONTINUED | OUTPATIENT
Start: 2024-04-29 | End: 2024-04-30

## 2024-04-29 RX ORDER — POLYETHYLENE GLYCOL 3350 17 G/17G
17 POWDER, FOR SOLUTION ORAL DAILY
Status: DISCONTINUED | OUTPATIENT
Start: 2024-04-29 | End: 2024-05-01 | Stop reason: HOSPADM

## 2024-04-29 RX ORDER — CALCIUM CARBONATE 500 MG/1
500 TABLET, CHEWABLE ORAL DAILY PRN
Status: DISCONTINUED | OUTPATIENT
Start: 2024-04-29 | End: 2024-05-01 | Stop reason: HOSPADM

## 2024-04-29 RX ADMIN — OXYCODONE HYDROCHLORIDE 10 MG: 10 TABLET ORAL at 23:50

## 2024-04-29 RX ADMIN — PROGESTERONE 200 MG: 100 CAPSULE ORAL at 08:42

## 2024-04-29 RX ADMIN — ACETAMINOPHEN 1000 MG: 500 TABLET ORAL at 16:09

## 2024-04-29 RX ADMIN — ACETAMINOPHEN 1000 MG: 500 TABLET ORAL at 08:43

## 2024-04-29 RX ADMIN — PROCHLORPERAZINE EDISYLATE 10 MG: 5 INJECTION INTRAMUSCULAR; INTRAVENOUS at 18:57

## 2024-04-29 RX ADMIN — ERTAPENEM SODIUM 1 G: 1 INJECTION, POWDER, LYOPHILIZED, FOR SOLUTION INTRAMUSCULAR; INTRAVENOUS at 08:46

## 2024-04-29 RX ADMIN — HYDROMORPHONE HYDROCHLORIDE 0.4 MG: 0.2 INJECTION, SOLUTION INTRAMUSCULAR; INTRAVENOUS; SUBCUTANEOUS at 11:25

## 2024-04-29 RX ADMIN — HYDROMORPHONE HYDROCHLORIDE 0.4 MG: 0.2 INJECTION, SOLUTION INTRAMUSCULAR; INTRAVENOUS; SUBCUTANEOUS at 05:30

## 2024-04-29 RX ADMIN — HYDROMORPHONE HYDROCHLORIDE 0.4 MG: 0.2 INJECTION, SOLUTION INTRAMUSCULAR; INTRAVENOUS; SUBCUTANEOUS at 00:50

## 2024-04-29 RX ADMIN — METOCLOPRAMIDE 5 MG: 5 INJECTION, SOLUTION INTRAMUSCULAR; INTRAVENOUS at 16:09

## 2024-04-29 RX ADMIN — HYDROMORPHONE HYDROCHLORIDE 0.4 MG: 0.2 INJECTION, SOLUTION INTRAMUSCULAR; INTRAVENOUS; SUBCUTANEOUS at 16:51

## 2024-04-29 RX ADMIN — LIDOCAINE 4% 2 PATCH: 40 PATCH TOPICAL at 08:56

## 2024-04-29 RX ADMIN — OXYCODONE HYDROCHLORIDE 10 MG: 10 TABLET ORAL at 03:54

## 2024-04-29 RX ADMIN — OXYCODONE HYDROCHLORIDE 10 MG: 10 TABLET ORAL at 08:43

## 2024-04-29 RX ADMIN — CALCIUM CARBONATE (ANTACID) CHEW TAB 500 MG 500 MG: 500 CHEW TAB at 01:38

## 2024-04-29 RX ADMIN — PROCHLORPERAZINE EDISYLATE 10 MG: 5 INJECTION INTRAMUSCULAR; INTRAVENOUS at 03:54

## 2024-04-29 RX ADMIN — ALBUTEROL SULFATE 2 PUFF: 90 INHALANT RESPIRATORY (INHALATION) at 11:30

## 2024-04-29 RX ADMIN — METOCLOPRAMIDE 5 MG: 5 INJECTION, SOLUTION INTRAMUSCULAR; INTRAVENOUS at 08:46

## 2024-04-29 RX ADMIN — METHOCARBAMOL 1000 MG: 100 INJECTION, SOLUTION INTRAMUSCULAR; INTRAVENOUS at 09:01

## 2024-04-29 RX ADMIN — ACETAMINOPHEN 1000 MG: 500 TABLET ORAL at 12:26

## 2024-04-29 RX ADMIN — HYDROMORPHONE HYDROCHLORIDE 0.2 MG: 0.2 INJECTION, SOLUTION INTRAMUSCULAR; INTRAVENOUS; SUBCUTANEOUS at 21:45

## 2024-04-29 RX ADMIN — ACETAMINOPHEN 1000 MG: 500 TABLET ORAL at 20:06

## 2024-04-29 RX ADMIN — ENOXAPARIN SODIUM 40 MG: 40 INJECTION SUBCUTANEOUS at 08:46

## 2024-04-29 RX ADMIN — OXYCODONE HYDROCHLORIDE 10 MG: 10 TABLET ORAL at 15:31

## 2024-04-29 RX ADMIN — METOCLOPRAMIDE 5 MG: 5 INJECTION, SOLUTION INTRAMUSCULAR; INTRAVENOUS at 23:42

## 2024-04-29 ASSESSMENT — ACTIVITIES OF DAILY LIVING (ADL)
ADLS_ACUITY_SCORE: 32
ADLS_ACUITY_SCORE: 29
ADLS_ACUITY_SCORE: 32
ADLS_ACUITY_SCORE: 29
ADLS_ACUITY_SCORE: 29
ADLS_ACUITY_SCORE: 32
ADLS_ACUITY_SCORE: 29
ADLS_ACUITY_SCORE: 32
ADLS_ACUITY_SCORE: 32
ADLS_ACUITY_SCORE: 28
ADLS_ACUITY_SCORE: 29
ADLS_ACUITY_SCORE: 28
ADLS_ACUITY_SCORE: 29
ADLS_ACUITY_SCORE: 32
ADLS_ACUITY_SCORE: 28

## 2024-04-29 NOTE — PROGRESS NOTES
"Urology  Progress Note    NAEO  Pain improving though still present  No gas yet  Tolerating clears, mild nausea  Up to chair yesterday    Exam  /77 (BP Location: Right arm)   Pulse 98   Temp 98  F (36.7  C) (Oral)   Resp 18   Ht 1.702 m (5' 7\")   Wt 87.4 kg (192 lb 9.6 oz)   SpO2 97%   BMI 30.17 kg/m    No acute distress  Unlabored breathing  Abdomen soft,  appropriately tender, nondistended. Incisions CDI, bolster in place  DINORA serosanguinous  Contreras with jeremías urine in tubing      UOP 2850/700  DINORA   L abd 50/35  L labial 10/15  R labial 8/5    Labs  Recent Labs   Lab Test 04/28/24  1114 04/27/24  0609 04/26/24  1402 04/26/24  0552 04/25/24  2148 04/25/24  1659 04/10/24  1444 04/26/23  1640   WBC  --   --   --   --   --  17.9* 5.5 6.8   HGB 8.1* 7.9* 8.1* 8.1*   < > 8.5* 13.5 13.6   CR  --   --   --  0.95  --   --  0.77 0.82    < > = values in this interval not displayed.            Assessment/Plan  33 year old adult with PMH of gender dysphoria, 4 Days Post-Op s/p full depth vaginoplasty, bladder botox and colostomy takedown.  Otherwise, remaining excellent cares per primary team.    - Continue contreras catheter for 2 weeks  - No indication for mirabegron at this time - consider if patient were to develop symptoms consistent with bladder spasms  - Bolster in place until reveal  - Abx x5 days  - Urology will follow     Will discuss with Dr. Dominguez.    Jin Cheatham MD PGY2       Contacting the Urology Team     Please use the following job codes to reach the Urology Team. Note that you must use an in house phone and that job codes cannot receive text pages.   On weekdays, dial [phone] (or star-star-star 777 on the new [employer] telephones) then 0817 to reach the Adult Urology resident or PA on call  On weekdays, dial [phone] (or star-star-star 777 on the new [employer] telephones) then 0818 to reach the Pediatric Urology resident  On weeknights and weekends, dial 893 (or star-star-star 777 on the new [employer] telephones) then " [phone] to reach the Urology resident on call (for both Adult and Pediatrics)

## 2024-04-29 NOTE — PLAN OF CARE
[id number]    POD4. A&Ox4. VSS on RA. Pain managed with scheduled tylenol, prn robaxin x1, prn oxycodone x1, and prn IV dilaudid x1. Intermittent nausea continued, started on scheduled reglan. Denied nausea this shift. Denies SOB. Full liquid diet, tolerating well. DINORA drains intact with small amounts of output. Evangelista in place with adequate urine output. Pt still denies passing gas or having a BM. Refused miralax this AM, education provided. Pt stated they will take miralax if unable to have a BM tomorrow. Abd incision with liquid bandage C/D/I. Bolster in place. Up with SBA. Plan to remove bolster tomorrow. Continue with plan of care.

## 2024-04-29 NOTE — PROGRESS NOTES
[id number] VSS. A&Ox4. C/O moderate pain, managed w/ PRN dilaudid and oxycodone. Reports feeling acid reflux/nausea, managed w/ PRN tums and compazine. Jpx3 w/ serosanguinous output. U/O good via contreras. Pt resting comfortably, no other complaints at this time. Will continue w/ POC.

## 2024-04-29 NOTE — PROGRESS NOTES
Colorectal Surgery Progress Note  [facility]  POD#4      Subjective:  No acute events overnight. Feels well today. Still not passing gas, no BM. Tolerating full liquids and ambulating. No nausea. No complaints today.    Vitals:  Vitals:    04/27/24 2000 04/28/24 0346 04/28/24 0817 04/29/24 0048   BP: 111/68 116/69 123/79 123/77   BP Location: Right arm Right arm Left arm Right arm   Cuff Size: Adult Regular      Pulse: 80 81 91 98   Resp: 15 16 18 18   Temp: 98.2  F (36.8  C) 98.1  F (36.7  C) 98.3  F (36.8  C) 98  F (36.7  C)   TempSrc: Oral Oral Oral Oral   SpO2: 98% 100% 100% 97%   Weight:   87.4 kg (192 lb 9.6 oz)    Height:         I/O:  I/O last 3 completed shifts:  In: 236 [P.O.:236]  Out: 3193 [Urine:3100; Drains:93]    Physical Exam:  Gen: AAOx3, no acute distress, resting comfortably in bed  Pulm: Non-labored breathing on room air  Abd: Soft, mildly distended, non- tender, no guarding   Incision C/D/I    Stoma take down site with dressing in place.    L abd DINORA drain - serosang   L labial DINORA Drain - serosang   R labial DINORA Drain - serosang  Ext:  Warm and well-perfused    BMP  Recent Labs   Lab 04/29/24  0524 04/28/24  2123 04/28/24  1114 04/27/24  1747 04/27/24  0912 04/27/24  0609 04/26/24  0552 04/26/24  0552 04/25/24  1821 04/25/24  0659   NA  --   --   --   --   --   --   --  136  --   --    POTASSIUM 3.6  --  3.7  --   --  3.7  --  4.1  --   --    CHLORIDE  --   --   --   --   --   --   --  105  --   --    CO2  --   --   --   --   --   --   --  21*  --   --    BUN  --   --   --   --   --   --   --  11.4  --   --    CR  --   --   --   --   --   --   --  0.95  --   --    GLC  --   --   --   --   --   --   --  126* 192* 97   MAG 1.8  --  1.8  --   --  1.8  --  1.9  --   --    PHOS  --  2.9 1.3* 2.7 1.6* 1.3*   < > 2.0*  --   --     < > = values in this interval not displayed.     CBC  Recent Labs   Lab 04/28/24  1114 04/27/24  0609 04/26/24  1402 04/26/24  0552  04/25/24  2148 04/25/24  1659   WBC  --   --   --   --   --  17.9*   HGB 8.1* 7.9* 8.1* 8.1*   < > 8.5*   HCT  --  23.8*  --   --   --  24.1*   PLT  --   --   --   --   --  156    < > = values in this interval not displayed.         ASSESSMENT: This is a 33 year old PMH of incomplete T10 spinal cord injury with neurogenic bladder and straight caths at baseline, sigmoid perforation of unclear etiology s/p prior end sigmoid resection and colostomy in 2018, gender dysphoria s/p transition 3 years ago on HRT since 2021, s/p orchiectomy in 2022.  Now s/p Lap FLOYD, robotic colostomy take down, flex sig; Lap robotic assisted artifical vagina with graft (penile skin flap & scrotal full thickness grafts), total penectomy, urethroplasty, robotic assisted colpopexy (suspension of vaginal apex), scotectomy, bilat labiaplasty via adj tissue transfer of scotal and mons skin flaps, clitoroplasty, cystoscopy w/ chemodenervation of bladder on 4/25/2024.    - add miralax    Neuro/Pain: oxycodone prn, tylenol, robaxin, IV dilaudid. Ativan and lidocaine patch for muscle spasms  CV: no acute concerns at this time  PULM: encourage IS.  GI/FEN:   - continue full liquids   - ambulate  - replete electrolytes  - miralax  : continue contreras x2 weeks per uro.  Can start Mirabegron daily for bladder spasms if needed but ok to hold off for now.   - bolster in for at least 5 days per uro.  Heme: acute blood loss anemia.  Most recent baseline in the 13s.  Down trending now to 8.1, stabilized at 7.9.    ID: no acute concerns at this time  Endocrine: no acute concerns at this time    Activity: as tolerated.  Ppx: resume lovenox today  Dispo: remain inpatient At least 5 days from surgery and then pending ROBF, pain control.  Likely will have DINORA drains on discharge.      Clinically Significant Risk Factors                         # Obesity: Estimated body mass index is 30.17 kg/m  as calculated from the following:    Height as of this encounter: 1.702  "m (5' 7\").    Weight as of this encounter: 87.4 kg (192 lb 9.6 oz).      # Financial/Environmental Concerns: none         Tabatha Almonte MD  General Surgery, PGY1    Patient was seen and discussed with Dr. Rossi, discussed with Dr. Anna.  "

## 2024-04-29 NOTE — PROGRESS NOTES
"/79 (BP Location: Left arm)   Pulse 91   Temp 98.3  F (36.8  C) (Oral)   Resp 18   Ht 1.702 m (5' 7\")   Wt 87.4 kg (192 lb 9.6 oz)   SpO2 100%   BMI 30.17 kg/m      [id number]    Patient VSS, on room air, A&Ox4, assist x2. Pain stated as a 6/10 while ambulating, 0.2 mg dilaudid given x1, 10 mg oxycodone given x1. Patient ambulated outside of the room for about 60 feet today. Patient sat on the toilet but was unable to pass any gas or have a BM. Woke up having leg spasms and feeling anxious, lorazepam given x1. Continue POC.   "

## 2024-04-30 ENCOUNTER — APPOINTMENT (OUTPATIENT)
Dept: PHYSICAL THERAPY | Facility: CLINIC | Age: 34
DRG: 876 | End: 2024-04-30
Attending: UROLOGY
Payer: COMMERCIAL

## 2024-04-30 ENCOUNTER — APPOINTMENT (OUTPATIENT)
Dept: GENERAL RADIOLOGY | Facility: CLINIC | Age: 34
DRG: 876 | End: 2024-04-30
Attending: UROLOGY
Payer: COMMERCIAL

## 2024-04-30 ENCOUNTER — APPOINTMENT (OUTPATIENT)
Dept: OCCUPATIONAL THERAPY | Facility: CLINIC | Age: 34
DRG: 876 | End: 2024-04-30
Attending: UROLOGY
Payer: COMMERCIAL

## 2024-04-30 LAB
HOLD SPECIMEN: NORMAL
MAGNESIUM SERPL-MCNC: 2.2 MG/DL (ref 1.7–2.3)
PHOSPHATE SERPL-MCNC: 2.2 MG/DL (ref 2.5–4.5)
POTASSIUM SERPL-SCNC: 3.9 MMOL/L (ref 3.4–5.3)

## 2024-04-30 PROCEDURE — 83735 ASSAY OF MAGNESIUM: CPT | Performed by: COLON & RECTAL SURGERY

## 2024-04-30 PROCEDURE — 120N000002 HC R&B MED SURG/OB UMMC

## 2024-04-30 PROCEDURE — 250N000013 HC RX MED GY IP 250 OP 250 PS 637: Performed by: COLON & RECTAL SURGERY

## 2024-04-30 PROCEDURE — 250N000011 HC RX IP 250 OP 636: Mod: JZ | Performed by: COLON & RECTAL SURGERY

## 2024-04-30 PROCEDURE — 250N000011 HC RX IP 250 OP 636: Mod: JZ | Performed by: PHYSICIAN ASSISTANT

## 2024-04-30 PROCEDURE — 97530 THERAPEUTIC ACTIVITIES: CPT | Mod: GP

## 2024-04-30 PROCEDURE — 258N000003 HC RX IP 258 OP 636

## 2024-04-30 PROCEDURE — 97535 SELF CARE MNGMENT TRAINING: CPT | Mod: GO

## 2024-04-30 PROCEDURE — 250N000013 HC RX MED GY IP 250 OP 250 PS 637

## 2024-04-30 PROCEDURE — 74019 RADEX ABDOMEN 2 VIEWS: CPT | Mod: 26 | Performed by: RADIOLOGY

## 2024-04-30 PROCEDURE — 250N000011 HC RX IP 250 OP 636: Performed by: STUDENT IN AN ORGANIZED HEALTH CARE EDUCATION/TRAINING PROGRAM

## 2024-04-30 PROCEDURE — 250N000011 HC RX IP 250 OP 636: Performed by: PHYSICIAN ASSISTANT

## 2024-04-30 PROCEDURE — 250N000013 HC RX MED GY IP 250 OP 250 PS 637: Performed by: PHYSICIAN ASSISTANT

## 2024-04-30 PROCEDURE — 250N000011 HC RX IP 250 OP 636

## 2024-04-30 PROCEDURE — 74019 RADEX ABDOMEN 2 VIEWS: CPT

## 2024-04-30 PROCEDURE — 84132 ASSAY OF SERUM POTASSIUM: CPT | Performed by: COLON & RECTAL SURGERY

## 2024-04-30 PROCEDURE — 250N000013 HC RX MED GY IP 250 OP 250 PS 637: Performed by: SURGERY

## 2024-04-30 PROCEDURE — 999N000248 HC STATISTIC IV INSERT WITH US BY RN

## 2024-04-30 PROCEDURE — 84100 ASSAY OF PHOSPHORUS: CPT | Performed by: COLON & RECTAL SURGERY

## 2024-04-30 PROCEDURE — 36415 COLL VENOUS BLD VENIPUNCTURE: CPT | Performed by: COLON & RECTAL SURGERY

## 2024-04-30 PROCEDURE — 97116 GAIT TRAINING THERAPY: CPT | Mod: GP

## 2024-04-30 RX ORDER — METHOCARBAMOL 750 MG/1
750 TABLET, FILM COATED ORAL 4 TIMES DAILY PRN
Status: DISCONTINUED | OUTPATIENT
Start: 2024-04-30 | End: 2024-05-01 | Stop reason: HOSPADM

## 2024-04-30 RX ORDER — ESTRADIOL VALERATE 20 MG/ML
20 INJECTION INTRAMUSCULAR WEEKLY
Status: DISCONTINUED | OUTPATIENT
Start: 2024-04-30 | End: 2024-04-30

## 2024-04-30 RX ORDER — HYDROMORPHONE HCL IN WATER/PF 6 MG/30 ML
0.2 PATIENT CONTROLLED ANALGESIA SYRINGE INTRAVENOUS
Status: DISCONTINUED | OUTPATIENT
Start: 2024-04-30 | End: 2024-05-01 | Stop reason: HOSPADM

## 2024-04-30 RX ORDER — PROCHLORPERAZINE MALEATE 5 MG
5 TABLET ORAL EVERY 6 HOURS PRN
Status: DISCONTINUED | OUTPATIENT
Start: 2024-04-30 | End: 2024-05-01 | Stop reason: HOSPADM

## 2024-04-30 RX ORDER — IBUPROFEN 600 MG/1
600 TABLET, FILM COATED ORAL 3 TIMES DAILY
Status: DISCONTINUED | OUTPATIENT
Start: 2024-04-30 | End: 2024-05-01 | Stop reason: HOSPADM

## 2024-04-30 RX ORDER — METHOCARBAMOL 750 MG/1
750 TABLET, FILM COATED ORAL 4 TIMES DAILY PRN
Qty: 30 TABLET | Refills: 0 | Status: SHIPPED | OUTPATIENT
Start: 2024-05-01 | End: 2024-08-13

## 2024-04-30 RX ORDER — ACETAMINOPHEN 500 MG
1000 TABLET ORAL 4 TIMES DAILY
Qty: 80 TABLET | Refills: 0 | Status: SHIPPED | OUTPATIENT
Start: 2024-05-01 | End: 2024-08-13

## 2024-04-30 RX ORDER — OXYCODONE HYDROCHLORIDE 5 MG/1
5-10 TABLET ORAL EVERY 4 HOURS PRN
Qty: 24 TABLET | Refills: 0 | Status: SHIPPED | OUTPATIENT
Start: 2024-05-01 | End: 2024-08-13

## 2024-04-30 RX ORDER — METOCLOPRAMIDE HYDROCHLORIDE 5 MG/ML
5 INJECTION INTRAMUSCULAR; INTRAVENOUS EVERY 8 HOURS
Status: DISCONTINUED | OUTPATIENT
Start: 2024-04-30 | End: 2024-04-30

## 2024-04-30 RX ORDER — HYDROMORPHONE HCL IN WATER/PF 6 MG/30 ML
0.4 PATIENT CONTROLLED ANALGESIA SYRINGE INTRAVENOUS
Status: DISCONTINUED | OUTPATIENT
Start: 2024-04-30 | End: 2024-05-01 | Stop reason: HOSPADM

## 2024-04-30 RX ORDER — ESTRADIOL VALERATE 20 MG/ML
6 INJECTION INTRAMUSCULAR WEEKLY
Status: DISCONTINUED | OUTPATIENT
Start: 2024-04-30 | End: 2024-04-30

## 2024-04-30 RX ORDER — POLYETHYLENE GLYCOL 3350 17 G/17G
17 POWDER, FOR SOLUTION ORAL DAILY
Qty: 510 G | Refills: 0 | Status: SHIPPED | OUTPATIENT
Start: 2024-05-01 | End: 2024-08-13

## 2024-04-30 RX ORDER — LIDOCAINE 4 G/G
2 PATCH TOPICAL EVERY 24 HOURS
Qty: 10 PATCH | Refills: 0 | Status: SHIPPED | OUTPATIENT
Start: 2024-05-01 | End: 2024-08-13

## 2024-04-30 RX ORDER — HYDROMORPHONE HYDROCHLORIDE 1 MG/ML
0.3 INJECTION, SOLUTION INTRAMUSCULAR; INTRAVENOUS; SUBCUTANEOUS ONCE
Status: COMPLETED | OUTPATIENT
Start: 2024-04-30 | End: 2024-04-30

## 2024-04-30 RX ORDER — ESTRADIOL VALERATE 40 MG/ML
2 INJECTION INTRAMUSCULAR WEEKLY
Status: DISCONTINUED | OUTPATIENT
Start: 2024-04-30 | End: 2024-05-01 | Stop reason: HOSPADM

## 2024-04-30 RX ADMIN — METHOCARBAMOL 750 MG: 750 TABLET ORAL at 16:22

## 2024-04-30 RX ADMIN — OXYCODONE HYDROCHLORIDE 10 MG: 10 TABLET ORAL at 06:25

## 2024-04-30 RX ADMIN — HYDROMORPHONE HYDROCHLORIDE 0.3 MG: 1 INJECTION, SOLUTION INTRAMUSCULAR; INTRAVENOUS; SUBCUTANEOUS at 10:06

## 2024-04-30 RX ADMIN — IRON SUCROSE 300 MG: 20 INJECTION, SOLUTION INTRAVENOUS at 17:07

## 2024-04-30 RX ADMIN — IBUPROFEN 600 MG: 600 TABLET, FILM COATED ORAL at 20:40

## 2024-04-30 RX ADMIN — POTASSIUM & SODIUM PHOSPHATES POWDER PACK 280-160-250 MG 1 PACKET: 280-160-250 PACK at 10:54

## 2024-04-30 RX ADMIN — CALCIUM CARBONATE (ANTACID) CHEW TAB 500 MG 500 MG: 500 CHEW TAB at 03:59

## 2024-04-30 RX ADMIN — PROGESTERONE 200 MG: 100 CAPSULE ORAL at 08:36

## 2024-04-30 RX ADMIN — ACETAMINOPHEN 1000 MG: 500 TABLET ORAL at 16:21

## 2024-04-30 RX ADMIN — POLYETHYLENE GLYCOL 3350 17 G: 17 POWDER, FOR SOLUTION ORAL at 08:34

## 2024-04-30 RX ADMIN — METOCLOPRAMIDE 5 MG: 5 INJECTION, SOLUTION INTRAMUSCULAR; INTRAVENOUS at 08:34

## 2024-04-30 RX ADMIN — OXYCODONE HYDROCHLORIDE 10 MG: 10 TABLET ORAL at 12:01

## 2024-04-30 RX ADMIN — ENOXAPARIN SODIUM 40 MG: 40 INJECTION SUBCUTANEOUS at 08:33

## 2024-04-30 RX ADMIN — HYDROMORPHONE HYDROCHLORIDE 0.2 MG: 0.2 INJECTION, SOLUTION INTRAMUSCULAR; INTRAVENOUS; SUBCUTANEOUS at 08:34

## 2024-04-30 RX ADMIN — OXYCODONE HYDROCHLORIDE 10 MG: 10 TABLET ORAL at 19:49

## 2024-04-30 RX ADMIN — METOCLOPRAMIDE 5 MG: 5 INJECTION, SOLUTION INTRAMUSCULAR; INTRAVENOUS at 16:22

## 2024-04-30 RX ADMIN — ACETAMINOPHEN 1000 MG: 500 TABLET ORAL at 08:34

## 2024-04-30 RX ADMIN — POTASSIUM & SODIUM PHOSPHATES POWDER PACK 280-160-250 MG 1 PACKET: 280-160-250 PACK at 16:23

## 2024-04-30 RX ADMIN — HYDROMORPHONE HYDROCHLORIDE 0.4 MG: 0.2 INJECTION, SOLUTION INTRAMUSCULAR; INTRAVENOUS; SUBCUTANEOUS at 03:25

## 2024-04-30 RX ADMIN — LIDOCAINE 4% 2 PATCH: 40 PATCH TOPICAL at 08:35

## 2024-04-30 RX ADMIN — PROCHLORPERAZINE MALEATE 5 MG: 5 TABLET ORAL at 11:46

## 2024-04-30 RX ADMIN — ERTAPENEM SODIUM 1 G: 1 INJECTION, POWDER, LYOPHILIZED, FOR SOLUTION INTRAMUSCULAR; INTRAVENOUS at 08:34

## 2024-04-30 RX ADMIN — OXYCODONE HYDROCHLORIDE 10 MG: 10 TABLET ORAL at 16:22

## 2024-04-30 RX ADMIN — PROCHLORPERAZINE EDISYLATE 10 MG: 5 INJECTION INTRAMUSCULAR; INTRAVENOUS at 03:55

## 2024-04-30 RX ADMIN — ACETAMINOPHEN 1000 MG: 500 TABLET ORAL at 12:01

## 2024-04-30 RX ADMIN — ACETAMINOPHEN 1000 MG: 500 TABLET ORAL at 19:49

## 2024-04-30 ASSESSMENT — ACTIVITIES OF DAILY LIVING (ADL)
ADLS_ACUITY_SCORE: 29
ADLS_ACUITY_SCORE: 31
ADLS_ACUITY_SCORE: 29
ADLS_ACUITY_SCORE: 31
ADLS_ACUITY_SCORE: 29

## 2024-04-30 NOTE — PROGRESS NOTES
CLINICAL NUTRITION SERVICES    Reason for Assessment:   Nutrition education regarding low fiber diet education    Diet History:   Patient has no history of low fiber diet education.    Nutrition Diagnosis:   Food- and nutrition-related knowledge deficit r/t no previous knowledge of low fiber diet as evidenced by patient report    Interventions:   Provided instruction on low fiber diet. Discussed each food group and foods to eat and avoid. Answered patient's questions regarding diet.     Provided handouts : Fiber-Restricted Nutrition Therapy (Nutrition Care Manual) and Low Fiber Diet hospital menu    Goals:   Patient will verbalize at least 5 low fiber foods acceptable on diet and 5 high fiber foods to avoid.    Follow-up:   Patient to ask any further nutrition-related questions before discharge.  In addition, pt may request outpatient RD appointment.    Clara Santiago RD, LD   Available on [facility]  No longer available by pager

## 2024-04-30 NOTE — PROGRESS NOTES
Colorectal Surgery Progress Note  [facility]  POD#5      Subjective:  No acute events overnight. Feels better today overall.  Still no gas/stool.  Feels some gas starting to move.  Did have a brief episode of heartburn like sensations yesterday but not persistent.  Tolerated full liquid diet and few bites of a cracker.  Walked yesterday.      Vitals:  Vitals:    04/29/24 0831 04/29/24 1611 04/30/24 0014 04/30/24 0753   BP:  120/77 104/64 109/67   BP Location:  Right arm Right arm Right arm   Pulse:  97 81 85   Resp:  18 18 14   Temp:  98.2  F (36.8  C) 98.2  F (36.8  C) 98.4  F (36.9  C)   TempSrc:  Oral Oral Oral   SpO2:  92% 98% 97%   Weight: 87 kg (191 lb 14.4 oz)   86.5 kg (190 lb 9.6 oz)   Height:         I/O:  I/O last 3 completed shifts:  In: 2260 [P.O.:2160; I.V.:100]  Out: 3270 [Urine:3200; Drains:70]    Physical Exam:  Gen: AAOx3, no acute distress, resting comfortably in bed, in good spirits  Pulm: Non-labored breathing on room air  Abd: Soft, mildly distended, non- tender, no guarding   Incision C/D/I    Stoma take down site with dressing in place.    L abd DINORA drain - serosang   L labial DINORA Drain - serosang   R labial DINORA Drain - serosang  Ext:  Warm and well-perfused    BMP  Recent Labs   Lab 04/29/24  0524 04/28/24  2123 04/28/24  1114 04/27/24  1747 04/27/24  0912 04/27/24  0609 04/26/24  0552 04/26/24  0552 04/25/24  1821 04/25/24  0659   NA  --   --   --   --   --   --   --  136  --   --    POTASSIUM 3.6  --  3.7  --   --  3.7  --  4.1  --   --    CHLORIDE  --   --   --   --   --   --   --  105  --   --    CO2  --   --   --   --   --   --   --  21*  --   --    BUN  --   --   --   --   --   --   --  11.4  --   --    CR  --   --   --   --   --   --   --  0.95  --   --    GLC  --   --   --   --   --   --   --  126* 192* 97   MAG 1.8  --  1.8  --   --  1.8  --  1.9  --   --    PHOS  --  2.9 1.3* 2.7 1.6* 1.3*   < > 2.0*  --   --     < > = values in this interval not  displayed.     CBC  Recent Labs   Lab 04/28/24  1114 04/27/24  0609 04/26/24  1402 04/26/24  0552 04/25/24  2148 04/25/24  1659   WBC  --   --   --   --   --  17.9*   HGB 8.1* 7.9* 8.1* 8.1*   < > 8.5*   HCT  --  23.8*  --   --   --  24.1*   PLT  --   --   --   --   --  156    < > = values in this interval not displayed.         ASSESSMENT: This is a 33 year old PMH of incomplete T10 spinal cord injury with neurogenic bladder and straight caths at baseline, sigmoid perforation of unclear etiology s/p prior end sigmoid resection and colostomy in 2018, gender dysphoria s/p transition 3 years ago on HRT since 2021, s/p orchiectomy in 2022.  Now s/p Lap FLOYD, robotic colostomy take down, flex sig; Lap robotic assisted artifical vagina with graft (penile skin flap & scrotal full thickness grafts), total penectomy, urethroplasty, robotic assisted colpopexy (suspension of vaginal apex), scotectomy, bilat labiaplasty via adj tissue transfer of scotal and mons skin flaps, clitoroplasty, cystoscopy w/ chemodenervation of bladder on 4/25/2024.    - continue Miralax and reglan  - OK for small amounts of a LRD     Neuro/Pain: oxycodone prn, tylenol, robaxin, IV dilaudid. Ativan & lidocaine patch for muscle spasms  CV: no acute concerns at this time  PULM: encourage IS.  GI/FEN:   - LRD  - ambulate  - replete electrolytes  - miralax & reglan  : continue contreras x2 weeks per uro.  Can start Mirabegron daily for bladder spasms if needed but ok to hold off for now.   - bolster in for at least 5 days per uro.  Heme: acute blood loss anemia.  Most recent baseline in the 13s.  Down trending now to 8.1, stabilized 7.9-8.1's.   ID: no acute concerns at this time  Endocrine: no acute concerns at this time    Activity: as tolerated.  Ppx: lovenox ppx  Dispo: pending ROBF in 1-2 days, pain control.  Likely will have some DINORA drains on discharge.      Clinically Significant Risk Factors                         # Overweight: Estimated body mass  "index is 29.85 kg/m  as calculated from the following:    Height as of this encounter: 1.702 m (5' 7\").    Weight as of this encounter: 86.5 kg (190 lb 9.6 oz).      # Financial/Environmental Concerns: none         Lore Collier PA-C ..................4/30/2024   8:37 AM  Colon and Rectal Surgery    Patient was seen and discussed with Dr. Rossi    The above plan of care was performed and communicated to me by Dr. Anna    I spent 25 minute face-to-face or coordinating care of Hannah Chavez. Over 50% of our time on the unit was spent counseling the patient and/or coordinating care as documented in the assessment and plan.     59450 post op hospital visit    "

## 2024-04-30 NOTE — PLAN OF CARE
A&O X4. Afebrile, VSS. Lethargic, but participates with cares. Bolster, internal stent and bilateral groin JPs removed. Passing gas, no BM. Contreras in place, adequate urine output. Low fiber diet, appetite is inadequate. Up with assist of 1, up to chair 2X, walked 1X this shift. Phos replaced, recheck with AM labs. Left PIV, SL. Teaching for contreras care done, handouts and supplies provided.

## 2024-04-30 NOTE — PLAN OF CARE
"[id number]    /77 (BP Location: Right arm)   Pulse 97   Temp 98.2  F (36.8  C) (Oral)   Resp 18   Ht 1.702 m (5' 7\")   Wt 87 kg (191 lb 14.4 oz)   SpO2 92%   BMI 30.06 kg/m      Afebrile, VSS on RA. Pain managed with scheduled medications, PRN dilaudid x2, and oxycodone x1. PRN compazine given x1 for nausea. Denied SOB. Assist x1 with gait belt and walker, ambulated outside of room this shift. Fair PO intake on full liquid diet. Evangelista patent with AUOP. No BM or gas, denied interventions, stated she will try tomorrow if she cannot go. Continue with POC.     Goal Outcome Evaluation:      Plan of Care Reviewed With: patient    Overall Patient Progress: no changeOverall Patient Progress: no change           "

## 2024-04-30 NOTE — PLAN OF CARE
[id number] A&Ox4, VSS on RA. Pain managed w/ oxycodone and IV dilaudid. Up to chair and bathroom several times overnight with 1 assist, gait belt and walker. Pt is belching but has not passed gas or had a BM but reports lots of gas pains. Mesh panties and pad changed overnight for dried drainage. Vaginal bolster in place, Jps within minimal output, contreras w/ adequate urine output. Compazine given x1.       Goal Outcome Evaluation:    Plan of Care Reviewed With: patient    Overall Patient Progress: no change

## 2024-04-30 NOTE — DISCHARGE SUMMARY
[facility]  Discharge Summary  Colon and Rectal Surgery     Hannah Chavez MRN# [id number]   YOB: 1990 Age: 33 year old     Date of Admission:  2024  Date of Discharge::  2024  Admitting Physician:  Amadou Anna MD  Discharge Physician:  Amadou Anna MD  Primary Care Physician:        Hayden Cerda          Admission Diagnoses:   Gender dysphoria [F64.9]  History of Sigmoid perforation s/p Hartmanns with colostomy  Colostomy status  History of T10 spinal cord injury with Neurogenic bladder          Discharge Diagnosis:   Gender dysphoria [F64.9]  History of Sigmoid perforation s/p Hartmanns with colostomy  Colostomy status  History of T10 spinal cord injury with Neurogenic bladder    Acute blood loss anemia  Hypophosphatemia         Procedures:   2024:     PROCEDURE: COLORECTAL   Laparoscopic lysis of adhesions (45 minutes).    Laparoscopic robotic assisted colostomy takedown.  Laparoscopic robotic assisted takedown of splenic flexure.  Flexible sigmoidoscopy.  Intraoperative ICG microangiography.    Robotic assisted gender affirmation surgery (per Dr. Dominguez).     ANESTHESIA: General endotracheal anesthesia plus bilateral TAP blocks.      PROCEDURE: UROLOGY  Robotic Assisted Laparoscopic Penile inversion vaginoplasty, which includes the followin. Robotic Assisted Laparoscopic Creation of Artificial Vagina with Graft (penile skin flap + scrotal full thickness grafts)  2. Total Penectomy  3. Urethroplasty  4. Robotic Assisted Laparoscopy, surgical, colpopexy (suspension of vaginal apex)  5. Scrotectomy  6. Bilateral labiaplasty via adjacent tissue transfer of scrotal and mons skin flaps, size 12 x 5 cm on left and 12 x 5 cm on right.    7. Clitoroplasty  8. Cystoscopy with chemodenervation of bladder (200u)  9. Colostomy Takedown by Dr. Anna          Consultations:   SOCIAL WORK IP CONSULT  NUTRITION SERVICES  "ADULT IP CONSULT  OCCUPATIONAL THERAPY ADULT IP CONSULT  PHYSICAL THERAPY ADULT IP CONSULT  CARE MANAGEMENT / SOCIAL WORK IP CONSULT  NURSING TO CONSULT FOR VASCULAR ACCESS CARE IP CONSULT  NURSING TO CONSULT FOR VASCULAR ACCESS CARE IP CONSULT  NURSING TO CONSULT FOR VASCULAR ACCESS CARE IP CONSULT         Imaging Studies:     Results for orders placed or performed during the hospital encounter of 04/25/24   POC US Guidance Needle Placement    Narrative    Transversus abdominis plane block    Impression    Transversus abdominis plane block            Medications Prior to Admission:     Facility-Administered Medications Prior to Admission   Medication Dose Route Frequency Provider Last Rate Last Admin    Botulinum Toxin Type A (BOTOX) 200 units injection 200 Units  200 Units Intramuscular Once Chilango Dominguez MD        Botulinum Toxin Type A (BOTOX) 200 units injection 200 Units  200 Units Intramuscular Once Chilango Dominguez MD         Medications Prior to Admission   Medication Sig Dispense Refill Last Dose    ALBUTEROL IN Inhale 1-2 puffs into the lungs 4 times daily as needed (shortness of breath)   4/25/2024 at 0525    diazepam (VALIUM) 5 MG tablet Take 5 mg by mouth every 6 hours as needed   More than a month    estradiol valerate (DELESTROGEN) 20 MG/ML injection Inject into the muscle once a week Saturday     0.11ml (2.2mg)   Past Week    progesterone (PROMETRIUM) 200 MG capsule Take 1 capsule by mouth every morning   4/18/2024    QUEtiapine (SEROQUEL) 25 MG tablet Take 25 mg by mouth as needed   4/19/2024    Insulin Syringe-Needle U-100 (B-D INSULIN SYRINGE) 25G X 1\" 1 ML MISC        Sharps Container MISC Sharps Container             Discharge Medications:     Current Discharge Medication List        START taking these medications    Details   acetaminophen (TYLENOL) 500 MG tablet Take 2 tablets (1,000 mg) by mouth 4 times daily  Qty: 80 tablet, Refills: 0    Associated Diagnoses: Acute post-operative pain " "     ibuprofen (ADVIL/MOTRIN) 600 MG tablet Take 1 tablet (600 mg) by mouth 3 times daily  Qty: 21 tablet, Refills: 0    Associated Diagnoses: Acute post-operative pain      Lidocaine (LIDOCARE) 4 % Patch Place 2 patches onto the skin every 24 hours To prevent lidocaine toxicity, patient should be patch free for 12 hrs daily.  Qty: 10 patch, Refills: 0    Associated Diagnoses: Acute post-operative pain      methocarbamol (ROBAXIN) 750 MG tablet Take 1 tablet (750 mg) by mouth 4 times daily as needed for muscle spasms  Qty: 30 tablet, Refills: 0    Associated Diagnoses: Acute post-operative pain      oxyCODONE (ROXICODONE) 5 MG tablet Take 1-2 tablets (5-10 mg) by mouth every 4 hours as needed for moderate to severe pain  Qty: 24 tablet, Refills: 0    Associated Diagnoses: Acute post-operative pain      polyethylene glycol (MIRALAX) 17 GM/Dose powder Take 17 g by mouth daily  Qty: 510 g, Refills: 0    Associated Diagnoses: Acute post-operative pain           CONTINUE these medications which have NOT CHANGED    Details   ALBUTEROL IN Inhale 1-2 puffs into the lungs 4 times daily as needed (shortness of breath)      diazepam (VALIUM) 5 MG tablet Take 5 mg by mouth every 6 hours as needed      estradiol valerate (DELESTROGEN) 20 MG/ML injection Inject into the muscle once a week Saturday     0.11ml (2.2mg)      progesterone (PROMETRIUM) 200 MG capsule Take 1 capsule by mouth every morning      QUEtiapine (SEROQUEL) 25 MG tablet Take 25 mg by mouth as needed      Insulin Syringe-Needle U-100 (B-D INSULIN SYRINGE) 25G X 1\" 1 ML MISC       Sharps Container MISC Sharps Container                   Brief History of Illness:    This is a 33 year old PMH of incomplete T10 spinal cord injury with neurogenic bladder and straight caths at baseline, sigmoid perforation of unclear etiology s/p prior end sigmoid resection and colostomy in 2018, gender dysphoria s/p transition 3 years ago on HRT since 2021, s/p orchiectomy in 2022.  " "Now s/p Lap FLOYD, robotic colostomy take down, flex sig; Lap robotic assisted artifical vagina with graft (penile skin flap & scrotal full thickness grafts), total penectomy, urethroplasty, robotic assisted colpopexy (suspension of vaginal apex), scotectomy, bilat labiaplasty via adj tissue transfer of scotal and mons skin flaps, clitoroplasty, cystoscopy w/ chemodenervation of bladder on 4/25/2024.            Hospital Course:   Post-operatively pt was gently fluid resuscitated.  Contreras was kept in place due to urethroplasty per Urology.  Is to keep contreras in for at least 2 weeks.  Pt did have acute blood loss anemia inherent to the extensive procedure and received 1U PRBC.  Serial Hgb did stabilize.  Pt did receive 5 days of post-operative ertapenem per urology team.  Pt had eventual return of bowel function with the help of reglan and miralax and was able to tolerate small amounts of a low fiber diet.  Bilateral groin DINORA drains were removed by Urology team.  The left pelvic abdominal DINORA Drain was removed by the CRS team prior to discharge however 2 sutures remain at this site.      Pt was seen by PT/OT and deemed appropriate for discharge home.  Pt did receive outpatient Estradiol injection on 4/30/2024.  Post-operative pain was controlled with scheduled tylenol, ibuprofen, robaxin and prn oxycodone, lidoderm patches.      Patient is to follow up in the Colon and Rectal Surgery Clinic in 2-3 week with Freida Wayne NP or Elyssa Pierre PA-C and then with Dr. Anna in 3-4 weeks after.  There is a penrose drain sutured to prior colostomy take down site wound and also 2 remaining sutures at prior left abdominal DINORA drain site, both will be removed in the CRS clinic.  Pt is to see Dr. Dominguez in 1 week after discharge.          Day of Discharge Physical Exam:   Blood pressure 109/67, pulse 85, temperature 98.4  F (36.9  C), temperature source Oral, resp. rate 14, height 1.702 m (5' 7\"), weight 86.5 kg (190 lb " 9.6 oz), SpO2 97%.    Gen: AAOx3, NAD  Pulm: Non-labored breathing  Abd: Soft, appropriately tender, no guarding/rebound   Incision C/D/I    Prior stoma site - penrose drain in place with serosang drainage.    Left abdomen - DINORA drain was removed on day of discharge, however 2 sutures remain at this site.     Defer perineal exam to Urology team  Ext:  Warm and well-perfused         Final Pathology Result:     Case Report   Surgical Pathology Report                         Case: [id number]                                   Authorizing Provider:  Chilango Dominguez MD        Collected:           04/25/2024 10:08 AM           Ordering Location:     [facility] MAIN OR                 Received:            04/25/2024 02:58 PM           Pathologist:           Laurence Rodríguez MD                                                           Specimens:   A) - Penis, penis and urethra                                                                        B) - Large Intestine, Colon, Colostomy                                                              C) - Rectum, Rectal stump                                                                  Final Diagnosis   A. penis and urethra:  -Penis and urethral, unremarkable     B. Colostomy:  -Segment of colon, unremarkable     C. Rectal stump:  -Rectal tissue, unremarkable            Discharge Instructions and Follow-Up:     Discharge Procedure Orders   Reason for your hospital stay   Order Comments: 4/25/2024:   PROCEDURE:  1. Laparoscopic lysis of adhesions (45 minutes).    2. Laparoscopic robotic assisted colostomy takedown.  3. Laparoscopic robotic assisted takedown of splenic flexure.  4. Flexible sigmoidoscopy.  5. Intraoperative ICG microangiography.    6. Robotic assisted gender affirmation surgery (per Dr. Dominguez).     ANESTHESIA: General endotracheal anesthesia plus bilateral TAP blocks      PROCEDURE: UROLOGY  Robotic Assisted Laparoscopic Penile inversion vaginoplasty, which includes  the followin. Robotic Assisted Laparoscopic Creation of Artificial Vagina with Graft (penile skin flap + scrotal full thickness grafts)  2. Total Penectomy  3. Urethroplasty  4. Robotic Assisted Laparoscopy, surgical, colpopexy (suspension of vaginal apex)  5. Scrotectomy  6. Bilateral labiaplasty via adjacent tissue transfer of scrotal and mons skin flaps, size 12 x 5 cm on left and 12 x 5 cm on right.    7. Clitoroplasty  8. Cystoscopy with chemodenervation of bladder (200u)  9. Colostomy Takedown by Dr. Anna     Activity   Order Comments: Your activity upon discharge:   ACTIVITY  -No lifting, pushing, pulling greater than 10 lbs and no strenuous exercise for 6 weeks   -Do not insert anything into your anus or rectum for 6 weeks  -No driving while on narcotic analgesics (i.e. Percocet, oxycodone, Vicodin)  -No driving until you are able to fully twist to both sides or slam on brakes quickly and without any pain  -We encourage walking at least 4-5 times per day     Order Specific Question Answer Comments   Is discharge order? Yes      [facility]ult [facility] Follow-up and recommended labs and tests   Order Comments: WOUND CARE  -Inspect your wounds daily for signs of infection (increased redness, drainage, pain)  -Keep your wound clean and dry  -You may shower, but do not soak in tub or pool    You prior Colostomy take down site wound:   A Penrose drain in in place.  This will be removed in the Colon and Rectal Surgery Clinic.  Cover with gauze and change the overlying gauze 1-2 times per day and more as needed for drainage/saturation/showering.     NOTIFY  Please contact Yuli Osorio RN or Carla Gilliam RN at [phone] for problems after discharge such as:  -Temperature > 101F, chills, rigors, dizziness  -Redness around or purulent drainage from wound  -Inability to tolerate diet, nausea or vomiting  -You stop passing gas, develop significant bloating, abdominal pain  -Have blood in stools/vomit  -Have severe  diarrhea/constipation  -Any other questions or concerns.  - At nights (after 4:30pm), on weekends, or if urgent, call [phone] and ask the  to speak with the on-call Colorectal Surgery resident or fellow      Medication Instructions  Some of your medications may have changed. Please take only prescribed and resumed medications     FOLLOW-UP  1.  You will need to follow-up with Freida Wayne NP or Elyssa Pierre PA-C in the Colon and Rectal Surgery clinic in 2-3 week(s) and then with CRS Staff: Dr. Amadou Anna in 4-5 weeks after.  Please contact our Nurses (phone # [phone]) if you have not heard from our clinic in 3 business days afer discharge to schedule a follow-up appointment.    2.  Follow up with Urologist, Dr. Dominguez in 1 week after discharge.     3.  Dilate three times per day or as instructed by Dr. Dominguez.     4.  The Penrose drain in prior ostomy site wound and also the 2 remaining sutures at your prior left abdominal drain site will be removed in the Colon and Rectal Surgery Clinic.     5.  Do recommend daily miralax.  Can intermittently hold a dose of miralax or intermittently decrease to half dose for significant diarrhea.        Appointments on [place] and/or [facility] (with [facility] or [facility] provider or service). Call [phone] if you haven't heard regarding these appointments within 7 days of discharge.     Walker Order for DME - ONLY FOR DME     Order Specific Question Answer Comments   Medical Equipment (DME) Supplier: [employer]    PATIENT INSTRUCTIONS: If you did not receive this ordered item today, please contact [employer] for availability ([place] Locations: [phone], [place]: [phone]).    Start Date: 2024    Walker Type: Standard (2 Wheel)    Diagnosis: R26.89 - Impaired Gait and Mobility      Diet   Order Comments: Follow this diet upon discharge:   DIET  -Low Residue Diet for at least 4-6 weeks  unless cleared by Colorectal surgery.  No raw vegetables, fruit skins, fibrous foods that require a lot of chewing, nuts, seeds, corn, popcorn.   -We recommend eating slowly, chewing thoroughly, eating small frequent meals throughout the day  -Stay well hydrated.     Order Specific Question Answer Comments   Is discharge order? Yes             Home Health Care:     Not needed at this time           Discharge Disposition:     Discharged to home      Condition at discharge: Stable      Lore Collier PA-C ..................5/1/2024   11:47 AM  Colon and Rectal Surgery     Pt was seen and discussed with Dr. Rossi on 5/1/2024    The above plan of care was performed and communicated to me by CRS Staff: Dr. Amadou Anna     I spent 35 minute face-to-face or coordinating care of Hannah Chavez. Over 50% of our time on the unit was spent counseling the patient and/or coordinating care as documented in the assessment and plan.     01171 post op hospital visit

## 2024-04-30 NOTE — PROGRESS NOTES
"Urology  Progress Note    NAEO  Pain improving though still present  No gas yet  Tolerating fulls, mild nausea  Up to chair yesterday,ambulating    Exam  /64 (BP Location: Right arm)   Pulse 81   Temp 98.2  F (36.8  C) (Oral)   Resp 18   Ht 1.702 m (5' 7\")   Wt 87 kg (191 lb 14.4 oz)   SpO2 98%   BMI 30.06 kg/m    No acute distress  Unlabored breathing  Abdomen soft,  appropriately tender, nondistended. Incisions CDI, bolster in place  DINORA serosanguinous  Contreras with jeremías urine in tubing      UOP 3300/--  DINORA   L abd 60/15  L labial 15/--  R labial 30/--    Labs  Recent Labs   Lab Test 04/28/24  1114 04/27/24  0609 04/26/24  1402 04/26/24  0552 04/25/24  2148 04/25/24  1659 04/10/24  1444 04/26/23  1640   WBC  --   --   --   --   --  17.9* 5.5 6.8   HGB 8.1* 7.9* 8.1* 8.1*   < > 8.5* 13.5 13.6   CR  --   --   --  0.95  --   --  0.77 0.82    < > = values in this interval not displayed.            Assessment/Plan  33 year old adult with PMH of gender dysphoria, 5 Days Post-Op s/p full depth vaginoplasty, bladder botox and colostomy takedown.  Otherwise, remaining excellent cares per primary team. Reveal today    - Continue contreras catheter for 2 weeks  - No indication for mirabegron at this time - consider if patient were to develop symptoms consistent with bladder spasms  - reveal today  - contreras to remain  - Abx x5 days  - Urology will follow     Will discuss with Dr. Dominguez.    Jin Cheatham MD PGY2       Contacting the Urology Team     Please use the following job codes to reach the Urology Team. Note that you must use an in house phone and that job codes cannot receive text pages.   On weekdays, dial [phone] (or star-star-star [phone] on the new [employer] telephones) then [phone] to reach the Adult Urology resident or PA on call  On weekdays, dial [phone] (or star-star-star [phone] on the new [employer] telephones) then [phone] to reach the Pediatric Urology resident  On weeknights and weekends, dial [phone] (or star-star-star [phone] on the new " [employer] telephones) then [phone] to reach the Urology resident on call (for both Adult and Pediatrics)

## 2024-05-01 ENCOUNTER — HOSPITAL ENCOUNTER (EMERGENCY)
Facility: CLINIC | Age: 34
Discharge: HOME | End: 2024-05-02
Attending: EMERGENCY MEDICINE | Admitting: EMERGENCY MEDICINE
Payer: COMMERCIAL

## 2024-05-01 ENCOUNTER — APPOINTMENT (OUTPATIENT)
Dept: OCCUPATIONAL THERAPY | Facility: CLINIC | Age: 34
DRG: 876 | End: 2024-05-01
Attending: UROLOGY
Payer: COMMERCIAL

## 2024-05-01 ENCOUNTER — APPOINTMENT (OUTPATIENT)
Dept: PHYSICAL THERAPY | Facility: CLINIC | Age: 34
DRG: 876 | End: 2024-05-01
Attending: UROLOGY
Payer: COMMERCIAL

## 2024-05-01 VITALS
HEART RATE: 95 BPM | OXYGEN SATURATION: 97 % | SYSTOLIC BLOOD PRESSURE: 126 MMHG | HEIGHT: 67 IN | WEIGHT: 190.6 LBS | DIASTOLIC BLOOD PRESSURE: 83 MMHG | BODY MASS INDEX: 29.91 KG/M2 | TEMPERATURE: 98.5 F | RESPIRATION RATE: 16 BRPM

## 2024-05-01 DIAGNOSIS — R50.82 FEVER POSTOP: ICD-10-CM

## 2024-05-01 LAB
MAGNESIUM SERPL-MCNC: 2.1 MG/DL (ref 1.7–2.3)
PHOSPHATE SERPL-MCNC: 2.5 MG/DL (ref 2.5–4.5)
POTASSIUM SERPL-SCNC: 4 MMOL/L (ref 3.4–5.3)

## 2024-05-01 PROCEDURE — 96361 HYDRATE IV INFUSION ADD-ON: CPT | Performed by: EMERGENCY MEDICINE

## 2024-05-01 PROCEDURE — 97535 SELF CARE MNGMENT TRAINING: CPT | Mod: GO

## 2024-05-01 PROCEDURE — 99284 EMERGENCY DEPT VISIT MOD MDM: CPT | Mod: 25 | Performed by: EMERGENCY MEDICINE

## 2024-05-01 PROCEDURE — 85025 COMPLETE CBC W/AUTO DIFF WBC: CPT | Performed by: EMERGENCY MEDICINE

## 2024-05-01 PROCEDURE — 36415 COLL VENOUS BLD VENIPUNCTURE: CPT | Performed by: EMERGENCY MEDICINE

## 2024-05-01 PROCEDURE — 84132 ASSAY OF SERUM POTASSIUM: CPT | Performed by: EMERGENCY MEDICINE

## 2024-05-01 PROCEDURE — 250N000011 HC RX IP 250 OP 636: Performed by: STUDENT IN AN ORGANIZED HEALTH CARE EDUCATION/TRAINING PROGRAM

## 2024-05-01 PROCEDURE — 83735 ASSAY OF MAGNESIUM: CPT | Performed by: COLON & RECTAL SURGERY

## 2024-05-01 PROCEDURE — 84100 ASSAY OF PHOSPHORUS: CPT | Performed by: COLON & RECTAL SURGERY

## 2024-05-01 PROCEDURE — 250N000013 HC RX MED GY IP 250 OP 250 PS 637: Performed by: COLON & RECTAL SURGERY

## 2024-05-01 PROCEDURE — 36415 COLL VENOUS BLD VENIPUNCTURE: CPT | Performed by: COLON & RECTAL SURGERY

## 2024-05-01 PROCEDURE — 80048 BASIC METABOLIC PNL TOTAL CA: CPT | Performed by: COLON & RECTAL SURGERY

## 2024-05-01 PROCEDURE — 81001 URINALYSIS AUTO W/SCOPE: CPT | Performed by: EMERGENCY MEDICINE

## 2024-05-01 PROCEDURE — 250N000011 HC RX IP 250 OP 636: Performed by: PHYSICIAN ASSISTANT

## 2024-05-01 PROCEDURE — 96374 THER/PROPH/DIAG INJ IV PUSH: CPT | Performed by: EMERGENCY MEDICINE

## 2024-05-01 PROCEDURE — 97530 THERAPEUTIC ACTIVITIES: CPT | Mod: GP

## 2024-05-01 PROCEDURE — 99284 EMERGENCY DEPT VISIT MOD MDM: CPT | Performed by: EMERGENCY MEDICINE

## 2024-05-01 PROCEDURE — 250N000013 HC RX MED GY IP 250 OP 250 PS 637: Performed by: SURGERY

## 2024-05-01 PROCEDURE — 87637 SARSCOV2&INF A&B&RSV AMP PRB: CPT | Performed by: EMERGENCY MEDICINE

## 2024-05-01 PROCEDURE — 97116 GAIT TRAINING THERAPY: CPT | Mod: GP

## 2024-05-01 RX ORDER — HYDROMORPHONE HYDROCHLORIDE 1 MG/ML
0.5 INJECTION, SOLUTION INTRAMUSCULAR; INTRAVENOUS; SUBCUTANEOUS
Status: COMPLETED | OUTPATIENT
Start: 2024-05-01 | End: 2024-05-02

## 2024-05-01 RX ORDER — IBUPROFEN 600 MG/1
600 TABLET, FILM COATED ORAL 3 TIMES DAILY
Qty: 21 TABLET | Refills: 0 | Status: SHIPPED | OUTPATIENT
Start: 2024-05-01 | End: 2024-08-13

## 2024-05-01 RX ADMIN — ACETAMINOPHEN 1000 MG: 500 TABLET ORAL at 08:49

## 2024-05-01 RX ADMIN — LIDOCAINE 4% 2 PATCH: 40 PATCH TOPICAL at 07:47

## 2024-05-01 RX ADMIN — ENOXAPARIN SODIUM 40 MG: 40 INJECTION SUBCUTANEOUS at 08:11

## 2024-05-01 RX ADMIN — ESTRADIOL VALERATE 2 MG: 40 INJECTION INTRAMUSCULAR at 08:51

## 2024-05-01 RX ADMIN — POLYETHYLENE GLYCOL 3350 17 G: 17 POWDER, FOR SOLUTION ORAL at 08:10

## 2024-05-01 RX ADMIN — OXYCODONE HYDROCHLORIDE 5 MG: 5 TABLET ORAL at 04:00

## 2024-05-01 RX ADMIN — OXYCODONE HYDROCHLORIDE 5 MG: 5 TABLET ORAL at 00:04

## 2024-05-01 RX ADMIN — PROGESTERONE 200 MG: 100 CAPSULE ORAL at 07:48

## 2024-05-01 RX ADMIN — ACETAMINOPHEN 1000 MG: 500 TABLET ORAL at 12:21

## 2024-05-01 RX ADMIN — IBUPROFEN 600 MG: 600 TABLET, FILM COATED ORAL at 07:45

## 2024-05-01 ASSESSMENT — ACTIVITIES OF DAILY LIVING (ADL)
ADLS_ACUITY_SCORE: 31

## 2024-05-01 ASSESSMENT — COLUMBIA-SUICIDE SEVERITY RATING SCALE - C-SSRS
6. HAVE YOU EVER DONE ANYTHING, STARTED TO DO ANYTHING, OR PREPARED TO DO ANYTHING TO END YOUR LIFE?: NO
2. HAVE YOU ACTUALLY HAD ANY THOUGHTS OF KILLING YOURSELF IN THE PAST MONTH?: NO
1. IN THE PAST MONTH, HAVE YOU WISHED YOU WERE DEAD OR WISHED YOU COULD GO TO SLEEP AND NOT WAKE UP?: NO

## 2024-05-01 NOTE — PROGRESS NOTES
Care Management Discharge Note    Discharge Date: 05/01/2024       Discharge Disposition: Home, OP PT    Discharge Services: None    Discharge DME: None    Discharge Transportation: family or friend will provide- Father    Private pay costs discussed: Not applicable    Does the patient's insurance plan have a 3 day qualifying hospital stay waiver?  No    PAS Confirmation Code:  NA  Patient/family educated on Medicare website which has current facility and service quality ratings: no    Education Provided on the Discharge Plan: Yes  Persons Notified of Discharge Plans: Patient  Patient/Family in Agreement with the Plan: yes    Handoff Referral Completed: Yes EHO    Additional Information:  33 year old PMH of incomplete T10 spinal cord injury with neurogenic bladder and straight caths at baseline, sigmoid perforation of unclear etiology s/p prior end sigmoid resection and colostomy in 2018, gender dysphoria s/p transition 3 years ago on HRT since 2021, s/p orchiectomy in 2022. Now s/p Lap FLOYD, robotic colostomy take down, flex sig; Lap robotic assisted artifical vagina with graft (penile skin flap & scrotal full thickness grafts), total penectomy, urethroplasty, robotic assisted colpopexy (suspension of vaginal apex), scotectomy, bilat labiaplasty via adj tissue transfer of scotal and mons skin flaps, clitoroplasty, cystoscopy w/ chemodenervation of bladder on 4/25/2024.     RNCC met with patient at bedside. Patient stated that her father will be picking her up to transport home 2-3pm.   Patient will be going home with standard walker and is requesting grabber/reacher. RNCC updated Rehab that patient has questions for DME. Patient stated that she has a [facility] and has been on a wait list for PCA services and she receives food support. Patient stated she has already scheduled her OP PT appt. Patient stated she will stay with her father tonight and then go to her mother's home tomorrow during her recovery phase. Jean Carlos  will stay in for 2 weeks. Will go in with Penrose drain.     Per provider, pt to follow-up outpatient for all needs. Pt to bring [place] paperwork [place] assistance to     Future Appointments   Date Time Provider Department Center   5/8/2024  8:15 AM Chilango Dominguez MD [facility]   5/14/2024 10:45 AM Freida Chen APRN CNP [facility]   5/23/2024 12:30 PM Jenna Gallagher, PT [facility]   5/30/2024  1:10 PM Jenna Gallagher, PT [facility]   6/11/2024  2:20 PM Chilango Dominguez MD [facility]   6/13/2024  1:50 PM Jenna Gallagher, PT [facility]   6/17/2024  9:30 AM Amadou Anna MD [facility]   6/19/2024 10:30 AM Chilango Dominguez MD [facility]         Carissa Sommers RNCC float  Nurse Coordinator    Social Work and Care Management Department   SEARCHABLE in [facility] - search CARE COORDINATOR     [place] ([phone]) Saturday & Sunday; ([phone]) [facility] Recognized Holidays     Units: 5A Onc 5201 thru 5219 RNCC, 5A Onc 5220 thru 5240 RNCC, 5C OFFSERVICE [id number] RNCC & 5C OFF SERVICE [phone] RNCC Pager: [phone]    Units: 6B Vocera, 6C Card 6401 thru 6420 RNCC, 6C Card 6502 thru 6514 RNCC, & 6C Card 6515 thru 6519 RNCC  Pager: [phone]    Units: 7A SOT RNCC Vocera, 7B Med Surg Vocera, 7C Med Surg 7401 thru 7418 RNCC & 7C Med Surg 7502 thru 7521 RNCC Pager: [phone]    Units: 6A Vocera & 4A CVICU Vocera, 4C MICU Vocera, and 4E SICU Vocera   Pager: [phone]    Units: 5 Ortho Vocera & 5 Med Surg Vocera  Pager: [phone]    Units: 6 Med Surg Vocera & 8 Med Surg Vocera  Pager [phone]

## 2024-05-01 NOTE — PLAN OF CARE
Goal Outcome Evaluation:      Plan of Care Reviewed With: patient    Overall Patient Progress: improvingOverall Patient Progress: improving    Outcome Evaluation: Plan to discharge this afternoon. Dad is arriving between 2-3 pm    DISCHARGE    D: Orders written for discharge to home with family assist.    A/I: Alert and oriented, up with SBA of 1 with walker and GB. Pain is managed with the current pain regimen. Lap sites, with dermabond, CDI/PRIMO. TD sites, dressing changed, penrose and DINORA in left abdomen removed by Dr. Anna. Dressing CDI. Evangelista to stay for total of 2 weeks. Teaching done as well as hand-outs. Patient verbalized understanding. OVSS, afebrile. Labs reviewed this morning, no need for ERP. AVS reviewed with the patient. Patient verbalized understanding of discharge plan. Copy of AVS given to patient. PIV removed per hospital policy.    PLAN: Discharge to home with family assist with dad in a wheelchair, stable condition. Discharge medication picked-up prior. Left 5A at 1410H.

## 2024-05-01 NOTE — PROGRESS NOTES
"Urology  Progress Note    NAEO  Pain improving though still present  Reveal yesterday  Passed BM yest  Tolerating low fiber diet mild nausea  Dilating well   Up to chair yesterday,ambulating    Exam  /76 (BP Location: Right arm)   Pulse 90   Temp 97.9  F (36.6  C) (Oral)   Resp 18   Ht 1.702 m (5' 7\")   Wt 86.5 kg (190 lb 9.6 oz)   SpO2 99%   BMI 29.85 kg/m    No acute distress  Unlabored breathing  Rest of exam deferred      UOP 2225/825  DINORA (CRS)  L abd 50/25    Labs  Recent Labs   Lab Test 04/28/24  1114 04/27/24  0609 04/26/24  1402 04/26/24  0552 04/25/24  2148 04/25/24  1659 04/10/24  1444 04/26/23  1640   WBC  --   --   --   --   --  17.9* 5.5 6.8   HGB 8.1* 7.9* 8.1* 8.1*   < > 8.5* 13.5 13.6   CR  --   --   --  0.95  --   --  0.77 0.82    < > = values in this interval not displayed.            Assessment/Plan  33 year old adult with PMH of gender dysphoria, 6 Days Post-Op s/p full depth vaginoplasty, bladder botox and colostomy takedown.  Otherwise, remaining excellent cares per primary team. Reveal today    - Continue contreras catheter for 2 weeks post op  - No indication for mirabegron at this time - consider if patient were to develop symptoms consistent with bladder spasms  - Abx x5 days  - okay for discharge from urologic perspective     Will discuss with Dr. Dominguez.    Feliz Camarillo MD  PGY2 Urology Resident          Contacting the Urology Team     Please use the following job codes to reach the Urology Team. Note that you must use an in house phone and that job codes cannot receive text pages.   On weekdays, dial [phone] (or star-star-star [phone] on the new [employer] telephones) then [phone] to reach the Adult Urology resident or PA on call  On weekdays, dial [phone] (or star-star-star 777 on the new [employer] telephones) then [phone] to reach the Pediatric Urology resident  On weeknights and weekends, dial [phone] (or star-star-star 777 on the new [employer] telephones) then [phone] to reach the Urology resident on call (for " both Adult and Pediatrics)

## 2024-05-01 NOTE — PLAN OF CARE
Goal Outcome Evaluation:      Plan of Care Reviewed With: patient    [id number]  Neuro: A&Ox4.  Looks Sleepy after pain medications.   Behavior: calm and cooperative   Pain: pain managed with OXYCODONE, tylenol, robaxin and ibuprofen.  Activity: SBA with 1 assist, walker and gait belt  Vital: Temp: 98.2  F (36.8  C) Temp src: Oral BP: 116/75 Pulse: 86   Resp: 18 SpO2: 99 % O2 Device: None (Room air)     GI/:  contreras with good urine output, Twice BM today, and passing gas. Denies nausea/vomiting.   Skin: vaginoplasty incision, 4 port sites, DINORA site with intact dressing and DINORA drain with bulb suction. Bruises on arms and heals are sore and elevated with pillow.  Arms are bit swollen.   LDAs: DINORA drain x1 with medium output. PIV x1 with SL.   Diet: low fiber diet with fair appetite and Well tolerated.     Received oral phos replacement and recheck is 6 am tomorrow. Bolster, internal stent and bilateral groin JPs removed.  Contreras will stay in place for weeks and continue with contreras care teaching.     Continues with POC.

## 2024-05-02 ENCOUNTER — TELEPHONE (OUTPATIENT)
Dept: PLASTIC SURGERY | Facility: CLINIC | Age: 34
End: 2024-05-02
Payer: COMMERCIAL

## 2024-05-02 ENCOUNTER — TELEPHONE (OUTPATIENT)
Dept: SURGERY | Facility: CLINIC | Age: 34
End: 2024-05-02
Payer: COMMERCIAL

## 2024-05-02 VITALS
TEMPERATURE: 98.3 F | OXYGEN SATURATION: 99 % | HEART RATE: 87 BPM | RESPIRATION RATE: 16 BRPM | SYSTOLIC BLOOD PRESSURE: 113 MMHG | DIASTOLIC BLOOD PRESSURE: 70 MMHG

## 2024-05-02 LAB
ALBUMIN UR-MCNC: NEGATIVE MG/DL
ANION GAP SERPL CALCULATED.3IONS-SCNC: 9 MMOL/L (ref 7–15)
APPEARANCE UR: CLEAR
BASOPHILS # BLD AUTO: 0 10E3/UL (ref 0–0.2)
BASOPHILS NFR BLD AUTO: 0 %
BILIRUB UR QL STRIP: NEGATIVE
BUN SERPL-MCNC: 3.2 MG/DL (ref 6–20)
CALCIUM SERPL-MCNC: 8.6 MG/DL (ref 8.6–10)
CHLORIDE SERPL-SCNC: 102 MMOL/L (ref 98–107)
COLOR UR AUTO: ABNORMAL
CREAT SERPL-MCNC: 0.62 MG/DL (ref 0.51–1.17)
DEPRECATED HCO3 PLAS-SCNC: 26 MMOL/L (ref 22–29)
EGFRCR SERPLBLD CKD-EPI 2021: >90 ML/MIN/1.73M2
EOSINOPHIL # BLD AUTO: 0.2 10E3/UL (ref 0–0.7)
EOSINOPHIL NFR BLD AUTO: 2 %
ERYTHROCYTE [DISTWIDTH] IN BLOOD BY AUTOMATED COUNT: 14.4 % (ref 10–15)
FLUAV RNA SPEC QL NAA+PROBE: NEGATIVE
FLUBV RNA RESP QL NAA+PROBE: NEGATIVE
GLUCOSE SERPL-MCNC: 103 MG/DL (ref 70–99)
GLUCOSE UR STRIP-MCNC: NEGATIVE MG/DL
HCT VFR BLD AUTO: 24.5 % (ref 35–53)
HGB BLD-MCNC: 8.2 G/DL (ref 13.3–17.7)
HGB UR QL STRIP: NEGATIVE
HOLD SPECIMEN: NORMAL
IMM GRANULOCYTES # BLD: 0 10E3/UL
IMM GRANULOCYTES NFR BLD: 0 %
KETONES UR STRIP-MCNC: 20 MG/DL
LEUKOCYTE ESTERASE UR QL STRIP: NEGATIVE
LYMPHOCYTES # BLD AUTO: 1 10E3/UL (ref 0.8–5.3)
LYMPHOCYTES NFR BLD AUTO: 11 %
MCH RBC QN AUTO: 29.5 PG (ref 26.5–33)
MCHC RBC AUTO-ENTMCNC: 33.5 G/DL (ref 31.5–36.5)
MCV RBC AUTO: 88 FL (ref 78–100)
MONOCYTES # BLD AUTO: 0.8 10E3/UL (ref 0–1.3)
MONOCYTES NFR BLD AUTO: 9 %
MUCOUS THREADS #/AREA URNS LPF: PRESENT /LPF
NEUTROPHILS # BLD AUTO: 7 10E3/UL (ref 1.6–8.3)
NEUTROPHILS NFR BLD AUTO: 78 %
NITRATE UR QL: NEGATIVE
NRBC # BLD AUTO: 0 10E3/UL
NRBC BLD AUTO-RTO: 0 /100
PH UR STRIP: 6.5 [PH] (ref 5–7)
PLATELET # BLD AUTO: 238 10E3/UL (ref 150–450)
POTASSIUM SERPL-SCNC: 3.5 MMOL/L (ref 3.4–5.3)
RBC # BLD AUTO: 2.78 10E6/UL (ref 3.8–5.9)
RBC URINE: 1 /HPF
RSV RNA SPEC NAA+PROBE: NEGATIVE
SARS-COV-2 RNA RESP QL NAA+PROBE: NEGATIVE
SODIUM SERPL-SCNC: 137 MMOL/L (ref 135–145)
SP GR UR STRIP: 1 (ref 1–1.03)
UROBILINOGEN UR STRIP-MCNC: NORMAL MG/DL
WBC # BLD AUTO: 8.9 10E3/UL (ref 4–11)
WBC URINE: 1 /HPF

## 2024-05-02 PROCEDURE — 250N000011 HC RX IP 250 OP 636: Performed by: EMERGENCY MEDICINE

## 2024-05-02 PROCEDURE — 258N000003 HC RX IP 258 OP 636: Performed by: EMERGENCY MEDICINE

## 2024-05-02 RX ADMIN — SODIUM CHLORIDE 1000 ML: 9 INJECTION, SOLUTION INTRAVENOUS at 00:02

## 2024-05-02 RX ADMIN — HYDROMORPHONE HYDROCHLORIDE 0.5 MG: 1 INJECTION, SOLUTION INTRAMUSCULAR; INTRAVENOUS; SUBCUTANEOUS at 00:02

## 2024-05-02 ASSESSMENT — ACTIVITIES OF DAILY LIVING (ADL): ADLS_ACUITY_SCORE: 38

## 2024-05-02 NOTE — TELEPHONE ENCOUNTER
Called pt to follow up after she discharged from the hospital yesterday after combo surgery with Dr. Dominguez and Dr. Anna on 4/25/24. Overnight pt went to the ED due to fevers. There was no concern for infection at this visit and pt was afebrile. Unable to reach Asher. Left voicemail with reason for calling and left a callback number.

## 2024-05-02 NOTE — TELEPHONE ENCOUNTER
Patient phone call    32yo transgender female with gender dysphoria recently admitted to hospital from 4/25 to 5/1/2024 for gender dysphoria, colostomy takedown, and gender reassignment surgery on 4/25. She was discharged home on 5/1. In the evening of 5/1, patient called due to spiking a fever to 101 at home. She reports feeling extremely fatigue, having some nausea but no vomiting. She is tolerating PO intake. Her abdominal pain is somewhat manageable with analgesics. She called due to concerns with fever. I recommend she come in to the ED for evaluation of her fever in light of recent surgery. Patient was agreeable with this and presented to ED. Will inform Dr Anna and his team of these events.    Hermelinda Paige MD  [facility] General Surgery PGY2

## 2024-05-02 NOTE — ED TRIAGE NOTES
Pt presents to ED endorsing fevers at home s/p vaginoplasty, colostomy reversal last Thursday. Pt states she was discharged from the hospital today and when she awoke from her nap her fever was 100 axillary. Motrin 600mg taken pta.      Triage Assessment (Adult)       Row Name 05/01/24 0339          Triage Assessment    Airway WDL WDL        Respiratory WDL    Respiratory WDL WDL        Skin Circulation/Temperature WDL    Skin Circulation/Temperature WDL WDL        Cardiac WDL    Cardiac WDL WDL     Cardiac Rhythm NSR        Peripheral/Neurovascular WDL    Peripheral Neurovascular WDL WDL        Cognitive/Neuro/Behavioral WDL    Cognitive/Neuro/Behavioral WDL WDL

## 2024-05-02 NOTE — ED PROVIDER NOTES
ED Provider Note  [facility]      History     Chief Complaint   Patient presents with    Post-op Problem     HPI  Hannah Chavez is a 33 year old adult who presents to the ER for evaluation of a post-operative problem.    Patient states that her recovery became to difficult to manage at home. Her father left for a short amount of time where she fell asleep and missed her medications, her bag overfilled, and she spiked a fever. She feels that she is having a UTI flare, she has chronic UTIs due to self catheterization for her neurogenic bladder. She denies SOB, cough. She feels dehydrated.  She reports 3/10 pain at this time including some discomfort around her incision and some abdominal pain due to gas. She has had some diarrhea after taking miralax. She has no known sick contacts and has noted no rashes. She has some foot swelling making wearing shoes slightly difficult but no other edema. She took 600mg of Ibuprofen around 9pm.    Per chart review, patient was in the hospital from 4/25-5/1 for gender dysphoria, sigmoid perforation s/p Hartmanns with colostomy, and neurogenic bladder. She had a robotic assisted laparoscopic penile inversion vaginoplasty.    Past Medical History  Past Medical History:   Diagnosis Date    Asthma     Bladder incontinence     Colostomy status (H)     Gender dysphoria     Neurogenic bladder     Self-catheterizes urinary bladder     Spinal cord injury of T10 vertebra (H)     Spinal cord injury of thoracic region without bone injury, initial encounter (H)     Uncomplicated asthma      Past Surgical History:   Procedure Laterality Date    ABDOMEN SURGERY      segmental seigmoid resection, end colostomy    CYSTOSCOPY N/A 09/01/2017    Procedure: CYSTOSCOPY;  Cystoscopy and Botox Injection into the Bladder;  Surgeon: Michael Mcnulty MD;  [facility]    CYSTOSCOPY N/A 10/12/2018    Procedure: CYSTOSCOPY;  Cystoscopy, Botox;  Surgeon: Michael Mcnulty  "MD Zain;  [facility]    CYSTOSCOPY, INJECT BOTOX N/A 4/25/2024    Procedure: Cystoscopy with Botox Injection to Bladder;  Surgeon: Chilango Dominguez MD;  [facility]    CYSTOSCOPY, INTRAVESICAL INJECTION N/A 03/02/2018    Procedure: CYSTOSCOPY, INTRAVESICAL INJECTION;  Cystoscopy And Botox Injection Into The Bladder  ;  Surgeon: Michael Mcnulty MD;  [facility]    DAVINCI ASSISTED COLOSTOMY TAKEDOWN N/A 4/25/2024    Procedure: ROBOT ASSISTED COLOSTOMY CLOSURE, takedown of splenic flexure.;  Surgeon: Amadou Anna MD;  [facility]    INJECT BOTOX N/A 09/01/2017    Procedure: INJECT BOTOX;;  Surgeon: Michael Mcnulty MD;  [facility]    INJECT BOTOX N/A 10/12/2018    Procedure: INJECT BOTOX;;  Surgeon: Michael Mcnulty MD;  [facility]    ORCHIECTOMY SCROTAL Bilateral 08/18/2022    Procedure: Bilateral Simple Scrotal Orchiectomy;  Surgeon: Chilango Dominguez MD;  [facility]    REMOVE INTRATHECAL PAIN PUMP N/A 06/02/2023    Procedure: Removal of intrathecal drug delivery system - removal of the intrathecal drug pump from the right abdomen and removal of the intrathecal catheter from the thoracolumbar spine;  Surgeon: Que Drew MD;  [facility]    SIGMOIDOSCOPY FLEXIBLE N/A 4/25/2024    Procedure: FLEXIBLE SIGMOIDOSCOPY;  Surgeon: Amadou Anna MD;  [facility]    VAGINOPLASTY WITH PERITONEAL FLAP, ROBOTIC ASSISTED N/A 4/25/2024    Procedure: Robotic assisted Full Depth Vaginoplasty;  Surgeon: Chilango Dominguez MD;  [facility]     acetaminophen (TYLENOL) 500 MG tablet  ALBUTEROL IN  diazepam (VALIUM) 5 MG tablet  estradiol valerate (DELESTROGEN) 20 MG/ML injection  ibuprofen (ADVIL/MOTRIN) 600 MG tablet  Insulin Syringe-Needle U-100 (B-D INSULIN SYRINGE) 25G X 1\" 1 ML MISC  Lidocaine (LIDOCARE) 4 % Patch  methocarbamol (ROBAXIN) 750 MG tablet  oxyCODONE (ROXICODONE) 5 MG tablet  polyethylene glycol (MIRALAX) 17 GM/Dose " "powder  progesterone (PROMETRIUM) 200 MG capsule  QUEtiapine (SEROQUEL) 25 MG tablet  Sharps Container MISC      Allergies   Allergen Reactions    Dust Mites     Shellfish Allergy Nausea and Vomiting and Unknown     Patient states his mother has a \"violent reaction\" to shellfish, and that he has a feeling of nausea when he smells shellfish, so he has never consumed any. He states he has also not been tested.      Shellfish-Derived Products Other (See Comments)     Patient states her mother has a \"violent reaction\" to shellfish, and that she has a feeling of nausea when she smells shellfish, so she has never consumed any. She states she has also not been tested.  Patient states his mother has a \"violent reaction\" to shellfish, and that he has a feeling of nausea when he smells shellfish, so he has never consumed any. He states he has also not been tested.       Family History  Family History   Problem Relation Age of Onset    Cancer Mother         unknown type    Depression Father     Fibromyalgia Father     Anxiety Disorder Brother     Anesthesia Reaction No family hx of     Thrombosis No family hx of      Social History   Social History     Tobacco Use    Smoking status: Never     Passive exposure: Never    Smokeless tobacco: Never   Vaping Use    Vaping status: Never Used   Substance Use Topics    Alcohol use: No    Drug use: No         A medically appropriate review of systems was performed with pertinent positives and negatives noted in the HPI, and all other systems negative.    Physical Exam   BP: 111/74  Pulse: 93  Temp: 98.7  F (37.1  C)  Resp: 18  SpO2: 97 %  Physical Exam  Vitals and nursing note reviewed.   Constitutional:       General: She is not in acute distress.     Appearance: Normal appearance. She is not ill-appearing or toxic-appearing.   HENT:      Head: Normocephalic.      Nose: Nose normal.   Eyes:      Pupils: Pupils are equal, round, and reactive to light.   Cardiovascular:      Rate and " "Rhythm: Normal rate and regular rhythm.   Pulmonary:      Effort: Pulmonary effort is normal.   Abdominal:      General: There is no distension.      Comments: Postsurgical wounds are clean dry and intact.  Abdomen is otherwise soft and nondistended.  No guarding rebound or other signs of peritonitis.  No CVA tenderness.   Musculoskeletal:         General: No deformity. Normal range of motion.      Cervical back: Normal range of motion.   Skin:     General: Skin is warm.   Neurological:      Mental Status: She is alert and oriented to person, place, and time.   Psychiatric:         Mood and Affect: Mood normal.         ED Course, Procedures, & Data           Results for orders placed or performed during the hospital encounter of 05/01/24   Basic metabolic panel     Status: Abnormal   Result Value Ref Range    Sodium 137 135 - 145 mmol/L    Potassium 3.5 3.4 - 5.3 mmol/L    Chloride 102 98 - 107 mmol/L    Carbon Dioxide (CO2) 26 22 - 29 mmol/L    Anion Gap 9 7 - 15 mmol/L    Urea Nitrogen 3.2 (L) 6.0 - 20.0 mg/dL    Creatinine 0.62 0.51 - 1.17 mg/dL    GFR Estimate >90 >60 mL/min/1.73m2    Calcium 8.6 8.6 - 10.0 mg/dL    Glucose 103 (H) 70 - 99 mg/dL    Narrative    The generation of reference intervals for this test is currently based on binary male or female sex. If the electronic health record information indicates another gender identity or if Legal Sex is recorded as \"Unknown\", both male and female reference intervals are provided where applicable, and should be considered according to the individual's appropriate clinical context.   UA with Microscopic reflex to Culture     Status: Abnormal    Specimen: Urine, Evangelista Catheter   Result Value Ref Range    Color Urine Straw Colorless, Straw, Light Yellow, Yellow    Appearance Urine Clear Clear    Glucose Urine Negative Negative mg/dL    Bilirubin Urine Negative Negative    Ketones Urine 20 (A) Negative mg/dL    Specific Gravity Urine 1.003 1.003 - 1.035    Blood " Urine Negative Negative    pH Urine 6.5 5.0 - 7.0    Protein Albumin Urine Negative Negative mg/dL    Urobilinogen Urine Normal Normal, 2.0 mg/dL    Nitrite Urine Negative Negative    Leukocyte Esterase Urine Negative Negative    Mucus Urine Present (A) None Seen /LPF    RBC Urine 1 <=2 /HPF    WBC Urine 1 <=5 /HPF    Narrative    Urine Culture not indicated   Asymptomatic Influenza A/B, RSV, & SARS-CoV2 PCR (COVID-19) Nasopharyngeal     Status: Normal    Specimen: Nasopharyngeal; Swab   Result Value Ref Range    Influenza A PCR Negative Negative    Influenza B PCR Negative Negative    RSV PCR Negative Negative    SARS CoV2 PCR Negative Negative    Narrative    Testing was performed using the Xpert Xpress CoV2/Flu/RSV Assay on the Cepheid GeneXpert Instrument. This test should be ordered for the detection of SARS-CoV-2, influenza, and RSV viruses in individuals who meet clinical and/or epidemiological criteria. Test performance is unknown in asymptomatic patients. This test is for in vitro diagnostic use under the FDA EUA for laboratories certified under CLIA to perform high or moderate complexity testing. This test has not been FDA cleared or approved. A negative result does not rule out the presence of PCR inhibitors in the specimen or target RNA in concentration below the limit of detection for the assay. If only one viral target is positive but coinfection with multiple targets is suspected, the sample should be re-tested with another FDA cleared, approved, or authorized test, if coinfection would change clinical management. This test was validated by the [facility]. These laboratories are certified under the Clinical Laboratory Improvement Amendments of 1988 (CLIA-88) as qualified to perform high complexity laboratory testing.   CBC with platelets and differential     Status: Abnormal   Result Value Ref Range    WBC Count 8.9 4.0 - 11.0 10e3/uL    RBC Count 2.78 (L) 3.80 - 5.90 10e6/uL     "Hemoglobin 8.2 (L) 13.3 - 17.7 g/dL    Hematocrit 24.5 (L) 35.0 - 53.0 %    MCV 88 78 - 100 fL    MCH 29.5 26.5 - 33.0 pg    MCHC 33.5 31.5 - 36.5 g/dL    RDW 14.4 10.0 - 15.0 %    Platelet Count 238 150 - 450 10e3/uL    % Neutrophils 78 %    % Lymphocytes 11 %    % Monocytes 9 %    % Eosinophils 2 %    % Basophils 0 %    % Immature Granulocytes 0 %    NRBCs per 100 WBC 0 <1 /100    Absolute Neutrophils 7.0 1.6 - 8.3 10e3/uL    Absolute Lymphocytes 1.0 0.8 - 5.3 10e3/uL    Absolute Monocytes 0.8 0.0 - 1.3 10e3/uL    Absolute Eosinophils 0.2 0.0 - 0.7 10e3/uL    Absolute Basophils 0.0 0.0 - 0.2 10e3/uL    Absolute Immature Granulocytes 0.0 <=0.4 10e3/uL    Absolute NRBCs 0.0 10e3/uL    Narrative    The generation of reference intervals for this test is currently based on binary male or female sex. If the electronic health record information indicates another gender identity or if Legal Sex is recorded as \"Unknown\", both male and female reference intervals are provided where applicable, and should be considered according to the individual's appropriate clinical context.   [place] Draw     Status: None    Narrative    The following orders were created for panel order [place] Draw.  Procedure                               Abnormality         Status                     ---------                               -----------         ------                     Extra Blue Top Tube[[id number]]                              Final result               Extra Red Top Tube[[id number]]                               Final result               Extra Purple Top Tube[[id number]]                            Final result                 Please view results for these tests on the individual orders.   Extra Blue Top Tube     Status: None   Result Value Ref Range    Hold Specimen [facility]    Extra Red Top Tube     Status: None   Result Value Ref Range    Hold Specimen [facility]    Extra Purple Top Tube     Status: None   Result Value Ref Range    Hold " Specimen [facility]    CBC with platelets differential     Status: Abnormal    Narrative    The following orders were created for panel order CBC with platelets differential.  Procedure                               Abnormality         Status                     ---------                               -----------         ------                     CBC with platelets and d...[[id number]]  Abnormal            Final result                 Please view results for these tests on the individual orders.     Medications   sodium chloride 0.9% BOLUS 1,000 mL (0 mLs Intravenous Stopped 5/2/24 0136)   HYDROmorphone (PF) (DILAUDID) injection 0.5 mg (0.5 mg Intravenous $Given 5/2/24 0002)     Labs Ordered and Resulted from Time of ED Arrival to Time of ED Departure   BASIC METABOLIC PANEL - Abnormal       Result Value    Sodium 137      Potassium 3.5      Chloride 102      Carbon Dioxide (CO2) 26      Anion Gap 9      Urea Nitrogen 3.2 (*)     Creatinine 0.62      GFR Estimate >90      Calcium 8.6      Glucose 103 (*)    ROUTINE UA WITH MICROSCOPIC REFLEX TO CULTURE - Abnormal    Color Urine Straw      Appearance Urine Clear      Glucose Urine Negative      Bilirubin Urine Negative      Ketones Urine 20 (*)     Specific Gravity Urine 1.003      Blood Urine Negative      pH Urine 6.5      Protein Albumin Urine Negative      Urobilinogen Urine Normal      Nitrite Urine Negative      Leukocyte Esterase Urine Negative      Mucus Urine Present (*)     RBC Urine 1      WBC Urine 1     CBC WITH PLATELETS AND DIFFERENTIAL - Abnormal    WBC Count 8.9      RBC Count 2.78 (*)     Hemoglobin 8.2 (*)     Hematocrit 24.5 (*)     MCV 88      MCH 29.5      MCHC 33.5      RDW 14.4      Platelet Count 238      % Neutrophils 78      % Lymphocytes 11      % Monocytes 9      % Eosinophils 2      % Basophils 0      % Immature Granulocytes 0      NRBCs per 100 WBC 0      Absolute Neutrophils 7.0      Absolute Lymphocytes 1.0      Absolute Monocytes 0.8       Absolute Eosinophils 0.2      Absolute Basophils 0.0      Absolute Immature Granulocytes 0.0      Absolute NRBCs 0.0     INFLUENZA A/B, RSV, & SARS-COV2 PCR - Normal    Influenza A PCR Negative      Influenza B PCR Negative      RSV PCR Negative      SARS CoV2 PCR Negative       No orders to display          Critical care was not performed.     Medical Decision Making  The patient's presentation was of moderate complexity (an undiagnosed new problem with uncertain diagnosis).    The patient's evaluation involved:  review of external note(s) from 3+ sources (reviewed H&P, op notes, discharge summary from recent admission.)  ordering and/or review of 3+ test(s) in this encounter (see separate area of note for details)    The patient's management necessitated moderate risk (prescription drug management including medications given in the ED).  Consider surgical consultation and admission to the hospital, however, patient continues to be well-appearing and workup is otherwise reassuring.    Assessment & Plan    Patient presents the ED for evaluation of fever.  Was recently discharged from the hospital after she underwent colostomy formation and vaginoplasty.      Differential diagnosis includes UTI, pyelonephritis, surgical site wound infection, pneumonia, COVID/influenza, other viral process    On arrival, patient is normal vital signs.  Over appears well and nontoxic.  She is afebrile here.  Postsurgical wounds are clean dry and intact.  Abdomen is soft and nontender.  Low suspicion for postsurgical infection/abscess or other intra-abdominal infection.    CBC without leukocytosis.  Hemoglobin stable at 8.2.  Metabolic panel shows normal electrolytes and normal renal function.  COVID/influenza/RSV is negative.  Lungs are clear to auscultation, low suspicion for pneumonia.  No leukocytosis or objective fever, low suspicion for bacteremia.  Vitals/labs not consistent with sepsis.    Exact etiology of patient's symptoms is  unclear.  On reassessment, she continues to appear well.  No source of infection is identified in the ED.  Patient was discharged in good condition.  Given strict return precautions should any obvious source of infection or febrile illness recur.  Otherwise, we will follow-up with her surgical team as previously advised.        I have reviewed the nursing notes. I have reviewed the findings, diagnosis, plan and need for follow up with the patient.    Discharge Medication List as of 5/2/2024  1:37 AM          Final diagnoses:   Fever postop     I, Shaneka Jane, mark anthony serving as a trained medical scribe to document services personally performed by Santiago Mejia DO, based on the provider's statements to me.     I, Santiago Mejia DO, was physically present and have reviewed and verified the accuracy of this note documented by Shaneka Jane.    Santiago Mejia DO  [facility] EMERGENCY DEPARTMENT  5/1/2024     Santiago Mejia DO  05/05/24 0206

## 2024-05-02 NOTE — DISCHARGE INSTRUCTIONS
Your workup in the emergency room is overall reassuring.  No source of infection at this time.  Pay close attention to your symptoms at home return to the ED immediately with any new or worsening symptoms.  Otherwise follow-up with your surgical team for your previously scheduled follow-up appointments.

## 2024-05-02 NOTE — PLAN OF CARE
Occupational Therapy Discharge Summary    Reason for therapy discharge:    Discharged to home.    Progress towards therapy goal(s). See goals on Care Plan in [facility] electronic health record for goal details.  Goals partially met.  Barriers to achieving goals:   discharge from facility.    Therapy recommendation(s):    Pt continues to progress, benefits from reminders of progress. SBA with FWW, recommend d/c to home with assist.

## 2024-05-03 ENCOUNTER — HOSPITAL ENCOUNTER (EMERGENCY)
Facility: CLINIC | Age: 34
Discharge: HOME | End: 2024-05-03
Attending: EMERGENCY MEDICINE | Admitting: EMERGENCY MEDICINE
Payer: COMMERCIAL

## 2024-05-03 ENCOUNTER — PATIENT OUTREACH (OUTPATIENT)
Dept: SURGERY | Facility: CLINIC | Age: 34
End: 2024-05-03
Payer: COMMERCIAL

## 2024-05-03 VITALS
HEART RATE: 80 BPM | TEMPERATURE: 98.5 F | DIASTOLIC BLOOD PRESSURE: 68 MMHG | RESPIRATION RATE: 18 BRPM | OXYGEN SATURATION: 97 % | SYSTOLIC BLOOD PRESSURE: 108 MMHG
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
## 2024-05-03 DIAGNOSIS — G89.18 ACUTE POST-OPERATIVE PAIN: ICD-10-CM

## 2024-05-03 PROCEDURE — 99283 EMERGENCY DEPT VISIT LOW MDM: CPT | Performed by: EMERGENCY MEDICINE

## 2024-05-03 PROCEDURE — 250N000013 HC RX MED GY IP 250 OP 250 PS 637: Performed by: EMERGENCY MEDICINE

## 2024-05-03 RX ORDER — METHOCARBAMOL 750 MG/1
750 TABLET, FILM COATED ORAL ONCE
Status: COMPLETED | OUTPATIENT
Start: 2024-05-03 | End: 2024-05-03

## 2024-05-03 RX ADMIN — METHOCARBAMOL 750 MG: 750 TABLET ORAL at 03:47

## 2024-05-03 ASSESSMENT — ENCOUNTER SYMPTOMS
WOUND: 1
NAUSEA: 0
FATIGUE: 0
FEVER: 0
COUGH: 0
CHILLS: 0
APPETITE CHANGE: 0
ABDOMINAL PAIN: 0
HEMATURIA: 0
MYALGIAS: 1
CHEST TIGHTNESS: 0
SHORTNESS OF BREATH: 0
VOMITING: 0

## 2024-05-03 ASSESSMENT — COLUMBIA-SUICIDE SEVERITY RATING SCALE - C-SSRS
2. HAVE YOU ACTUALLY HAD ANY THOUGHTS OF KILLING YOURSELF IN THE PAST MONTH?: NO
1. IN THE PAST MONTH, HAVE YOU WISHED YOU WERE DEAD OR WISHED YOU COULD GO TO SLEEP AND NOT WAKE UP?: NO
6. HAVE YOU EVER DONE ANYTHING, STARTED TO DO ANYTHING, OR PREPARED TO DO ANYTHING TO END YOUR LIFE?: NO

## 2024-05-03 ASSESSMENT — ACTIVITIES OF DAILY LIVING (ADL)
ADLS_ACUITY_SCORE: 38

## 2024-05-03 NOTE — DISCHARGE INSTRUCTIONS
If you have any further concerns about your recovery, follow-up with your surgeon or return for repeat evaluation.  Continue your prescribed medications for management of your postoperative pain

## 2024-05-03 NOTE — PROGRESS NOTES
Post Op Note     Called to check on patient postoperatively after hospital discharge.       Patient is s/p colostomy take down and vaginoplasty with Dr. Amadou Anna for colostomy status.   Admitted 4/25 and discharged on 5/1.    Pain is well controlled with tylenol, oxycodone, robaxin, and ibuprofen   Patient is eating and drinking normally. Patient is on a low fiber diet.  Encouraged patient to drink 8-10 glasses of water a day.   Patient is passing flats, is having loose stools. Holding miralax due to loose stools.   Patient has a contreras in place  Patient is not set up with home care.   Patient Denies nausea and vomiting.  Patient Denies any fevers or chills. She did have one fever and went to ED. Workup negative and no fever since   Patient's incision has a penrose in place that will be removed at first clinic appt.   Sutures at old DINORA site to be removed at first clinic appt   Patient is on a activity restriction. Lifting 10 pounds for 6 weeks.   Patient Denies needing any forms completed.   Follow up is set up with Freida Chen NP on 5/14 and with Dr. Amadou Anna on 6/17.   Encouraged the patient to contact the clinic in the meantime with any fevers, chills, nausea, vomiting, increased colostomy output, no colostomy output, dizziness, lightheadedness, uncontrolled pain, changes to the incisions, or with any questions or concerns.    Patient's questions were answered to their stated satisfaction and they are in agreement with this plan.    JARVIS Roldan [phone]  [facility]  [facility]

## 2024-05-03 NOTE — ED PROVIDER NOTES
"  History     Chief Complaint   Patient presents with    Fall    Post-op Problem     HPI  Hannah Chavez is a 33 year old female who is 8 days status post surgery presenting for evaluation of possible injury after a fall.  Patient woke up this morning in pain and wanted to reach her muscle relaxant medication.  As she rolled, she lost her balance and fell to the ground.  She has an underlying spinal cord injury and has difficulty with mobility and spasticity at baseline.  Due to this, she was unable to get herself up off the ground.  She was also unable to get her medication.  EMS was contacted and brought her in for evaluation.  She was concerned she may have injured herself with a fall as she had sharp pain.  Patient was given fentanyl by EMS for pain control and her pain is currently back to baseline.      ==================================================================    CHART REVIEW:      Reviewed procedure note from 4/25/2024    END CHART REVIEW  ==================================================================    Allergies:  Allergies   Allergen Reactions    Dust Mites     Shellfish Allergy Nausea and Vomiting and Unknown     Patient states his mother has a \"violent reaction\" to shellfish, and that he has a feeling of nausea when he smells shellfish, so he has never consumed any. He states he has also not been tested.      Shellfish-Derived Products Other (See Comments)     Patient states her mother has a \"violent reaction\" to shellfish, and that she has a feeling of nausea when she smells shellfish, so she has never consumed any. She states she has also not been tested.  Patient states his mother has a \"violent reaction\" to shellfish, and that he has a feeling of nausea when he smells shellfish, so he has never consumed any. He states he has also not been tested.         Problem List:    Patient Active Problem List    Diagnosis Date Noted    Colostomy status (H) 04/25/2024     Priority: Medium    Gender " dysphoria 08/02/2022     Priority: Medium     Added automatically from request for surgery [id number]      Neurogenic bladder 02/21/2018     Priority: Medium        Past Medical History:    Past Medical History:   Diagnosis Date    Asthma     Bladder incontinence     Colostomy status (H)     Gender dysphoria     Neurogenic bladder     Self-catheterizes urinary bladder     Spinal cord injury of T10 vertebra (H)     Spinal cord injury of thoracic region without bone injury, initial encounter (H)     Uncomplicated asthma        Past Surgical History:    Past Surgical History:   Procedure Laterality Date    ABDOMEN SURGERY      segmental seigmoid resection, end colostomy    CYSTOSCOPY N/A 09/01/2017    Procedure: CYSTOSCOPY;  Cystoscopy and Botox Injection into the Bladder;  Surgeon: Michael Mcnulty MD;  [facility]    CYSTOSCOPY N/A 10/12/2018    Procedure: CYSTOSCOPY;  Cystoscopy, Botox;  Surgeon: Michael Mcnulty MD;  [facility]    CYSTOSCOPY, INJECT BOTOX N/A 4/25/2024    Procedure: Cystoscopy with Botox Injection to Bladder;  Surgeon: Chilango Dominguez MD;  Location: [facility]    CYSTOSCOPY, INTRAVESICAL INJECTION N/A 03/02/2018    Procedure: CYSTOSCOPY, INTRAVESICAL INJECTION;  Cystoscopy And Botox Injection Into The Bladder  ;  Surgeon: Michael Mcnulty MD;  Location: [facility]    DAVINCI ASSISTED COLOSTOMY TAKEDOWN N/A 4/25/2024    Procedure: ROBOT ASSISTED COLOSTOMY CLOSURE, takedown of splenic flexure.;  Surgeon: Amadou Anna MD;  Location: [facility]    INJECT BOTOX N/A 09/01/2017    Procedure: INJECT BOTOX;;  Surgeon: Michael Mcnulty MD;  [facility]    INJECT BOTOX N/A 10/12/2018    Procedure: INJECT BOTOX;;  Surgeon: Michael Mcnulty MD;  [facility]    ORCHIECTOMY SCROTAL Bilateral 08/18/2022    Procedure: Bilateral Simple Scrotal Orchiectomy;  Surgeon: Chilango Dominguez MD;  Location: [facility]    REMOVE INTRATHECAL PAIN PUMP N/A 06/02/2023    Procedure: Removal  "of intrathecal drug delivery system - removal of the intrathecal drug pump from the right abdomen and removal of the intrathecal catheter from the thoracolumbar spine;  Surgeon: Que Drew MD;  Location: [facility]    SIGMOIDOSCOPY FLEXIBLE N/A 4/25/2024    Procedure: FLEXIBLE SIGMOIDOSCOPY;  Surgeon: Amadou Anna MD;  Location: [facility]    VAGINOPLASTY WITH PERITONEAL FLAP, ROBOTIC ASSISTED N/A 4/25/2024    Procedure: Robotic assisted Full Depth Vaginoplasty;  Surgeon: Chilango Dominguez MD;  Location: [facility]       Family History:    Family History   Problem Relation Age of Onset    Cancer Mother         unknown type    Depression Father     Fibromyalgia Father     Anxiety Disorder Brother     Anesthesia Reaction No family hx of     Thrombosis No family hx of        Social History:  Marital Status:  Single [1]  Social History     Tobacco Use    Smoking status: Never     Passive exposure: Never    Smokeless tobacco: Never   Vaping Use    Vaping status: Never Used   Substance Use Topics    Alcohol use: No    Drug use: No        Medications:    acetaminophen (TYLENOL) 500 MG tablet  ALBUTEROL IN  diazepam (VALIUM) 5 MG tablet  estradiol valerate (DELESTROGEN) 20 MG/ML injection  ibuprofen (ADVIL/MOTRIN) 600 MG tablet  Insulin Syringe-Needle U-100 (B-D INSULIN SYRINGE) 25G X 1\" 1 ML MISC  Lidocaine (LIDOCARE) 4 % Patch  methocarbamol (ROBAXIN) 750 MG tablet  oxyCODONE (ROXICODONE) 5 MG tablet  polyethylene glycol (MIRALAX) 17 GM/Dose powder  progesterone (PROMETRIUM) 200 MG capsule  QUEtiapine (SEROQUEL) 25 MG tablet  Sharps Container MISC          Review of Systems   Constitutional:  Negative for appetite change, chills, fatigue and fever.   HENT:  Negative for congestion.    Respiratory:  Negative for cough, chest tightness and shortness of breath.    Cardiovascular:  Negative for chest pain.   Gastrointestinal:  Negative for abdominal pain, nausea and vomiting.   Genitourinary:  Positive for vaginal " bleeding (Slight, unchanged from postoperative baseline). Negative for decreased urine volume and hematuria.   Musculoskeletal:  Positive for myalgias (Muscle spasms).   Skin:  Positive for wound (Surgical wounds).   All other systems reviewed and are negative.      Physical Exam   BP: 136/86  Pulse: 95  Temp: 98.5  F (36.9  C)  Resp: 18  SpO2: 97 %      Physical Exam  Vitals and nursing note reviewed.   Constitutional:       Appearance: Normal appearance. She is not ill-appearing or diaphoretic.   HENT:      Nose: Nose normal.      Mouth/Throat:      Mouth: Mucous membranes are moist.   Cardiovascular:      Rate and Rhythm: Normal rate.      Pulses: Normal pulses.   Pulmonary:      Effort: Pulmonary effort is normal.   Abdominal:      Palpations: Abdomen is soft.      Tenderness: There is abdominal tenderness (Mild lower abdominal tenderness).      Comments: Left lower surgical wound with drain in place, some white mucousy drainage present.  Wound margin with mild erythema.  Minimal tenderness.   Skin:     General: Skin is warm and dry.      Capillary Refill: Capillary refill takes less than 2 seconds.   Neurological:      Mental Status: She is alert and oriented to person, place, and time.   Psychiatric:         Mood and Affect: Mood normal.         ED Course        Procedures             No results found for this or any previous visit (from the past 24 hour(s)).    Medications   methocarbamol (ROBAXIN) tablet 750 mg (750 mg Oral $Given 5/3/24 8293)     4:39 AM Patient re-assessed: Pain dramatically improved.  Having minimal spasms now.  Patient feels well enough to go home.    Assessments & Plan (with Medical Decision Making)  33-year-old female who is 8 days post surgery presenting for evaluation of a fall and possible injury.  She fell reaching to get her muscle relaxants medication and fell from her bed to the floor.  Had immediate sharp pain however her pain has subsided here.  Surgical wounds appear to be  well-healing.  Has trace bleeding but no active hemorrhage.  Treated patient with her prescribed muscle relaxant here with resolution of the spasms she has been experiencing.  Clinically she may have torn a small scab or pulled out a stitch with her fall but does not seem to have had any major injury.  Considered further imaging however her exam is reassuring so was observed for period in the ED with improvement in her symptoms.  Discharged home with plan for outpatient surgical follow-up or return if symptoms worsen or new symptoms develop.     I have reviewed the nursing notes.    I have reviewed the findings, diagnosis, plan and need for follow up with the patient.          New Prescriptions    No medications on file       Final diagnoses:   Fall, initial encounter   Acute post-operative pain       5/3/2024   [facility] EMERGENCY DEPT       [place]on[place], Fredo Putnam MD  05/03/24 [id number]

## 2024-05-03 NOTE — ED TRIAGE NOTES
Pt arrived via EMS for a fall. Pt has a hx of spinal cord injury age 3, had a recent abd surgery where they removed colostomy site and performed full vaginal plasty on 4/25. ,Pt reported she was reaching for her medications and fell out of bed. Pt reports hip pain and pubic pain. Concerned of her recent stiches. No active bleeding noted.      Triage Assessment (Adult)       Row Name 05/03/24 0308          Triage Assessment    Airway WDL WDL        Respiratory WDL    Respiratory WDL WDL        Skin Circulation/Temperature WDL    Skin Circulation/Temperature WDL WDL        Cardiac WDL    Cardiac WDL WDL        Peripheral/Neurovascular WDL    Peripheral Neurovascular WDL WDL        Cognitive/Neuro/Behavioral WDL    Cognitive/Neuro/Behavioral WDL WDL

## 2024-05-06 ENCOUNTER — TELEPHONE (OUTPATIENT)
Dept: PLASTIC SURGERY | Facility: CLINIC | Age: 34
End: 2024-05-06
Payer: COMMERCIAL

## 2024-05-06 NOTE — TELEPHONE ENCOUNTER
Called to check on patient who had full depth vaginoplasty with Dr. Dominguez on 4/25. Pt went to the ED on 5/1 for fevers (workup negative for infection) and and 5/3 due to a fall. Unable to reach the patient. Left voicemail stating the reason for calling and left callback number.

## 2024-05-08 ENCOUNTER — HOSPITAL ENCOUNTER (EMERGENCY)
Facility: CLINIC | Age: 34
Discharge: HOME | End: 2024-05-08
Attending: FAMILY MEDICINE | Admitting: FAMILY MEDICINE
Payer: COMMERCIAL

## 2024-05-08 ENCOUNTER — OFFICE VISIT (OUTPATIENT)
Dept: UROLOGY | Facility: CLINIC | Age: 34
End: 2024-05-08
Payer: COMMERCIAL

## 2024-05-08 ENCOUNTER — TELEPHONE (OUTPATIENT)
Dept: UROLOGY | Facility: CLINIC | Age: 34
End: 2024-05-08

## 2024-05-08 VITALS
SYSTOLIC BLOOD PRESSURE: 138 MMHG | WEIGHT: 180 LBS | HEART RATE: 70 BPM | HEIGHT: 67 IN | DIASTOLIC BLOOD PRESSURE: 82 MMHG | BODY MASS INDEX: 28.25 KG/M2

## 2024-05-08 VITALS
SYSTOLIC BLOOD PRESSURE: 122 MMHG | RESPIRATION RATE: 16 BRPM | WEIGHT: 180 LBS | DIASTOLIC BLOOD PRESSURE: 79 MMHG | BODY MASS INDEX: 28.19 KG/M2 | OXYGEN SATURATION: 97 % | TEMPERATURE: 97.6 F | HEART RATE: 91 BPM

## 2024-05-08 DIAGNOSIS — F64.9 GENDER DYSPHORIA: Primary | ICD-10-CM

## 2024-05-08 DIAGNOSIS — R33.9 URINE RETENTION: ICD-10-CM

## 2024-05-08 DIAGNOSIS — Z98.890 POSTOPERATIVE STATE: ICD-10-CM

## 2024-05-08 PROCEDURE — 51702 INSERT TEMP BLADDER CATH: CPT | Performed by: FAMILY MEDICINE

## 2024-05-08 PROCEDURE — 51798 US URINE CAPACITY MEASURE: CPT | Performed by: FAMILY MEDICINE

## 2024-05-08 PROCEDURE — 76705 ECHO EXAM OF ABDOMEN: CPT | Performed by: FAMILY MEDICINE

## 2024-05-08 PROCEDURE — 99284 EMERGENCY DEPT VISIT MOD MDM: CPT | Mod: 25 | Performed by: FAMILY MEDICINE

## 2024-05-08 PROCEDURE — 76705 ECHO EXAM OF ABDOMEN: CPT | Mod: 26 | Performed by: FAMILY MEDICINE

## 2024-05-08 PROCEDURE — 99024 POSTOP FOLLOW-UP VISIT: CPT | Performed by: UROLOGY

## 2024-05-08 PROCEDURE — 99284 EMERGENCY DEPT VISIT MOD MDM: CPT | Performed by: FAMILY MEDICINE

## 2024-05-08 RX ORDER — CIPROFLOXACIN 500 MG/1
500 TABLET, FILM COATED ORAL ONCE
Status: COMPLETED | OUTPATIENT
Start: 2024-05-08 | End: 2024-05-08

## 2024-05-08 RX ADMIN — CIPROFLOXACIN 500 MG: 500 TABLET, FILM COATED ORAL at 09:05

## 2024-05-08 ASSESSMENT — COLUMBIA-SUICIDE SEVERITY RATING SCALE - C-SSRS
1. IN THE PAST MONTH, HAVE YOU WISHED YOU WERE DEAD OR WISHED YOU COULD GO TO SLEEP AND NOT WAKE UP?: NO
6. HAVE YOU EVER DONE ANYTHING, STARTED TO DO ANYTHING, OR PREPARED TO DO ANYTHING TO END YOUR LIFE?: NO
2. HAVE YOU ACTUALLY HAD ANY THOUGHTS OF KILLING YOURSELF IN THE PAST MONTH?: NO

## 2024-05-08 ASSESSMENT — PAIN SCALES - GENERAL: PAINLEVEL: NO PAIN (0)

## 2024-05-08 ASSESSMENT — ACTIVITIES OF DAILY LIVING (ADL)
ADLS_ACUITY_SCORE: 38
ADLS_ACUITY_SCORE: 36

## 2024-05-08 NOTE — NURSING NOTE
"Chief Complaint   Patient presents with    Follow Up     Post op          Blood pressure 138/82, pulse 70, height 1.702 m (5' 7\"), weight 81.6 kg (180 lb). Body mass index is 28.19 kg/m .    Patient Active Problem List   Diagnosis    Neurogenic bladder    Gender dysphoria    Colostomy status (H)       Allergies   Allergen Reactions    Dust Mites     Shellfish Allergy Nausea and Vomiting and Unknown     Patient states his mother has a \"violent reaction\" to shellfish, and that he has a feeling of nausea when he smells shellfish, so he has never consumed any. He states he has also not been tested.      Shellfish-Derived Products Other (See Comments)     Patient states her mother has a \"violent reaction\" to shellfish, and that she has a feeling of nausea when she smells shellfish, so she has never consumed any. She states she has also not been tested.  Patient states his mother has a \"violent reaction\" to shellfish, and that he has a feeling of nausea when he smells shellfish, so he has never consumed any. He states he has also not been tested.         Current Outpatient Medications   Medication Sig Dispense Refill    acetaminophen (TYLENOL) 500 MG tablet Take 2 tablets (1,000 mg) by mouth 4 times daily 80 tablet 0    ALBUTEROL IN Inhale 1-2 puffs into the lungs 4 times daily as needed (shortness of breath)      diazepam (VALIUM) 5 MG tablet Take 5 mg by mouth every 6 hours as needed      estradiol valerate (DELESTROGEN) 20 MG/ML injection Inject into the muscle once a week Saturday     0.11ml (2.2mg)      ibuprofen (ADVIL/MOTRIN) 600 MG tablet Take 1 tablet (600 mg) by mouth 3 times daily 21 tablet 0    Insulin Syringe-Needle U-100 (B-D INSULIN SYRINGE) 25G X 1\" 1 ML MISC       Lidocaine (LIDOCARE) 4 % Patch Place 2 patches onto the skin every 24 hours To prevent lidocaine toxicity, patient should be patch free for 12 hrs daily. 10 patch 0    methocarbamol (ROBAXIN) 750 MG tablet Take 1 tablet (750 mg) by mouth 4 times " daily as needed for muscle spasms 30 tablet 0    oxyCODONE (ROXICODONE) 5 MG tablet Take 1-2 tablets (5-10 mg) by mouth every 4 hours as needed for moderate to severe pain 24 tablet 0    polyethylene glycol (MIRALAX) 17 GM/Dose powder Take 17 g by mouth daily 510 g 0    progesterone (PROMETRIUM) 200 MG capsule Take 1 capsule by mouth every morning      QUEtiapine (SEROQUEL) 25 MG tablet Take 25 mg by mouth as needed      Sharps Container MISC Sharps Container         Social History     Tobacco Use    Smoking status: Never     Passive exposure: Never    Smokeless tobacco: Never   Vaping Use    Vaping status: Never Used   Substance Use Topics    Alcohol use: No    Drug use: No       Varsha Judge  5/8/2024  8:39 AM

## 2024-05-08 NOTE — DISCHARGE INSTRUCTIONS
ICD-10-CM    1. Urine retention  R33.9     keep catheter in place and follow-up next week with urology.  may also call nursing at urology regarding trying with a different catheter for self-cath. return for obstruction, fever      2. Postoperative state  Z98.890

## 2024-05-08 NOTE — TELEPHONE ENCOUNTER
Spoke to Asher regarding issues she is having catheterizing.  She is at the [place] urgent care because she is able to get some urine out but feels like there is something obstructing her ability to get the catheter all the way in.  Dr. Dominguez notified.      Jenna De Leon RN

## 2024-05-08 NOTE — PROGRESS NOTES
"Hannah Chavez is a 33 year old trans female.  She underwent full depth robotic vaginoplasty and colostomy takedown 4/25/2024  No significant perioperative outcomes.  Left hospital POD6.  Passing stool through rectum  Vagina is being dilated TID with purple dilator to full depth - all dots inside.  No obvious wounds  Still has a contreras    /82   Pulse 70   Ht 1.702 m (5' 7\")   Wt 81.6 kg (180 lb)   BMI 28.19 kg/m    Exam:  Doing great.  We removed contreras  She demonstrated CIC with 14 Fr.  Vagina appears healing well.  No large wounds.    A/P   Excellent outcome after vaginoplasty + colostomy takedown  Pic taken for chart.  Cipro x 1 given  Dilate TID  Contreras removed today.   Patient demonstrated CIC  Followup with us in 1 month  Already has followup with CRS team.    Chilango Dominguez MD    Addendum 5/14/24: Pt to CIC 4x/day due to indefinite urinary retention. Due to recent urethral surgery, Pt to use coude catheter as she is unable to pass a straight tip due to anatomy.\"     "

## 2024-05-08 NOTE — TELEPHONE ENCOUNTER
Caller reporting the following red-flag symptom(s): Issues with self cath- Surgery 4/25    Per the system red-flag symptom policy, patient was instructed to:  speak with a Registered Nurse    Action:  Patient warm transferred to a Registered Nurse

## 2024-05-08 NOTE — PROGRESS NOTES
The following medication was given:     MEDICATION: Ciprofloxacin  ROUTE: PO  SITE: Medication was given orally  DOSE: 500mg  LOT #: [id number]  : [employer]  EXPIRATION DATE:11/2024  NDC#: 6257-9376-94   Was there drug waste? No    Prior to medication administration, verified patient identity using patient's name and date of birth.  Due to medication administration, patient instructed to remain in clinic for 15 minutes  afterwards, and to report any adverse reaction to me immediately.

## 2024-05-08 NOTE — ED PROVIDER NOTES
"  History     Chief Complaint   Patient presents with    Catheter Problem     HPI  Hannah Chavez is a 33 year old adult who is a trans female, and prior spine injury with a T10 injury, who underwent a robotic vaginoplasty and colostomy takedown in April 25, 2024.  She was discharged postop day #6.  Was at the urology clinic today and was able to self catheterize herself for the first time in the clinic.  She was therefore discharged with self cath materials but when at home she noted that her catheter was difficult to advance as it was she felt too firm once it was in the bladder and could only partially empty the bladder.  She arrived here and had a bladder scan prior to my seeing the patient and performing a bedside ultrasound of 500 cc at least in the bladder.  She feels that his suprapubic pain but no swelling in the region no recent fever.  She was just examined in urology clinic.  She has no change in stooling.  No fever.  She is otherwise feeling well.        Allergies:  Allergies   Allergen Reactions    Dust Mites     Shellfish Allergy Nausea and Vomiting and Unknown     Patient states his mother has a \"violent reaction\" to shellfish, and that he has a feeling of nausea when he smells shellfish, so he has never consumed any. He states he has also not been tested.      Shellfish-Derived Products Other (See Comments)     Patient states her mother has a \"violent reaction\" to shellfish, and that she has a feeling of nausea when she smells shellfish, so she has never consumed any. She states she has also not been tested.  Patient states his mother has a \"violent reaction\" to shellfish, and that he has a feeling of nausea when he smells shellfish, so he has never consumed any. He states he has also not been tested.         Problem List:    Patient Active Problem List    Diagnosis Date Noted    Colostomy status (H) 04/25/2024     Priority: Medium    Gender dysphoria 08/02/2022     Priority: Medium     Added " automatically from request for surgery [id number]      Neurogenic bladder 02/21/2018     Priority: Medium        Past Medical History:    Past Medical History:   Diagnosis Date    Asthma     Bladder incontinence     Colostomy status (H)     Gender dysphoria     Neurogenic bladder     Self-catheterizes urinary bladder     Spinal cord injury of T10 vertebra (H)     Spinal cord injury of thoracic region without bone injury, initial encounter (H)     Uncomplicated asthma        Past Surgical History:    Past Surgical History:   Procedure Laterality Date    ABDOMEN SURGERY      segmental seigmoid resection, end colostomy    CYSTOSCOPY N/A 09/01/2017    Procedure: CYSTOSCOPY;  Cystoscopy and Botox Injection into the Bladder;  Surgeon: Michael Mcnulty MD;  [facility]    CYSTOSCOPY N/A 10/12/2018    Procedure: CYSTOSCOPY;  Cystoscopy, Botox;  Surgeon: Michael Mcnulty MD;  [facility]    CYSTOSCOPY, INJECT BOTOX N/A 4/25/2024    Procedure: Cystoscopy with Botox Injection to Bladder;  Surgeon: Chilango Dominguez MD;  [facility]    CYSTOSCOPY, INTRAVESICAL INJECTION N/A 03/02/2018    Procedure: CYSTOSCOPY, INTRAVESICAL INJECTION;  Cystoscopy And Botox Injection Into The Bladder  ;  Surgeon: Michael Mcnulty MD;  [facility]    DAVINCI ASSISTED COLOSTOMY TAKEDOWN N/A 4/25/2024    Procedure: ROBOT ASSISTED COLOSTOMY CLOSURE, takedown of splenic flexure.;  Surgeon: Amadou Anna MD;  [facility]    INJECT BOTOX N/A 09/01/2017    Procedure: INJECT BOTOX;;  Surgeon: Michael Mcnulty MD;  [facility]    INJECT BOTOX N/A 10/12/2018    Procedure: INJECT BOTOX;;  Surgeon: Michael Mcnulty MD;  [facility]    ORCHIECTOMY SCROTAL Bilateral 08/18/2022    Procedure: Bilateral Simple Scrotal Orchiectomy;  Surgeon: Chilango Dominguez MD;  [facility]    REMOVE INTRATHECAL PAIN PUMP N/A 06/02/2023    Procedure: Removal of intrathecal drug delivery system - removal of the  "intrathecal drug pump from the right abdomen and removal of the intrathecal catheter from the thoracolumbar spine;  Surgeon: Que Drew MD;  [facility]    SIGMOIDOSCOPY FLEXIBLE N/A 4/25/2024    Procedure: FLEXIBLE SIGMOIDOSCOPY;  Surgeon: Amadou Anna MD;  [facility]    VAGINOPLASTY WITH PERITONEAL FLAP, ROBOTIC ASSISTED N/A 4/25/2024    Procedure: Robotic assisted Full Depth Vaginoplasty;  Surgeon: Chilango Dominguez MD;  [facility]       Family History:    Family History   Problem Relation Age of Onset    Cancer Mother         unknown type    Depression Father     Fibromyalgia Father     Anxiety Disorder Brother     Anesthesia Reaction No family hx of     Thrombosis No family hx of        Social History:  Marital Status:  Single [1]  Social History     Tobacco Use    Smoking status: Never     Passive exposure: Never    Smokeless tobacco: Never   Vaping Use    Vaping status: Never Used   Substance Use Topics    Alcohol use: No    Drug use: No        Medications:    acetaminophen (TYLENOL) 500 MG tablet  ALBUTEROL IN  diazepam (VALIUM) 5 MG tablet  estradiol valerate (DELESTROGEN) 20 MG/ML injection  ibuprofen (ADVIL/MOTRIN) 600 MG tablet  Insulin Syringe-Needle U-100 (B-D INSULIN SYRINGE) 25G X 1\" 1 ML MISC  Lidocaine (LIDOCARE) 4 % Patch  methocarbamol (ROBAXIN) 750 MG tablet  oxyCODONE (ROXICODONE) 5 MG tablet  polyethylene glycol (MIRALAX) 17 GM/Dose powder  progesterone (PROMETRIUM) 200 MG capsule  QUEtiapine (SEROQUEL) 25 MG tablet  [employer]ar[employer] Container MISC          Review of Systems  ROS:  5 point ROS negative except as noted above in HPI, including Gen., Resp., CV, GI &  system review.      Physical Exam   BP: 122/79  Pulse: 91  Temp: 97.6  F (36.4  C)  Resp: 16  Weight: 81.6 kg (180 lb)  SpO2: 97 %      Physical Exam    The abdomen is soft not significantly tender including suprapubic region.      ED Course        Procedures              Critical Care time:  none      "          Results for orders placed or performed during the hospital encounter of 05/08/24 (from the past 24 hour(s))   POC US ABDOMEN LIMITED    Impression    [facility] Procedure Note      Limited Bedside ED Ultrasound of the Urinary Bladder:    PERFORMED BY: Dr. Preston Amezcua MD  INDICATIONS:  Possible obstrucion and/or mass  PROBE: Low frequency convex probe  BODY LOCATION:  Abdomen  FINDINGS:  Visualization of the bladder in longitudinal and transverse views demonstrated a distended state.  The bladder dimensions 500-600 cc.  INTERPRETATION:  Total calculated volume was estimated at 500-600 cc.  The bladder is abnormally distended.  IMAGE DOCUMENTATION: Images were archived to PACs system.           Medications - No data to display    Assessments & Plan (with Medical Decision Making)     MDM; Hannah Chavez is a 33 year old adult arrived with difficulty urinating after catheter was removed today in urology clinic.  She had undergone robotic vaginoplasty and colostomy takedown in late April and was discharged with self-catheterization instructions and was able to self catheterize in the urology clinic but since then could not urinate and had retained 500 to 600 cc of urine in her bladder at the time of her presentation here.  I was able to confirm by bedside ultrasound and a catheter was placed.  In the interim I had called to Dr. Dominguez or her urologist and discussed.  His concern was that she may be not able to self catheterize due to the localization of the new urethra which has been created.  I went back to speak with the patient and she believes it may be more that the catheter she was using at home was different than the one that she was able to catheterize with in the urology clinic and that she can get some urine out with the catheter but not completely and wonders if she was only partially able to enter the bladder because the catheter was too stiff that she was using.  She will be contacting  the urology nurses to see if possibly she could get a different type of catheter for home but in the meantime the plan I had discussed with Dr. Dominguez was for her to follow-up with them next week and leave the catheter in.  However at the time I spoke with him I did not relay the concern about the different type of catheter.  She will discuss that with again their clinic staff    I have reviewed the findings, diagnosis, plan and need for follow up with the patient.           Medical Decision Making  The patient's presentation was of moderate complexity (a chronic illness mild to moderate exacerbation, progression, or side effect of treatment).    The patient's evaluation involved:  history and exam without other MDM data elements    The patient's management necessitated moderate risk (a decision regarding minor procedure (contreras placement) with risk factors of post-op state).        New Prescriptions    No medications on file       Final diagnoses:   Urine retention - keep catheter in place and follow-up next week with urology.  may also call nursing at urology regarding trying with a different catheter for self-cath. return for obstruction, fever   Postoperative state       5/8/2024   [facility] EMERGENCY DEPT       Preston Amezcua MD  05/08/24 [id number]

## 2024-05-08 NOTE — ED TRIAGE NOTES
Patient unable to empty bladder.  Patient had indwelling catheter taken out yesterday after vaginoplasty and colostomy reversal surgery and now is having trouble emptying bladder.  Patient self caths.     Triage Assessment (Adult)       Row Name 05/08/24 1409          Triage Assessment    Airway WDL WDL        Respiratory WDL    Respiratory WDL WDL        Skin Circulation/Temperature WDL    Skin Circulation/Temperature WDL WDL        Cardiac WDL    Cardiac WDL WDL        Peripheral/Neurovascular WDL    Peripheral Neurovascular WDL WDL        Cognitive/Neuro/Behavioral WDL    Cognitive/Neuro/Behavioral WDL WDL

## 2024-05-08 NOTE — TELEPHONE ENCOUNTER
33 year old s/p   Robotic assisted Full Depth Vaginoplasty   Cystoscopy with Botox Injection to Bladder   ROBOT ASSISTED COLOSTOMY CLOSURE, takedown of splenic flexure.   FLEXIBLE SIGMOIDOSCOPY  On 4/25/24   Pt had contreras removed today in clinic   Self cathed with more of a flexible catheter in clinic and had no issues   She is now home(in [place]) and using the old catheters from [employer] that are quite stiff (she used these before surgery)and unable to pass   Please call Asher back with recommendations

## 2024-05-08 NOTE — LETTER
"5/8/2024       RE: Hannah Chavez  [address]  [place] [address]     Dear Colleague,    Thank you for referring your patient, Hannah Chavez, to the [facility] [place] at [facility]. Please see a copy of my visit note below.    Hannah Chavez is a 33 year old trans female.  She underwent full depth robotic vaginoplasty and colostomy takedown 4/25/2024  No significant perioperative outcomes.  Left hospital POD6.  Passing stool through rectum  Vagina is being dilated TID with purple dilator to full depth - all dots inside.  No obvious wounds  Still has a contreras    /82   Pulse 70   Ht 1.702 m (5' 7\")   Wt 81.6 kg (180 lb)   BMI 28.19 kg/m    Exam:  Doing great.  We removed contreras  She demonstrated CIC with 14 Fr.  Vagina appears healing well.  No large wounds.    A/P   Excellent outcome after vaginoplasty + colostomy takedown  Pic taken for chart.  Cipro x 1 given  Dilate TID  Contreras removed today.   Patient demonstrated CIC  Followup with us in 1 month  Already has followup with CRS team.    Chilango Dominguez MD    The following medication was given:     MEDICATION: Ciprofloxacin  ROUTE: PO  SITE: Medication was given orally  DOSE: 500mg  LOT #: [id number]  : [employer]  EXPIRATION DATE:11/2024  NDC#: 5276-5071-97   Was there drug waste? No    Prior to medication administration, verified patient identity using patient's name and date of birth.  Due to medication administration, patient instructed to remain in clinic for 15 minutes  afterwards, and to report any adverse reaction to me immediately.    "

## 2024-05-10 ENCOUNTER — DOCUMENTATION ONLY (OUTPATIENT)
Dept: UROLOGY | Facility: CLINIC | Age: 34
End: 2024-05-10
Payer: COMMERCIAL

## 2024-05-10 NOTE — PROGRESS NOTES
Type of Form Received: Order (coude catheter)    Form Received (Date) 5/10/24   Form Filled out Yes, date 5/10/24   Placed in provider folder Yes       Received Completed forms Yes   Faxed Forms Faxed To: [facility]  Fax Number: [phone]   Sent to HIM (Date) 5/15/24       Were Records Sent? Yes   If Yes: Where: [facility]  Fax Number: [phone]  Date: 5/15/24

## 2024-05-11 ENCOUNTER — HOSPITAL ENCOUNTER (EMERGENCY)
Facility: CLINIC | Age: 34
Discharge: HOME | End: 2024-05-11
Attending: EMERGENCY MEDICINE | Admitting: EMERGENCY MEDICINE
Payer: COMMERCIAL

## 2024-05-11 VITALS
SYSTOLIC BLOOD PRESSURE: 131 MMHG | RESPIRATION RATE: 18 BRPM | TEMPERATURE: 99.1 F | BODY MASS INDEX: 28.25 KG/M2 | WEIGHT: 180 LBS | OXYGEN SATURATION: 98 % | HEART RATE: 90 BPM | DIASTOLIC BLOOD PRESSURE: 80 MMHG | HEIGHT: 67 IN

## 2024-05-11 DIAGNOSIS — R50.9 FEVER, UNSPECIFIED FEVER CAUSE: ICD-10-CM

## 2024-05-11 LAB
ANION GAP SERPL CALCULATED.3IONS-SCNC: 10 MMOL/L (ref 7–15)
BASOPHILS # BLD AUTO: 0 10E3/UL (ref 0–0.2)
BASOPHILS NFR BLD AUTO: 0 %
BUN SERPL-MCNC: 9 MG/DL (ref 6–20)
CALCIUM SERPL-MCNC: 8.7 MG/DL (ref 8.6–10)
CHLORIDE SERPL-SCNC: 103 MMOL/L (ref 98–107)
CREAT SERPL-MCNC: 0.72 MG/DL (ref 0.51–1.17)
DEPRECATED HCO3 PLAS-SCNC: 23 MMOL/L (ref 22–29)
EGFRCR SERPLBLD CKD-EPI 2021: >90 ML/MIN/1.73M2
EOSINOPHIL # BLD AUTO: 0.4 10E3/UL (ref 0–0.7)
EOSINOPHIL NFR BLD AUTO: 5 %
ERYTHROCYTE [DISTWIDTH] IN BLOOD BY AUTOMATED COUNT: 15.1 % (ref 10–15)
FLUAV RNA SPEC QL NAA+PROBE: NEGATIVE
FLUBV RNA RESP QL NAA+PROBE: NEGATIVE
GLUCOSE SERPL-MCNC: 112 MG/DL (ref 70–99)
HCT VFR BLD AUTO: 27.7 % (ref 35–53)
HGB BLD-MCNC: 8.9 G/DL (ref 11.7–17.7)
IMM GRANULOCYTES # BLD: 0 10E3/UL
IMM GRANULOCYTES NFR BLD: 0 %
LYMPHOCYTES # BLD AUTO: 1.2 10E3/UL (ref 0.8–5.3)
LYMPHOCYTES NFR BLD AUTO: 16 %
MCH RBC QN AUTO: 29.9 PG (ref 26.5–33)
MCHC RBC AUTO-ENTMCNC: 32.1 G/DL (ref 31.5–36.5)
MCV RBC AUTO: 93 FL (ref 78–100)
MONOCYTES # BLD AUTO: 0.6 10E3/UL (ref 0–1.3)
MONOCYTES NFR BLD AUTO: 8 %
NEUTROPHILS # BLD AUTO: 5.3 10E3/UL (ref 1.6–8.3)
NEUTROPHILS NFR BLD AUTO: 70 %
NRBC # BLD AUTO: 0 10E3/UL
NRBC BLD AUTO-RTO: 0 /100
PLATELET # BLD AUTO: 334 10E3/UL (ref 150–450)
POTASSIUM SERPL-SCNC: 3.7 MMOL/L (ref 3.4–5.3)
PROCALCITONIN SERPL IA-MCNC: 0.05 NG/ML
RBC # BLD AUTO: 2.98 10E6/UL (ref 3.8–5.9)
RSV RNA SPEC NAA+PROBE: NEGATIVE
SARS-COV-2 RNA RESP QL NAA+PROBE: NEGATIVE
SODIUM SERPL-SCNC: 136 MMOL/L (ref 135–145)
WBC # BLD AUTO: 7.6 10E3/UL (ref 4–11)

## 2024-05-11 PROCEDURE — 85004 AUTOMATED DIFF WBC COUNT: CPT | Performed by: EMERGENCY MEDICINE

## 2024-05-11 PROCEDURE — 36415 COLL VENOUS BLD VENIPUNCTURE: CPT | Performed by: EMERGENCY MEDICINE

## 2024-05-11 PROCEDURE — 84145 PROCALCITONIN (PCT): CPT | Performed by: EMERGENCY MEDICINE

## 2024-05-11 PROCEDURE — 87637 SARSCOV2&INF A&B&RSV AMP PRB: CPT | Performed by: EMERGENCY MEDICINE

## 2024-05-11 PROCEDURE — 87040 BLOOD CULTURE FOR BACTERIA: CPT | Performed by: EMERGENCY MEDICINE

## 2024-05-11 PROCEDURE — 250N000013 HC RX MED GY IP 250 OP 250 PS 637: Performed by: EMERGENCY MEDICINE

## 2024-05-11 PROCEDURE — 99283 EMERGENCY DEPT VISIT LOW MDM: CPT | Performed by: EMERGENCY MEDICINE

## 2024-05-11 PROCEDURE — 80048 BASIC METABOLIC PNL TOTAL CA: CPT | Performed by: EMERGENCY MEDICINE

## 2024-05-11 RX ORDER — ACETAMINOPHEN 500 MG
1000 TABLET ORAL ONCE
Status: COMPLETED | OUTPATIENT
Start: 2024-05-11 | End: 2024-05-11

## 2024-05-11 RX ADMIN — ACETAMINOPHEN 1000 MG: 500 TABLET, FILM COATED ORAL at 21:10

## 2024-05-11 ASSESSMENT — ACTIVITIES OF DAILY LIVING (ADL)
ADLS_ACUITY_SCORE: 38

## 2024-05-12 NOTE — ED NOTES
Patient reports fatigue, minor headache, and body aches that started today. States she was feeling like her normal self yesterday. Denies pain on incision sites.

## 2024-05-12 NOTE — ED PROVIDER NOTES
"ED Provider Note  [facility]      History     Chief Complaint   Patient presents with    Fever     HPI  Hannah Chavez is a 34 year old adult who is a trans female who underwent robotic vaginoplasty with colostomy takedown on April 25, 2024, ultimately discharged home postoperative day #6.  Patient has been with attempts to self catheterize, however was unable to do so, and therefore presented to emergency department on May 8, and Evangelista catheter was reinserted after bedside ultrasound showed greater than 500 cc fluid in the bladder.    Patient now is concerned with regards to intermittent fevers.  States that she has been having intermittent fevers occurring every other day for the past week since discharge home from the hospital.  Does have family members with slight congestion, and cough, with viral type symptoms.  Patient has not had much in the way of cough, and denies any sore throat, or ear pain.  Has had slight headache.  Denies any severe abdominal discomfort.  Does have abdominal and other incision sites which have not had any significant drainage.  Denies any severe pains anywhere.  Urine has been clear.  No severe abdominal discomfort.        Independent Historian:        Review of External Notes:  I reviewed ED visit from May 8.  I reviewed urology clinic visit from May 8 as well.  I reviewed April 25 through May first hospital stay when patient had colostomy takedown, with robotic assisted gender affirmation surgery    Allergies:  Allergies   Allergen Reactions    Dust Mites     Shellfish Allergy Nausea and Vomiting and Unknown     Patient states his mother has a \"violent reaction\" to shellfish, and that he has a feeling of nausea when he smells shellfish, so he has never consumed any. He states he has also not been tested.      Shellfish-Derived Products Other (See Comments)     Patient states her mother has a \"violent reaction\" to shellfish, and that she has a feeling of " "nausea when she smells shellfish, so she has never consumed any. She states she has also not been tested.  Patient states his mother has a \"violent reaction\" to shellfish, and that he has a feeling of nausea when he smells shellfish, so he has never consumed any. He states he has also not been tested.         Problem List:    Patient Active Problem List    Diagnosis Date Noted    Colostomy status (H) 04/25/2024     Priority: Medium    Gender dysphoria 08/02/2022     Priority: Medium     Added automatically from request for surgery [id number]      Neurogenic bladder 02/21/2018     Priority: Medium        Past Medical History:    Past Medical History:   Diagnosis Date    Asthma     Bladder incontinence     Colostomy status (H)     Gender dysphoria     Neurogenic bladder     Self-catheterizes urinary bladder     Spinal cord injury of T10 vertebra (H)     Spinal cord injury of thoracic region without bone injury, initial encounter (H)     Uncomplicated asthma        Past Surgical History:    Past Surgical History:   Procedure Laterality Date    ABDOMEN SURGERY      segmental seigmoid resection, end colostomy    CYSTOSCOPY N/A 09/01/2017    Procedure: CYSTOSCOPY;  Cystoscopy and Botox Injection into the Bladder;  Surgeon: Michael Mcnulty MD;  [facility]    CYSTOSCOPY N/A 10/12/2018    Procedure: CYSTOSCOPY;  Cystoscopy, Botox;  Surgeon: Michael Mcnulty MD;  [facility]    CYSTOSCOPY, INJECT BOTOX N/A 4/25/2024    Procedure: Cystoscopy with Botox Injection to Bladder;  Surgeon: Chilango Dominguez MD;  [facility]    CYSTOSCOPY, INTRAVESICAL INJECTION N/A 03/02/2018    Procedure: CYSTOSCOPY, INTRAVESICAL INJECTION;  Cystoscopy And Botox Injection Into The Bladder  ;  Surgeon: Michael Mcnulty MD;  [facility]    DAVINCI ASSISTED COLOSTOMY TAKEDOWN N/A 4/25/2024    Procedure: ROBOT ASSISTED COLOSTOMY CLOSURE, takedown of splenic flexure.;  Surgeon: Amadou Anna MD;  [facility]    INJECT BOTOX N/A 09/01/2017    Procedure: INJECT BOTOX;;  Surgeon: Michael Mcnulty MD;  [facility]    INJECT BOTOX N/A 10/12/2018    Procedure: INJECT BOTOX;;  Surgeon: Michael Mcnulty MD;  [facility]    ORCHIECTOMY SCROTAL Bilateral 08/18/2022    Procedure: Bilateral Simple Scrotal Orchiectomy;  Surgeon: Chilango Dominguez MD;  [facility]    REMOVE INTRATHECAL PAIN PUMP N/A 06/02/2023    Procedure: Removal of intrathecal drug delivery system - removal of the intrathecal drug pump from the right abdomen and removal of the intrathecal catheter from the thoracolumbar spine;  Surgeon: Que Drew MD;  [facility]    SIGMOIDOSCOPY FLEXIBLE N/A 4/25/2024    Procedure: FLEXIBLE SIGMOIDOSCOPY;  Surgeon: Amadou Anna MD;  [facility]    VAGINOPLASTY WITH PERITONEAL FLAP, ROBOTIC ASSISTED N/A 4/25/2024    Procedure: Robotic assisted Full Depth Vaginoplasty;  Surgeon: Chilango Dominguez MD;  [facility]       Family History:    Family History   Problem Relation Age of Onset    Cancer Mother         unknown type    Depression Father     Fibromyalgia Father     Anxiety Disorder Brother     Anesthesia Reaction No family hx of     Thrombosis No family hx of        Social History:  Marital Status:  Single [1]  Social History     Tobacco Use    Smoking status: Never     Passive exposure: Never    Smokeless tobacco: Never   Vaping Use    Vaping status: Never Used   Substance Use Topics    Alcohol use: No    Drug use: No        Medications:    acetaminophen (TYLENOL) 500 MG tablet  ALBUTEROL IN  diazepam (VALIUM) 5 MG tablet  estradiol valerate (DELESTROGEN) 20 MG/ML injection  ibuprofen (ADVIL/MOTRIN) 600 MG tablet  Insulin Syringe-Needle U-100 (B-D INSULIN SYRINGE) 25G X 1\" 1 ML MISC  Lidocaine (LIDOCARE) 4 % Patch  methocarbamol (ROBAXIN) 750 MG tablet  oxyCODONE (ROXICODONE) 5 MG tablet  polyethylene glycol (MIRALAX) 17 GM/Dose powder  progesterone (PROMETRIUM) " "200 MG capsule  QUEtiapine (SEROQUEL) 25 MG tablet  Sharps Container MISC          Review of Systems  A medically appropriate review of systems was performed with pertinent positives and negatives noted in the HPI, and all other systems negative.    Physical Exam   Patient Vitals for the past 24 hrs:   BP Temp Temp src Pulse Resp SpO2 Height Weight   05/11/24 2156 -- -- -- -- -- 98 % -- --   05/11/24 2121 131/80 -- -- 90 -- -- -- --   05/11/24 2031 131/79 -- -- 92 -- 100 % -- --   05/11/24 2011 122/76 -- -- -- -- 98 % -- --   05/11/24 2002 (!) 143/83 99.1  F (37.3  C) Oral 96 18 99 % 1.702 m (5' 7\") 81.6 kg (180 lb)          Physical Exam  General: alert and in no acute distress on arrival  Head: atraumatic, normocephalic  Lungs:  nonlabored  CV:  extremities warm and perfused  Abd: nondistended.  Well-healed incision sites.  Skin: no rashes, no diaphoresis and skin color normal  Neuro: Patient awake, alert, speech is fluent,   Psychiatric: affect/mood normal,        ED Course                 Procedures                           Results for orders placed or performed during the hospital encounter of 05/11/24 (from the past 24 hour(s))   CBC with platelets differential    Narrative    The following orders were created for panel order CBC with platelets differential.  Procedure                               Abnormality         Status                     ---------                               -----------         ------                     CBC with platelets and d...[[id number]]  Abnormal            Final result               RBC and Platelet Morphology[[id number]]                                                   Please view results for these tests on the individual orders.   Basic metabolic panel   Result Value Ref Range    Sodium 136 135 - 145 mmol/L    Potassium 3.7 3.4 - 5.3 mmol/L    Chloride 103 98 - 107 mmol/L    Carbon Dioxide (CO2) 23 22 - 29 mmol/L    Anion Gap 10 7 - 15 mmol/L    Urea Nitrogen 9.0 6.0 - 20.0 " "mg/dL    Creatinine 0.72 0.51 - 1.17 mg/dL    GFR Estimate >90 >60 mL/min/1.73m2    Calcium 8.7 8.6 - 10.0 mg/dL    Glucose 112 (H) 70 - 99 mg/dL    Narrative    The generation of reference intervals for this test is currently based on binary male or female sex. If the electronic health record information indicates another gender identity or if Legal Sex is recorded as \"Unknown\", both male and female reference intervals are provided where applicable, and should be considered according to the individual's appropriate clinical context.   Procalcitonin   Result Value Ref Range    Procalcitonin 0.05 <0.50 ng/mL   Symptomatic Influenza A/B, RSV, & SARS-CoV2 PCR (COVID-19) Nose    Specimen: Nose; Swab   Result Value Ref Range    Influenza A PCR Negative Negative    Influenza B PCR Negative Negative    RSV PCR Negative Negative    SARS CoV2 PCR Negative Negative    Narrative    Testing was performed using the Xpert Xpress CoV2/Flu/RSV Assay on the [employer] GeneXpert Instrument. This test should be ordered for the detection of SARS-CoV-2, influenza, and RSV viruses in individuals who meet clinical and/or epidemiological criteria. Test performance is unknown in asymptomatic patients. This test is for in vitro diagnostic use under the FDA EUA for laboratories certified under CLIA to perform high or moderate complexity testing. This test has not been FDA cleared or approved. A negative result does not rule out the presence of PCR inhibitors in the specimen or target RNA in concentration below the limit of detection for the assay. If only one viral target is positive but coinfection with multiple targets is suspected, the sample should be re-tested with another FDA cleared, approved, or authorized test, if coinfection would change clinical management. This test was validated by the [facility]. These laboratories are certified under the Clinical Laboratory Improvement Amendments of 1988 (CLIA-88) as qualified " "to perform high complexity laboratory testing.   CBC with platelets and differential   Result Value Ref Range    WBC Count 7.6 4.0 - 11.0 10e3/uL    RBC Count 2.98 (L) 3.80 - 5.90 10e6/uL    Hemoglobin 8.9 (L) 11.7 - 17.7 g/dL    Hematocrit 27.7 (L) 35.0 - 53.0 %    MCV 93 78 - 100 fL    MCH 29.9 26.5 - 33.0 pg    MCHC 32.1 31.5 - 36.5 g/dL    RDW 15.1 (H) 10.0 - 15.0 %    Platelet Count 334 150 - 450 10e3/uL    % Neutrophils 70 %    % Lymphocytes 16 %    % Monocytes 8 %    % Eosinophils 5 %    % Basophils 0 %    % Immature Granulocytes 0 %    NRBCs per 100 WBC 0 <1 /100    Absolute Neutrophils 5.3 1.6 - 8.3 10e3/uL    Absolute Lymphocytes 1.2 0.8 - 5.3 10e3/uL    Absolute Monocytes 0.6 0.0 - 1.3 10e3/uL    Absolute Eosinophils 0.4 0.0 - 0.7 10e3/uL    Absolute Basophils 0.0 0.0 - 0.2 10e3/uL    Absolute Immature Granulocytes 0.0 <=0.4 10e3/uL    Absolute NRBCs 0.0 10e3/uL    Narrative    The generation of reference intervals for this test is currently based on binary male or female sex. If the electronic health record information indicates another gender identity or if Legal Sex is recorded as \"Unknown\", both male and female reference intervals are provided where applicable, and should be considered according to the individual's appropriate clinical context.       MEDICATIONS GIVEN IN THE EMERGENCY DEPARTMENT:  Medications   acetaminophen (TYLENOL) tablet 1,000 mg (1,000 mg Oral $Given 5/11/24 2110)           Independent Interpretation (X-rays, CTs, rhythm strip):  None    Consultations/Discussion of Management or Tests:  None       Social Determinants of Health affecting care:         Assessments & Plan (with Medical Decision Making)  34 year old adult who presents to the Emergency Department for evaluation of fevers.  Fever has been intermittent, occurring approximately every other day according to patient.  Patient arrives afebrile currently in the emergency department.  Otherwise normal vitals.  Based on " examination, with history, and no clear source of fever, however given the recent significant surgery, there is potential for infectious source.  Patient with no severe abdominal discomfort, and therefore I feel this is less likely.  Has been tolerating food, and liquids, and therefore abdominal etiology of fever is less likely.  No change of urinary habits.  Does have indwelling Evangelista catheter, however no change in output, with no coloration change, no abdominal discomfort, and therefore I do not feel urinalysis is indicated.  Patient did have COVID, flu, and RSV testing which is negative.  Blood tests including procalcitonin, and white blood cell count are normal.  Based on clinical history, and laboratory test today, reassuring values, and therefore I feel it is reasonable for discharge home, blood cultures pending, and follow-up with clinic providers, with return instructions discussed if new or worsening symptoms develop.       I have reviewed the nursing notes.    I have reviewed the findings, diagnosis, plan and need for follow up with the patient.       Critical Care time:  none      NEW PRESCRIPTIONS STARTED AT TODAY'S ER VISIT  Discharge Medication List as of 5/11/2024  9:43 PM          Final diagnoses:   Fever, unspecified fever cause       5/11/2024   [facility] EMERGENCY DEPT       Mike Pulido MD  05/12/24 0228

## 2024-05-12 NOTE — DISCHARGE INSTRUCTIONS
Your blood test looked okay.    Follow-up in clinic as needed.    Return to be seen if new or worsening symptoms develop.

## 2024-05-12 NOTE — ED TRIAGE NOTES
Pt arrives with concerns of fever. States temp at home was 99.8 axillary. Nurse advised pt to be seen for fever. Tylenol most recently at 1130 this morning. Colostomy reversal and vaginoplasty on 4/25/24.     Triage Assessment (Adult)       Row Name 05/11/24 1953          Triage Assessment    Airway WDL WDL        Respiratory WDL    Respiratory WDL WDL        Skin Circulation/Temperature WDL    Skin Circulation/Temperature WDL WDL        Cardiac WDL    Cardiac WDL WDL        Peripheral/Neurovascular WDL    Peripheral Neurovascular WDL WDL        Cognitive/Neuro/Behavioral WDL    Cognitive/Neuro/Behavioral WDL WDL

## 2024-05-14 ENCOUNTER — OFFICE VISIT (OUTPATIENT)
Dept: SURGERY | Facility: CLINIC | Age: 34
End: 2024-05-14
Payer: COMMERCIAL

## 2024-05-14 VITALS
OXYGEN SATURATION: 94 % | WEIGHT: 185 LBS | HEART RATE: 93 BPM | SYSTOLIC BLOOD PRESSURE: 120 MMHG | HEIGHT: 67 IN | BODY MASS INDEX: 29.03 KG/M2 | DIASTOLIC BLOOD PRESSURE: 79 MMHG | TEMPERATURE: 98.2 F

## 2024-05-14 DIAGNOSIS — Z09 FOLLOW-UP EXAMINATION AFTER COLORECTAL SURGERY: Primary | ICD-10-CM

## 2024-05-14 DIAGNOSIS — T81.41XA INFECTION OF SUPERFICIAL INCISIONAL SURGICAL SITE AFTER PROCEDURE, INITIAL ENCOUNTER: Primary | ICD-10-CM

## 2024-05-14 PROCEDURE — 99024 POSTOP FOLLOW-UP VISIT: CPT | Performed by: NURSE PRACTITIONER

## 2024-05-14 ASSESSMENT — PAIN SCALES - GENERAL: PAINLEVEL: NO PAIN (1)

## 2024-05-14 NOTE — LETTER
"2024       RE: Hannah Chavez  [address]  [place] [address]     Dear Colleague,    Thank you for referring your patient, Hannah Chavez, to the [facility] [place] at [facility]. Please see a copy of my visit note below.    Colon and Rectal Surgery Postoperative Clinic Note    RE: Hannah Chavez  : 1990  SHAHRIAR: 2024    Hannah Chavez is a very pleasant 34 year old adult with PMH of incomplete T10 spinal cord injury with neurogenic bladder and straight caths at baseline, sigmoid perforation of unclear etiology s/p prior end sigmoid resection and colostomy in , gender dysphoria s/p transition 3 years ago on HRT since , s/p orchiectomy in .  Now s/p Lap FLOYD, robotic colostomy take down, flex sig; Lap robotic assisted artifical vagina with graft (penile skin flap & scrotal full thickness grafts), total penectomy, urethroplasty, robotic assisted colpopexy (suspension of vaginal apex), scotectomy, bilat labiaplasty via adj tissue transfer of scotal and mons skin flaps, clitoroplasty, cystoscopy w/ chemodenervation of bladder on 2024 by Dr. Dominguez and Dr. Anna.    Interval history: She has been back in the ED a few times with fevers, a fall, and urinary retention. She reports that her fevers finally broke. She is having bowel movements every other day and these are soft and easy to pass. Changing dressing at takedown site daily.     Physical Examination:   /79 (BP Location: Left arm, Patient Position: Sitting, Cuff Size: Adult Regular)   Pulse 93   Temp 98.2  F (36.8  C) (Oral)   Ht 5' 7\"   Wt 185 lb   SpO2 94%   BMI 28.98 kg/m    General: Alert, oriented, in no acute distress, sitting comfortably  HEENT: mucous membranes moist  Abdomen: Soft, non distended, non tender on palpation. Incision sites well approximated with no erythema or drainage. Takedown site with penrose drain in " place. This was removed and external gauze dressing placed.    Assessment/Plan:  34 year old adult /p Lap FLOYD, robotic colostomy take down, flex sig; Lap robotic assisted artifical vagina with graft (penile skin flap & scrotal full thickness grafts), total penectomy, urethroplasty, robotic assisted colpopexy (suspension of vaginal apex), scotectomy, bilat labiaplasty via adj tissue transfer of scotal and mons skin flaps, clitoroplasty, cystoscopy w/ chemodenervation of bladder on 4/25/2024 by Dr. Dominguez and Dr. Anna. She is recovering well and having normal bowel movements. Takedown site healing well without infection and can put an external gauze dressing at this site. No lifting more than 10 pounds for a full 6 weeks from surgery. All other restrictions per Dr. Dominguez. Scheduled for follow up next month with Dr. Anna. Encouraged them to contact the clinic in the meantime with any questions or concerns.     Medical history:  Past Medical History:   Diagnosis Date    Asthma     Bladder incontinence     Colostomy status (H)     Gender dysphoria     Neurogenic bladder     Self-catheterizes urinary bladder     Spinal cord injury of T10 vertebra (H)     Spinal cord injury of thoracic region without bone injury, initial encounter (H)     Uncomplicated asthma        Surgical history:  Past Surgical History:   Procedure Laterality Date    ABDOMEN SURGERY      segmental seigmoid resection, end colostomy    CYSTOSCOPY N/A 09/01/2017    Procedure: CYSTOSCOPY;  Cystoscopy and Botox Injection into the Bladder;  Surgeon: Michael Mcnulty MD;  [facility]    CYSTOSCOPY N/A 10/12/2018    Procedure: CYSTOSCOPY;  Cystoscopy, Botox;  Surgeon: Michael Mcnulty MD;  [facility]    CYSTOSCOPY, INJECT BOTOX N/A 4/25/2024    Procedure: Cystoscopy with Botox Injection to Bladder;  Surgeon: Chilango Dominguez MD;  [facility]    CYSTOSCOPY, INTRAVESICAL INJECTION N/A 03/02/2018    Procedure: CYSTOSCOPY,  INTRAVESICAL INJECTION;  Cystoscopy And Botox Injection Into The Bladder  ;  Surgeon: Michael Mcnulty MD;  [facility]    DAVINCI ASSISTED COLOSTOMY TAKEDOWN N/A 4/25/2024    Procedure: ROBOT ASSISTED COLOSTOMY CLOSURE, takedown of splenic flexure.;  Surgeon: Amadou Anna MD;  [facility]    INJECT BOTOX N/A 09/01/2017    Procedure: INJECT BOTOX;;  Surgeon: Michael Mcnulty MD;  [facility]    INJECT BOTOX N/A 10/12/2018    Procedure: INJECT BOTOX;;  Surgeon: Michael Mcnulty MD;  [facility]    ORCHIECTOMY SCROTAL Bilateral 08/18/2022    Procedure: Bilateral Simple Scrotal Orchiectomy;  Surgeon: Chilango Dominguez MD;  [facility]    REMOVE INTRATHECAL PAIN PUMP N/A 06/02/2023    Procedure: Removal of intrathecal drug delivery system - removal of the intrathecal drug pump from the right abdomen and removal of the intrathecal catheter from the thoracolumbar spine;  Surgeon: Que Drew MD;  [facility]    SIGMOIDOSCOPY FLEXIBLE N/A 4/25/2024    Procedure: FLEXIBLE SIGMOIDOSCOPY;  Surgeon: Amadou Anna MD;  [facility]    VAGINOPLASTY WITH PERITONEAL FLAP, ROBOTIC ASSISTED N/A 4/25/2024    Procedure: Robotic assisted Full Depth Vaginoplasty;  Surgeon: Chilango Dominguez MD;  [facility]       Problem list:    Patient Active Problem List    Diagnosis Date Noted    Colostomy status (H) 04/25/2024     Priority: Medium    Gender dysphoria 08/02/2022     Priority: Medium     Added automatically from request for surgery [id number]      Neurogenic bladder 02/21/2018     Priority: Medium       Medications:  Current Outpatient Medications   Medication Sig Dispense Refill    acetaminophen (TYLENOL) 500 MG tablet Take 2 tablets (1,000 mg) by mouth 4 times daily 80 tablet 0    ALBUTEROL IN Inhale 1-2 puffs into the lungs 4 times daily as needed (shortness of breath)      diazepam (VALIUM) 5 MG tablet Take 5 mg by mouth every 6 hours as needed    "   estradiol valerate (DELESTROGEN) 20 MG/ML injection Inject into the muscle once a week Saturday     0.11ml (2.2mg)      ibuprofen (ADVIL/MOTRIN) 600 MG tablet Take 1 tablet (600 mg) by mouth 3 times daily 21 tablet 0    Insulin Syringe-Needle U-100 (B-D INSULIN SYRINGE) 25G X 1\" 1 ML MISC       Lidocaine (LIDOCARE) 4 % Patch Place 2 patches onto the skin every 24 hours To prevent lidocaine toxicity, patient should be patch free for 12 hrs daily. 10 patch 0    methocarbamol (ROBAXIN) 750 MG tablet Take 1 tablet (750 mg) by mouth 4 times daily as needed for muscle spasms 30 tablet 0    oxyCODONE (ROXICODONE) 5 MG tablet Take 1-2 tablets (5-10 mg) by mouth every 4 hours as needed for moderate to severe pain 24 tablet 0    polyethylene glycol (MIRALAX) 17 GM/Dose powder Take 17 g by mouth daily 510 g 0    progesterone (PROMETRIUM) 200 MG capsule Take 1 capsule by mouth every morning      QUEtiapine (SEROQUEL) 25 MG tablet Take 25 mg by mouth as needed      Sharps Container MISC Sharps Container         Allergies:  Allergies   Allergen Reactions    Dust Mites     Shellfish Allergy Nausea and Vomiting and Unknown     Patient states his mother has a \"violent reaction\" to shellfish, and that he has a feeling of nausea when he smells shellfish, so he has never consumed any. He states he has also not been tested.      Shellfish-Derived Products Other (See Comments)     Patient states her mother has a \"violent reaction\" to shellfish, and that she has a feeling of nausea when she smells shellfish, so she has never consumed any. She states she has also not been tested.  Patient states his mother has a \"violent reaction\" to shellfish, and that he has a feeling of nausea when he smells shellfish, so he has never consumed any. He states he has also not been tested.         Family history:  Family History   Problem Relation Age of Onset    Cancer Mother         unknown type    Depression Father     Fibromyalgia Father     Anxiety " Disorder Brother     Anesthesia Reaction No family hx of     Thrombosis No family hx of        Social history:  Social History     Tobacco Use    Smoking status: Never     Passive exposure: Never    Smokeless tobacco: Never   Substance Use Topics    Alcohol use: No     Marital status: single.    There are no exam notes on file for this visit.     30 minutes spent on the date of the encounter doing chart review, history and exam, documentation and further activities as noted above.   This is a postop visit.      This note was created using speech recognition software and may contain unintended word substitutions.      Again, thank you for allowing me to participate in the care of your patient.      Sincerely,    AUTUMN Young CNP

## 2024-05-14 NOTE — PROGRESS NOTES
"Colon and Rectal Surgery Postoperative Clinic Note    RE: Hannah Chavez  : 1990  SHAHRAIR: 2024    Hannah Chavez is a very pleasant 34 year old adult with PMH of incomplete T10 spinal cord injury with neurogenic bladder and straight caths at baseline, sigmoid perforation of unclear etiology s/p prior end sigmoid resection and colostomy in 2018, gender dysphoria s/p transition 3 years ago on HRT since , s/p orchiectomy in .  Now s/p Lap FLOYD, robotic colostomy take down, flex sig; Lap robotic assisted artifical vagina with graft (penile skin flap & scrotal full thickness grafts), total penectomy, urethroplasty, robotic assisted colpopexy (suspension of vaginal apex), scotectomy, bilat labiaplasty via adj tissue transfer of scotal and mons skin flaps, clitoroplasty, cystoscopy w/ chemodenervation of bladder on 2024 by Dr. Dominguez and Dr. Anna.    Interval history: She has been back in the ED a few times with fevers, a fall, and urinary retention. She reports that her fevers finally broke. She is having bowel movements every other day and these are soft and easy to pass. Changing dressing at takedown site daily.     Physical Examination:   /79 (BP Location: Left arm, Patient Position: Sitting, Cuff Size: Adult Regular)   Pulse 93   Temp 98.2  F (36.8  C) (Oral)   Ht 5' 7\"   Wt 185 lb   SpO2 94%   BMI 28.98 kg/m    General: Alert, oriented, in no acute distress, sitting comfortably  HEENT: mucous membranes moist  Abdomen: Soft, non distended, non tender on palpation. Incision sites well approximated with no erythema or drainage. Takedown site with penrose drain in place. This was removed and external gauze dressing placed.    Assessment/Plan:  34 year old adult /p Lap FLOYD, robotic colostomy take down, flex sig; Lap robotic assisted artifical vagina with graft (penile skin flap & scrotal full thickness grafts), total penectomy, urethroplasty, robotic assisted colpopexy (suspension of " vaginal apex), scotectomy, bilat labiaplasty via adj tissue transfer of scotal and mons skin flaps, clitoroplasty, cystoscopy w/ chemodenervation of bladder on 4/25/2024 by Dr. Dominguez and Dr. Anna. She is recovering well and having normal bowel movements. Takedown site healing well without infection and can put an external gauze dressing at this site. No lifting more than 10 pounds for a full 6 weeks from surgery. All other restrictions per Dr. Dominguez. Scheduled for follow up next month with Dr. Anna. Encouraged them to contact the clinic in the meantime with any questions or concerns.     Medical history:  Past Medical History:   Diagnosis Date    Asthma     Bladder incontinence     Colostomy status (H)     Gender dysphoria     Neurogenic bladder     Self-catheterizes urinary bladder     Spinal cord injury of T10 vertebra (H)     Spinal cord injury of thoracic region without bone injury, initial encounter (H)     Uncomplicated asthma        Surgical history:  Past Surgical History:   Procedure Laterality Date    ABDOMEN SURGERY      segmental seigmoid resection, end colostomy    CYSTOSCOPY N/A 09/01/2017    Procedure: CYSTOSCOPY;  Cystoscopy and Botox Injection into the Bladder;  Surgeon: Michael Mcnulty MD;  [facility]    CYSTOSCOPY N/A 10/12/2018    Procedure: CYSTOSCOPY;  Cystoscopy, Botox;  Surgeon: Michael Mcnulty MD;  [facility]    CYSTOSCOPY, INJECT BOTOX N/A 4/25/2024    Procedure: Cystoscopy with Botox Injection to Bladder;  Surgeon: Chilango Dominguez MD;  [facility]    CYSTOSCOPY, INTRAVESICAL INJECTION N/A 03/02/2018    Procedure: CYSTOSCOPY, INTRAVESICAL INJECTION;  Cystoscopy And Botox Injection Into The Bladder  ;  Surgeon: Michael Mcnulty MD;  [facility]    DAVINCI ASSISTED COLOSTOMY TAKEDOWN N/A 4/25/2024    Procedure: ROBOT ASSISTED COLOSTOMY CLOSURE, takedown of splenic flexure.;  Surgeon: Amadou Anna MD;  [facility]    INJECT  "BOTOX N/A 09/01/2017    Procedure: INJECT BOTOX;;  Surgeon: Michael Mcnulty MD;  [facility]    INJECT BOTOX N/A 10/12/2018    Procedure: INJECT BOTOX;;  Surgeon: Michael Mcnulty MD;  [facility]    ORCHIECTOMY SCROTAL Bilateral 08/18/2022    Procedure: Bilateral Simple Scrotal Orchiectomy;  Surgeon: Chilango Dominguez MD;  [facility]    REMOVE INTRATHECAL PAIN PUMP N/A 06/02/2023    Procedure: Removal of intrathecal drug delivery system - removal of the intrathecal drug pump from the right abdomen and removal of the intrathecal catheter from the thoracolumbar spine;  Surgeon: Que Drew MD;  [facility]    SIGMOIDOSCOPY FLEXIBLE N/A 4/25/2024    Procedure: FLEXIBLE SIGMOIDOSCOPY;  Surgeon: Amadou Anna MD;  [facility]    VAGINOPLASTY WITH PERITONEAL FLAP, ROBOTIC ASSISTED N/A 4/25/2024    Procedure: Robotic assisted Full Depth Vaginoplasty;  Surgeon: Chilango Dominguez MD;  [facility]       Problem list:    Patient Active Problem List    Diagnosis Date Noted    Colostomy status (H) 04/25/2024     Priority: Medium    Gender dysphoria 08/02/2022     Priority: Medium     Added automatically from request for surgery [id number]      Neurogenic bladder 02/21/2018     Priority: Medium       Medications:  Current Outpatient Medications   Medication Sig Dispense Refill    acetaminophen (TYLENOL) 500 MG tablet Take 2 tablets (1,000 mg) by mouth 4 times daily 80 tablet 0    ALBUTEROL IN Inhale 1-2 puffs into the lungs 4 times daily as needed (shortness of breath)      diazepam (VALIUM) 5 MG tablet Take 5 mg by mouth every 6 hours as needed      estradiol valerate (DELESTROGEN) 20 MG/ML injection Inject into the muscle once a week Saturday     0.11ml (2.2mg)      ibuprofen (ADVIL/MOTRIN) 600 MG tablet Take 1 tablet (600 mg) by mouth 3 times daily 21 tablet 0    Insulin Syringe-Needle U-100 (B-D INSULIN SYRINGE) 25G X 1\" 1 ML MISC       Lidocaine (LIDOCARE) 4 % Patch " "Place 2 patches onto the skin every 24 hours To prevent lidocaine toxicity, patient should be patch free for 12 hrs daily. 10 patch 0    methocarbamol (ROBAXIN) 750 MG tablet Take 1 tablet (750 mg) by mouth 4 times daily as needed for muscle spasms 30 tablet 0    oxyCODONE (ROXICODONE) 5 MG tablet Take 1-2 tablets (5-10 mg) by mouth every 4 hours as needed for moderate to severe pain 24 tablet 0    polyethylene glycol (MIRALAX) 17 GM/Dose powder Take 17 g by mouth daily 510 g 0    progesterone (PROMETRIUM) 200 MG capsule Take 1 capsule by mouth every morning      QUEtiapine (SEROQUEL) 25 MG tablet Take 25 mg by mouth as needed      Sharps Container MISC Sharps Container         Allergies:  Allergies   Allergen Reactions    Dust Mites     Shellfish Allergy Nausea and Vomiting and Unknown     Patient states his mother has a \"violent reaction\" to shellfish, and that he has a feeling of nausea when he smells shellfish, so he has never consumed any. He states he has also not been tested.      Shellfish-Derived Products Other (See Comments)     Patient states her mother has a \"violent reaction\" to shellfish, and that she has a feeling of nausea when she smells shellfish, so she has never consumed any. She states she has also not been tested.  Patient states his mother has a \"violent reaction\" to shellfish, and that he has a feeling of nausea when he smells shellfish, so he has never consumed any. He states he has also not been tested.         Family history:  Family History   Problem Relation Age of Onset    Cancer Mother         unknown type    Depression Father     Fibromyalgia Father     Anxiety Disorder Brother     Anesthesia Reaction No family hx of     Thrombosis No family hx of        Social history:  Social History     Tobacco Use    Smoking status: Never     Passive exposure: Never    Smokeless tobacco: Never   Substance Use Topics    Alcohol use: No     Marital status: single.    There are no exam notes on file " for this visit.     30 minutes spent on the date of the encounter doing chart review, history and exam, documentation and further activities as noted above.   This is a postop visit.    AUTUMN Forde, NP-C  Colon and Rectal Surgery  [facility]    This note was created using speech recognition software and may contain unintended word substitutions.     5-Fu Counseling: 5-Fluorouracil Counseling:  I discussed with the patient the risks of 5-fluorouracil including but not limited to erythema, scaling, itching, weeping, crusting, and pain.

## 2024-05-14 NOTE — PROGRESS NOTES
"This writer saw patient during her scheduled colon-rectal follow-up visit.   She is awaiting coude catheters to remove contreras and resume straight cathing at home. Urine continues to be clear, yellow, with no foul odor.  Her fevers have resolved.  She requests suture on left side abdomen be removed today since drain is gone and incision is well healed. This suture removed in clinic.  She has noticed a change in her drainage and discharge. Drainage is now more \"brownish and slimy with a foul odor.\"   Vulvar incisions healing well overall, but areas of fibrinous tissue and tissue erythema of inner labia along urethral plate. Strong odor with exam. No areas of fluctuance or dehiscence noted. Expected ongoing edema of vulva for this point post-op. Dilation continues top go well.  Discussed week of Augmentin to help clear any early infection.  Patient agreeable with plan  AUTUMN Bonilla CNP on 5/14/2024 at 12:01 PM    "

## 2024-05-15 ENCOUNTER — MEDICAL CORRESPONDENCE (OUTPATIENT)
Dept: HEALTH INFORMATION MANAGEMENT | Facility: CLINIC | Age: 34
End: 2024-05-15
Payer: COMMERCIAL

## 2024-05-16 ENCOUNTER — HOSPITAL ENCOUNTER (EMERGENCY)
Facility: CLINIC | Age: 34
Discharge: HOME | End: 2024-05-16
Attending: EMERGENCY MEDICINE | Admitting: EMERGENCY MEDICINE
Payer: COMMERCIAL

## 2024-05-16 VITALS
SYSTOLIC BLOOD PRESSURE: 124 MMHG | RESPIRATION RATE: 18 BRPM | HEIGHT: 67 IN | TEMPERATURE: 98.3 F | WEIGHT: 185 LBS | HEART RATE: 90 BPM | DIASTOLIC BLOOD PRESSURE: 83 MMHG | OXYGEN SATURATION: 97 % | BODY MASS INDEX: 29.03 KG/M2

## 2024-05-16 DIAGNOSIS — T81.30XA WOUND DEHISCENCE: ICD-10-CM

## 2024-05-16 PROCEDURE — 99282 EMERGENCY DEPT VISIT SF MDM: CPT | Performed by: EMERGENCY MEDICINE

## 2024-05-16 PROCEDURE — 99283 EMERGENCY DEPT VISIT LOW MDM: CPT | Performed by: EMERGENCY MEDICINE

## 2024-05-16 ASSESSMENT — ACTIVITIES OF DAILY LIVING (ADL)
ADLS_ACUITY_SCORE: 38
ADLS_ACUITY_SCORE: 38

## 2024-05-16 ASSESSMENT — COLUMBIA-SUICIDE SEVERITY RATING SCALE - C-SSRS
1. IN THE PAST MONTH, HAVE YOU WISHED YOU WERE DEAD OR WISHED YOU COULD GO TO SLEEP AND NOT WAKE UP?: NO
2. HAVE YOU ACTUALLY HAD ANY THOUGHTS OF KILLING YOURSELF IN THE PAST MONTH?: NO
6. HAVE YOU EVER DONE ANYTHING, STARTED TO DO ANYTHING, OR PREPARED TO DO ANYTHING TO END YOUR LIFE?: NO

## 2024-05-16 NOTE — ED TRIAGE NOTES
Patient presents with vaginal bleeding that started 1 hour prior to arrival. Patient had a vaginoplasty on 4/25. Patient was doing post-op vaginal dilation at 4am. Patient states she typically has little to no spotting after these sessions. States today she has had bright red vaginal bleeding that saturated 3 large wet wipes. States she has a history of anemia.      Triage Assessment (Adult)       Row Name 05/16/24 0533          Triage Assessment    Airway WDL WDL        Respiratory WDL    Respiratory WDL WDL        Skin Circulation/Temperature WDL    Skin Circulation/Temperature WDL WDL        Cardiac WDL    Cardiac WDL WDL        Peripheral/Neurovascular WDL    Peripheral Neurovascular WDL WDL        Cognitive/Neuro/Behavioral WDL    Cognitive/Neuro/Behavioral WDL WDL

## 2024-05-16 NOTE — DISCHARGE INSTRUCTIONS
Continue using your dilator as directed by your surgeon.  Return for fevers, increasing pain, worsening symptoms, or other concerns.  Otherwise follow-up in clinic.

## 2024-05-16 NOTE — ED PROVIDER NOTES
"  History     Chief Complaint   Patient presents with    Vaginal Bleeding     HPI  Hannah Chavez is a 34 year old adult who presents for vaginal bleeding.  The patient is approximately 3 weeks post vaginal plasty and has been using a vaginal dilator.  This morning she was using the vaginal dilator when she suddenly had pain and bleeding.  She has bled through several pads.  Bleeding has slowed and stopped now.    I reviewed the patient's operative note with colorectal surgery and urology on 4/25/2024, she had a vaginoplasty. I reviewed the patient's clinic visit with colorectal surgery from 5/14/2024 (2 days prior) for follow up after surgery, started on amoxicillin-clavulanate for possible surgical site infection.     Allergies:  Allergies   Allergen Reactions    Dust Mites     Shellfish Allergy Nausea and Vomiting and Unknown     Patient states his mother has a \"violent reaction\" to shellfish, and that he has a feeling of nausea when he smells shellfish, so he has never consumed any. He states he has also not been tested.      Shellfish-Derived Products Other (See Comments)     Patient states her mother has a \"violent reaction\" to shellfish, and that she has a feeling of nausea when she smells shellfish, so she has never consumed any. She states she has also not been tested.  Patient states his mother has a \"violent reaction\" to shellfish, and that he has a feeling of nausea when he smells shellfish, so he has never consumed any. He states he has also not been tested.         Problem List:    Patient Active Problem List    Diagnosis Date Noted    Colostomy status (H) 04/25/2024     Priority: Medium    Gender dysphoria 08/02/2022     Priority: Medium     Added automatically from request for surgery [id number]      Neurogenic bladder 02/21/2018     Priority: Medium        Past Medical History:    Past Medical History:   Diagnosis Date    Asthma     Bladder incontinence     Colostomy status (H)     Gender dysphoria     " Neurogenic bladder     Self-catheterizes urinary bladder     Spinal cord injury of T10 vertebra (H)     Spinal cord injury of thoracic region without bone injury, initial encounter (H)     Uncomplicated asthma        Past Surgical History:    Past Surgical History:   Procedure Laterality Date    ABDOMEN SURGERY      segmental seigmoid resection, end colostomy    CYSTOSCOPY N/A 09/01/2017    Procedure: CYSTOSCOPY;  Cystoscopy and Botox Injection into the Bladder;  Surgeon: Michael Mcnulty MD;  [facility]    CYSTOSCOPY N/A 10/12/2018    Procedure: CYSTOSCOPY;  Cystoscopy, Botox;  Surgeon: Michael Mcnulty MD;  [facility]    CYSTOSCOPY, INJECT BOTOX N/A 4/25/2024    Procedure: Cystoscopy with Botox Injection to Bladder;  Surgeon: Chilango Dominguez MD;  [facility]    CYSTOSCOPY, INTRAVESICAL INJECTION N/A 03/02/2018    Procedure: CYSTOSCOPY, INTRAVESICAL INJECTION;  Cystoscopy And Botox Injection Into The Bladder  ;  Surgeon: Michael Mcnulty MD;  [facility]    DAVINCI ASSISTED COLOSTOMY TAKEDOWN N/A 4/25/2024    Procedure: ROBOT ASSISTED COLOSTOMY CLOSURE, takedown of splenic flexure.;  Surgeon: Amadou Anna MD;  [facility]    INJECT BOTOX N/A 09/01/2017    Procedure: INJECT BOTOX;;  Surgeon: Michael Mcnulty MD;  [facility]    INJECT BOTOX N/A 10/12/2018    Procedure: INJECT BOTOX;;  Surgeon: Michael Mcnulty MD;  [facility]    ORCHIECTOMY SCROTAL Bilateral 08/18/2022    Procedure: Bilateral Simple Scrotal Orchiectomy;  Surgeon: Chilango Dominguez MD;  Location[facility]    REMOVE INTRATHECAL PAIN PUMP N/A 06/02/2023    Procedure: Removal of intrathecal drug delivery system - removal of the intrathecal drug pump from the right abdomen and removal of the intrathecal catheter from the thoracolumbar spine;  Surgeon: Que Drew MD;  [facility]    SIGMOIDOSCOPY FLEXIBLE N/A 4/25/2024    Procedure: FLEXIBLE SIGMOIDOSCOPY;  Surgeon:  "Amadou Anna MD;  [facility]    VAGINOPLASTY WITH PERITONEAL FLAP, ROBOTIC ASSISTED N/A 4/25/2024    Procedure: Robotic assisted Full Depth Vaginoplasty;  Surgeon: Chilango Dominguez MD;  [facility]       Family History:    Family History   Problem Relation Age of Onset    Cancer Mother         unknown type    Depression Father     Fibromyalgia Father     Anxiety Disorder Brother     Anesthesia Reaction No family hx of     Thrombosis No family hx of        Social History:  Marital Status:  Single [1]  Social History     Tobacco Use    Smoking status: Never     Passive exposure: Never    Smokeless tobacco: Never   Vaping Use    Vaping status: Never Used   Substance Use Topics    Alcohol use: No    Drug use: No        Medications:    acetaminophen (TYLENOL) 500 MG tablet  ALBUTEROL IN  amoxicillin-clavulanate (AUGMENTIN) 875-125 MG tablet  diazepam (VALIUM) 5 MG tablet  estradiol valerate (DELESTROGEN) 20 MG/ML injection  ibuprofen (ADVIL/MOTRIN) 600 MG tablet  Insulin Syringe-Needle U-100 (B-D INSULIN SYRINGE) 25G X 1\" 1 ML MISC  Lidocaine (LIDOCARE) 4 % Patch  methocarbamol (ROBAXIN) 750 MG tablet  oxyCODONE (ROXICODONE) 5 MG tablet  polyethylene glycol (MIRALAX) 17 GM/Dose powder  progesterone (PROMETRIUM) 200 MG capsule  QUEtiapine (SEROQUEL) 25 MG tablet  Sharps Container MISC          Review of Systems    Physical Exam   BP: 124/83  Pulse: 90  Temp: 98.3  F (36.8  C)  Resp: 18  Height: 170.2 cm (5' 7\")  Weight: 83.9 kg (185 lb)  SpO2: 97 %      Physical Exam  Constitutional:       General: She is not in acute distress.     Appearance: She is well-developed.   HENT:      Head: Normocephalic and atraumatic.   Cardiovascular:      Rate and Rhythm: Normal rate.   Pulmonary:      Effort: No respiratory distress.      Breath sounds: No stridor.   Genitourinary:         Comments: Laceration over what appears to be wound dehiscence along the more inferior aspect of the vagina, about half dollar coin " size and length.  Bleeding controlled.  Surgical wounds to the labia are well-appearing without dehiscence or significant erythema or swelling.  Skin:     General: Skin is warm and dry.   Neurological:      Mental Status: She is alert.         ED Course        Procedures              Critical Care time:  none               No results found for this or any previous visit (from the past 24 hour(s)).    Medications - No data to display    Assessments & Plan (with Medical Decision Making)   34-year-old female presents for evaluation of bleeding and wound and pain from her vagina after doing vaginal dilation.  She has a laceration in the most inferior caudal aspect, I believe this is likely a surgical wound that has dehisced.  Bleeding is controlled now.  I have discussed the case with the on-call urologist, Dr. Dominguez.  We discussed ongoing management of the patient and he recommended allowing the wound to heal by secondary intention, no stitches, and to recommend that the patient continue with her vaginal dilation.  I discussed this with the patient and she is comfortable with this plan.  She is told to return if it anytime she has worsening of her symptoms or other concerns, otherwise follow-up in clinic.  The patient is in agreement with this plan.    I have reviewed the nursing notes.    I have reviewed the findings, diagnosis, plan and need for follow up with the patient.             Discharge Medication List as of 5/16/2024  7:19 AM          Final diagnoses:   Wound dehiscence       5/16/2024   [facility] EMERGENCY DEPT       David Potts MD  05/16/24 0745

## 2024-05-17 LAB
BACTERIA BLD CULT: NO GROWTH
BACTERIA BLD CULT: NO GROWTH

## 2024-05-22 NOTE — PROGRESS NOTES
"Colon and Rectal Surgery Follow-up Clinic Note      RE: Hannah Chavez  : 1990  SHAHRIAR: 2024    DIAGNOSIS: Gender dysphoria, history of sigmoid perforation status post Sanchez's procedure, and neurogenic bladder status post laparoscopic lysis of adhesions, robotic colostomy take down, and flexible sigmoidoscopy; and combo robotic vaginoplasty (penile skin flap & scrotal full thickness grafts), total penectomy, urethroplasty, robotic assisted colpopexy (suspension of vaginal apex), scrotectomy, bilat labiaplasty via adj tissue transfer of scrotal and mons skin flaps, clitoroplasty, cystoscopy w/ chemodenervation of bladder on 2024 in combo with Dr. Dominguez.     INTERVAL HISTORY: Denies increased pain, fevers, or chills. Tolerating diet well. Having formed bowel movements every other day without blood. Off narcotic pain meds.    Physical Examination:  /81 (BP Location: Left arm, Patient Position: Sitting, Cuff Size: Adult Regular)   Pulse 113   Ht 1.702 m (5' 7\")   Wt 80.3 kg (177 lb)   SpO2 98%   BMI 27.72 kg/m    Abdomen soft, nontender. Incisions with no evidence of infection or hernia.    ASSESSMENT  Doing well postop.    PLAN  1.  High-fiber diet.  2.  Daily MiraLAX as needed.  3.  No activity restrictions.  4.  Colonoscopy at age 47.  5.  Return to clinic as needed.    20 minutes spent on the date of encounter performing chart review, history and exam, documentation.     Amadou Anna MD, MSc, FACS, FASCRS.    Chief, Division of Colon & Rectal Surgery  Stanley M. Goldberg, MD Endowed Chair, Colon & Rectal Surgery  Department of Surgery  [facility]      Referring Provider:  Chilango Dominguez MD  [address] [place] [id number]     Primary Care Provider:  Hayden Cerda  "

## 2024-05-23 ENCOUNTER — THERAPY VISIT (OUTPATIENT)
Dept: PHYSICAL THERAPY | Facility: CLINIC | Age: 34
End: 2024-05-23
Attending: UROLOGY
Payer: COMMERCIAL

## 2024-05-23 DIAGNOSIS — F64.9 GENDER DYSPHORIA: ICD-10-CM

## 2024-05-23 DIAGNOSIS — N81.89 PELVIC FLOOR WEAKNESS IN FEMALE: Primary | ICD-10-CM

## 2024-05-23 PROCEDURE — 97162 PT EVAL MOD COMPLEX 30 MIN: CPT | Mod: GP | Performed by: PHYSICAL THERAPIST

## 2024-05-23 PROCEDURE — 97110 THERAPEUTIC EXERCISES: CPT | Mod: GP | Performed by: PHYSICAL THERAPIST

## 2024-05-23 PROCEDURE — 97530 THERAPEUTIC ACTIVITIES: CPT | Mod: GP | Performed by: PHYSICAL THERAPIST

## 2024-05-23 NOTE — PROGRESS NOTES
PHYSICAL THERAPY EVALUATION  Type of Visit: Evaluation    See electronic medical record for Abuse and Falls Screening details.    Subjective     Was on oral Baclofen for a while but wasn't working so stopped taking it. Spontaneous bowel rupture with colostomy and then during gender affirming surgery a month ago had a colostomy reversal surgery. Had the colostomy for about 5 years and then had PFC testing and ultimately underwent reversal surgery.   Presenting condition or subjective complaint: recent surgery  Date of onset: 04/25/24    Relevant medical history: Anemia; Asthma; Bladder or bowel problems; Depression; Incontinence; Vision problems   Dates & types of surgery: 4/25/24 vaginoplasty, colostomy reversal    Prior diagnostic imaging/testing results:       Prior therapy history for the same diagnosis, illness or injury: No      Prior Level of Function  Transfers:   Ambulation:   ADL:   IADL:     Living Environment  Social support: With family members   Type of home: Other   Stairs to enter the home: Yes 5 Is there a railing: Yes   Ramp: No   Stairs inside the home: No       Help at home: Self Cares (home health aide/personal care attendant, family, etc)  Equipment owned: Walker with wheels; Crutches; Standard wheelchair; Bath bench     Employment: No    Hobbies/Interests: Bowling, video games, playing cards    Patient goals for therapy:      Pain assessment:      Objective      PELVIC EVALUATION  ADDITIONAL HISTORY:  Sex assigned at birth: Male  Gender identity: Female    Pronouns: She/Her Hers      Bladder History:  Feels bladder filling: Yes  Triggers for feeling of inability to wait to go to the bathroom: No    How long can you wait to urinate: varies  Gets up at night to urinate: No    Can stop the flow of urine when urinating: Yes  Volume of urine usually released: Medium   Other issues: Trouble emptying bladder completely; Bladder infections Has to self cath 4-6 times/day due to SCI. Has noticed it's a  "little harder to cath since the surgery. Did have contreras placed but then had trouble cathing so ended up getting the contreras put back in. Does go to the bathroom in between the caths but never fully empties. Varies in how often she will naturally go to the bathroom. Leakage since starting hormones has helped the leakage significant. Does feel when she leaks. That is new in the last 3 years that she feels the leakage. Leakage happens more randomly. Tends to happen when she has 400-500 ml in her bladder.   Number of bladder infections in last 12 months: 2  Fluid intake per day: unsure unsure    Medications taken for bladder: No     Activities causing urine leak:      Amount of urine typically leaked:    Pads used to help with leaking: Yes I use this many pads per day: 1-2 I use this type/brand: unsure    Bowel History:  Frequency of bowel movement: every 1-2 days  Consistency of stool: Soft-formed    Ignores the urge to defecate: No, Does have normal urges now although getting used to interpreting the urges. Leaked stool only once and was when she was on the Miralax. Is currently on a low fiber diet which she can stop anytime now. Not taking anything for bowel movements at this point.   Other bowel issues: Straining to have bowel movement, sometimes. More straining at the end of a bowel movement. Is able to start the bowel movement. Muscles start to settle down with standing and wiping. Something that hasn't changed is the frequency of stooling. But every now and then will have a build up and then a \"dumping\" sensation as well. Will start to eat more things with bread. Has started to eat more bananas but overall doesn't eat a lot of fruits and veggies. Doesn't eat breakfast. Tends to graze feed throughout the day. Will eat cheese its, chips dip, microwave meals.   Length of time spent trying to have a bowel movement:      Sexual Function History:  Sexual orientation: Lesbian    Sexually active: No  Lubrication used: Yes " Yes  Pelvic pain: Sitting; Initial penetration (rectal or vaginal)    Pain or difficulty with orgasms/erection/ejaculation: Yes Recent surgery. Has been using dilators regularly. Has been using the first one and went up to the 2nd one but ended up in the ER due to bleeding. Has been dilating 1x/day most days but has tried to do 2-3x/day. Her goal would be to get up to the 2nd dilator and is not to engage in penetrative intercourse. She did full depth vaginoplasty in order to feel complete. Feels she'd be able to use toys without difficulty.   State of menopause:    Hormone medications: Yes Estradiol    Are you currently pregnant: No, Have you been diagnosed with pelvic prolapse or abdominal separation: No, Do you get regular exercise: No, Have you tried pelvic floor strengthening exercises for 4 weeks: No, Do you have any history of trauma that is relevant to your care that you d like to share: No    Discussed reason for referral regarding pelvic health needs and external/internal pelvic floor muscle examination with patient/guardian.  Opportunity provided to ask questions and verbal consent for assessment and intervention was given.    PAIN:   POSTURE:   LUMBAR SCREEN:  Did not check due to SCI and IR, flexed knee pattern with difficulty with ambulation and standing.   HIP SCREEN:  Strength:    Functional Strength Testing:     PELVIC/SI SCREEN:     PAIN PROVOCATION TEST:   PELVIS/SI SPECIAL TESTS:   BREATHING SYMMETRY:     PELVIC EXAM  External Visual Inspection:  At rest: Normal  With voluntary pelvic floor contraction: Absent  Relaxation of PFM: no movement so no relaxation  With intra-abdominal pressure: Bearing down as defecation: minimal to no movement    Integumentary:   Introitus: Overall, healing very well. Does have contreras catheter in place. Exudate noted around perineal skin. No signs of infection noted.     External Digital Palpation per Perineum:   Ischiocavernosis: Unremarkable  Bulbo cavernosis:  Unremarkable  Transverse perineal: Unremarkable  Levator ani: Unremarkable  Perineal body: Unremarkable    Scar:   Location/Type: labial scars which are healed well  Mobility: WNL    Internal Digital Palpation:  Per Vagina:  No palpable contraction noted with cuing to contract. Tightness noted around introitus however no pain to palpation.  Only assessed due to significant difficulty with inserting dilator. No barrier noted.    Per Rectum:        Pelvic Organ Prolapse:       ABDOMINAL ASSESSMENT  Diastasis Rectus Abdominis (JEANINE):      Abdominal Activation/Strength:     Scar:   Location/Type:   Mobility:     Fascial Tension/Restriction:     BIOFEEDBACK:  Position:   Surface Electrodes:     Abdominals:     Perianals:       DERMATOMES:   DTR S:     Assessment & Plan   CLINICAL IMPRESSIONS  Medical Diagnosis: Gender dysphoria    Treatment Diagnosis: Pelvic floor weakness   Impression/Assessment: Patient is a 34 year old adult with Pelvic floor complaints.  The following significant findings have been identified: Decreased ROM/flexibility, Decreased strength, Impaired sensation, Impaired muscle performance, Decreased activity tolerance, and Impaired posture. These impairments interfere with their ability to perform self care tasks, recreational activities, household chores, household mobility, and community mobility as compared to previous level of function.     Clinical Decision Making (Complexity):  Clinical Presentation: Evolving/Changing  Clinical Presentation Rationale: based on medical and personal factors listed in PT evaluation  Clinical Decision Making (Complexity): Moderate complexity    PLAN OF CARE  Treatment Interventions:  Modalities: Biofeedback  Interventions: Manual Therapy, Neuromuscular Re-education, Therapeutic Activity, Therapeutic Exercise, Self-Care/Home Management    Long Term Goals     PT Goal 1  Goal Identifier: Dilation  Goal Description: Pt to be able to tolerate 2nd size dilator without pain  or bleeding.  Rationale: to maximize safety and independence with performance of ADLs and functional tasks;to maximize safety and independence within the home;to maximize safety and independence with self cares  Target Date: 08/15/24      Frequency of Treatment: 1x/wk  Duration of Treatment: 12 weeks    Recommended Referrals to Other Professionals:   Education Assessment:   Learner/Method: No Barriers to Learning    Risks and benefits of evaluation/treatment have been explained.   Patient/Family/caregiver agrees with Plan of Care.     Evaluation Time:     PT Eval, Moderate Complexity Minutes (63774): 40       Signing Clinician: PETE Salazar [facility]                                                                                   OUTPATIENT PHYSICAL THERAPY      PLAN OF TREATMENT FOR OUTPATIENT REHABILITATION   Patient's Last Name, First Name, BALJITChetTORIChet ChavezHannah  BHUMI YOB: 1990   Provider's Name   [facility]   Medical Record No.  [id number]     Onset Date: 04/25/24  Start of Care Date: 05/23/24     Medical Diagnosis:  Gender dysphoria      PT Treatment Diagnosis:  Pelvic floor weakness Plan of Treatment  Frequency/Duration: 1x/wk/ 12 weeks    Certification date from 05/23/24 to 08/15/24         See note for plan of treatment details and functional goals     Jenna Gallagher PT                         I CERTIFY THE NEED FOR THESE SERVICES FURNISHED UNDER        THIS PLAN OF TREATMENT AND WHILE UNDER MY CARE     (Physician attestation of this document indicates review and certification of the therapy plan).              Referring Provider:  Chilango Dominguez    Initial Assessment  See Epic Evaluation- Start of Care Date: 05/23/24

## 2024-05-24 PROBLEM — N81.89 PELVIC FLOOR WEAKNESS IN FEMALE: Status: ACTIVE | Noted: 2024-05-24

## 2024-05-30 ENCOUNTER — THERAPY VISIT (OUTPATIENT)
Dept: PHYSICAL THERAPY | Facility: CLINIC | Age: 34
End: 2024-05-30
Attending: UROLOGY
Payer: COMMERCIAL

## 2024-05-30 ENCOUNTER — PRE VISIT (OUTPATIENT)
Dept: UROLOGY | Facility: CLINIC | Age: 34
End: 2024-05-30

## 2024-05-30 DIAGNOSIS — N31.9 NEUROGENIC BLADDER: Primary | ICD-10-CM

## 2024-05-30 DIAGNOSIS — N81.89 PELVIC FLOOR WEAKNESS IN FEMALE: Primary | ICD-10-CM

## 2024-05-30 PROCEDURE — 97140 MANUAL THERAPY 1/> REGIONS: CPT | Mod: GP | Performed by: PHYSICAL THERAPIST

## 2024-05-30 PROCEDURE — 97110 THERAPEUTIC EXERCISES: CPT | Mod: GP | Performed by: PHYSICAL THERAPIST

## 2024-05-30 NOTE — TELEPHONE ENCOUNTER
Reason for visit: cysto + botox 200 u     Dx/Hx/Sx: gender dysphoria, neurogenic bladder     Records/imaging/labs/orders: in epic     At Rooming: consent - verify prep with provider - ask provider if pt gets antibiotic

## 2024-06-11 ENCOUNTER — OFFICE VISIT (OUTPATIENT)
Dept: PLASTIC SURGERY | Facility: CLINIC | Age: 34
End: 2024-06-11
Payer: COMMERCIAL

## 2024-06-11 VITALS
HEIGHT: 67 IN | HEART RATE: 103 BPM | BODY MASS INDEX: 27.8 KG/M2 | DIASTOLIC BLOOD PRESSURE: 76 MMHG | OXYGEN SATURATION: 96 % | SYSTOLIC BLOOD PRESSURE: 113 MMHG | WEIGHT: 177.1 LBS

## 2024-06-11 DIAGNOSIS — N31.9 NEUROGENIC BLADDER: ICD-10-CM

## 2024-06-11 DIAGNOSIS — F64.9 GENDER DYSPHORIA: Primary | ICD-10-CM

## 2024-06-11 PROCEDURE — 99024 POSTOP FOLLOW-UP VISIT: CPT | Performed by: UROLOGY

## 2024-06-11 ASSESSMENT — PAIN SCALES - GENERAL: PAINLEVEL: NO PAIN (0)

## 2024-06-11 NOTE — LETTER
"6/11/2024       RE: Hannah Chavez  [address]  [place] [address]       Dear Colleague,    Thank you for referring your patient, Hannah Chavez, to the [facility] PLASTIC AND RECONSTRUCTIVE SURGERY CLINIC [place] at [facility]. Please see a copy of my visit note below.    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    She underwent colostomy takedown + full depth vaginoplasty.  She continues to dilate TID. Usually there is a dot remaining outside of body  She did have some troubles with self cath, but she is back to self cath with Coude catheters.  There occasionally is a \"catch\" but the catheter does pass without issues.  She is overall happy with outcomes.  She is having regular bowel movements,  No fecal contents from vaginal space.    Vital signs reviewed    Exam:  Vaginoplasty well healed.  There is labia minora definition.  There are no wounds.  Some drainage from vaginal canal.    A/P   Excellent outcome after vaginoplasty.    Continue dilation TID until 3 months postop  Ok for clitoral stimulation since she is >4 weeks posotp  Ok for intercourse at 3 months postop.  Followup in about 3 months in urology clinic for cystoscopy with botox.  Has apt with colorectal as well.        Again, thank you for allowing me to participate in the care of your patient.      Sincerely,    Chilango Dominguez MD    "

## 2024-06-11 NOTE — NURSING NOTE
"Chief Complaint   Patient presents with    Surgical Followup     6 week post-op, DOS 4/25/24.       Vitals:    06/11/24 1436   BP: 113/76   BP Location: Left arm   Patient Position: Sitting   Cuff Size: Adult Regular   Pulse: 103   SpO2: 96%   Weight: 80.3 kg (177 lb 1.6 oz)   Height: 1.702 m (5' 7\")       Body mass index is 27.74 kg/m .      Emil Giron, EMT    "

## 2024-06-13 ENCOUNTER — THERAPY VISIT (OUTPATIENT)
Dept: PHYSICAL THERAPY | Facility: CLINIC | Age: 34
End: 2024-06-13
Attending: UROLOGY
Payer: COMMERCIAL

## 2024-06-13 DIAGNOSIS — N81.89 PELVIC FLOOR WEAKNESS IN FEMALE: Primary | ICD-10-CM

## 2024-06-13 PROCEDURE — 97110 THERAPEUTIC EXERCISES: CPT | Mod: GP | Performed by: PHYSICAL THERAPIST

## 2024-06-13 PROCEDURE — 97530 THERAPEUTIC ACTIVITIES: CPT | Mod: GP | Performed by: PHYSICAL THERAPIST

## 2024-06-13 NOTE — PROGRESS NOTES
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    She underwent colostomy takedown + full depth vaginoplasty.  She continues to dilate TID. Usually there is a dot remaining outside of body  She did have some troubles with self cath, but she is back to self cath with Coude catheters.  There occasionally is a \"catch\" but the catheter does pass without issues.  She is overall happy with outcomes.  She is having regular bowel movements,  No fecal contents from vaginal space.    Vital signs reviewed    Exam:  Vaginoplasty well healed.  There is labia minora definition.  There are no wounds.  Some drainage from vaginal canal.    A/P   Excellent outcome after vaginoplasty.    Continue dilation TID until 3 months postop  Ok for clitoral stimulation since she is >4 weeks posotp  Ok for intercourse at 3 months postop.  Followup in about 3 months in urology clinic for cystoscopy with botox.  Has apt with colorectal as well.    Chilango Dominguez MD  "

## 2024-06-17 ENCOUNTER — OFFICE VISIT (OUTPATIENT)
Dept: SURGERY | Facility: CLINIC | Age: 34
End: 2024-06-17
Payer: COMMERCIAL

## 2024-06-17 VITALS
SYSTOLIC BLOOD PRESSURE: 117 MMHG | WEIGHT: 177 LBS | BODY MASS INDEX: 27.78 KG/M2 | HEIGHT: 67 IN | OXYGEN SATURATION: 98 % | DIASTOLIC BLOOD PRESSURE: 81 MMHG | HEART RATE: 113 BPM

## 2024-06-17 DIAGNOSIS — Z09 FOLLOW-UP EXAMINATION AFTER COLORECTAL SURGERY: Primary | ICD-10-CM

## 2024-06-17 PROCEDURE — 99024 POSTOP FOLLOW-UP VISIT: CPT | Performed by: COLON & RECTAL SURGERY

## 2024-06-17 ASSESSMENT — PAIN SCALES - GENERAL: PAINLEVEL: NO PAIN (0)

## 2024-06-17 NOTE — NURSING NOTE
"Chief Complaint   Patient presents with    Post-op Visit       Vitals:    06/17/24 0921   BP: 117/81   BP Location: Left arm   Patient Position: Sitting   Cuff Size: Adult Regular   Pulse: 113   SpO2: 98%   Weight: 177 lb   Height: 5' 7\"       Body mass index is 27.72 kg/m .    Crystal Louis CMA    "

## 2024-06-17 NOTE — LETTER
"2024       RE: Hannah Chavez  [address] [place] [address]       Dear Colleague,    Thank you for referring your patient, Hannah Chavez, to the [facility] COLON AND RECTAL SURGERY CLINIC [place] at [facility]. Please see a copy of my visit note below.    Colon and Rectal Surgery Follow-up Clinic Note      RE: Hannah Chavez  : 1990  SHAHRIAR: 2024    DIAGNOSIS: Gender dysphoria, history of sigmoid perforation status post Sanchez's procedure, and neurogenic bladder status post laparoscopic lysis of adhesions, robotic colostomy take down, and flexible sigmoidoscopy; and combo robotic vaginoplasty (penile skin flap & scrotal full thickness grafts), total penectomy, urethroplasty, robotic assisted colpopexy (suspension of vaginal apex), scrotectomy, bilat labiaplasty via adj tissue transfer of scrotal and mons skin flaps, clitoroplasty, cystoscopy w/ chemodenervation of bladder on 2024 in combo with Dr. Dominguez.     INTERVAL HISTORY: Denies increased pain, fevers, or chills. Tolerating diet well. Having formed bowel movements every other day without blood. Off narcotic pain meds.    Physical Examination:  /81 (BP Location: Left arm, Patient Position: Sitting, Cuff Size: Adult Regular)   Pulse 113   Ht 1.702 m (5' 7\")   Wt 80.3 kg (177 lb)   SpO2 98%   BMI 27.72 kg/m    Abdomen soft, nontender. Incisions with no evidence of infection or hernia.    ASSESSMENT  Doing well postop.    PLAN  1.  High-fiber diet.  2.  Daily MiraLAX as needed.  3.  No activity restrictions.  4.  Colonoscopy at age 47.  5.  Return to clinic as needed.    20 minutes spent on the date of encounter performing chart review, history and exam, documentation.         Referring Provider:  Chilango Dominguez MD  [address] [place] [id number]     Primary Care Provider:  Hayden Cerda      Again, thank you for allowing me to participate in the care of " your patient.      Sincerely,    Amadou Anna MD

## 2024-06-20 ENCOUNTER — THERAPY VISIT (OUTPATIENT)
Dept: PHYSICAL THERAPY | Facility: CLINIC | Age: 34
End: 2024-06-20
Payer: COMMERCIAL

## 2024-06-20 DIAGNOSIS — N81.89 PELVIC FLOOR WEAKNESS IN FEMALE: Primary | ICD-10-CM

## 2024-06-20 PROCEDURE — 97110 THERAPEUTIC EXERCISES: CPT | Mod: GP | Performed by: PHYSICAL THERAPIST

## 2024-07-08 ENCOUNTER — THERAPY VISIT (OUTPATIENT)
Dept: PHYSICAL THERAPY | Facility: CLINIC | Age: 34
End: 2024-07-08
Payer: COMMERCIAL

## 2024-07-08 DIAGNOSIS — N81.89 PELVIC FLOOR WEAKNESS IN FEMALE: Primary | ICD-10-CM

## 2024-07-08 PROCEDURE — 90912 BFB TRAINING 1ST 15 MIN: CPT | Mod: GP | Performed by: PHYSICAL THERAPIST

## 2024-07-08 PROCEDURE — 97530 THERAPEUTIC ACTIVITIES: CPT | Mod: GP | Performed by: PHYSICAL THERAPIST

## 2024-07-18 DIAGNOSIS — T85.610D BACLOFEN PUMP FAILURE, SUBSEQUENT ENCOUNTER: ICD-10-CM

## 2024-07-18 DIAGNOSIS — R25.2 SPASTICITY: ICD-10-CM

## 2024-07-18 DIAGNOSIS — S30.1XXD ABDOMINAL WALL SEROMA, SUBSEQUENT ENCOUNTER: Primary | ICD-10-CM

## 2024-07-19 ENCOUNTER — TELEPHONE (OUTPATIENT)
Dept: NEUROSURGERY | Facility: CLINIC | Age: 34
End: 2024-07-19
Payer: COMMERCIAL

## 2024-07-19 DIAGNOSIS — Z01.818 PREOPERATIVE EVALUATION TO RULE OUT SURGICAL CONTRAINDICATION: Primary | ICD-10-CM

## 2024-07-19 NOTE — TELEPHONE ENCOUNTER
Called patient to schedule surgery with Dr. Que Drew    Spoke with:  Asher (patient)    Date of Surgery: 7/25/2024    Arrival time Discussed with Patient:  No    Location of surgery: [facility]     Pre-Op H&P: TBD    Post-Op Appts: 8/12/2024     Imaging:  No      Discussed with patient pre-op RN will call 2-3 days prior to surgery with arrival time and instructions:  Yes     Packet sent out: No 07/19/24  via [employer] Message    Additional Comments:      All patients questions were answered and patient was instructed to review surgical packet and call back with any questions or concerns.       Phillip Pinzon on 7/19/2024 at 1:03 PM

## 2024-08-02 ENCOUNTER — MEDICAL CORRESPONDENCE (OUTPATIENT)
Dept: HEALTH INFORMATION MANAGEMENT | Facility: CLINIC | Age: 34
End: 2024-08-02
Payer: COMMERCIAL

## 2024-08-02 ENCOUNTER — DOCUMENTATION ONLY (OUTPATIENT)
Dept: UROLOGY | Facility: CLINIC | Age: 34
End: 2024-08-02
Payer: COMMERCIAL

## 2024-08-02 NOTE — PROGRESS NOTES
Type of Form Received: Order (speedicath)    Form Received (Date) 8/2/24   Form Filled out Yes, date 8/2/24   Placed in provider folder Yes       Received Completed forms Yes   Faxed Forms Faxed To: [facility]  Fax Number: [phone]   Sent to HIM (Date) 8/2/24

## 2024-08-07 ENCOUNTER — TELEPHONE (OUTPATIENT)
Dept: NEUROSURGERY | Facility: CLINIC | Age: 34
End: 2024-08-07
Payer: COMMERCIAL

## 2024-08-07 NOTE — TELEPHONE ENCOUNTER
Rescheduled surgery with Dr. Drew to 8/22/2024    Spoke with: Patient    Surgery is located at [facility], [address] [address]    Patient will be seen for their new H&P by Virtual PAC on 8/13/2024 at 4pm - Patient is aware they need to be in the state of [place] and that they will receive their start time for surgery & pre-op instructions at this appointment - scheduled labs at [facility] at 530pm to follow PAC    Is there imaging that needs to be rescheduled for new surgery date? No    Patients 2 week post op has been rescheduled to 9/9/2024 at 8am    Additional comments: Patient will call back if they have any questions or concerns.    Aga Knox on 8/7/2024 at 11:36 AM

## 2024-08-07 NOTE — TELEPHONE ENCOUNTER
FUTURE VISIT INFORMATION      SURGERY INFORMATION:  Date: 8/13/2024  Location: [facility] OR  Surgeon:  Que Drew MD   Anesthesia Type:  General   Procedure: Exploration of wound, excision of scar, decompression of seroma, right abdomen       RECORDS REQUESTED FROM:       Primary Care Provider:    Pertinent Medical History:    Most recent Sleep Study: 10/22    Action    Action Taken 8/7/2024 12:16PM VIRI     I called pt Asher - ronald. Ask about her PCP

## 2024-08-07 NOTE — TELEPHONE ENCOUNTER
----- Message from Rashmi SALGADO sent at 8/1/2024 10:11 AM CDT -----  Regarding: RE: Rescheduling?  Phillip Garcia. Let me know if you're able to reach her to schedule. She'll need PAC and labs.     Rashmi  ----- Message -----  From: Phillip Pinzon  Sent: 8/1/2024   9:52 AM CDT  To: Rashmi Stanford RN  Subject: RE: Rescheduling?                                I can reschedule her for 8/22  ----- Message -----  From: Rashmi Stanford RN  Sent: 8/1/2024   9:35 AM CDT  To: Phillip Pinzon  Subject: Rescheduling?                                    Phillip Velazquez.     Is Asher going to call us if she wants to reschedule after 8/12 or are we looking for a new date for her? I just wasn't sure what was happening.    Rashmi Garcia

## 2024-08-13 ENCOUNTER — HOSPITAL ENCOUNTER (EMERGENCY)
Facility: CLINIC | Age: 34
Discharge: HOME | End: 2024-08-13
Payer: COMMERCIAL

## 2024-08-13 ENCOUNTER — PRE VISIT (OUTPATIENT)
Dept: SURGERY | Facility: CLINIC | Age: 34
End: 2024-08-13

## 2024-08-13 ENCOUNTER — VIRTUAL VISIT (OUTPATIENT)
Dept: SURGERY | Facility: CLINIC | Age: 34
End: 2024-08-13
Payer: COMMERCIAL

## 2024-08-13 ENCOUNTER — ANESTHESIA EVENT (OUTPATIENT)
Dept: SURGERY | Facility: CLINIC | Age: 34
End: 2024-08-13
Payer: COMMERCIAL

## 2024-08-13 VITALS
HEART RATE: 83 BPM | OXYGEN SATURATION: 97 % | RESPIRATION RATE: 16 BRPM | TEMPERATURE: 98.3 F | DIASTOLIC BLOOD PRESSURE: 87 MMHG | SYSTOLIC BLOOD PRESSURE: 155 MMHG

## 2024-08-13 VITALS — BODY MASS INDEX: 27.78 KG/M2 | HEIGHT: 67 IN | WEIGHT: 177 LBS

## 2024-08-13 DIAGNOSIS — S30.1XXD ABDOMINAL WALL SEROMA, SUBSEQUENT ENCOUNTER: ICD-10-CM

## 2024-08-13 DIAGNOSIS — Z01.818 PREOP EXAMINATION: Primary | ICD-10-CM

## 2024-08-13 DIAGNOSIS — R30.0 DYSURIA: ICD-10-CM

## 2024-08-13 DIAGNOSIS — E87.6 HYPOKALEMIA: ICD-10-CM

## 2024-08-13 LAB
ALBUMIN UR-MCNC: NEGATIVE MG/DL
APPEARANCE UR: CLEAR
BILIRUB UR QL STRIP: NEGATIVE
COLOR UR AUTO: YELLOW
GLUCOSE UR STRIP-MCNC: NEGATIVE MG/DL
HGB UR QL STRIP: NEGATIVE
KETONES UR STRIP-MCNC: NEGATIVE MG/DL
LEUKOCYTE ESTERASE UR QL STRIP: ABNORMAL
MUCOUS THREADS #/AREA URNS LPF: PRESENT /LPF
NITRATE UR QL: NEGATIVE
PH UR STRIP: 7 [PH] (ref 5–7)
RBC URINE: 3 /HPF
SP GR UR STRIP: 1.02 (ref 1–1.03)
SQUAMOUS EPITHELIAL: 4 /HPF
UROBILINOGEN UR STRIP-MCNC: 4 MG/DL
WBC URINE: 11 /HPF

## 2024-08-13 PROCEDURE — G0463 HOSPITAL OUTPT CLINIC VISIT: HCPCS

## 2024-08-13 PROCEDURE — 250N000013 HC RX MED GY IP 250 OP 250 PS 637

## 2024-08-13 PROCEDURE — 99214 OFFICE O/P EST MOD 30 MIN: CPT | Mod: 95 | Performed by: NURSE PRACTITIONER

## 2024-08-13 PROCEDURE — 99214 OFFICE O/P EST MOD 30 MIN: CPT

## 2024-08-13 PROCEDURE — 81003 URINALYSIS AUTO W/O SCOPE: CPT

## 2024-08-13 PROCEDURE — 87086 URINE CULTURE/COLONY COUNT: CPT

## 2024-08-13 RX ORDER — GRANULES FOR ORAL 3 G/1
3 POWDER ORAL ONCE
Status: COMPLETED | OUTPATIENT
Start: 2024-08-13 | End: 2024-08-13

## 2024-08-13 RX ADMIN — GRANULES FOR ORAL SOLUTION 3 G: 3 POWDER ORAL at 18:25

## 2024-08-13 ASSESSMENT — ACTIVITIES OF DAILY LIVING (ADL): ADLS_ACUITY_SCORE: 38

## 2024-08-13 ASSESSMENT — PAIN SCALES - GENERAL: PAINLEVEL: NO PAIN (0)

## 2024-08-13 ASSESSMENT — ENCOUNTER SYMPTOMS: ORTHOPNEA: 0

## 2024-08-13 NOTE — DISCHARGE INSTRUCTIONS
You will receive a call with culture results in approximately 24 hours.  Follow-up in ER if fever chills or low back pain develop.

## 2024-08-13 NOTE — H&P
Pre-Operative H & P     CC:  Preoperative exam to assess for increased cardiopulmonary risk while undergoing surgery and anesthesia.    Date of Encounter: 8/13/2024  Primary Care Physician:  Hayden Cerda     Reason for visit: No diagnosis found.    HPI  Hannah Chavez is a 34 year old adult who presents for pre-operative H & P in preparation for  Procedure Information       Case: [id number] Date/Time: 08/22/24 1215    Procedure: Exploration of wound, excision of scar, decompression of seroma, right abdomen (Right: Arm)    Anesthesia type: General    Diagnosis:       Abdominal wall seroma, subsequent encounter [S30.1XXD]      Baclofen pump failure, subsequent encounter [T85.610D]      Spasticity [R25.2]    Pre-op diagnosis:       Abdominal wall seroma, subsequent encounter [S30.1XXD]      Baclofen pump failure, subsequent encounter [T85.610D]      Spasticity [R25.2]    [facility]  [facility]    Providers: Que Drew MD            Hannah Chavez is a 33 year old adult with asthma, history of incomplete T10 injury, neurogenic bladder, anemia anxiety and depression that has an abdominal wall seroma.  This seroma is at the site where her previous baclofen pump was removed.  She has consulted with Dr. Drew and the above listed procedure has been recommended.     History is obtained from the patient and chart review    Hx of abnormal bleeding or anti-platelet use: none    Menstrual history: N/A    Past Medical History  Past Medical History:   Diagnosis Date    Asthma     Bladder incontinence     Colostomy status (H)     Gender dysphoria     Neurogenic bladder     Self-catheterizes urinary bladder     Spinal cord injury of T10 vertebra (H)     Spinal cord injury of thoracic region without bone injury, initial encounter (H)     Uncomplicated asthma        Past Surgical History  Past Surgical History:   Procedure Laterality Date    ABDOMEN SURGERY      segmental seigmoid resection, end colostomy    CYSTOSCOPY  N/A 09/01/2017    Procedure: CYSTOSCOPY;  Cystoscopy and Botox Injection into the Bladder;  Surgeon: Michael Mcnulty MD;  [facility]    CYSTOSCOPY N/A 10/12/2018    Procedure: CYSTOSCOPY;  Cystoscopy, Botox;  Surgeon: Michael Mcnulty MD;  [facility]    CYSTOSCOPY, INJECT BOTOX N/A 4/25/2024    Procedure: Cystoscopy with Botox Injection to Bladder;  Surgeon: Chilango Dominguez MD;  [facility]    CYSTOSCOPY, INTRAVESICAL INJECTION N/A 03/02/2018    Procedure: CYSTOSCOPY, INTRAVESICAL INJECTION;  Cystoscopy And Botox Injection Into The Bladder  ;  Surgeon: Michael Mcnulty MD;  [facility]    DAVINCI ASSISTED COLOSTOMY TAKEDOWN N/A 4/25/2024    Procedure: ROBOT ASSISTED COLOSTOMY CLOSURE, takedown of splenic flexure.;  Surgeon: Amadou Anna MD;  [facility]    INJECT BOTOX N/A 09/01/2017    Procedure: INJECT BOTOX;;  Surgeon: Michael Mcnulty MD;  [facility]    INJECT BOTOX N/A 10/12/2018    Procedure: INJECT BOTOX;;  Surgeon: Michael Mcnulty MD;  [facility]    ORCHIECTOMY SCROTAL Bilateral 08/18/2022    Procedure: Bilateral Simple Scrotal Orchiectomy;  Surgeon: Chilango Dominguez MD;  [facility]    REMOVE INTRATHECAL PAIN PUMP N/A 06/02/2023    Procedure: Removal of intrathecal drug delivery system - removal of the intrathecal drug pump from the right abdomen and removal of the intrathecal catheter from the thoracolumbar spine;  Surgeon: Que Drew MD;  [facility]    SIGMOIDOSCOPY FLEXIBLE N/A 4/25/2024    Procedure: FLEXIBLE SIGMOIDOSCOPY;  Surgeon: Amadou Anna MD;  [facility]    VAGINOPLASTY WITH PERITONEAL FLAP, ROBOTIC ASSISTED N/A 4/25/2024    Procedure: Robotic assisted Full Depth Vaginoplasty;  Surgeon: Chilango Dominguez MD;  [facility]       Prior to Admission Medications  Current Outpatient Medications   Medication Sig Dispense Refill    ALBUTEROL IN Inhale 1-2 puffs into the lungs 4  "times daily as needed (shortness of breath)      diazepam (VALIUM) 5 MG tablet Take 5 mg by mouth every 6 hours as needed      estradiol valerate (DELESTROGEN) 20 MG/ML injection Inject into the muscle once a week Saturday     0.11ml (2.2mg)      progesterone (PROMETRIUM) 200 MG capsule Take 1 capsule by mouth every morning      QUEtiapine (SEROQUEL) 25 MG tablet Take 25 mg by mouth as needed      Insulin Syringe-Needle U-100 (B-D INSULIN SYRINGE) 25G X 1\" 1 ML MISC       Sharps Container MISC Sharps Container         Allergies  Allergies   Allergen Reactions    Dust Mites     Shellfish Allergy Nausea and Vomiting and Unknown     Patient states his mother has a \"violent reaction\" to shellfish, and that he has a feeling of nausea when he smells shellfish, so he has never consumed any. He states he has also not been tested.      Shellfish-Derived Products Other (See Comments)     Patient states her mother has a \"violent reaction\" to shellfish, and that she has a feeling of nausea when she smells shellfish, so she has never consumed any. She states she has also not been tested.  Patient states his mother has a \"violent reaction\" to shellfish, and that he has a feeling of nausea when he smells shellfish, so he has never consumed any. He states he has also not been tested.         Social History  Social History     Socioeconomic History    Marital status: Single     Spouse name: Not on file    Number of children: 0    Years of education: Not on file    Highest education level: Not on file   Occupational History    Occupation: unemployed   Tobacco Use    Smoking status: Never     Passive exposure: Never    Smokeless tobacco: Never   Vaping Use    Vaping status: Never Used   Substance and Sexual Activity    Alcohol use: No    Drug use: Never    Sexual activity: Not on file   Other Topics Concern    Not on file   Social History Narrative    Not on file     Social Determinants of Health     Financial Resource Strain: Not At " Risk (6/25/2024)    Received from [facility]    Financial Resource Strain     Is it hard for you to pay for the very basics like food, housing, medical care or heating?: No   Food Insecurity: Not At Risk (6/25/2024)    Received from [facility]    Food Insecurity     Does your food run out before you have the money to buy more?: No   Transportation Needs: Not At Risk (6/25/2024)    Received from [facility]    Transportation Needs     Does a lack of transportation keep you from your medical appointments or from getting your medications?: No   Physical Activity: Inactive (4/6/2022)    Received from [facility]    Exercise Vital Sign     Days of Exercise per Week: 0 days     Minutes of Exercise per Session: 0 min   Stress: No Stress Concern Present (4/6/2022)    Received from [facility]    [place] citizen [place] of Occupational Health - Occupational Stress Questionnaire     Feeling of Stress : Only a little   Social Connections: Socially Isolated (4/6/2022)    Received from [facility]    Social Connection and Isolation Panel [NHANES]     Frequency of Communication with Friends and Family: More than three times a week     Frequency of Social Gatherings with Friends and Family: Once a week     Attends Yazidi Services: Never     Active Member of Clubs or Organizations: No     Attends Club or Organization Meetings: Patient declined     Marital Status: Never    Interpersonal Safety: Not At Risk (4/6/2022)    Received from [facility]    Humiliation, Afraid, Rape, and Kick questionnaire     Fear of Current or Ex-Partner: No     Emotionally Abused: No     Physically Abused: No     Sexually Abused: No   Housing Stability: Low Risk  (4/6/2022)    Received from [facility]    Housing Stability Vital Sign     Unable to Pay for Housing in the Last Year: No     Number of Places Lived in the Last Year: 1     In the last 12 months, was there a time when you did not have a steady place to sleep or slept in a shelter  (including now)?: No       Family History  Family History   Problem Relation Age of Onset    Cancer Mother         unknown type    Depression Father     Fibromyalgia Father     Anxiety Disorder Brother     Anesthesia Reaction No family hx of     Thrombosis No family hx of        Review of Systems  The complete review of systems is negative other than noted in the HPI or here.   Anesthesia Evaluation   Pt has had prior anesthetic.     No history of anesthetic complications       ROS/MED HX  ENT/Pulmonary:     (+)                     Intermittent, asthma  Treatment: Inhaler prn,              (-) NEIL risk factors and recent URI   Neurologic: Comment: Incomplete T10 spinal injury.  Not paralyzed.  +neurogenic bladder and spastic gait.  Can walk, but typically pushes a wheelchair to walk.      Unassisted, walking distance is very limited, but with pushing a wheelchair, she can walk longer distances.      (+)                     Spinal cord injury, year sustained: 1993, level of injury: T10 incomplete,         Cardiovascular:  - neg cardiovascular ROS   (+)  - -   -  - -                                 Previous cardiac testing   Echo: Date: Results:    Stress Test:  Date: Results:    ECG Reviewed:  Date: 6/2022 Results:  Normal sinus rhythm   Right axis deviation   Pulmonary disease pattern   Prolonged QT   Abnormal ECG     Cath:  Date: Results:   (-) orthopnea/PND   METS/Exercise Tolerance: >4 METS Comment: Is doing formal physical therapy currently - does weight training and rides a stationary bike.    Walks on a treadmill at home at 2mph for up to 30 minutes.      Denies any exertional dyspnea or angina.    Hematologic:  - neg hematologic  ROS     Musculoskeletal:  - neg musculoskeletal ROS     GI/Hepatic:  - neg GI/hepatic ROS     Renal/Genitourinary: Comment: Neurogenic bladder - self cath 4-6x per day.         Recurrent UTIs - currently has symptoms of UTI including frequent urination, odor and fatigue.  Completed  macrobid this past Thursday and a one time dose of fosfomycin that was prescribed in [place] yesterday.  Feels she still has UTI symptoms.  Is going to go to the emergency room tonight or tomorrow and see if she needs IV antibiotics as she has in the past.       Endo:  - neg endo ROS     Psychiatric/Substance Use:     (+) psychiatric history anxiety and depression (adjustment disorder)       Infectious Disease:  - neg infectious disease ROS     Malignancy:  - neg malignancy ROS     Other: Comment: RLQ abdomen wound/abdominal wall seroma (baclofen pump removal site)           Virtual visit -  No vitals were obtained    Physical Exam  Constitutional: Awake, alert, cooperative, no apparent distress, and appears stated age.  Eyes: Pupils equal  HENT: Normocephalic  Respiratory: non labored breathing   Neurologic: Awake, alert, oriented to name, place and time.   Neuropsychiatric: Calm, cooperative. Normal affect.      Prior Labs/Diagnostic Studies   All labs and imaging personally reviewed     EKG/ stress test - if available please see in ROS above     Labs:    Component      Latest Ref Rng 8/16/2024  4:44 PM   Sodium      135 - 145 mmol/L 142    Potassium      3.4 - 5.3 mmol/L 4.1    Chloride      98 - 107 mmol/L 107    Carbon Dioxide (CO2)      22 - 29 mmol/L 27    Anion Gap      7 - 15 mmol/L 8    Urea Nitrogen      6.0 - 20.0 mg/dL 6.3    Creatinine      0.51 - 1.17 mg/dL 0.81    GFR Estimate      >60 mL/min/1.73m2 >90    Calcium      8.8 - 10.4 mg/dL 9.4    Glucose      70 - 99 mg/dL 88    WBC      4.0 - 11.0 10e3/uL 5.5    RBC Count      3.80 - 5.90 10e6/uL 4.49    Hemoglobin      11.7 - 17.7 g/dL 12.1    Hematocrit      35.0 - 53.0 % 37.2    MCV      78 - 100 fL 83    MCH      26.5 - 33.0 pg 26.9    MCHC      31.5 - 36.5 g/dL 32.5    RDW      10.0 - 15.0 % 13.8    Platelet Count      150 - 450 10e3/uL 197    INR      0.85 - 1.15  1.05    PTT      22 - 38 Seconds 36          The patient's records and results  "personally reviewed by this provider.     Outside records reviewed from: Care Everywhere      Assessment    Hannah Chavez is a 34 year old adult seen as a PAC referral for risk assessment and optimization for anesthesia.    Plan/Recommendations  Pt will be optimized for the proposed procedure.  See below for details on the assessment, risk, and preoperative recommendations    NEUROLOGY  - No history of TIA, CVA or seizure  - incomplete T10 injury at age 3 now with impaired gait and neurogenic bladder.  Consider fall risk precautions.     -Post Op delirium risk factors:  No risk identified    ENT  - No current airway concerns.  Will need to be reassessed day of surgery.  Mallampati: Unable to assess  TM: Unable to assess    CARDIAC  - No history of CAD, Hypertension, and Afib  - METS (Metabolic Equivalents)  Patient performs 4 or more METS exercise without symptoms             Total Score: 0      RCRI-Low risk: Class 2 0.9% complication rate             Total Score: 1    RCRI: High Risk Surgery        PULMONARY    NEIL Low Risk             Total Score: 1    NEIL: Male      - Asthma  Well controlled  - Tobacco History    History   Smoking Status    Never   Smokeless Tobacco    Never       GI  - denies GERD  PONV High Risk  Total Score: 3           1 AN PONV: Pt is Female    1 AN PONV: Patient is not a current smoker    1 AN PONV: Intended Post Op Opioids        /RENAL  - neurogenic bladder and recurrent UTI's (see ROS) - see future emergency room note.  Patient verbalized that she will notify the surgeon prior to planned surgery if symptoms haven't improved as they may choose to postpone surgery..     ENDOCRINE    - BMI: Estimated body mass index is 27.72 kg/m  as calculated from the following:    Height as of this encounter: 1.702 m (5' 7\").    Weight as of this encounter: 80.3 kg (177 lb).  Overweight (BMI 25.0-29.9)  - No history of Diabetes Mellitus    HEME  VTE Low Risk 0.5%             Total Score: 2    VTE: " Male      - No history of abnormal bleeding or antiplatelet use.  - Chronic anemia  Recommend perioperative use of blood conservation techniques intraoperatively and close monitoring for postoperative bleeding.      MSK  Patient is NOT Frail             Total Score: 1    Frailty: Increased exhaustion        PSYCH  - continue mental health meds without interruption.    SKIN  - abdominal wall seroma.  Surgery planned as above.        Different anesthesia methods/types have been discussed with the patient, but they are aware that the final plan will be decided by the assigned anesthesia provider on the date of service.      The patient is optimized for their procedure. AVS with information on surgery time/arrival time, meds and NPO status given by nursing staff. No further diagnostic testing indicated.    Please refer to the physical examination documented by the anesthesiologist in the anesthesia record on the day of surgery.    Video-Visit Details    Type of service:  Video Visit    Provider received verbal consent for a Video Visit from the patient? Yes   Video Start Time:  1542  Video End Time: 1559    Originating Location (pt. Location): Home    Distant Location (provider location):  Off-site  Mode of Communication:  Video Conference via [employer]    On the day of service:     Prep time: 8 minutes  Visit time: 17 minutes  Documentation time: 5 minutes  ------------------------------------------  Total time: 30 minutes      AUTUMN Jiang CNP  Preoperative Assessment Center  [facility]  Clinic and Surgery Center  Phone: [phone]  Fax: [phone]

## 2024-08-13 NOTE — PATIENT INSTRUCTIONS
Preparing for Your Surgery      Name:  Hannah Chavez   MRN:  [id number]   :  1990   Today's Date:  2024       Arriving for surgery:  Surgery date:  24  Arrival time:  10:15 am  Surgery time: 12:15 pm    Please come to:     Please come to:       [facility]    [address] [place]  [address]     The [facility] Patient/Visitor Ramp is at [address]. Patients and visitors who self-park will receive the reduced hospital parking rate. If the Patient /Visitor Ramp is full, please follow the signs to the [employer] car park located at the main hospital entrance.       parking is available (24 hours/ 7 days a week)      Discounted parking pass options are available for patients and visitors. They can be purchased at the [employer] desk at the main hospital entrance.     -    Stop at the security desk and they will direct surgery patients to the Surgery Check in and Family [facility]. [phone]        - If you need directions, a wheelchair or an escort please stop at the Information/security desk in the lobby.     What can I eat or drink?  -  You may eat and drink normally up to 8 hours prior to arrival time. (Until 2:15 am)  -  You may have clear liquids until 2 hours prior to arrival time. (Until 8:15 am)    Examples of clear liquids:  Water  Clear broth  Juices (apple, white grape, white cranberry  and cider) without pulp  Noncarbonated, powder based beverages  (lemonade and Eros-Aid)  Sodas (Sprite, 7-Up, ginger ale and seltzer)  Coffee or tea (without milk or cream)  Gatorade    -  No Alcohol or cannabis products for at least 24 hours before surgery.     Which medicines can I take?    Hold Aspirin for 7 days before surgery.   Hold Multivitamins for 7 days before surgery.  Hold Supplements for 7 days before surgery.  Hold Ibuprofen (Advil, Motrin) for 1 day(s) before  surgery--unless otherwise directed by surgeon.  Hold Naproxen (Aleve) for 4 days before surgery.    -  PLEASE TAKE these medications per your usual routine:  Tylenol if needed  Albuterol inhaler if needed and bring this to the hospital  Diazepam (Valium) if needed  You may take your Estradiol injection on the Sunday before surgery  Progesterone  Quetiapine (Seroquel) if needed    How do I prepare myself?  - Please take 2 showers (one the night prior to surgery and one the morning of surgery) using Scrubcare or Hibiclens soap.    Use this soap only from the neck to your toes. Do not use in genital area.     Leave the soap on your skin for one minute--then rinse thoroughly.      You may use your own shampoo and conditioner. No other hair products.     Sleep in clean sheets and wear clean clothes.   - Please remove all jewelry and body piercings.  - No lotions, deodorants or fragrance.  - No makeup or fingernail polish.   - Bring your ID and insurance card.    -If you use a CPAP machine, please bring the CPAP machine, tubing, and mask to hospital.    -If you have a Deep Brain Stimulator, Spinal Cord Stimulator, or any Neuro Stimulator device---you must bring the remote control to the hospital.      ALL PATIENTS GOING HOME THE SAME DAY OF SURGERY ARE REQUIRED TO HAVE A RESPONSIBLE ADULT TO DRIVE AND BE IN ATTENDANCE WITH THEM FOR 24 HOURS FOLLOWING SURGERY.    Covid testing policy as of 12/06/2022  Your surgeon will notify and schedule you for a COVID test if one is needed before surgery--please direct any questions or COVID symptoms to your surgeon      Questions or Concerns:    - For any questions regarding the day of surgery or your hospital stay, please contact the Pre Admission Nursing Office at [phone].       - If you have health changes between today and your surgery, please call your surgeon.       - For questions after surgery, please call your surgeons office.           Current Visitor Guidelines    You  may have 2 visitors in the pre op area.    Visiting hours: 8 a.m. to 8:30 p.m.    Patients confirmed or suspected to have symptoms of COVID 19 or flu:     No visitors allowed for adult patients.   Children (under age 18) can have 1 named visitor.     People who are sick or showing symptoms of COVID 19 or flu:    Are not allowed to visit patients--we can only make exceptions in special situations.       Please follow these guidelines for your visit:          Please maintain social distance          Masking is optional--however at times you may be asked to wear a mask for the safety of yourself and others     Clean your hands with alcohol hand . Do this when you arrive at and leave the building and patient room,    And again after you touch your mask or anything in the room.     Go directly to and from the room you are visiting.     Stay in the patient s room during your visit. Limit going to other places in the hospital as much as possible     Leave bags and jackets at home or in the car.     For everyone s health, please don t come and go during your visit. That includes for smoking   during your visit.

## 2024-08-13 NOTE — ED PROVIDER NOTES
"  History     Chief Complaint   Patient presents with    UTI     HPI  Hannah Chavez is a 34 year old adult who presents to urgent care with chief complaint of UTI.  Patient reports developing UTI symptoms approximately 2 weeks ago.  Symptoms include frequency and urgency with urination.  Patient also notes some burning with urination.  Patient was treated with Macrobid and finished complete course however symptoms did not improve.  Patient was then seen outside the system in [place] and given fosfomycin 2 days ago per resistance spectrum.  Patient reports medical staff there told patient to return for second dose if first dose does not improve symptoms.  Patient returns today for possible second dose.  Patient denies fever, chills, hematuria, abdominal pain, low back pain.    Allergies:  Allergies   Allergen Reactions    Dust Mites     Shellfish Allergy Nausea and Vomiting and Unknown     Patient states his mother has a \"violent reaction\" to shellfish, and that he has a feeling of nausea when he smells shellfish, so he has never consumed any. He states he has also not been tested.      Shellfish-Derived Products Other (See Comments)     Patient states her mother has a \"violent reaction\" to shellfish, and that she has a feeling of nausea when she smells shellfish, so she has never consumed any. She states she has also not been tested.  Patient states his mother has a \"violent reaction\" to shellfish, and that he has a feeling of nausea when he smells shellfish, so he has never consumed any. He states he has also not been tested.         Problem List:    Patient Active Problem List    Diagnosis Date Noted    Pelvic floor weakness in female 05/24/2024     Priority: Medium    Colostomy status (H) 04/25/2024     Priority: Medium    Gender dysphoria 08/02/2022     Priority: Medium     Added automatically from request for surgery [id number]      Neurogenic bladder 02/21/2018     Priority: Medium        Past Medical History:  "   Past Medical History:   Diagnosis Date    Asthma     Bladder incontinence     Colostomy status (H)     Gender dysphoria     Neurogenic bladder     Self-catheterizes urinary bladder     Spinal cord injury of T10 vertebra (H)     Spinal cord injury of thoracic region without bone injury, initial encounter (H)     Uncomplicated asthma        Past Surgical History:    Past Surgical History:   Procedure Laterality Date    ABDOMEN SURGERY      segmental seigmoid resection, end colostomy    CYSTOSCOPY N/A 09/01/2017    Procedure: CYSTOSCOPY;  Cystoscopy and Botox Injection into the Bladder;  Surgeon: Michael Mcnulty MD;  [facility]    CYSTOSCOPY N/A 10/12/2018    Procedure: CYSTOSCOPY;  Cystoscopy, Botox;  Surgeon: Michael Mcnulty MD;  [facility]    CYSTOSCOPY, INJECT BOTOX N/A 4/25/2024    Procedure: Cystoscopy with Botox Injection to Bladder;  Surgeon: Chilango Dominguez MD;  [facility]    CYSTOSCOPY, INTRAVESICAL INJECTION N/A 03/02/2018    Procedure: CYSTOSCOPY, INTRAVESICAL INJECTION;  Cystoscopy And Botox Injection Into The Bladder  ;  Surgeon: Michael Mcnulty MD;  [facility]    DAVINCI ASSISTED COLOSTOMY TAKEDOWN N/A 4/25/2024    Procedure: ROBOT ASSISTED COLOSTOMY CLOSURE, takedown of splenic flexure.;  Surgeon: Amadou Anna MD;  [facility]    INJECT BOTOX N/A 09/01/2017    Procedure: INJECT BOTOX;;  Surgeon: Michael Mcnulty MD;  [facility]    INJECT BOTOX N/A 10/12/2018    Procedure: INJECT BOTOX;;  Surgeon: Michael Mcnulty MD;  [facility]    ORCHIECTOMY SCROTAL Bilateral 08/18/2022    Procedure: Bilateral Simple Scrotal Orchiectomy;  Surgeon: Chilango Dominguez MD;  [facility]    REMOVE INTRATHECAL PAIN PUMP N/A 06/02/2023    Procedure: Removal of intrathecal drug delivery system - removal of the intrathecal drug pump from the right abdomen and removal of the intrathecal catheter from the thoracolumbar spine;  Surgeon: Viki  "Que Sandoval MD;  [facility]    SIGMOIDOSCOPY FLEXIBLE N/A 4/25/2024    Procedure: FLEXIBLE SIGMOIDOSCOPY;  Surgeon: Amadou Anna MD;  [facility]    VAGINOPLASTY WITH PERITONEAL FLAP, ROBOTIC ASSISTED N/A 4/25/2024    Procedure: Robotic assisted Full Depth Vaginoplasty;  Surgeon: Chilango Dominguez MD;  [facility]       Family History:    Family History   Problem Relation Age of Onset    Cancer Mother         unknown type    Depression Father     Fibromyalgia Father     Anxiety Disorder Brother     Anesthesia Reaction No family hx of     Thrombosis No family hx of        Social History:  Marital Status:  Single [1]  Social History     Tobacco Use    Smoking status: Never     Passive exposure: Never    Smokeless tobacco: Never   Vaping Use    Vaping status: Never Used   Substance Use Topics    Alcohol use: No    Drug use: Never        Medications:    ALBUTEROL IN  diazepam (VALIUM) 5 MG tablet  estradiol valerate (DELESTROGEN) 20 MG/ML injection  Insulin Syringe-Needle U-100 (B-D INSULIN SYRINGE) 25G X 1\" 1 ML MISC  progesterone (PROMETRIUM) 200 MG capsule  QUEtiapine (SEROQUEL) 25 MG tablet  Sharps Container MISC          Review of Systems   All other systems reviewed and are negative.      Physical Exam   BP: (!) 155/87  Pulse: 97  Temp: 98.3  F (36.8  C)  Resp: 16  SpO2: (!) 83 %      Physical Exam  Vitals and nursing note reviewed.   Constitutional:       General: She is not in acute distress.     Appearance: Normal appearance. She is not ill-appearing.   HENT:      Head: Normocephalic.   Cardiovascular:      Rate and Rhythm: Normal rate.      Pulses: Normal pulses.   Pulmonary:      Effort: Pulmonary effort is normal.   Abdominal:      Tenderness: There is no left CVA tenderness.   Musculoskeletal:      Cervical back: Neck supple.   Skin:     Findings: No rash.   Neurological:      General: No focal deficit present.      Mental Status: She is alert.         ED Course      "   Procedures        Results for orders placed or performed during the hospital encounter of 08/13/24 (from the past 24 hour(s))   UA with Microscopic reflex to Culture    Specimen: Urine, Clean Catch   Result Value Ref Range    Color Urine Yellow Colorless, Straw, Light Yellow, Yellow    Appearance Urine Clear Clear    Glucose Urine Negative Negative mg/dL    Bilirubin Urine Negative Negative    Ketones Urine Negative Negative mg/dL    Specific Gravity Urine 1.024 1.003 - 1.035    Blood Urine Negative Negative    pH Urine 7.0 5.0 - 7.0    Protein Albumin Urine Negative Negative mg/dL    Urobilinogen Urine 4.0 (A) Normal, 2.0 mg/dL    Nitrite Urine Negative Negative    Leukocyte Esterase Urine Trace (A) Negative    Mucus Urine Present (A) None Seen /LPF    RBC Urine 3 (H) <=2 /HPF    WBC Urine 11 (H) <=5 /HPF    Squamous Epithelials Urine 4 (H) <=1 /HPF    Narrative    Urine Culture ordered based on laboratory criteria       Medications   fosfomycin (MONUROL) Packet 3 g (has no administration in time range)       Assessments & Plan (with Medical Decision Making)     I have reviewed the nursing notes.    I have reviewed the findings, diagnosis, plan and need for follow up with the patient.      Medical Decision Making  Patient is a 34 year old adult who presents to urgent care with chief complaint of UTI.  Patient reports developing UTI symptoms approximately 2 weeks ago.  Symptoms include frequency and urgency with urination.  Patient also notes some burning with urination.  Patient was treated with Macrobid and finished complete course however symptoms did not improve.  Patient was then seen outside the system in [place] and given fosfomycin 2 days ago per resistance spectrum.  Patient reports medical staff there told patient to return for second dose if first dose does not improve symptoms.  Patient returns today for possible second dose.  Patient denies fever, chills, hematuria, abdominal pain, low back  pain.    Exam above.  Patient in no acute distress.  No CVA tenderness bilaterally.    UA collected with cultures pending.    Given patient is currently on antibiotics this may skew UA results.  Given that patient is still having symptoms plan to treat with second dose of fosfomycin today.  Patient should follow-up with urologist if symptoms do not improve over the next 3 to 4 days.    Prior to making a final disposition on this patient the results of patient's tests and other diagnostic studies were discussed with the patient. All questions were answered. Patient expressed understanding of the plan and was amenable to it.     Disclaimer: This note consists of symbols derived from keyboarding, dictation and/or voice recognition software. As a result, there may be errors in the script that have gone undetected. Please consider this when interpreting information found in this chart.        New Prescriptions    No medications on file       Final diagnoses:   Dysuria       8/13/2024   [facility] EMERGENCY DEPT       Daria Khan PA-C  08/13/24 182

## 2024-08-13 NOTE — PROGRESS NOTES
Asher is a 34 year old who is being evaluated via a billable video visit.    How would you like to obtain your AVS? MyChart  If the video visit is dropped, the invitation should be resent by: Text to cell phone: [phone]    Subjective   Asher is a 34 year old, presenting for the following health issues:  Pre-Op Exam      HPI         Physical Exam

## 2024-08-15 LAB — BACTERIA UR CULT: NO GROWTH

## 2024-08-15 NOTE — RESULT ENCOUNTER NOTE
Final urine culture report is negative.  Adult: Negative urine culture parameters per protocol: No growth   [facility] Emergency Dept discharge antibiotic prescribed (If applicable): None  Treatment recommendations per [facility] ED Lab Result Urine Culture protocol: No change in plan of care.

## 2024-08-16 ENCOUNTER — LAB (OUTPATIENT)
Dept: LAB | Facility: CLINIC | Age: 34
End: 2024-08-16
Payer: COMMERCIAL

## 2024-08-16 DIAGNOSIS — Z01.818 PREOP EXAMINATION: ICD-10-CM

## 2024-08-16 DIAGNOSIS — E87.6 HYPOKALEMIA: ICD-10-CM

## 2024-08-16 DIAGNOSIS — Z01.818 PREOPERATIVE EVALUATION TO RULE OUT SURGICAL CONTRAINDICATION: ICD-10-CM

## 2024-08-16 LAB
ANION GAP SERPL CALCULATED.3IONS-SCNC: 8 MMOL/L (ref 7–15)
APTT PPP: 36 SECONDS (ref 22–38)
BUN SERPL-MCNC: 6.3 MG/DL (ref 6–20)
CALCIUM SERPL-MCNC: 9.4 MG/DL (ref 8.8–10.4)
CHLORIDE SERPL-SCNC: 107 MMOL/L (ref 98–107)
CREAT SERPL-MCNC: 0.81 MG/DL (ref 0.51–1.17)
EGFRCR SERPLBLD CKD-EPI 2021: >90 ML/MIN/1.73M2
ERYTHROCYTE [DISTWIDTH] IN BLOOD BY AUTOMATED COUNT: 13.8 % (ref 10–15)
GLUCOSE SERPL-MCNC: 88 MG/DL (ref 70–99)
HCO3 SERPL-SCNC: 27 MMOL/L (ref 22–29)
HCT VFR BLD AUTO: 37.2 % (ref 35–53)
HGB BLD-MCNC: 12.1 G/DL (ref 11.7–17.7)
INR PPP: 1.05 (ref 0.85–1.15)
MCH RBC QN AUTO: 26.9 PG (ref 26.5–33)
MCHC RBC AUTO-ENTMCNC: 32.5 G/DL (ref 31.5–36.5)
MCV RBC AUTO: 83 FL (ref 78–100)
PLATELET # BLD AUTO: 197 10E3/UL (ref 150–450)
POTASSIUM SERPL-SCNC: 4.1 MMOL/L (ref 3.4–5.3)
RBC # BLD AUTO: 4.49 10E6/UL (ref 3.8–5.9)
SODIUM SERPL-SCNC: 142 MMOL/L (ref 135–145)
WBC # BLD AUTO: 5.5 10E3/UL (ref 4–11)

## 2024-08-16 PROCEDURE — 85730 THROMBOPLASTIN TIME PARTIAL: CPT

## 2024-08-16 PROCEDURE — 85027 COMPLETE CBC AUTOMATED: CPT

## 2024-08-16 PROCEDURE — 36415 COLL VENOUS BLD VENIPUNCTURE: CPT

## 2024-08-16 PROCEDURE — 85610 PROTHROMBIN TIME: CPT

## 2024-08-16 PROCEDURE — 80048 BASIC METABOLIC PNL TOTAL CA: CPT

## 2024-08-19 NOTE — RESULT ENCOUNTER NOTE
Gregory Young,    Your test results are attached.  Your BMP (kidney and electrolytes) is within normal range and good for surgery.  Your other labs should be in your MyChart from Dr. Drew, but also look okay for surgery.         Brittney Sagastume DNP, RN, ANP-C

## 2024-08-21 RX ORDER — HYDROMORPHONE HYDROCHLORIDE 1 MG/ML
0.4 INJECTION, SOLUTION INTRAMUSCULAR; INTRAVENOUS; SUBCUTANEOUS EVERY 5 MIN PRN
Status: CANCELLED | OUTPATIENT
Start: 2024-08-21

## 2024-08-21 RX ORDER — HYDROMORPHONE HYDROCHLORIDE 1 MG/ML
0.2 INJECTION, SOLUTION INTRAMUSCULAR; INTRAVENOUS; SUBCUTANEOUS EVERY 5 MIN PRN
Status: CANCELLED | OUTPATIENT
Start: 2024-08-21

## 2024-08-21 RX ORDER — ONDANSETRON 4 MG/1
4 TABLET, ORALLY DISINTEGRATING ORAL EVERY 30 MIN PRN
Status: CANCELLED | OUTPATIENT
Start: 2024-08-21

## 2024-08-21 RX ORDER — DEXAMETHASONE SODIUM PHOSPHATE 4 MG/ML
4 INJECTION, SOLUTION INTRA-ARTICULAR; INTRALESIONAL; INTRAMUSCULAR; INTRAVENOUS; SOFT TISSUE
Status: CANCELLED | OUTPATIENT
Start: 2024-08-21

## 2024-08-21 RX ORDER — NALOXONE HYDROCHLORIDE 0.4 MG/ML
0.1 INJECTION, SOLUTION INTRAMUSCULAR; INTRAVENOUS; SUBCUTANEOUS
Status: CANCELLED | OUTPATIENT
Start: 2024-08-21

## 2024-08-21 RX ORDER — ONDANSETRON 2 MG/ML
4 INJECTION INTRAMUSCULAR; INTRAVENOUS EVERY 30 MIN PRN
Status: CANCELLED | OUTPATIENT
Start: 2024-08-21

## 2024-08-21 RX ORDER — FENTANYL CITRATE 50 UG/ML
25 INJECTION, SOLUTION INTRAMUSCULAR; INTRAVENOUS EVERY 5 MIN PRN
Status: CANCELLED | OUTPATIENT
Start: 2024-08-21

## 2024-08-21 RX ORDER — SODIUM CHLORIDE, SODIUM LACTATE, POTASSIUM CHLORIDE, CALCIUM CHLORIDE 600; 310; 30; 20 MG/100ML; MG/100ML; MG/100ML; MG/100ML
INJECTION, SOLUTION INTRAVENOUS CONTINUOUS
Status: CANCELLED | OUTPATIENT
Start: 2024-08-21

## 2024-08-21 RX ORDER — FENTANYL CITRATE 50 UG/ML
50 INJECTION, SOLUTION INTRAMUSCULAR; INTRAVENOUS EVERY 5 MIN PRN
Status: CANCELLED | OUTPATIENT
Start: 2024-08-21

## 2024-08-21 ASSESSMENT — ENCOUNTER SYMPTOMS: ORTHOPNEA: 0

## 2024-08-21 NOTE — ANESTHESIA PREPROCEDURE EVALUATION
Anesthesia Pre-Procedure Evaluation    Patient: Hannah Chavez   MRN: [id number] : 1990        Procedure : Procedure(s):  Exploration of wound, excision of scar, decompression of seroma, right abdomen          Past Medical History:   Diagnosis Date    Asthma     Bladder incontinence     Colostomy status (H)     Gender dysphoria     Neurogenic bladder     Self-catheterizes urinary bladder     Spinal cord injury of T10 vertebra (H)     Spinal cord injury of thoracic region without bone injury, initial encounter (H)     Uncomplicated asthma       Past Surgical History:   Procedure Laterality Date    ABDOMEN SURGERY      segmental seigmoid resection, end colostomy    CYSTOSCOPY N/A 2017    Procedure: CYSTOSCOPY;  Cystoscopy and Botox Injection into the Bladder;  Surgeon: Michael Mcnulty MD;  [facility]    CYSTOSCOPY N/A 10/12/2018    Procedure: CYSTOSCOPY;  Cystoscopy, Botox;  Surgeon: Michael Mcnulty MD;  [facility]    CYSTOSCOPY, INJECT BOTOX N/A 2024    Procedure: Cystoscopy with Botox Injection to Bladder;  Surgeon: Chilango Dominguez MD;  [facility]    CYSTOSCOPY, INTRAVESICAL INJECTION N/A 2018    Procedure: CYSTOSCOPY, INTRAVESICAL INJECTION;  Cystoscopy And Botox Injection Into The Bladder  ;  Surgeon: Michael Mcnulty MD;  [facility]    DAVINCI ASSISTED COLOSTOMY TAKEDOWN N/A 2024    Procedure: ROBOT ASSISTED COLOSTOMY CLOSURE, takedown of splenic flexure.;  Surgeon: Amadou Anna MD;  [facility]    INJECT BOTOX N/A 2017    Procedure: INJECT BOTOX;;  Surgeon: Michael Mcnulty MD;  [facility]    INJECT BOTOX N/A 10/12/2018    Procedure: INJECT BOTOX;;  Surgeon: Michael Mcnulty MD;  [facility]    ORCHIECTOMY SCROTAL Bilateral 2022    Procedure: Bilateral Simple Scrotal Orchiectomy;  Surgeon: Chilango Dominguez MD;  [facility]    REMOVE INTRATHECAL PAIN PUMP N/A 2023    Procedure:  "Removal of intrathecal drug delivery system - removal of the intrathecal drug pump from the right abdomen and removal of the intrathecal catheter from the thoracolumbar spine;  Surgeon: Que Drew MD;  [facility]    SIGMOIDOSCOPY FLEXIBLE N/A 4/25/2024    Procedure: FLEXIBLE SIGMOIDOSCOPY;  Surgeon: Amadou Anna MD;  [facility]    VAGINOPLASTY WITH PERITONEAL FLAP, ROBOTIC ASSISTED N/A 4/25/2024    Procedure: Robotic assisted Full Depth Vaginoplasty;  Surgeon: Chilango Dominguez MD;  [facility]      Allergies   Allergen Reactions    Dust Mites     Shellfish Allergy Nausea and Vomiting and Unknown     Patient states his mother has a \"violent reaction\" to shellfish, and that he has a feeling of nausea when he smells shellfish, so he has never consumed any. He states he has also not been tested.      Shellfish-Derived Products Other (See Comments)     Patient states her mother has a \"violent reaction\" to shellfish, and that she has a feeling of nausea when she smells shellfish, so she has never consumed any. She states she has also not been tested.  Patient states his mother has a \"violent reaction\" to shellfish, and that he has a feeling of nausea when he smells shellfish, so he has never consumed any. He states he has also not been tested.        Social History     Tobacco Use    Smoking status: Never     Passive exposure: Never    Smokeless tobacco: Never   Substance Use Topics    Alcohol use: No      Wt Readings from Last 1 Encounters:   08/13/24 80.3 kg (177 lb)        Anesthesia Evaluation   Pt has had prior anesthetic.     No history of anesthetic complications       ROS/MED HX  ENT/Pulmonary:     (+)                     Intermittent, asthma  Treatment: Inhaler prn,              (-) NEIL risk factors and recent URI   Neurologic: Comment: Incomplete T10 spinal injury.  Not paralyzed.  +neurogenic bladder and spastic gait.  Can walk, but typically pushes a wheelchair to walk.  "     Unassisted, walking distance is very limited, but with pushing a wheelchair, she can walk longer distances.      (+)                     Spinal cord injury, year sustained: 1993, level of injury: T10 incomplete,         Cardiovascular:  - neg cardiovascular ROS   (+)  - -   -  - -                                 Previous cardiac testing   Echo: Date: Results:    Stress Test:  Date: Results:    ECG Reviewed:  Date: 6/2022 Results:  Normal sinus rhythm   Right axis deviation   Pulmonary disease pattern   Prolonged QT   Abnormal ECG     Cath:  Date: Results:   (-) orthopnea/PND   METS/Exercise Tolerance: >4 METS Comment: Is doing formal physical therapy currently - does weight training and rides a stationary bike.    Walks on a treadmill at home at 2mph for up to 30 minutes.      Denies any exertional dyspnea or angina.    Hematologic:  - neg hematologic  ROS     Musculoskeletal:  - neg musculoskeletal ROS     GI/Hepatic:  - neg GI/hepatic ROS     Renal/Genitourinary: Comment: Neurogenic bladder - self cath 4-6x per day.         Recurrent UTIs - currently has symptoms of UTI including frequent urination, odor and fatigue.  Completed macrobid this past Thursday and a one time dose of fosfomycin that was prescribed in [place] yesterday.  Feels she still has UTI symptoms.  Is going to go to the emergency room tonight or tomorrow and see if she needs IV antibiotics as she has in the past.       Endo:  - neg endo ROS     Psychiatric/Substance Use:     (+) psychiatric history anxiety and depression (adjustment disorder)       Infectious Disease:  - neg infectious disease ROS     Malignancy:  - neg malignancy ROS     Other: Comment: RLQ abdomen wound/abdominal wall seroma (baclofen pump removal site)           Physical Exam    Airway        Mallampati: I   TM distance: > 3 FB   Neck ROM: full   Mouth opening: > 3 cm    Respiratory Devices and Support         Dental     Comment: No teeth maxilla, multiple missing mandible   "  (+) Other - document in comments      Cardiovascular             Pulmonary                   OUTSIDE LABS:  CBC:   Lab Results   Component Value Date    WBC 5.5 08/16/2024    WBC 7.6 05/11/2024    HGB 12.1 08/16/2024    HGB 8.9 (L) 05/11/2024    HCT 37.2 08/16/2024    HCT 27.7 (L) 05/11/2024     08/16/2024     05/11/2024     BMP:   Lab Results   Component Value Date     08/16/2024     05/11/2024    POTASSIUM 4.1 08/16/2024    POTASSIUM 3.7 05/11/2024    CHLORIDE 107 08/16/2024    CHLORIDE 103 05/11/2024    CO2 27 08/16/2024    CO2 23 05/11/2024    BUN 6.3 08/16/2024    BUN 9.0 05/11/2024    CR 0.81 08/16/2024    CR 0.72 05/11/2024    GLC 88 08/16/2024     (H) 05/11/2024     COAGS:   Lab Results   Component Value Date    PTT 36 08/16/2024    INR 1.05 08/16/2024     POC: No results found for: \"BGM\", \"HCG\", \"HCGS\"  HEPATIC:   Lab Results   Component Value Date    ALBUMIN 4.5 04/26/2023    PROTTOTAL 7.4 04/26/2023    ALT 10 04/26/2023    AST 15 04/26/2023    ALKPHOS 68 04/26/2023    BILITOTAL 0.6 04/26/2023     OTHER:   Lab Results   Component Value Date    CHERIE 9.4 08/16/2024    PHOS 2.5 05/01/2024    MAG 2.1 05/01/2024       Anesthesia Plan    ASA Status:  3       Anesthesia Type: General.     - Airway: ETT   Induction: Intravenous.   Maintenance: Balanced.        Consents    Anesthesia Plan(s) and associated risks, benefits, and realistic alternatives discussed. Questions answered and patient/representative(s) expressed understanding.     - Discussed:     - Discussed with:  Patient      - Extended Intubation/Ventilatory Support Discussed: No.      - Patient is DNR/DNI Status: No     Use of blood products discussed: No .     Postoperative Care    Pain management: IV analgesics.   PONV prophylaxis: Ondansetron (or other 5HT-3), Dexamethasone or Solumedrol     Comments:               Emil Little MD    I have reviewed the pertinent notes and labs in the chart from the past " "30 days and (re)examined the patient.  Any updates or changes from those notes are reflected in this note.              # Overweight: Estimated body mass index is 27.72 kg/m  as calculated from the following:    Height as of 8/13/24: 1.702 m (5' 7\").    Weight as of 8/13/24: 80.3 kg (177 lb).      "

## 2024-08-22 ENCOUNTER — ANESTHESIA (OUTPATIENT)
Dept: SURGERY | Facility: CLINIC | Age: 34
End: 2024-08-22
Payer: COMMERCIAL

## 2024-08-22 ENCOUNTER — HOSPITAL ENCOUNTER (OUTPATIENT)
Facility: CLINIC | Age: 34
Discharge: HOME | End: 2024-08-22
Attending: NEUROLOGICAL SURGERY | Admitting: NEUROLOGICAL SURGERY
Payer: COMMERCIAL

## 2024-08-22 VITALS
BODY MASS INDEX: 27 KG/M2 | HEIGHT: 68 IN | OXYGEN SATURATION: 95 % | HEART RATE: 90 BPM | SYSTOLIC BLOOD PRESSURE: 148 MMHG | DIASTOLIC BLOOD PRESSURE: 79 MMHG | TEMPERATURE: 98.4 F | RESPIRATION RATE: 14 BRPM | WEIGHT: 178.13 LBS

## 2024-08-22 DIAGNOSIS — S30.1XXD ABDOMINAL WALL SEROMA, SUBSEQUENT ENCOUNTER: Primary | ICD-10-CM

## 2024-08-22 DIAGNOSIS — Z01.818 PREOPERATIVE EVALUATION TO RULE OUT SURGICAL CONTRAINDICATION: ICD-10-CM

## 2024-08-22 LAB
ALBUMIN UR-MCNC: NEGATIVE MG/DL
APPEARANCE UR: CLEAR
BACTERIA #/AREA URNS HPF: ABNORMAL /HPF
BILIRUB UR QL STRIP: NEGATIVE
COLOR UR AUTO: ABNORMAL
GLUCOSE UR STRIP-MCNC: NEGATIVE MG/DL
HGB UR QL STRIP: NEGATIVE
HYALINE CASTS: 4 /LPF
KETONES UR STRIP-MCNC: NEGATIVE MG/DL
LEUKOCYTE ESTERASE UR QL STRIP: ABNORMAL
NITRATE UR QL: NEGATIVE
PH UR STRIP: 6.5 [PH] (ref 5–7)
RBC URINE: <1 /HPF
SP GR UR STRIP: 1.01 (ref 1–1.03)
SQUAMOUS EPITHELIAL: 1 /HPF
UROBILINOGEN UR STRIP-MCNC: NORMAL MG/DL
WBC URINE: 69 /HPF

## 2024-08-22 PROCEDURE — 370N000017 HC ANESTHESIA TECHNICAL FEE, PER MIN: Performed by: NEUROLOGICAL SURGERY

## 2024-08-22 PROCEDURE — 250N000011 HC RX IP 250 OP 636: Performed by: NEUROLOGICAL SURGERY

## 2024-08-22 PROCEDURE — 360N000074 HC SURGERY LEVEL 1, PER MIN: Performed by: NEUROLOGICAL SURGERY

## 2024-08-22 PROCEDURE — 258N000003 HC RX IP 258 OP 636

## 2024-08-22 PROCEDURE — 250N000009 HC RX 250: Performed by: NEUROLOGICAL SURGERY

## 2024-08-22 PROCEDURE — 250N000009 HC RX 250

## 2024-08-22 PROCEDURE — 10140 I&D HMTMA SEROMA/FLUID COLLJ: CPT | Mod: 51 | Performed by: NEUROLOGICAL SURGERY

## 2024-08-22 PROCEDURE — 11446 EXC FACE-MM B9+MARG >4 CM: CPT | Mod: GC | Performed by: NEUROLOGICAL SURGERY

## 2024-08-22 PROCEDURE — 250N000013 HC RX MED GY IP 250 OP 250 PS 637

## 2024-08-22 PROCEDURE — 272N000001 HC OR GENERAL SUPPLY STERILE: Performed by: NEUROLOGICAL SURGERY

## 2024-08-22 PROCEDURE — 81003 URINALYSIS AUTO W/O SCOPE: CPT | Performed by: NEUROLOGICAL SURGERY

## 2024-08-22 PROCEDURE — 999N000141 HC STATISTIC PRE-PROCEDURE NURSING ASSESSMENT: Performed by: NEUROLOGICAL SURGERY

## 2024-08-22 PROCEDURE — 250N000025 HC SEVOFLURANE, PER MIN: Performed by: NEUROLOGICAL SURGERY

## 2024-08-22 PROCEDURE — 87186 SC STD MICRODIL/AGAR DIL: CPT | Performed by: NEUROLOGICAL SURGERY

## 2024-08-22 PROCEDURE — 250N000011 HC RX IP 250 OP 636

## 2024-08-22 PROCEDURE — 710N000010 HC RECOVERY PHASE 1, LEVEL 2, PER MIN: Performed by: NEUROLOGICAL SURGERY

## 2024-08-22 PROCEDURE — 11400 EXC TR-EXT B9+MARG 0.5 CM<: CPT | Performed by: ANESTHESIOLOGY

## 2024-08-22 PROCEDURE — 87086 URINE CULTURE/COLONY COUNT: CPT | Performed by: NEUROLOGICAL SURGERY

## 2024-08-22 PROCEDURE — 710N000012 HC RECOVERY PHASE 2, PER MINUTE: Performed by: NEUROLOGICAL SURGERY

## 2024-08-22 RX ORDER — OXYCODONE HYDROCHLORIDE 5 MG/1
5 TABLET ORAL EVERY 6 HOURS PRN
Qty: 15 TABLET | Refills: 0 | Status: SHIPPED | OUTPATIENT
Start: 2024-08-22 | End: 2024-08-26

## 2024-08-22 RX ORDER — FENTANYL CITRATE 50 UG/ML
INJECTION, SOLUTION INTRAMUSCULAR; INTRAVENOUS PRN
Status: DISCONTINUED | OUTPATIENT
Start: 2024-08-22 | End: 2024-08-22

## 2024-08-22 RX ORDER — CEFAZOLIN SODIUM/WATER 2 G/20 ML
2 SYRINGE (ML) INTRAVENOUS SEE ADMIN INSTRUCTIONS
Status: DISCONTINUED | OUTPATIENT
Start: 2024-08-22 | End: 2024-08-22 | Stop reason: HOSPADM

## 2024-08-22 RX ORDER — ONDANSETRON 2 MG/ML
4 INJECTION INTRAMUSCULAR; INTRAVENOUS EVERY 30 MIN PRN
Status: DISCONTINUED | OUTPATIENT
Start: 2024-08-22 | End: 2024-08-22 | Stop reason: HOSPADM

## 2024-08-22 RX ORDER — PROPOFOL 10 MG/ML
INJECTION, EMULSION INTRAVENOUS PRN
Status: DISCONTINUED | OUTPATIENT
Start: 2024-08-22 | End: 2024-08-22

## 2024-08-22 RX ORDER — CEPHALEXIN 500 MG/1
1000 CAPSULE ORAL 3 TIMES DAILY
Qty: 42 CAPSULE | Refills: 0 | Status: SHIPPED | OUTPATIENT
Start: 2024-08-22 | End: 2024-08-29

## 2024-08-22 RX ORDER — ONDANSETRON 2 MG/ML
INJECTION INTRAMUSCULAR; INTRAVENOUS PRN
Status: DISCONTINUED | OUTPATIENT
Start: 2024-08-22 | End: 2024-08-22

## 2024-08-22 RX ORDER — LIDOCAINE 40 MG/G
CREAM TOPICAL
Status: DISCONTINUED | OUTPATIENT
Start: 2024-08-22 | End: 2024-08-22 | Stop reason: HOSPADM

## 2024-08-22 RX ORDER — ESMOLOL HYDROCHLORIDE 10 MG/ML
INJECTION INTRAVENOUS PRN
Status: DISCONTINUED | OUTPATIENT
Start: 2024-08-22 | End: 2024-08-22

## 2024-08-22 RX ORDER — OXYCODONE HYDROCHLORIDE 5 MG/1
5 TABLET ORAL
Status: DISCONTINUED | OUTPATIENT
Start: 2024-08-22 | End: 2024-08-22 | Stop reason: HOSPADM

## 2024-08-22 RX ORDER — ONDANSETRON 4 MG/1
4 TABLET, ORALLY DISINTEGRATING ORAL EVERY 30 MIN PRN
Status: DISCONTINUED | OUTPATIENT
Start: 2024-08-22 | End: 2024-08-22 | Stop reason: HOSPADM

## 2024-08-22 RX ORDER — ACETAMINOPHEN 500 MG
500-1000 TABLET ORAL EVERY 6 HOURS PRN
Qty: 30 TABLET | Refills: 0 | Status: SHIPPED | OUTPATIENT
Start: 2024-08-22

## 2024-08-22 RX ORDER — DEXAMETHASONE SODIUM PHOSPHATE 4 MG/ML
INJECTION, SOLUTION INTRA-ARTICULAR; INTRALESIONAL; INTRAMUSCULAR; INTRAVENOUS; SOFT TISSUE PRN
Status: DISCONTINUED | OUTPATIENT
Start: 2024-08-22 | End: 2024-08-22

## 2024-08-22 RX ORDER — LIDOCAINE HYDROCHLORIDE 20 MG/ML
INJECTION, SOLUTION INFILTRATION; PERINEURAL PRN
Status: DISCONTINUED | OUTPATIENT
Start: 2024-08-22 | End: 2024-08-22

## 2024-08-22 RX ORDER — SODIUM CHLORIDE, SODIUM LACTATE, POTASSIUM CHLORIDE, CALCIUM CHLORIDE 600; 310; 30; 20 MG/100ML; MG/100ML; MG/100ML; MG/100ML
INJECTION, SOLUTION INTRAVENOUS CONTINUOUS
Status: DISCONTINUED | OUTPATIENT
Start: 2024-08-22 | End: 2024-08-22 | Stop reason: HOSPADM

## 2024-08-22 RX ORDER — CEFAZOLIN SODIUM/WATER 2 G/20 ML
2 SYRINGE (ML) INTRAVENOUS
Status: DISCONTINUED | OUTPATIENT
Start: 2024-08-22 | End: 2024-08-22 | Stop reason: HOSPADM

## 2024-08-22 RX ORDER — NALOXONE HYDROCHLORIDE 0.4 MG/ML
0.1 INJECTION, SOLUTION INTRAMUSCULAR; INTRAVENOUS; SUBCUTANEOUS
Status: DISCONTINUED | OUTPATIENT
Start: 2024-08-22 | End: 2024-08-22 | Stop reason: HOSPADM

## 2024-08-22 RX ORDER — DEXAMETHASONE SODIUM PHOSPHATE 4 MG/ML
4 INJECTION, SOLUTION INTRA-ARTICULAR; INTRALESIONAL; INTRAMUSCULAR; INTRAVENOUS; SOFT TISSUE
Status: DISCONTINUED | OUTPATIENT
Start: 2024-08-22 | End: 2024-08-22 | Stop reason: HOSPADM

## 2024-08-22 RX ORDER — OXYCODONE HYDROCHLORIDE 10 MG/1
10 TABLET ORAL
Status: DISCONTINUED | OUTPATIENT
Start: 2024-08-22 | End: 2024-08-22 | Stop reason: HOSPADM

## 2024-08-22 RX ORDER — ACETAMINOPHEN 325 MG/1
975 TABLET ORAL ONCE
Status: COMPLETED | OUTPATIENT
Start: 2024-08-22 | End: 2024-08-22

## 2024-08-22 RX ADMIN — PROPOFOL 100 MG: 10 INJECTION, EMULSION INTRAVENOUS at 13:00

## 2024-08-22 RX ADMIN — ESMOLOL HYDROCHLORIDE 50 MG: 10 INJECTION, SOLUTION INTRAVENOUS at 13:05

## 2024-08-22 RX ADMIN — PROPOFOL 50 MG: 10 INJECTION, EMULSION INTRAVENOUS at 13:03

## 2024-08-22 RX ADMIN — PHENYLEPHRINE HYDROCHLORIDE 100 MCG: 10 INJECTION INTRAVENOUS at 14:37

## 2024-08-22 RX ADMIN — PHENYLEPHRINE HYDROCHLORIDE 200 MCG: 10 INJECTION INTRAVENOUS at 13:26

## 2024-08-22 RX ADMIN — ONDANSETRON 4 MG: 2 INJECTION INTRAMUSCULAR; INTRAVENOUS at 14:25

## 2024-08-22 RX ADMIN — FENTANYL CITRATE 50 MCG: 50 INJECTION INTRAMUSCULAR; INTRAVENOUS at 13:00

## 2024-08-22 RX ADMIN — PHENYLEPHRINE HYDROCHLORIDE 100 MCG: 10 INJECTION INTRAVENOUS at 14:16

## 2024-08-22 RX ADMIN — SUGAMMADEX 100 MG: 100 INJECTION, SOLUTION INTRAVENOUS at 14:25

## 2024-08-22 RX ADMIN — PHENYLEPHRINE HYDROCHLORIDE 150 MCG: 10 INJECTION INTRAVENOUS at 13:49

## 2024-08-22 RX ADMIN — Medication 10 MG: at 13:49

## 2024-08-22 RX ADMIN — Medication 2 G: at 13:29

## 2024-08-22 RX ADMIN — PHENYLEPHRINE HYDROCHLORIDE 100 MCG: 10 INJECTION INTRAVENOUS at 13:43

## 2024-08-22 RX ADMIN — SODIUM CHLORIDE, POTASSIUM CHLORIDE, SODIUM LACTATE AND CALCIUM CHLORIDE: 600; 310; 30; 20 INJECTION, SOLUTION INTRAVENOUS at 12:52

## 2024-08-22 RX ADMIN — PHENYLEPHRINE HYDROCHLORIDE 100 MCG: 10 INJECTION INTRAVENOUS at 13:20

## 2024-08-22 RX ADMIN — DEXAMETHASONE SODIUM PHOSPHATE 8 MG: 4 INJECTION, SOLUTION INTRA-ARTICULAR; INTRALESIONAL; INTRAMUSCULAR; INTRAVENOUS; SOFT TISSUE at 13:26

## 2024-08-22 RX ADMIN — LIDOCAINE HYDROCHLORIDE 80 MG: 20 INJECTION, SOLUTION INFILTRATION; PERINEURAL at 13:00

## 2024-08-22 RX ADMIN — PHENYLEPHRINE HYDROCHLORIDE 100 MCG: 10 INJECTION INTRAVENOUS at 14:31

## 2024-08-22 RX ADMIN — ACETAMINOPHEN 975 MG: 325 TABLET ORAL at 11:53

## 2024-08-22 RX ADMIN — PHENYLEPHRINE HYDROCHLORIDE 100 MCG: 10 INJECTION INTRAVENOUS at 13:38

## 2024-08-22 RX ADMIN — PROPOFOL 20 MG: 10 INJECTION, EMULSION INTRAVENOUS at 14:34

## 2024-08-22 RX ADMIN — Medication 50 MG: at 13:02

## 2024-08-22 RX ADMIN — HYDROMORPHONE HYDROCHLORIDE 0.5 MG: 1 INJECTION, SOLUTION INTRAMUSCULAR; INTRAVENOUS; SUBCUTANEOUS at 14:28

## 2024-08-22 ASSESSMENT — ACTIVITIES OF DAILY LIVING (ADL)
ADLS_ACUITY_SCORE: 36

## 2024-08-22 NOTE — ANESTHESIA CARE TRANSFER NOTE
Patient: Hannah Chavez    Procedure: Procedure(s):  Exploration of wound, excision of scar, decompression of seroma, right abdomen       Diagnosis: Abdominal wall seroma, subsequent encounter [S30.1XXD]  Baclofen pump failure, subsequent encounter [T85.610D]  Spasticity [R25.2]  Diagnosis Additional Information: No value filed.    Anesthesia Type:   General     Note:    Oropharynx: oropharynx clear of all foreign objects and spontaneously breathing  Level of Consciousness: awake  Oxygen Supplementation: face mask  Level of Supplemental Oxygen (L/min / FiO2): 4  Independent Airway: airway patency satisfactory and stable  Dentition: dentition unchanged  Vital Signs Stable: post-procedure vital signs reviewed and stable  Report to RN Given: handoff report given  Patient transferred to: PACU    Handoff Report: Identifed the Patient, Identified the Reponsible Provider, Reviewed the pertinent medical history, Discussed the surgical course, Reviewed Intra-OP anesthesia mangement and issues during anesthesia, Set expectations for post-procedure period and Allowed opportunity for questions and acknowledgement of understanding      Vitals:  Vitals Value Taken Time   /86 08/22/24 1515   Temp     Pulse 91 08/22/24 1516   Resp     SpO2 98 % 08/22/24 1516   Vitals shown include unfiled device data.    Electronically Signed By: Emil Little MD  August 22, 2024  3:16 PM

## 2024-08-22 NOTE — BRIEF OP NOTE
[facility]    Brief Operative Note    Pre-operative diagnosis: Abdominal wall seroma, subsequent encounter [S30.1XXD]  Baclofen pump failure, subsequent encounter [T85.610D]  Spasticity [R25.2]  Post-operative diagnosis Same as pre-operative diagnosis    Procedure: Exploration of wound, excision of scar, decompression of seroma, right abdomen, Right - Arm    Surgeon: Surgeons and Role:     * Que Drew MD - Primary     * Slava Boateng MD - Resident - Assisting  Anesthesia: General   Estimated Blood Loss: 5 ml    Drains: None  Specimens: * No specimens in log *  Findings:   Wound exploration, evacuation of seroma, resection of capsule. .  Complications: None.  Implants: * No implants in log *    Slava Boateng MD  Neurosurgery Resident  Pager: [phone]'      NEUROSURGERY ATTENDING ATTESTATION: Que VALLE M.D., Ph.D., Neurosurgery Attending, was present and scrubbed for the entire case and performed the key and critical portions of the case.

## 2024-08-22 NOTE — DISCHARGE INSTRUCTIONS
Contacting your Doctor -   To contact a doctor, call Dr. Drew's office @ [phone]  or:  [phone] and ask for the resident on call for Neurosurgery (answered 24 hours a day)   Emergency Department:  [facility]: [phone]  [facility]: [phone] 911 if you are in need of immediate or emergent help

## 2024-08-22 NOTE — ANESTHESIA PROCEDURE NOTES
Airway       Patient location during procedure: OR       Procedure Start/Stop Times: 8/22/2024 1:06 PM  Staff -        Anesthesiologist:  Que Brooke MD       Resident/Fellow: Emil Little MD       Other Anesthesia Staff: Omar Cheung MD       Performed By: resident and with residents       Procedure performed by resident/fellow/CRNA in presence of a teaching physician.    Consent for Airway        Urgency: elective  Indications and Patient Condition       Indications for airway management: selina-procedural       Induction type:intravenous       Mask difficulty assessment: 1 - vent by mask    Final Airway Details       Final airway type: endotracheal airway       Successful airway: ETT - single  Endotracheal Airway Details        ETT size (mm): 7.0       Cuffed: yes       Successful intubation technique: direct laryngoscopy       DL Blade Type: MAC 3       Grade View of Cords: 2       Adjucts: stylet       Position: Right       Measured from: lips       Secured at (cm): 21       Bite block used: Soft    Post intubation assessment        Placement verified by: capnometry, equal breath sounds and chest rise        Number of attempts at approach: 1       Number of other approaches attempted: 0       Secured with: tape       Ease of procedure: easy       Dentition: Unchanged    Medication(s) Administered   Medication Administration Time: 8/22/2024 1:06 PM

## 2024-08-22 NOTE — ANESTHESIA POSTPROCEDURE EVALUATION
Patient: Hannah Chavez    Procedure: Procedure(s):  Exploration of wound, excision of scar, decompression of seroma, right abdomen       Anesthesia Type:  General    Note:  Disposition: Outpatient   Postop Pain Control: Uneventful            Sign Out: Well controlled pain   PONV: No   Neuro/Psych: Uneventful            Sign Out: Acceptable/Baseline neuro status   Airway/Respiratory: Uneventful            Sign Out: Acceptable/Baseline resp. status   CV/Hemodynamics: Uneventful            Sign Out: Acceptable CV status; No obvious hypovolemia; No obvious fluid overload   Other NRE: NONE   DID A NON-ROUTINE EVENT OCCUR? No           Last vitals:  Vitals Value Taken Time   /79 08/22/24 1637   Temp 36.9  C (98.4  F) 08/22/24 1635   Pulse 85 08/22/24 1608   Resp 14 08/22/24 1635   SpO2 94 % 08/22/24 1640   Vitals shown include unfiled device data.    Electronically Signed By: CHRYSTAL THORPE MD  August 22, 2024  4:44 PM

## 2024-08-23 ENCOUNTER — PATIENT OUTREACH (OUTPATIENT)
Dept: NEUROSURGERY | Facility: CLINIC | Age: 34
End: 2024-08-23
Payer: COMMERCIAL

## 2024-08-23 LAB — BACTERIA UR CULT: ABNORMAL

## 2024-08-23 NOTE — TELEPHONE ENCOUNTER
Pt s/p exploration of wound, excision of scar, decompression of seroma, right abdomen by Dr. Drew on 8/23/24, discharge same day. Left VM to check on pt's postop status. Reminded her to take abx as directed. Left the clinic number ([phone]) and the number to reach the on-call neurosurgery resident for after hours/weekend concerns. Requested pt call back to update me on her status.

## 2024-09-03 NOTE — OP NOTE
PATIENT NAME: TRINY WAYNE  YOB: 1990  MRN:   [id number]  ACCOUNT:  [id number]      DATE OF PROCEDURE:  08/22/2024    PREOPERATIVE DIAGNOSIS:   1.  History of spinal cord injury and spasticity  2.  Status post intrathecal baclofen pump placement  3.  Status post intrathecal baclofen pump and catheter revision  4.  Intrathecal baclofen therapy no longer needed  5.  Intrathecal baclofen pump system removal request  6.  Status post removal of intrathecal baclofen pump system, which includes the intrathecal pump from the right abdomen and intrathecal catheter from the thoracolumbar spine, on 6/2/2023  7.  Seroma, right lower quadrant, status post fine needle aspiration on 8/28/2023  8.  Persistent seroma, right lower quadrant, with scar formation    POSTOPERATIVE DIAGNOSIS:  1.  History of spinal cord injury and spasticity  2.  Status post intrathecal baclofen pump placement  3.  Status post intrathecal baclofen pump and catheter revision  4.  Intrathecal baclofen therapy no longer needed  5.  Intrathecal baclofen pump system removal request  6.  Status post removal of intrathecal baclofen pump system, which includes the intrathecal pump from the right abdomen and intrathecal catheter from the thoracolumbar spine, on 6/2/2023  7.  Seroma, right lower quadrant, status post fine needle aspiration on 8/28/2023  8.  Persistent seroma, right lower quadrant, with scar formation    PROCEDURE PERFORMED:  Exploration of wound, excision of scar and decompression of seroma over the right abdomen    ATTENDING SURGEON:  Que Drew MD, PhD     ASSISTANT SURGEON:  Slava Boateng MD     ANESTHESIA:  General endotracheal anesthesia.     ESTIMATED BLOOD LOSS:  5 mL.    COMPLICATIONS:  None.    FINDINGS:  Firm intrathecal baclofen pump pocket/scar with Dacron mesh pouch imbedded in the scar/pump pocket.  Some serous fluid noted within the pocket.  No obvious signs of infection noted.  Entire scar/pocket was  "removed.  No residual scar noted.    INDICATIONS FOR PROCEDURE:  Ms. Asher Chavez presents with persistent abdominal wall seroma/fluid collection on the right abdomen, the site of her previous baclofen pump which was subsequently removed.  She did present to me for evaluation for similar issues back in 8/28/2023 and the pump was aspirated at bedside which yielded approximately 40 mL of seroma.  The infectious workup was negative for any growth.  She again reached out to us stating that she is noticing the fluid collection getting bigger.  She was asked to come in for further evaluation.  Patient states that the fluid collection is rather uncomfortable and at times painful.  She is also noticing firm mass around the area where the pump was removed.  She denies any changes in the wound or the skin and she denies any wound breakdown.  She denies any headaches, especially positional headaches.  She did get a CT of the abdomen recently which showed pump pocket scar and fluid collection.  In summary, Ms. Young \"Asher\" Kathy is a 33 year old transgender female who was referred by Hina Anna and Alberto for removal of the existing intrathecal baclofen pump system.  She was and still is currently under consideration of reversing her colostomy as well as vaginoplasty.  Last year, neurosurgery was asked to remove the intrathecal baclofen pump system.  Initially, the plan was to remove the pump during the other procedures but due to intraoperative positioning changes and the length of the planned surgeries, the decision was made to go with removal of the intrathecal pump system first.  In terms of her baclofen pump, it was placed long time ago for her spasticity.  It was done at [facility] and she continues to get her pump managed by the [facility].  She was last seen by MIKE Flor on 3/17/2023.  Her pump was placed for SCI lower extremity spasticity and it has been revised in the past.  She did have " a catastrophic failure of her catheter some time ago and she was in a comatose state for a few days.  Otherwise, she reported no issues with the pump or the baclofen regimen.  She was weaned off of her baclofen and she was taking oral baclofen in case of any withdrawal symptoms but she did well and she did not need to take any oral baclofen.  Intraoperatively, it was noted that there was Dacron pouch.  She has a persistent right lower quadrant fluid collection, seroma, which has persisted and re-accumulated despite fine needle aspiration back in 8/28/2023.  CT of the abdomen/pelvis from 3/13/2024 does show and confirm a right lower quadrant fluid collection at the previous pump location, which is smaller than the previous size.  However, this is after the seroma was aspirated so it is likely that the fluid re-accumulated.  Also, the lining of the fluid, the previous pump pocket lining, has thickened.  On exam, she seems to have a firm fluid collection.  Normally, it is not unusual to have a postoperative pump pocket seroma.  Also, this usually resolves over time.  However, this seroma has not resolved in the past year, even after reducing the fluid content with aspiration.  The workup did not reveal any infection so I do not feel that this is infected.  It is likely that her pocket lining has scarred down and with Dacron pouch being present, it is not being absorbed.  There is a likelihood that it may persist.  Patient states that this continues to bother her and it is somewhat painful.  Given that this seroma has persisted, the best way to address it would be to decompress and also remove the pocket lining that has thickened over time.  I did recommend re-opening the incision and resecting the pocket lining and decompressing the fluid collection.  Patient did state that she is scheduled for colostomy reversal and vaginoplasty with Hina Anna and lAberto on 4/25/2024.  She did state that Dr. Anna may take a  look at the seroma and address it.  I told the patient that I could be available to assist both Hina Anna and Alberto in addressing the seroma/scar and if possible remove it during her planned surgery on 4/25/2024.  I told the patient that I would reach out to Hina Anna and Alberto to see if they would allow me to get involved, be present, and perform the above mentioned procedure during the planned surgery on 4/25/2024.  I did send them a staff message but I will also send them today's progress note to continue our discussion.  Ultimately, Dr. Dominguez stated that he could remove the scar during the procedure.  However, per patient, the surgery was very long and Dr. Dominguez did not have a chance to remove the scar.  I did discuss the surgical procedure with the patient.  The incision will be opened and the previous scar and the pump pocket as well as the seroma will be removed.  It will be mostly subcutaneous dissection.  Risks, benefits and alternative therapies were discussed with the patient, including but not limited to infection, bleeding, reformation of scar and reformation of seroma, as well as risks associated with anesthesia.  Patient expressed understanding.  All questions were answered.    DESCRIPTION OF PROCEDURE: The patient was brought in the operating room and was placed supine on the operative bed.  With the placement of endotracheal tube, general anesthesia was obtained.  Patient remained in a supine position.  All pressure points were well padded.  Her previous well healed incision over the right abdomen  was prepped and draped in standard fashion.  We planned a transverse/horizonal incision of approximately 8 cm going over the previous well healed incision.  Time out was performed confirming the patient's identity, procedure to be performed, site and side of the surgery and the administration of preoperative prophylactic antibiotic.  Her right abdominal incision area was injected with  total of 14 mL of local anesthetic, 1% Lidocaine with epinephrine mixed with 1/4% Marcaine plain, 50:50 mix.    We turned our attention to the right abdominal incision.  The #10 blade was used to make the incision, going over the old previous well healed incision.  We then used monopolar to dissect down through the subcutaneous tissue and the scar tissue to the pump pocket.      Immediately noted was a thick rind of a scar/capsule at the previous pump location.  The capsule was opened using a monopolar cautery.  We then noted clear seroma draining from the pocket.  The fluid did not look infected.  Also, the fluid was less than 10 mL by my estimate.  The pump pocket also appeared clean.    At this time, Erinn clamps were applied to the exposed edges of the pump pocket scar.  Then, gentle traction was produced by pulling up on the clamps.  Then, using the monopolar cautery, the interface between the firm scarred pocket and the subcutaneous fat plane was dissected.  The interface was easily identified and when gentle traction was applied, the interface was easily dissected using the monopolar cautery.  First, we  the anterior wall.  Then, the edges were identified and dissected.  Attention was then turned to the posterior wall and dissected free.  With most of the sides dissected free, we continued to apply traction and dissected the inferior attachment and scar.  Once this attachment point was dissected free, the large 8 x 5 x 3 cm pump pocket scar was removed in one large piece.  We could see the Dacron pouch imbedded in the scar/pump pocket.      With satisfactory removal of the scar and decompression of the seroma, we began closing the wound.  The mostly subcutaneous tissue was generously irrigated with normal saline.  Meticulous hemostasis was obtained.  The deep subcutaneous fat space was reapproximated using 2-0 Vicryl sutures to collapse the area and to minimize any dead space.  Then. 2-0 Vicryl  sutures were used to reapproximate the deep subcutaneous layer.  Then, 3-0 Vicryl sutures were used to close the dermal layer in an inverted and interrupted fashion.  Then, 4-0 Monocryl suture was used to close the skin using a subcuticular technique.  The wound was then cleaned and dried and re-prepped with ChoraPrep.  Dermabond skin glue was applied to the incision.     The drapes were removed and the patient was extubated and taken to the recovery room in a stable condition.  At the end of the case, all counts including needle, sponge and instrument counts were correct x 2.  There were no complications.     IEtienne M.D., Ph.D., Neurosurgery Attending, was present and scrubbed for the entire case and performed the key and critical portions of the case.      ETIENNE BROOKS MD, PHD

## 2024-09-04 ENCOUNTER — PRE VISIT (OUTPATIENT)
Dept: UROLOGY | Facility: CLINIC | Age: 34
End: 2024-09-04
Payer: COMMERCIAL

## 2024-09-04 NOTE — TELEPHONE ENCOUNTER
Reason for visit: cystoscopy      Dx/Hx/Sx: gender dysphoria     Records/imaging/labs/orders: in epic     At Rooming: standard -- consent -- ask pt if they would like lido

## 2024-09-18 ENCOUNTER — OFFICE VISIT (OUTPATIENT)
Dept: UROLOGY | Facility: CLINIC | Age: 34
End: 2024-09-18
Payer: COMMERCIAL

## 2024-09-18 VITALS
BODY MASS INDEX: 27.28 KG/M2 | HEART RATE: 81 BPM | HEIGHT: 68 IN | SYSTOLIC BLOOD PRESSURE: 121 MMHG | DIASTOLIC BLOOD PRESSURE: 79 MMHG | WEIGHT: 180 LBS

## 2024-09-18 DIAGNOSIS — N31.9 NEUROGENIC BLADDER: Primary | ICD-10-CM

## 2024-09-18 PROCEDURE — 52287 CYSTOSCOPY CHEMODENERVATION: CPT | Mod: GC | Performed by: UROLOGY

## 2024-09-18 RX ORDER — LIDOCAINE HYDROCHLORIDE 20 MG/ML
JELLY TOPICAL ONCE
Status: COMPLETED | OUTPATIENT
Start: 2024-09-18 | End: 2024-09-18

## 2024-09-18 RX ADMIN — LIDOCAINE HYDROCHLORIDE: 20 JELLY TOPICAL at 10:41

## 2024-09-18 NOTE — NURSING NOTE
The following medication was given:     MEDICATION:  Botox   ROUTE: Bladder wall   SITE: Medication injected via bladder wall   DOSE:  200 units   LOT #: [id number]  :  [employer]   EXPIRATION DATE: 2026/12  NDC#: 8169-3443-83   Was there drug waste? No    Prior to medication administration, verified patient identity using patient's name and date of birth.  Due to medication administration, patient instructed to remain in clinic for 15 minutes  afterwards, and to report any adverse reaction to me immediately.

## 2024-09-18 NOTE — LETTER
"9/18/2024       RE: Hannah Chavez  [address]  [place] [address]     Dear Colleague,    Thank you for referring your patient, Hannah Chavez, to the [facility] [place] at [facility]. Please see a copy of my visit note below.    Cystoscopy:    Preop dx:  Neurogenic bladder  Postop dx neurogenic bladder      Hannah Chavez is a 34 year old transgender female with neurogenic bladder, prior colostomy due to sigmoid perforation. She has neurogenic bladder.  Does CIC with 14 Fr Coude catheter. No strictures.     She underwent colostomy takedown + full depth vaginoplasty on 04/25/24  She continues to dilate TID. Usually there is a dot remaining outside of body  She did have some troubles with self cath initially, but she is back to self cath with Coude catheters.  There occasionally is a \"catch\" but the catheter does pass without issues.  She is overall happy with outcomes.  She is having regular bowel movements,  Most recently had seroma excision by Dr. Drew. Patient is happy with outcome.     Procedure:  After verification of informed consent was obtained, the patient was brought to the operating room, and moved to the operating table. After adequate anesthesia was induced, the patient was repositioned in the lithotomy position and prepped and draped in the usual sterile fashion. A formal timeout was performed to confirm the correct patient, procedure and operative site.     A 21-Danish Carmona injection cystoscope was placed into the bladder.  The bladder mucosa appeared to be normal without stones, tumors or diverticulum on 360 degree inspection. The ureteral orifices were in the normal orthotopic position bilaterally.  We mixed 2 vials of 100 U botulinum toxin A into 10 mL of injectable saline  All injections were placed deep to the mucosa into the detrusor muscle. The procedure was then concluded. Patient tolerated it well. We then drained " her bladder with a catheter.    Vital signs reviewed     Exam:  Vaginoplasty well healed.  There is labia minora definition.  There are no wounds.  Some drainage from vaginal canal.     A/P   Excellent outcome after vaginoplasty.  Dilate TID or BID.  Follow up in about 6 months in urology clinic for cystoscopy with botox.  Some granulation tissue cautery performed today.    Chilango Dominguez MD    Again, thank you for allowing me to participate in the care of your patient.      Sincerely,    Chilango Dominguez MD

## 2024-09-18 NOTE — NURSING NOTE
"Chief Complaint   Patient presents with    Cystoscopy     + Botox        Blood pressure 121/79, pulse 81, height 1.727 m (5' 8\"), weight 81.6 kg (180 lb). Body mass index is 27.37 kg/m .    Patient Active Problem List   Diagnosis    Neurogenic bladder    Gender dysphoria    Colostomy status (H)    Pelvic floor weakness in female       Allergies   Allergen Reactions    Dust Mites     Shellfish Allergy Nausea and Vomiting and Unknown     Patient states his mother has a \"violent reaction\" to shellfish, and that he has a feeling of nausea when he smells shellfish, so he has never consumed any. He states he has also not been tested.      Shellfish-Derived Products Other (See Comments)     Patient states her mother has a \"violent reaction\" to shellfish, and that she has a feeling of nausea when she smells shellfish, so she has never consumed any. She states she has also not been tested.  Patient states his mother has a \"violent reaction\" to shellfish, and that he has a feeling of nausea when he smells shellfish, so he has never consumed any. He states he has also not been tested.         Current Outpatient Medications   Medication Sig Dispense Refill    acetaminophen (TYLENOL) 500 MG tablet Take 1-2 tablets (500-1,000 mg) by mouth every 6 hours as needed for mild pain. 30 tablet 0    ALBUTEROL IN Inhale 1-2 puffs into the lungs 4 times daily as needed (shortness of breath)      diazepam (VALIUM) 5 MG tablet Take 5 mg by mouth every 6 hours as needed      estradiol valerate (DELESTROGEN) 20 MG/ML injection Inject into the muscle once a week Saturday     0.11ml (2.2mg)      Insulin Syringe-Needle U-100 (B-D INSULIN SYRINGE) 25G X 1\" 1 ML MISC       progesterone (PROMETRIUM) 200 MG capsule Take 1 capsule by mouth every morning      QUEtiapine (SEROQUEL) 25 MG tablet Take 25 mg by mouth as needed      Sharps Container MISC Sharps Container         Social History     Tobacco Use    Smoking status: Never     Passive exposure: " Never    Smokeless tobacco: Never   Vaping Use    Vaping status: Never Used   Substance Use Topics    Alcohol use: No    Drug use: Never       Invasive Procedure Safety Checklist:    Procedure: Cystoscopy    Action: Complete sections and checkboxes as appropriate.    Pre-procedure:  1. Patient ID Verified with 2 identifiers (Asya and  or MRN) : YES    2. Procedure and site verified with patient/designee (when able) : YES    3. Accurate consent documentation in medical record : YES    4. H&P (or appropriate assessment) documented in medical record : N/A  H&P must be up to 30 days prior to procedure an updated within 24 hours of                 Procedure as applicable.     5. Relevant diagnostic and radiology test results appropriately labeled and displayed as applicable : YES    6. Blood products, implants, devices, and/or special equipment available for the procedure as applicable : YES    7. Procedure site(s) marked with provider initials [Exclusions: none] : NO    8. Marking not required. Reason : Yes  Procedure does not require site marking    Time Out:     Time-Out performed immediately prior to starting procedure, including verbal and active participation of all team members addressing: YES    1. Correct patient identity.  2. Confirmed that the correct side and site are marked.  3. An accurate procedure to be done.  4. Agreement on the procedure to be done.  5. Correct patient position.  6. Relevant images and results are properly labeled and appropriately displayed.  7. The need to administer antibiotics or fluids for irrigation purposes during the procedure as applicable.  8. Safety precautions based on patient history or medication use.    During Procedure: Verification of correct person, site, and procedure occurs any time the responsibility for care of the patient is transferred to another member of the care team.    The following medication was given:     MEDICATION:  Lidocaine without epinephrine 2%  jelly  ROUTE: urethral   SITE: urethral   DOSE: 10 mL  LOT #: [id number]  : [employer]  EXPIRATION DATE: 4-26  NDC#: [address]-3     Was there drug waste? No    Prior to med admin, verified patient identity using patient's name and date of birth.  Due to med administration, patient instructed to remain in clinic for 15 minutes  afterwards, and to report any adverse reaction to me immediately.    Drug Amount Wasted:  None.  Vial/Syringe: Syringe      Varsha Judge  9/18/2024  10:21 AM

## 2024-09-18 NOTE — PATIENT INSTRUCTIONS
"Please follow up in 6 months with  repeat  cysto + botox.    It was a pleasure meeting with you today.  Thank you for allowing me and my team the privilege of caring for you today.  YOU are the reason we are here, and I truly hope we provided you with the excellent service you deserve.  Please let us know if there is anything else we can do for you so that we can be sure you are leaving completely satisfied with your care experience.        AFTER YOUR CYSTOSCOPY        You have just completed a cystoscopy, or \"cysto\", which allowed your physician to learn more about your bladder (or to remove a stent placed after surgery). We suggest that you continue to avoid caffeine, fruit juice, and alcohol for the next 24 hours, however, you are encouraged to return to your normal activities.         A few things that are considered normal after your cystoscopy:     * Small amount of bleeding (or spotting) that clears within the next 24 hours     * Slight burning sensation with urination     * Sensation to of needing to avoid more frequently     * The feeling of \"air\" in your urine     * Mild discomfort that is relieved with Tylenol        Please contact our office promptly if you:     * Develop a fever above 101 degrees     * Are unable to urinate     * Develop bright red blood that does not stop     * Severe pain or swelling         Please contact our office with any concerns or questions @[email or web address].  "

## 2024-09-18 NOTE — PROGRESS NOTES
"Cystoscopy:    Preop dx:  Neurogenic bladder  Postop dx neurogenic bladder      Hannah Chavez is a 34 year old transgender female with neurogenic bladder, prior colostomy due to sigmoid perforation. She has neurogenic bladder.  Does CIC with 14 Fr Coude catheter. No strictures.     She underwent colostomy takedown + full depth vaginoplasty on 04/25/24  She continues to dilate TID. Usually there is a dot remaining outside of body  She did have some troubles with self cath initially, but she is back to self cath with Coude catheters.  There occasionally is a \"catch\" but the catheter does pass without issues.  She is overall happy with outcomes.  She is having regular bowel movements,  Most recently had seroma excision by Dr. Drew. Patient is happy with outcome.     Procedure:  After verification of informed consent was obtained, the patient was brought to the operating room, and moved to the operating table. After adequate anesthesia was induced, the patient was repositioned in the lithotomy position and prepped and draped in the usual sterile fashion. A formal timeout was performed to confirm the correct patient, procedure and operative site.     A 21-[place] Carmona injection cystoscope was placed into the bladder.  The bladder mucosa appeared to be normal without stones, tumors or diverticulum on 360 degree inspection. The ureteral orifices were in the normal orthotopic position bilaterally.  We mixed 2 vials of 100 U botulinum toxin A into 10 mL of injectable saline  All injections were placed deep to the mucosa into the detrusor muscle. The procedure was then concluded. Patient tolerated it well. We then drained her bladder with a catheter.    Vital signs reviewed     Exam:  Vaginoplasty well healed.  There is labia minora definition.  There are no wounds.  Some drainage from vaginal canal.     A/P   Excellent outcome after vaginoplasty.  Dilate TID or BID.  Follow up in about 6 months in urology clinic for " cystoscopy with botox.  Some granulation tissue cautery performed today.    Jelena Richardson MD  Urology Resident    Physician Attestation   I, Chilango Dominguez MD, saw this patient and agree with the findings and plan of care as documented in the note.    I personally performed the procedure today with assistance from resident.    Chilango Dominguez MD

## 2024-10-20 ENCOUNTER — HOSPITAL ENCOUNTER (EMERGENCY)
Facility: CLINIC | Age: 34
Discharge: HOME | End: 2024-10-20
Attending: NURSE PRACTITIONER | Admitting: NURSE PRACTITIONER
Payer: COMMERCIAL

## 2024-10-20 VITALS
SYSTOLIC BLOOD PRESSURE: 118 MMHG | HEART RATE: 78 BPM | HEIGHT: 68 IN | TEMPERATURE: 98.5 F | RESPIRATION RATE: 16 BRPM | WEIGHT: 180 LBS | OXYGEN SATURATION: 97 % | BODY MASS INDEX: 27.28 KG/M2 | DIASTOLIC BLOOD PRESSURE: 80 MMHG

## 2024-10-20 DIAGNOSIS — N39.0 UTI (URINARY TRACT INFECTION), BACTERIAL: ICD-10-CM

## 2024-10-20 DIAGNOSIS — A49.9 UTI (URINARY TRACT INFECTION), BACTERIAL: ICD-10-CM

## 2024-10-20 DIAGNOSIS — Z98.890 H/O VAGINOPLASTY: ICD-10-CM

## 2024-10-20 LAB
ALBUMIN UR-MCNC: NEGATIVE MG/DL
APPEARANCE UR: ABNORMAL
BACTERIA #/AREA URNS HPF: ABNORMAL /HPF
BILIRUB UR QL STRIP: NEGATIVE
COLOR UR AUTO: YELLOW
GLUCOSE UR STRIP-MCNC: NEGATIVE MG/DL
HGB UR QL STRIP: NEGATIVE
KETONES UR STRIP-MCNC: NEGATIVE MG/DL
LEUKOCYTE ESTERASE UR QL STRIP: ABNORMAL
MUCOUS THREADS #/AREA URNS LPF: PRESENT /LPF
NITRATE UR QL: POSITIVE
PH UR STRIP: 7 [PH] (ref 5–7)
RBC URINE: 1 /HPF
SP GR UR STRIP: 1.01 (ref 1–1.03)
SQUAMOUS EPITHELIAL: 1 /HPF
UROBILINOGEN UR STRIP-MCNC: NORMAL MG/DL
WBC URINE: 32 /HPF

## 2024-10-20 PROCEDURE — 250N000013 HC RX MED GY IP 250 OP 250 PS 637: Performed by: NURSE PRACTITIONER

## 2024-10-20 PROCEDURE — 81001 URINALYSIS AUTO W/SCOPE: CPT | Performed by: NURSE PRACTITIONER

## 2024-10-20 PROCEDURE — 99283 EMERGENCY DEPT VISIT LOW MDM: CPT | Performed by: NURSE PRACTITIONER

## 2024-10-20 PROCEDURE — 87086 URINE CULTURE/COLONY COUNT: CPT | Performed by: NURSE PRACTITIONER

## 2024-10-20 RX ORDER — CEFDINIR 300 MG/1
300 CAPSULE ORAL ONCE
Status: COMPLETED | OUTPATIENT
Start: 2024-10-20 | End: 2024-10-20

## 2024-10-20 RX ORDER — CEFDINIR 300 MG/1
300 CAPSULE ORAL 2 TIMES DAILY
Qty: 14 CAPSULE | Refills: 0 | Status: SHIPPED | OUTPATIENT
Start: 2024-10-20 | End: 2024-10-27

## 2024-10-20 RX ADMIN — CEFDINIR 300 MG: 300 CAPSULE ORAL at 17:02

## 2024-10-20 ASSESSMENT — COLUMBIA-SUICIDE SEVERITY RATING SCALE - C-SSRS
2. HAVE YOU ACTUALLY HAD ANY THOUGHTS OF KILLING YOURSELF IN THE PAST MONTH?: NO
1. IN THE PAST MONTH, HAVE YOU WISHED YOU WERE DEAD OR WISHED YOU COULD GO TO SLEEP AND NOT WAKE UP?: NO
6. HAVE YOU EVER DONE ANYTHING, STARTED TO DO ANYTHING, OR PREPARED TO DO ANYTHING TO END YOUR LIFE?: YES

## 2024-10-20 ASSESSMENT — ACTIVITIES OF DAILY LIVING (ADL)
ADLS_ACUITY_SCORE: 38

## 2024-10-20 NOTE — DISCHARGE INSTRUCTIONS
Cefdinir 300 mg twice a day for 7 days.    ---First dose given here today.  Urine cultures pending.  Contact your surgeon tomorrow morning regarding the vaginal bleeding that is occurring after catheter/dilation.

## 2024-10-20 NOTE — ED TRIAGE NOTES
Pt presents to ED with concerns of vaginal bleeding, pt has hx of vaginal plasty ~ 6 months ago. Pt states bleeding became painful in the recent days and concerned her. Pt also reports has been difficult to dilate today and over the last few days.      Triage Assessment (Adult)       Row Name 10/20/24 1411          Triage Assessment    Airway WDL WDL        Respiratory WDL    Respiratory WDL WDL        Skin Circulation/Temperature WDL    Skin Circulation/Temperature WDL WDL        Cardiac WDL    Cardiac WDL WDL        Cognitive/Neuro/Behavioral WDL    Cognitive/Neuro/Behavioral WDL WDL

## 2024-10-20 NOTE — ED PROVIDER NOTES
History     Chief Complaint   Patient presents with    Vaginal Bleeding     Hx of vaginal plasty 6 months ago, bleeding for past 2 weeks     HPI  Hannah Chavez is a 34 year old, transgender female who presents with vaginal bleeding. Patient is s/p vaginoplasty 6 months ago and uses coude catheter top dilate twice a day. She has had to decrease the size of catheter to 14 Romanian over the last couple weeks. She is able to continue to dilate once a day but normally does it twice a day. She has noted bleeding occurs each time she does the dilation. She also reports some pain. She has history of frequent UTIs. Denies fevers, chills or abdominal pain.  Patient was evaluated at [facility] urgent [facility] earlier today.  Was recommended to present to the emergency department for further evaluation.    Notes from visit with urology 9/18/2024:    Chilango Dominguez MD  Urology Neurogenic bladder  Dx Cystoscopy ; Referred by Chilango Dominguez MD  Reason for Visit     Nursing Note  Varsha Judge (Technician)  The following medication was given:     MEDICATION:  Botox   ROUTE: Bladder wall   SITE: Medication injected via bladder wall   DOSE:  200 units   LOT #: [id number]  :  Allergan   EXPIRATION DATE: 2026/12  NDC#: 7988-8736-24   Was there drug waste? No    Prior to medication administration, verified patient identity using patient's name and date of birth.  Due to medication administration, patient instructed to remain in clinic for 15 minutes  afterwards, and to report any adverse reaction to me immediately.                        Progress Notes  Chilango Dominguez MD (Physician)  Urology  Expand All Collapse All  Cystoscopy:     Preop dx:  Neurogenic bladder  Postop dx neurogenic bladder        Hannah Chavez is a 34 year old transgender female with neurogenic bladder, prior colostomy due to sigmoid perforation. She has neurogenic bladder.  Does CIC with 14 Fr Coude catheter. No strictures.     She underwent  "colostomy takedown + full depth vaginoplasty on 04/25/24  She continues to dilate TID. Usually there is a dot remaining outside of body  She did have some troubles with self cath initially, but she is back to self cath with Coude catheters.  There occasionally is a \"catch\" but the catheter does pass without issues.  She is overall happy with outcomes.  She is having regular bowel movements,  Most recently had seroma excision by Dr. Drew. Patient is happy with outcome.     Procedure:  After verification of informed consent was obtained, the patient was brought to the operating room, and moved to the operating table. After adequate anesthesia was induced, the patient was repositioned in the lithotomy position and prepped and draped in the usual sterile fashion. A formal timeout was performed to confirm the correct patient, procedure and operative site.     A 21-Togolese Carmona injection cystoscope was placed into the bladder.  The bladder mucosa appeared to be normal without stones, tumors or diverticulum on 360 degree inspection. The ureteral orifices were in the normal orthotopic position bilaterally.  We mixed 2 vials of 100 U botulinum toxin A into 10 mL of injectable saline  All injections were placed deep to the mucosa into the detrusor muscle. The procedure was then concluded. Patient tolerated it well. We then drained her bladder with a catheter.     Vital signs reviewed     Exam:  Vaginoplasty well healed.  There is labia minora definition.  There are no wounds.  Some drainage from vaginal canal.     A/P   Excellent outcome after vaginoplasty.  Dilate TID or BID.  Follow up in about 6 months in urology clinic for cystoscopy with botox.  Some granulation tissue cautery performed today.     Jelena Richardson MD  Urology Resident        Physician Attestation  I, Chilango Dominguez MD, saw this patient and agree with the findings and plan of care as documented in the note.    I personally performed the procedure today with " "assistance from resident.     Chilango Dominguez MD            Allergies:  Allergies   Allergen Reactions    Dust Mites     Shellfish Allergy Nausea and Vomiting and Unknown     Patient states his mother has a \"violent reaction\" to shellfish, and that he has a feeling of nausea when he smells shellfish, so he has never consumed any. He states he has also not been tested.      Shellfish-Derived Products Other (See Comments)     Patient states her mother has a \"violent reaction\" to shellfish, and that she has a feeling of nausea when she smells shellfish, so she has never consumed any. She states she has also not been tested.  Patient states his mother has a \"violent reaction\" to shellfish, and that he has a feeling of nausea when he smells shellfish, so he has never consumed any. He states he has also not been tested.         Problem List:    Patient Active Problem List    Diagnosis Date Noted    Pelvic floor weakness in female 05/24/2024     Priority: Medium    Colostomy status (H) 04/25/2024     Priority: Medium    Gender dysphoria 08/02/2022     Priority: Medium     Added automatically from request for surgery [id number]      Neurogenic bladder 02/21/2018     Priority: Medium        Past Medical History:    Past Medical History:   Diagnosis Date    Asthma     Bladder incontinence     Colostomy status (H)     Gender dysphoria     Neurogenic bladder     Self-catheterizes urinary bladder     Spinal cord injury of T10 vertebra (H)     Spinal cord injury of thoracic region without bone injury, initial encounter (H)     Uncomplicated asthma        Past Surgical History:    Past Surgical History:   Procedure Laterality Date    ABDOMEN SURGERY      segmental seigmoid resection, end colostomy    CYSTOSCOPY N/A 09/01/2017    Procedure: CYSTOSCOPY;  Cystoscopy and Botox Injection into the Bladder;  Surgeon: Michael Mcnulty MD;  [facility]    CYSTOSCOPY N/A 10/12/2018    Procedure: CYSTOSCOPY;  Cystoscopy, Botox;  " Surgeon: Michael Mcnulty MD;  [facility]    CYSTOSCOPY, INJECT BOTOX N/A 4/25/2024    Procedure: Cystoscopy with Botox Injection to Bladder;  Surgeon: Chilango Dominguez MD;  [facility]    CYSTOSCOPY, INTRAVESICAL INJECTION N/A 03/02/2018    Procedure: CYSTOSCOPY, INTRAVESICAL INJECTION;  Cystoscopy And Botox Injection Into The Bladder  ;  Surgeon: Michael Mcnulty MD;  [facility]    DAVINCI ASSISTED COLOSTOMY TAKEDOWN N/A 4/25/2024    Procedure: ROBOT ASSISTED COLOSTOMY CLOSURE, takedown of splenic flexure.;  Surgeon: Amadou Anna MD;  [facility]    INJECT BOTOX N/A 09/01/2017    Procedure: INJECT BOTOX;;  Surgeon: Michael Mcnulty MD;  [facility]    INJECT BOTOX N/A 10/12/2018    Procedure: INJECT BOTOX;;  Surgeon: Michael Mcnulty MD;  [facility]    ORCHIECTOMY SCROTAL Bilateral 08/18/2022    Procedure: Bilateral Simple Scrotal Orchiectomy;  Surgeon: Chilango Dominguez MD;  Location[facility]    REMOVE INTRATHECAL PAIN PUMP N/A 06/02/2023    Procedure: Removal of intrathecal drug delivery system - removal of the intrathecal drug pump from the right abdomen and removal of the intrathecal catheter from the thoracolumbar spine;  Surgeon: Que Drew MD;  [facility]    REVISE SCAR TRUNK Right 8/22/2024    Procedure: Exploration of wound, excision of scar, decompression of seroma, right abdomen;  Surgeon: Que Drew MD;  [facility]    SIGMOIDOSCOPY FLEXIBLE N/A 4/25/2024    Procedure: FLEXIBLE SIGMOIDOSCOPY;  Surgeon: Amadou Anna MD;  [facility]    VAGINOPLASTY WITH PERITONEAL FLAP, ROBOTIC ASSISTED N/A 4/25/2024    Procedure: Robotic assisted Full Depth Vaginoplasty;  Surgeon: Chilango Dominguez MD;  [facility]       Family History:    Family History   Problem Relation Age of Onset    Cancer Mother         unknown type    Depression Father     Fibromyalgia Father     Anxiety Disorder Brother      "Anesthesia Reaction No family hx of     Thrombosis No family hx of        Social History:  Marital Status:  Single [1]  Social History     Tobacco Use    Smoking status: Never     Passive exposure: Never    Smokeless tobacco: Never   Vaping Use    Vaping status: Never Used   Substance Use Topics    Alcohol use: No    Drug use: Never        Medications:    acetaminophen (TYLENOL) 500 MG tablet  ALBUTEROL IN  cefdinir (OMNICEF) 300 MG capsule  diazepam (VALIUM) 5 MG tablet  estradiol valerate (DELESTROGEN) 20 MG/ML injection  Insulin Syringe-Needle U-100 (B-D INSULIN SYRINGE) 25G X 1\" 1 ML MISC  progesterone (PROMETRIUM) 200 MG capsule  QUEtiapine (SEROQUEL) 25 MG tablet  Sharps Container MISC          Review of Systems  As mentioned above in the history present illness. All other systems were reviewed and are negative.    Physical Exam   BP: 132/78  Pulse: 84  Temp: 98.5  F (36.9  C)  Resp: 16  Height: 172.7 cm (5' 8\")  Weight: 81.6 kg (180 lb)  SpO2: 98 %      Physical Exam  Constitutional:       General: She is not in acute distress.     Appearance: Normal appearance. She is not ill-appearing.   HENT:      Head: Normocephalic and atraumatic.   Cardiovascular:      Rate and Rhythm: Normal rate and regular rhythm.   Pulmonary:      Effort: Pulmonary effort is normal.      Breath sounds: Normal breath sounds.   Genitourinary:     Urethra: Urethral lesion (There is an area of increased tissue/inflammation to the left of the vaginoplasty and uretral opening that has blood clot noted.) present.      Comments: I asked if patient would like a chaperone in the room during exam but she declined. Her brother is at bedside and I asked if she wanted him to step out of the room but she wanted him to stay.    Skin:     General: Skin is warm and dry.   Neurological:      General: No focal deficit present.      Mental Status: She is alert and oriented to person, place, and time.         ED Course        Procedures          "     Results for orders placed or performed during the hospital encounter of 10/20/24 (from the past 24 hour(s))   UA with Microscopic reflex to Culture    Specimen: Urine, Catheter   Result Value Ref Range    Color Urine Yellow Colorless, Straw, Light Yellow, Yellow    Appearance Urine Slightly Cloudy (A) Clear    Glucose Urine Negative Negative mg/dL    Bilirubin Urine Negative Negative    Ketones Urine Negative Negative mg/dL    Specific Gravity Urine 1.015 1.003 - 1.035    Blood Urine Negative Negative    pH Urine 7.0 5.0 - 7.0    Protein Albumin Urine Negative Negative mg/dL    Urobilinogen Urine Normal Normal, 2.0 mg/dL    Nitrite Urine Positive (A) Negative    Leukocyte Esterase Urine Large (A) Negative    Bacteria Urine Few (A) None Seen /HPF    Mucus Urine Present (A) None Seen /LPF    RBC Urine 1 <=2 /HPF    WBC Urine 32 (H) <=5 /HPF    Squamous Epithelials Urine 1 <=1 /HPF    Narrative    Urine Culture ordered based on laboratory criteria       Medications   cefdinir (OMNICEF) capsule 300 mg (300 mg Oral $Given 10/20/24 1702)       Assessments & Plan (with Medical Decision Making)     Possibly scar tissue/adhesions causing inflammation when she is performing dilation with catheter and then leading to bleeding. Additionally UA appears concerning for infection. Urine Culture pending.  Plan:  Cefdinir 300 mg twice a day for 7 days.    ---First dose given here today.  Urine cultures pending.  Contact your surgeon tomorrow morning regarding the vaginal bleeding that is occurring after catheter/dilation.      Discharge Medication List as of 10/20/2024  4:58 PM        START taking these medications    Details   cefdinir (OMNICEF) 300 MG capsule Take 1 capsule (300 mg) by mouth 2 times daily for 7 days., Disp-14 capsule, R-0, E-Prescribe             Final diagnoses:   UTI (urinary tract infection), bacterial   H/O vaginoplasty       10/20/2024   [facility] EMERGENCY DEPT       Batsheva Geiger  Mara, AUTUMN CNP  10/20/24 6212

## 2024-10-22 LAB
BACTERIA UR CULT: ABNORMAL

## 2024-10-30 ENCOUNTER — MEDICAL CORRESPONDENCE (OUTPATIENT)
Dept: HEALTH INFORMATION MANAGEMENT | Facility: CLINIC | Age: 34
End: 2024-10-30
Payer: COMMERCIAL

## 2024-10-30 ENCOUNTER — DOCUMENTATION ONLY (OUTPATIENT)
Dept: UROLOGY | Facility: CLINIC | Age: 34
End: 2024-10-30
Payer: COMMERCIAL

## 2024-10-30 NOTE — PROGRESS NOTES
Type of Form Received: Order (pull-on, disposable liner)    Form Received (Date) 10/30/24   Form Filled out Yes, date 10/30/24   Placed in provider folder Yes       Received Completed forms Yes   Faxed Forms Faxed To: [facility]  Fax Number: [phone]   Sent to HIM (Date) 10/30/24

## 2024-11-06 PROBLEM — N81.89 PELVIC FLOOR WEAKNESS IN FEMALE: Status: RESOLVED | Noted: 2024-05-24 | Resolved: 2024-11-06

## 2024-11-15 ENCOUNTER — TELEPHONE (OUTPATIENT)
Dept: UROLOGY | Facility: CLINIC | Age: 34
End: 2024-11-15
Payer: COMMERCIAL

## 2024-11-15 NOTE — TELEPHONE ENCOUNTER
[facility] Call Center    Phone Message    May a detailed message be left on voicemail: no     Reason for Call: Other: Patient stated that ever since  her procedure she has been having a lot of bleeding. Please call patient back to discuss.     Action Taken: Message routed to:  [facility] Urology    Travel Screening: Not Applicable     Date of Service:

## 2024-11-15 NOTE — TELEPHONE ENCOUNTER
Pt had surgery with Dr Dominguez on 4/25/24. Called pt to discuss and assess symptoms. Pt states that she dilated this morning and had a large amount blood afterward. She went to the emergency department afterward. By the time she was assessed, her bleeding had subsided and she was discharged. Pt explained she has had bleeding after dilation starting about 3 weeks ago, but on two occasions she had a concerning amount of bleeding. On all occurrences pt states bleeding subsided shortly after dilation. Pt has made no changes to her dilation regimen, but notes she needed to switch from the blue to purple dilator due to discomfort with dilation. She has maintained the depth of her vaginal canal. Pt believes she has granulation tissue deep in her vaginal canal. Reassured pt that the amount of bleeding she has is not concerning, and that she will need to see us in clinic to assess and come up with a plan. She understands that we can treat granulation tissue in clinic if we can reach it, but if it exists deeper in the canal this would need to be removed int he OR. Added pt on for an appointment with Dr Dominguez on 11/26 at 1:40 pm.

## 2024-11-26 ENCOUNTER — OFFICE VISIT (OUTPATIENT)
Dept: PLASTIC SURGERY | Facility: CLINIC | Age: 34
End: 2024-11-26
Payer: COMMERCIAL

## 2024-11-26 VITALS
OXYGEN SATURATION: 97 % | SYSTOLIC BLOOD PRESSURE: 122 MMHG | TEMPERATURE: 98.4 F | DIASTOLIC BLOOD PRESSURE: 77 MMHG | HEIGHT: 68 IN | HEART RATE: 80 BPM | BODY MASS INDEX: 27.37 KG/M2

## 2024-11-26 DIAGNOSIS — L92.9 GRANULATION TISSUE: ICD-10-CM

## 2024-11-26 DIAGNOSIS — Z87.890 STATUS POST GENDER AFFIRMATION SURGERY: Primary | ICD-10-CM

## 2024-11-26 PROCEDURE — 99213 OFFICE O/P EST LOW 20 MIN: CPT | Mod: 25 | Performed by: UROLOGY

## 2024-11-26 PROCEDURE — 17250 CHEM CAUT OF GRANLTJ TISSUE: CPT | Performed by: UROLOGY

## 2024-11-26 ASSESSMENT — PAIN SCALES - GENERAL: PAINLEVEL_OUTOF10: MILD PAIN (3)

## 2024-11-26 NOTE — LETTER
"11/26/2024       RE: Hannah Chavez  [address]  [place] [address]     Dear Colleague,    Thank you for referring your patient, Hannah Chavez, to the [facility] [place] [facility]. Please see a copy of my visit note below.    SUBJECTIVE:  Asher is a 34 year old who underwent full depth vaginoplasty on 4/25/24. She noticed an increase in bleeding with dilation about 2 months ago which became even heavier one month ago. She has also noticed an increase in pain with dilations. She had been doing once daily dilations with blue dilator, but went down to purple after the increased bleeding began. Bleeding was enough a few times (filled toilet after dilating) that she went to ED x 2. There was no active bleeding or concerns with ongoing bleeding/low hemoglobin during those visits. She has now worked back up to purple and blue dilator. She does once daily dilations for 30-40 minutes at a time. She is able to get purple in up to last dot at introitus and blue with about half dot less inside. She describes pain as \"a lot of intense stretching\" and feels like there is a ring of tightness about detention into canal. She is able to get past this, but reports a lot of pain. She is here today for assessment and treatment of any granulation tissue. Yesterday she actually had less blood than usual with dilation. She brought her dilator with her to clinic to show where the pain/restriction begins    No concerns about urination/cathing or bowel movements      OBJECTIVE:  /77 (BP Location: Left arm, Patient Position: Sitting, Cuff Size: Adult Regular)   Pulse 80   Temp 98.4  F (36.9  C) (Oral)   Ht 1.727 m (5' 8\")   SpO2 97%   BMI 27.37 kg/m     General: NAD  Well healed vulva  Several patches of friable, red granulation tissue around introitus    ASSESSMENT/PLAN:  S/P full depth vaginoplasty  Granulation tissue - treated " with silver nitrate  RTC in 4-6 weeks with QUAN Stewart APRN, CNP    Physician Attestation  I, Chilango Dominguez MD, saw this patient and agree with the findings and plan of care as documented in the note.      Doing great overall despite an extremely complex clinical situation with colostomy takedown and full vaginoplasty.    She has a nice aesthetic result, functional vaginal canal, stool within normal limits and able to self cath.    Overall we are very pleased with her outcome.    Chilango Dominguez MD  Reconstructive Urology        Again, thank you for allowing me to participate in the care of your patient.      Sincerely,    Chilango Dominguez MD

## 2024-11-26 NOTE — PROGRESS NOTES
"SUBJECTIVE:  Asher is a 34 year old who underwent full depth vaginoplasty on 4/25/24. She noticed an increase in bleeding with dilation about 2 months ago which became even heavier one month ago. She has also noticed an increase in pain with dilations. She had been doing once daily dilations with blue dilator, but went down to purple after the increased bleeding began. Bleeding was enough a few times (filled toilet after dilating) that she went to ED x 2. There was no active bleeding or concerns with ongoing bleeding/low hemoglobin during those visits. She has now worked back up to purple and blue dilator. She does once daily dilations for 30-40 minutes at a time. She is able to get purple in up to last dot at introitus and blue with about half dot less inside. She describes pain as \"a lot of intense stretching\" and feels like there is a ring of tightness about MCC into canal. She is able to get past this, but reports a lot of pain. She is here today for assessment and treatment of any granulation tissue. Yesterday she actually had less blood than usual with dilation. She brought her dilator with her to clinic to show where the pain/restriction begins    No concerns about urination/cathing or bowel movements      OBJECTIVE:  /77 (BP Location: Left arm, Patient Position: Sitting, Cuff Size: Adult Regular)   Pulse 80   Temp 98.4  F (36.9  C) (Oral)   Ht 1.727 m (5' 8\")   SpO2 97%   BMI 27.37 kg/m     General: NAD  Well healed vulva  Several patches of friable, red granulation tissue around introitus    ASSESSMENT/PLAN:  S/P full depth vaginoplasty  Granulation tissue - treated with silver nitrate  RTC in 4-6 weeks with QUAN Stewart APRN, CNP    Physician Attestation   I, Chilango Dominguez MD, saw this patient and agree with the findings and plan of care as documented in the note.      Doing great overall despite an extremely complex clinical situation with colostomy takedown and full " vaginoplasty.    She has a nice aesthetic result, functional vaginal canal, stool within normal limits and able to self cath.    Overall we are very pleased with her outcome.    Chilango Dominguez MD  Reconstructive Urology

## 2024-11-26 NOTE — NURSING NOTE
"Chief Complaint   Patient presents with    Surgical Followup     Post-op DOS 4/25/24, assess granulation tissue and bleeding.       Vitals:    11/26/24 1352   BP: 122/77   BP Location: Left arm   Patient Position: Sitting   Cuff Size: Adult Regular   Pulse: 80   Temp: 98.4  F (36.9  C)   TempSrc: Oral   SpO2: 97%   Height: 1.727 m (5' 8\")       Body mass index is 27.37 kg/m .    Patient reports mild vaginal pain (3/10).    Emil Giron, EMT    "

## 2024-12-22 ENCOUNTER — HEALTH MAINTENANCE LETTER (OUTPATIENT)
Age: 34
End: 2024-12-22

## 2024-12-29 ENCOUNTER — HOSPITAL ENCOUNTER (EMERGENCY)
Facility: CLINIC | Age: 34
Discharge: HOME | End: 2024-12-29
Attending: FAMILY MEDICINE | Admitting: FAMILY MEDICINE
Payer: COMMERCIAL

## 2024-12-29 VITALS
RESPIRATION RATE: 16 BRPM | OXYGEN SATURATION: 96 % | BODY MASS INDEX: 27.37 KG/M2 | SYSTOLIC BLOOD PRESSURE: 134 MMHG | HEART RATE: 99 BPM | WEIGHT: 180 LBS | TEMPERATURE: 98.8 F | DIASTOLIC BLOOD PRESSURE: 80 MMHG

## 2024-12-29 DIAGNOSIS — U07.1 COVID-19 VIRUS INFECTION: ICD-10-CM

## 2024-12-29 DIAGNOSIS — N39.0 URINARY TRACT INFECTION IN FEMALE: ICD-10-CM

## 2024-12-29 LAB
ALBUMIN UR-MCNC: NEGATIVE MG/DL
ANION GAP SERPL CALCULATED.3IONS-SCNC: 11 MMOL/L (ref 7–15)
APPEARANCE UR: CLEAR
BACTERIA #/AREA URNS HPF: ABNORMAL /HPF
BASOPHILS # BLD AUTO: 0 10E3/UL (ref 0–0.2)
BASOPHILS NFR BLD AUTO: 0 %
BILIRUB UR QL STRIP: NEGATIVE
BUN SERPL-MCNC: 6.5 MG/DL (ref 6–20)
CALCIUM SERPL-MCNC: 8.7 MG/DL (ref 8.8–10.4)
CHLORIDE SERPL-SCNC: 100 MMOL/L (ref 98–107)
COLOR UR AUTO: ABNORMAL
CREAT SERPL-MCNC: 0.93 MG/DL (ref 0.51–1.17)
EGFRCR SERPLBLD CKD-EPI 2021: 83 ML/MIN/1.73M2
EOSINOPHIL # BLD AUTO: 0 10E3/UL (ref 0–0.7)
EOSINOPHIL NFR BLD AUTO: 0 %
ERYTHROCYTE [DISTWIDTH] IN BLOOD BY AUTOMATED COUNT: 11.9 % (ref 10–15)
FLUAV RNA SPEC QL NAA+PROBE: NEGATIVE
FLUBV RNA RESP QL NAA+PROBE: NEGATIVE
GLUCOSE SERPL-MCNC: 97 MG/DL (ref 70–99)
GLUCOSE UR STRIP-MCNC: NEGATIVE MG/DL
HCO3 SERPL-SCNC: 25 MMOL/L (ref 22–29)
HCT VFR BLD AUTO: 35.3 % (ref 35–53)
HGB BLD-MCNC: 12.3 G/DL (ref 11.7–17.7)
HGB UR QL STRIP: NEGATIVE
IMM GRANULOCYTES # BLD: 0 10E3/UL
IMM GRANULOCYTES NFR BLD: 0 %
KETONES UR STRIP-MCNC: ABNORMAL MG/DL
LEUKOCYTE ESTERASE UR QL STRIP: ABNORMAL
LYMPHOCYTES # BLD AUTO: 0.8 10E3/UL (ref 0.8–5.3)
LYMPHOCYTES NFR BLD AUTO: 14 %
MCH RBC QN AUTO: 29.1 PG (ref 26.5–33)
MCHC RBC AUTO-ENTMCNC: 34.8 G/DL (ref 31.5–36.5)
MCV RBC AUTO: 84 FL (ref 78–100)
MONOCYTES # BLD AUTO: 0.6 10E3/UL (ref 0–1.3)
MONOCYTES NFR BLD AUTO: 10 %
MUCOUS THREADS #/AREA URNS LPF: PRESENT /LPF
NEUTROPHILS # BLD AUTO: 4.4 10E3/UL (ref 1.6–8.3)
NEUTROPHILS NFR BLD AUTO: 76 %
NITRATE UR QL: POSITIVE
NRBC # BLD AUTO: 0 10E3/UL
NRBC BLD AUTO-RTO: 0 /100
PH UR STRIP: 6 [PH] (ref 5–7)
PLATELET # BLD AUTO: 158 10E3/UL (ref 150–450)
POTASSIUM SERPL-SCNC: 3.4 MMOL/L (ref 3.4–5.3)
RBC # BLD AUTO: 4.23 10E6/UL (ref 3.8–5.9)
RBC URINE: <1 /HPF
RSV RNA SPEC NAA+PROBE: NEGATIVE
SARS-COV-2 RNA RESP QL NAA+PROBE: POSITIVE
SODIUM SERPL-SCNC: 136 MMOL/L (ref 135–145)
SP GR UR STRIP: 1.02 (ref 1–1.03)
SQUAMOUS EPITHELIAL: <1 /HPF
UROBILINOGEN UR STRIP-MCNC: NORMAL MG/DL
WBC # BLD AUTO: 5.9 10E3/UL (ref 4–11)
WBC URINE: 22 /HPF

## 2024-12-29 PROCEDURE — 87637 SARSCOV2&INF A&B&RSV AMP PRB: CPT | Performed by: FAMILY MEDICINE

## 2024-12-29 PROCEDURE — 85049 AUTOMATED PLATELET COUNT: CPT | Performed by: FAMILY MEDICINE

## 2024-12-29 PROCEDURE — 85004 AUTOMATED DIFF WBC COUNT: CPT | Performed by: FAMILY MEDICINE

## 2024-12-29 PROCEDURE — 36415 COLL VENOUS BLD VENIPUNCTURE: CPT | Performed by: FAMILY MEDICINE

## 2024-12-29 PROCEDURE — 80048 BASIC METABOLIC PNL TOTAL CA: CPT | Performed by: FAMILY MEDICINE

## 2024-12-29 PROCEDURE — 81001 URINALYSIS AUTO W/SCOPE: CPT | Performed by: FAMILY MEDICINE

## 2024-12-29 PROCEDURE — 99284 EMERGENCY DEPT VISIT MOD MDM: CPT | Performed by: FAMILY MEDICINE

## 2024-12-29 PROCEDURE — 87186 SC STD MICRODIL/AGAR DIL: CPT | Performed by: FAMILY MEDICINE

## 2024-12-29 PROCEDURE — 250N000013 HC RX MED GY IP 250 OP 250 PS 637: Performed by: FAMILY MEDICINE

## 2024-12-29 RX ORDER — IBUPROFEN 600 MG/1
600 TABLET, FILM COATED ORAL ONCE
Status: COMPLETED | OUTPATIENT
Start: 2024-12-29 | End: 2024-12-29

## 2024-12-29 RX ORDER — SULFAMETHOXAZOLE AND TRIMETHOPRIM 800; 160 MG/1; MG/1
1 TABLET ORAL 2 TIMES DAILY
Qty: 20 TABLET | Refills: 0 | Status: SHIPPED | OUTPATIENT
Start: 2024-12-29 | End: 2025-01-08

## 2024-12-29 RX ADMIN — IBUPROFEN 600 MG: 600 TABLET, FILM COATED ORAL at 17:26

## 2024-12-29 ASSESSMENT — ENCOUNTER SYMPTOMS
ABDOMINAL PAIN: 0
MUSCULOSKELETAL NEGATIVE: 1
CARDIOVASCULAR NEGATIVE: 1
PSYCHIATRIC NEGATIVE: 1
SHORTNESS OF BREATH: 0
EYE REDNESS: 0
COUGH: 1
FEVER: 1
VOMITING: 0
HEMATOLOGIC/LYMPHATIC NEGATIVE: 1
WEAKNESS: 1
SORE THROAT: 1

## 2024-12-29 ASSESSMENT — ACTIVITIES OF DAILY LIVING (ADL)
ADLS_ACUITY_SCORE: 58
ADLS_ACUITY_SCORE: 58

## 2024-12-29 NOTE — DISCHARGE INSTRUCTIONS
Treat fever with ibuprofen or Tylenol.    You may continue your inhaler for the cough.    Paxlovid prescribed for COVID.  Do not take quetiapine while you are on this med.    Keep up fluid intake.    Take antibiotic.  You will be informed of culture results.    If you have worsening fever, breathing difficulty or other concerning symptoms, return for reevaluation.

## 2024-12-29 NOTE — ED PROVIDER NOTES
"  History     Chief Complaint   Patient presents with    Urinary Frequency     HPI  Hannah Chavez is a 34 year old adult who presents with fever onset today, congestion, malodorous urine.      Hannah is a 34-year-old woman who has a history of a thoracic spinal cord injury at a very young age, neurogenic bladder who self caths, has history of UTIs, who comes in with history of noting a temperature of 102 degrees axillary today.    Her current symptoms include a sore throat and cough and fatigue and these started today along with a fever.    She also has been experiencing malodorous urine, more so in the last day or so.  She tells me her last treatment for UTI was in the ER and I see that visit was probably in October 2024.    She is COVID vaccinated.  Her initial COVID test has come back positive.    Allergies:  Allergies   Allergen Reactions    Dust Mites     Shellfish Allergy Nausea and Vomiting and Unknown     Patient states his mother has a \"violent reaction\" to shellfish, and that he has a feeling of nausea when he smells shellfish, so he has never consumed any. He states he has also not been tested.      Shellfish-Derived Products Other (See Comments)     Patient states her mother has a \"violent reaction\" to shellfish, and that she has a feeling of nausea when she smells shellfish, so she has never consumed any. She states she has also not been tested.  Patient states his mother has a \"violent reaction\" to shellfish, and that he has a feeling of nausea when he smells shellfish, so he has never consumed any. He states he has also not been tested.         Problem List:    Patient Active Problem List    Diagnosis Date Noted    Colostomy status (H) 04/25/2024     Priority: Medium    Gender dysphoria 08/02/2022     Priority: Medium     Added automatically from request for surgery [id number]      Neurogenic bladder 02/21/2018     Priority: Medium        Past Medical History:    Past Medical History:   Diagnosis Date "    Asthma     Bladder incontinence     Colostomy status (H)     Gender dysphoria     Neurogenic bladder     Self-catheterizes urinary bladder     Spinal cord injury of T10 vertebra (H)     Spinal cord injury of thoracic region without bone injury, initial encounter (H)     Uncomplicated asthma        Past Surgical History:    Past Surgical History:   Procedure Laterality Date    ABDOMEN SURGERY      segmental seigmoid resection, end colostomy    CYSTOSCOPY N/A 09/01/2017    Procedure: CYSTOSCOPY;  Cystoscopy and Botox Injection into the Bladder;  Surgeon: Michael Mcnulty MD;  [facility]    CYSTOSCOPY N/A 10/12/2018    Procedure: CYSTOSCOPY;  Cystoscopy, Botox;  Surgeon: Michael Mcnulty MD;  [facility]    CYSTOSCOPY, INJECT BOTOX N/A 4/25/2024    Procedure: Cystoscopy with Botox Injection to Bladder;  Surgeon: Chilango Dominguez MD;  [facility]    CYSTOSCOPY, INTRAVESICAL INJECTION N/A 03/02/2018    Procedure: CYSTOSCOPY, INTRAVESICAL INJECTION;  Cystoscopy And Botox Injection Into The Bladder  ;  Surgeon: Michael Mcnulty MD;  [facility]    DAVINCI ASSISTED COLOSTOMY TAKEDOWN N/A 4/25/2024    Procedure: ROBOT ASSISTED COLOSTOMY CLOSURE, takedown of splenic flexure.;  Surgeon: Amadou Anna MD;  [facility]    INJECT BOTOX N/A 09/01/2017    Procedure: INJECT BOTOX;;  Surgeon: Michael Mcnulty MD;  [facility]    INJECT BOTOX N/A 10/12/2018    Procedure: INJECT BOTOX;;  Surgeon: Michael Mcnulty MD;  [facility]    ORCHIECTOMY SCROTAL Bilateral 08/18/2022    Procedure: Bilateral Simple Scrotal Orchiectomy;  Surgeon: Chilango Dominguez MD;  [facility]    REMOVE INTRATHECAL PAIN PUMP N/A 06/02/2023    Procedure: Removal of intrathecal drug delivery system - removal of the intrathecal drug pump from the right abdomen and removal of the intrathecal catheter from the thoracolumbar spine;  Surgeon: Que Drew MD;  [facility]     "REVISE SCAR TRUNK Right 8/22/2024    Procedure: Exploration of wound, excision of scar, decompression of seroma, right abdomen;  Surgeon: Que Drew MD;  [facility]    SIGMOIDOSCOPY FLEXIBLE N/A 4/25/2024    Procedure: FLEXIBLE SIGMOIDOSCOPY;  Surgeon: Amadou Anna MD;  [facility]    VAGINOPLASTY WITH PERITONEAL FLAP, ROBOTIC ASSISTED N/A 4/25/2024    Procedure: Robotic assisted Full Depth Vaginoplasty;  Surgeon: Chilango Dominguez MD;  [facility]       Family History:    Family History   Problem Relation Age of Onset    Cancer Mother         unknown type    Depression Father     Fibromyalgia Father     Anxiety Disorder Brother     Anesthesia Reaction No family hx of     Thrombosis No family hx of        Social History:  Marital Status:  Single [1]  Social History     Tobacco Use    Smoking status: Never     Passive exposure: Never    Smokeless tobacco: Never   Vaping Use    Vaping status: Never Used   Substance Use Topics    Alcohol use: No    Drug use: Never        Medications:    nirmatrelvir and ritonavir (PAXLOVID) 300 mg/100 mg therapy pack  sulfamethoxazole-trimethoprim (BACTRIM DS) 800-160 MG tablet  acetaminophen (TYLENOL) 500 MG tablet  ALBUTEROL IN  diazepam (VALIUM) 5 MG tablet  estradiol valerate (DELESTROGEN) 20 MG/ML injection  Insulin Syringe-Needle U-100 (B-D INSULIN SYRINGE) 25G X 1\" 1 ML MISC  progesterone (PROMETRIUM) 200 MG capsule  QUEtiapine (SEROQUEL) 25 MG tablet  Sharps Container MISC          Review of Systems   Constitutional:  Positive for fever.   HENT:  Positive for congestion and sore throat.    Eyes:  Negative for redness.   Respiratory:  Positive for cough. Negative for shortness of breath.    Cardiovascular: Negative.    Gastrointestinal:  Negative for abdominal pain and vomiting.   Genitourinary:         See HPI.   Musculoskeletal: Negative.    Skin:  Negative for rash.   Neurological:  Positive for weakness.        Lower extremity weakness " due to her underlying condition.   Hematological: Negative.    Psychiatric/Behavioral: Negative.         Physical Exam   BP: 134/80  Pulse: 99  Temp: 98.8  F (37.1  C)  Resp: 16  Weight: 81.6 kg (180 lb)  SpO2: 96 %      Physical Exam  Constitutional:       General: She is not in acute distress.  HENT:      Head: Normocephalic.      Mouth/Throat:      Pharynx: No posterior oropharyngeal erythema.   Eyes:      Conjunctiva/sclera: Conjunctivae normal.   Cardiovascular:      Rate and Rhythm: Normal rate and regular rhythm.      Heart sounds: No murmur heard.  Pulmonary:      Breath sounds: Normal breath sounds.   Abdominal:      General: There is no distension.      Tenderness: There is no abdominal tenderness.   Musculoskeletal:      Cervical back: No tenderness.      Right lower leg: No edema.      Left lower leg: No edema.   Lymphadenopathy:      Cervical: No cervical adenopathy.   Skin:     General: Skin is warm and dry.   Neurological:      Mental Status: She is alert.      Comments: Lower extremity weakness.  Normal speech.  Apparent normal cognition.   Psychiatric:         Mood and Affect: Mood normal.         ED Course       1555 -chart was reviewed, patient interviewed and exam performed, noted that COVID test is positive.  Will need to obtain a cath UA.  Lab ordered.     Procedures             Critical Care time:               Results for orders placed or performed during the hospital encounter of 12/29/24 (from the past 24 hours)   Influenza A/B, RSV and SARS-CoV2 PCR (COVID-19) Nasopharyngeal    Specimen: Nasopharyngeal; Swab   Result Value Ref Range    Influenza A PCR Negative Negative    Influenza B PCR Negative Negative    RSV PCR Negative Negative    SARS CoV2 PCR Positive (A) Negative    Narrative    Testing was performed using the Xpert Xpress CoV2/Flu/RSV Assay on the Cepheid GeneXpert Instrument. This test should be ordered for the detection of SARS-CoV2, influenza, and RSV viruses in individuals  with signs and symptoms of respiratory tract infection. This test is for in vitro diagnostic use under the [place] FDA for laboratories certified under CLIA to perform high or moderate complexity testing. This test has been [place] FDA cleared. A negative result does not rule out the presence of PCR inhibitors in the specimen or target RNA in concentration below the limit of detection for the assay. If only one viral target is positive but coinfection with multiple targets is suspected, the sample should be re-tested with another FDA cleared, approved, or authorized test, if coninfection would change clinical management. This test was validated by the [facility]. These laboratories are certified under the Clinical Laboratory Improvement Amendments of 1988 (CLIA-88) as qualified to perfom high complexity laboratory testing.   UA with Microscopic reflex to Culture    Specimen: Urine, Clean Catch   Result Value Ref Range    Color Urine Light Yellow Colorless, Straw, Light Yellow, Yellow    Appearance Urine Clear Clear    Glucose Urine Negative Negative mg/dL    Bilirubin Urine Negative Negative    Ketones Urine Trace (A) Negative mg/dL    Specific Gravity Urine 1.016 1.003 - 1.035    Blood Urine Negative Negative    pH Urine 6.0 5.0 - 7.0    Protein Albumin Urine Negative Negative mg/dL    Urobilinogen Urine Normal Normal, 2.0 mg/dL    Nitrite Urine Positive (A) Negative    Leukocyte Esterase Urine Large (A) Negative    Bacteria Urine Moderate (A) None Seen /HPF    Mucus Urine Present (A) None Seen /LPF    RBC Urine <1 <=2 /HPF    WBC Urine 22 (H) <=5 /HPF    Squamous Epithelials Urine <1 <=1 /HPF    Narrative    Urine Culture ordered based on laboratory criteria   CBC with platelets differential    Narrative    The following orders were created for panel order CBC with platelets differential.  Procedure                               Abnormality         Status                     ---------                      "          -----------         ------                     CBC with platelets and d...[[id number]]                      Final result                 Please view results for these tests on the individual orders.   Basic metabolic panel   Result Value Ref Range    Sodium 136 135 - 145 mmol/L    Potassium 3.4 3.4 - 5.3 mmol/L    Chloride 100 98 - 107 mmol/L    Carbon Dioxide (CO2) 25 22 - 29 mmol/L    Anion Gap 11 7 - 15 mmol/L    Urea Nitrogen 6.5 6.0 - 20.0 mg/dL    Creatinine 0.93 0.51 - 1.17 mg/dL    GFR Estimate 83 >60 mL/min/1.73m2    Calcium 8.7 (L) 8.8 - 10.4 mg/dL    Glucose 97 70 - 99 mg/dL    Narrative    The generation of reference intervals for this test is currently based on binary male or female sex. If the electronic health record information indicates another gender identity or if Legal Sex is recorded as \"Unknown\", both male and female reference intervals are provided where applicable, and should be considered according to the individual's appropriate clinical context.   CBC with platelets and differential   Result Value Ref Range    WBC Count 5.9 4.0 - 11.0 10e3/uL    RBC Count 4.23 3.80 - 5.90 10e6/uL    Hemoglobin 12.3 11.7 - 17.7 g/dL    Hematocrit 35.3 35.0 - 53.0 %    MCV 84 78 - 100 fL    MCH 29.1 26.5 - 33.0 pg    MCHC 34.8 31.5 - 36.5 g/dL    RDW 11.9 10.0 - 15.0 %    Platelet Count 158 150 - 450 10e3/uL    % Neutrophils 76 %    % Lymphocytes 14 %    % Monocytes 10 %    % Eosinophils 0 %    % Basophils 0 %    % Immature Granulocytes 0 %    NRBCs per 100 WBC 0 <1 /100    Absolute Neutrophils 4.4 1.6 - 8.3 10e3/uL    Absolute Lymphocytes 0.8 0.8 - 5.3 10e3/uL    Absolute Monocytes 0.6 0.0 - 1.3 10e3/uL    Absolute Eosinophils 0.0 0.0 - 0.7 10e3/uL    Absolute Basophils 0.0 0.0 - 0.2 10e3/uL    Absolute Immature Granulocytes 0.0 <=0.4 10e3/uL    Absolute NRBCs 0.0 10e3/uL    Narrative    The generation of reference intervals for this test is currently based on binary male or female sex. If the " "electronic health record information indicates another gender identity or if Legal Sex is recorded as \"Unknown\", both male and female reference intervals are provided where applicable, and should be considered according to the individual's appropriate clinical context.       Medications   ibuprofen (ADVIL/MOTRIN) tablet 600 mg (600 mg Oral $Given 12/29/24 1726)       Assessments & Plan (with Medical Decision Making)     This patient presented with respiratory congestion and fever.  She has tested positive for COVID.  She also has symptoms which are suggestive of urinary infection in her case.  WBC is normal.  I think this goes more with the diagnosis of COVID as far as the cause of her fever.    She is going to be treated with Tamiflu which we discussed.  Also start antibiotic for urinary infection.  Culture is pending and urinary infection can be further evaluated in terms of antibiotic choice based on culture and sensitivities.    Symptomatic management of COVID also advised.    Indications to return were also discussed    Patient voices understanding of recommendations.          I have reviewed the nursing notes.    I have reviewed the findings, diagnosis, plan and need for follow up with the patient.          Medical Decision Making  The patient's presentation was of moderate complexity.    The patient's evaluation involved:  Chart review, interview and exam, lab.    The patient's management necessitated   Management of COVID and urinary infection.        Discharge Medication List as of 12/29/2024  5:24 PM        START taking these medications    Details   nirmatrelvir and ritonavir (PAXLOVID) 300 mg/100 mg therapy pack Take 3 tablets by mouth 2 times daily for 5 days., Disp-30 tablet, R-0, Local PrintDate of symptom onset: 12-29-24; Risk criteria met: Yes; Weight >40 kg Yes; Renal fxn: normal;  Drug-Drug interactions reviewed & addressed: Yes      sulfamethoxazole-trimethoprim (BACTRIM DS) 800-160 MG tablet " Take 1 tablet by mouth 2 times daily for 10 days., Disp-20 tablet, R-0, Local Print             Final diagnoses:   COVID-19 virus infection   Urinary tract infection in female       12/29/2024   [facility] EMERGENCY DEPT       LyChris blood MD  12/29/24 0867

## 2024-12-29 NOTE — ED TRIAGE NOTES
Pt states she has symptoms of a UTI. Also concern for covid. No known exposure.     Triage Assessment (Adult)       Row Name 12/29/24 1222          Triage Assessment    Airway WDL WDL        Respiratory WDL    Respiratory WDL X;rhythm/pattern;cough     Rhythm/Pattern, Respiratory shortness of breath        Skin Circulation/Temperature WDL    Skin Circulation/Temperature WDL WDL        Cardiac WDL    Cardiac WDL WDL        Peripheral/Neurovascular WDL    Peripheral Neurovascular WDL WDL        Cognitive/Neuro/Behavioral WDL    Cognitive/Neuro/Behavioral WDL WDL

## 2025-01-01 ENCOUNTER — TELEPHONE (OUTPATIENT)
Dept: NURSING | Facility: CLINIC | Age: 35
End: 2025-01-01
Payer: COMMERCIAL

## 2025-01-01 DIAGNOSIS — N39.0 URINARY TRACT INFECTION: Primary | ICD-10-CM

## 2025-01-01 LAB
BACTERIA UR CULT: ABNORMAL
BACTERIA UR CULT: ABNORMAL

## 2025-01-01 RX ORDER — NITROFURANTOIN 25; 75 MG/1; MG/1
100 CAPSULE ORAL 2 TIMES DAILY
Qty: 10 CAPSULE | Refills: 0 | Status: SHIPPED | OUTPATIENT
Start: 2025-01-01 | End: 2025-01-06

## 2025-01-01 NOTE — TELEPHONE ENCOUNTER
[facility]    Reason for call: Lab Result Notification     Lab Result (including Rx patient on, if applicable).  If culture, copy of lab report at bottom.  Lab Result: Urine Culture  12/29/24 Prescribed Sulfamethoxazole-trimethoprim (BACTRIM DS) 800-160 MG tablet Take 1 tablet by mouth 2 times daily for 10 days. - Oral   (RESISTANT)    Creatinine Level (mg/dl)   Creatinine   Date Value Ref Range Status   12/29/2024 0.93 0.51 - 1.17 mg/dL Final     Comment:     Male and Female  0-2 Months    0.31-0.88 mg/dL  2-12 Months   0.16-0.39 mg/dL  1-2 Years     0.18-0.35 mg/dL  3-4 Years     0.26-0.42 mg/dL  5-6 Years     0.29-0.47 mg/dL  7-8 Years     0.34-0.53 mg/dL  9-10 Years    0.33-0.64 mg/dL  11-12 Years   0.44-0.68 mg/dL  13-14 Years   0.46-0.77 mg/dL    Female  15 Years and older  0.51-0.95 mg/dL    Male  15 Years and older  0.67-1.17 mg/dL        Creatinine clearance (ml/min), if applicable    Serum creatinine: 0.93 mg/dL 12/29/24 1636  Estimated creatinine clearance: 97.3 mL/min (Female)  Estimated creatinine clearance: 131.6 mL/min (Male)     RN Recommendations/Instructions per [facility] ED lab result protocol:   [facility] ED lab result protocol utilized: Urine Culture  Advise to discontinue current antibiotic.   Instruct to start antibiotic: Nitrofurantoin    Patient's current Symptoms at time of telephone encounter:   Pt states she feels better than when seen in ED 12/29/24.      Does patient fit any of the following criteria?:     Has allergy to medications: No    Is breastfeeding: No    Is pregnant: No    On Coumadin/Warfarin: No      Patient/care giver notified to contact your PCP clinic or return to the Emergency department if your:  Symptoms return.  Symptoms do not improve after 3 days on antibiotic.  Symptoms do not resolve after completing antibiotic.  Symptoms worsen or other concerning symptoms.       Polly Johnston RN

## 2025-01-08 ENCOUNTER — MEDICAL CORRESPONDENCE (OUTPATIENT)
Dept: HEALTH INFORMATION MANAGEMENT | Facility: CLINIC | Age: 35
End: 2025-01-08
Payer: COMMERCIAL

## 2025-03-11 ENCOUNTER — PRE VISIT (OUTPATIENT)
Dept: UROLOGY | Facility: CLINIC | Age: 35
End: 2025-03-11
Payer: COMMERCIAL

## 2025-03-11 DIAGNOSIS — F64.9 GENDER DYSPHORIA: ICD-10-CM

## 2025-03-11 DIAGNOSIS — N31.9 NEUROGENIC BLADDER: Primary | ICD-10-CM

## 2025-03-11 NOTE — TELEPHONE ENCOUNTER
Reason for visit: cystoscopy -- 200 units botox      Dx/Hx/Sx: NGB    Records/imaging/labs/orders: in epic     At Rooming: standard --ask pt if pt would like lido

## 2025-03-19 ENCOUNTER — OFFICE VISIT (OUTPATIENT)
Dept: UROLOGY | Facility: CLINIC | Age: 35
End: 2025-03-19
Payer: COMMERCIAL

## 2025-03-19 ENCOUNTER — MEDICAL CORRESPONDENCE (OUTPATIENT)
Dept: HEALTH INFORMATION MANAGEMENT | Facility: CLINIC | Age: 35
End: 2025-03-19

## 2025-03-19 VITALS
OXYGEN SATURATION: 97 % | WEIGHT: 180 LBS | HEART RATE: 85 BPM | SYSTOLIC BLOOD PRESSURE: 127 MMHG | BODY MASS INDEX: 27.28 KG/M2 | DIASTOLIC BLOOD PRESSURE: 87 MMHG | HEIGHT: 68 IN

## 2025-03-19 DIAGNOSIS — N31.9 NEUROGENIC BLADDER: ICD-10-CM

## 2025-03-19 DIAGNOSIS — M62.89 PELVIC FLOOR DYSFUNCTION IN FEMALE: Primary | ICD-10-CM

## 2025-03-19 RX ORDER — LIDOCAINE HYDROCHLORIDE 20 MG/ML
JELLY TOPICAL ONCE
Status: COMPLETED | OUTPATIENT
Start: 2025-03-19 | End: 2025-03-19

## 2025-03-19 RX ORDER — CIPROFLOXACIN 500 MG/1
500 TABLET, FILM COATED ORAL ONCE
Status: COMPLETED | OUTPATIENT
Start: 2025-03-19 | End: 2025-03-19

## 2025-03-19 RX ADMIN — LIDOCAINE HYDROCHLORIDE: 20 JELLY TOPICAL at 11:00

## 2025-03-19 RX ADMIN — CIPROFLOXACIN 500 MG: 500 TABLET, FILM COATED ORAL at 11:00

## 2025-03-19 ASSESSMENT — PAIN SCALES - GENERAL: PAINLEVEL_OUTOF10: NO PAIN (0)

## 2025-03-19 NOTE — PROGRESS NOTES
"Cystoscopy with botox injection     Preop dx:  Neurogenic bladder  Postop dx neurogenic bladder        Hannah Chavez is a 34 year old transgender female with neurogenic bladder, prior colostomy due to sigmoid perforation. She has neurogenic bladder.  Does CIC with 14 Fr Coude catheter. No strictures.     She underwent colostomy takedown + full depth vaginoplasty on 04/25/24  She continues to dilate TID. Usually there is a dot remaining outside of body  She did have some troubles with self cath initially, but she is back to self cath with Coude catheters.  There occasionally is a \"catch\" but the catheter does pass without issues.  She is overall happy with outcomes.  She is having regular bowel movements,  Has some issues with granulation and has granulation tissue cauterization with Pat Wilkinson NP.  Last botox was 9/18/2024 which worked well.     Procedure:  After verification of informed consent was obtained, the patient was brought to the operating room, and moved to the operating table. After adequate anesthesia was induced, the patient was repositioned in the lithotomy position and prepped and draped in the usual sterile fashion. A formal timeout was performed to confirm the correct patient, procedure and operative site.     A flexible cystoscope was placed into the bladder.  The bladder mucosa appeared to be normal without stones, tumors or diverticulum on 360 degree inspection. The ureteral orifices were in the normal orthotopic position bilaterally.  We mixed 2 vials of 100 U botulinum toxin A into 10 mL of injectable saline  All injections were placed deep to the mucosa into the detrusor muscle. The procedure was then concluded. Patient tolerated it well.     We are also going to place a referral in for pelvic floor botox given some issues with pelvic floor spasticity during dilation related to her SCI.    Chilango Dominguez MD    "

## 2025-03-19 NOTE — NURSING NOTE
"Chief Complaint   Patient presents with    Cystoscopy     Cystoscopy plus botox       Height 1.727 m (5' 8\"), weight 81.6 kg (180 lb). Body mass index is 27.37 kg/m .    Patient Active Problem List   Diagnosis    Neurogenic bladder    Gender dysphoria    Colostomy status (H)       Allergies   Allergen Reactions    Dust Mites     Shellfish Allergy Nausea and Vomiting and Unknown     Patient states his mother has a \"violent reaction\" to shellfish, and that he has a feeling of nausea when he smells shellfish, so he has never consumed any. He states he has also not been tested.      Shellfish-Derived Products Other (See Comments)     Patient states her mother has a \"violent reaction\" to shellfish, and that she has a feeling of nausea when she smells shellfish, so she has never consumed any. She states she has also not been tested.  Patient states his mother has a \"violent reaction\" to shellfish, and that he has a feeling of nausea when he smells shellfish, so he has never consumed any. He states he has also not been tested.         Current Outpatient Medications   Medication Sig Dispense Refill    acetaminophen (TYLENOL) 500 MG tablet Take 1-2 tablets (500-1,000 mg) by mouth every 6 hours as needed for mild pain. 30 tablet 0    ALBUTEROL IN Inhale 1-2 puffs into the lungs 4 times daily as needed (shortness of breath)      diazepam (VALIUM) 5 MG tablet Take 5 mg by mouth every 6 hours as needed      estradiol valerate (DELESTROGEN) 20 MG/ML injection Inject into the muscle once a week Saturday     0.11ml (2.2mg)      Insulin Syringe-Needle U-100 (B-D INSULIN SYRINGE) 25G X 1\" 1 ML MISC       methocarbamol (ROBAXIN) 500 MG tablet Take 1 tablet (500 mg) by mouth 2 times daily as needed for muscle spasms. 40 tablet 0    progesterone (PROMETRIUM) 200 MG capsule Take 1 capsule by mouth every morning      QUEtiapine (SEROQUEL) 25 MG tablet Take 25 mg by mouth as needed      Sharps Container MISC Sharps Container         Social " History     Tobacco Use    Smoking status: Never     Passive exposure: Never    Smokeless tobacco: Never   Vaping Use    Vaping status: Never Used   Substance Use Topics    Alcohol use: No    Drug use: Never       What to expect after the procedure reviewed with patient: yes    Franklin Jackson  3/19/2025  10:15 AM     Invasive Procedure Safety Checklist:    Procedure: cystoscopy with botox    Action: Complete sections and checkboxes as appropriate.    Pre-procedure:  1. Patient ID Verified with 2 identifiers (Asya and  or MRN) : YES    2. Procedure and site verified with patient/designee (when able) : YES    3. Accurate consent documentation in medical record : YES    4. H&P (or appropriate assessment) documented in medical record : YES  H&P must be up to 30 days prior to procedure an updated within 24 hours of                 Procedure as applicable.     5. Relevant diagnostic and radiology test results appropriately labeled and displayed as applicable : YES    6. Blood products, implants, devices, and/or special equipment available for the procedure as applicable : YES    7. Procedure site(s) marked with provider initials [Exclusions: None] : NO    8. Marking not required. Reason : Yes  Procedure does not require site marking    Time Out:     Time-Out performed immediately prior to starting procedure, including verbal and active participation of all team members addressing: YES    1. Correct patient identity.  2. Confirmed that the correct side and site are marked.  3. An accurate procedure to be done.  4. Agreement on the procedure to be done.  5. Correct patient position.  6. Relevant images and results are properly labeled and appropriately displayed.  7. The need to administer antibiotics or fluids for irrigation purposes during the procedure as applicable.  8. Safety precautions based on patient history or medication use.    During Procedure: Verification of correct person, site, and procedure occurs any time  the responsibility for care of the patient is transferred to another member of the care team.      The following medication was given:     MEDICATION:  Uro-jet  ROUTE: Intra-urethral   SITE: Urethra  DOSE: 10 mL 2% lidocaine  LOT #: [id number]  : [employer]  EXPIRATION DATE: 9-26  NDC#: [address]-37   Was there drug waste? No    Prior to administration, verified patient identity using patient's name and date of birth.  Due to administration, patient instructed to remain in clinic for 15 minutes  afterwards, and to report any adverse reaction to me immediately.      Drug Amount Wasted:  None.  Vial/Syringe: Single dose vial    BOTOX INJECTION PROCEDURE NOTE     Date: 03/19/2025    Provider: Chilango Dominguez  [place] [facility]    INDICATION: NGB    Dosage: 200 units    Route of Administration(s):   Subcutaneous:  Intradermal:  Intramuscular:  Body Site(s) of Injection(s): Bladder wall  Description of effectiveness of this treatment: yes  Botox Lot No: [id number]  Number of units injected: 200U  NDC:9478-2900-56  Expiration date: 2027/04  Number of units of unavoidable wastage: 0U     The following medication was given:     MEDICATION:  Ciprofloxacin  ROUTE:  oral  SITE: mouth  DOSE: 500mg  LOT #: [id number]  : [employer]  EXPIRATION DATE: 2026/11  NDC#: 1735-1036-43   Was there drug waste? No  Multi-dose vial: No    Franklin Jackson  March 19, 2025

## 2025-03-19 NOTE — LETTER
"3/19/2025       RE: Hannah Chavez  [address]  [place] [address]     Dear Colleague,    Thank you for referring your patient, Hannah Chavez, to the [facility]OLOGY [facility]INIC [place] at [facility]. Please see a copy of my visit note below.    SUBJECTIVE:  Asher is a 34 year old who underwent full depth vaginoplasty on 4/25/2024. She began having more pain and bleeding in September 2024   She reports less bleeding since last silver nitrate treatment, but still has bleeding with dilations. Pain is slightly improved with taking muscle relaxer before dilations, but she still has area of tightness, pain, and resistance from pelvic floor spasticity.       OBJECTIVE:  /87 (BP Location: Right arm, Patient Position: Sitting, Cuff Size: Adult Regular)   Pulse 85   Ht 1.727 m (5' 8\")   Wt 81.6 kg (180 lb)   SpO2 97%   BMI 27.37 kg/m     General: NAD  Vulva: ***  Urinary: ***    ASSESSMENT/PLAN:  S/P vaginoplasty with granulation tissue in canal/at introitus  Neurogenic bladder - cystoscopy performed to administer Botox today in clinic      AUTUMN Mo, CNP      Again, thank you for allowing me to participate in the care of your patient.      Sincerely,    Chilango Dominguez MD    "

## 2025-04-21 ENCOUNTER — OFFICE VISIT (OUTPATIENT)
Dept: PLASTIC SURGERY | Facility: CLINIC | Age: 35
End: 2025-04-21
Payer: COMMERCIAL

## 2025-04-21 VITALS
DIASTOLIC BLOOD PRESSURE: 86 MMHG | SYSTOLIC BLOOD PRESSURE: 120 MMHG | HEART RATE: 82 BPM | OXYGEN SATURATION: 100 % | BODY MASS INDEX: 27.37 KG/M2 | HEIGHT: 68 IN

## 2025-04-21 DIAGNOSIS — L92.9 GRANULATION TISSUE: ICD-10-CM

## 2025-04-21 DIAGNOSIS — Z87.890 STATUS POST GENDER AFFIRMATION SURGERY: Primary | ICD-10-CM

## 2025-04-21 DIAGNOSIS — M62.89 HIGH-TONE PELVIC FLOOR DYSFUNCTION IN FEMALE: ICD-10-CM

## 2025-04-21 ASSESSMENT — PAIN SCALES - GENERAL: PAINLEVEL_OUTOF10: SEVERE PAIN (7)

## 2025-04-21 NOTE — PROGRESS NOTES
SUBJECTIVE:  Asher is a 34 year old who underwent full depth vaginoplasty on 4/25/24. She noticed an increase in bleeding with dilation in September which became even heavier in October. She also noticed an increase in pain with dilations at that time. She has been seen by this writer several times for ongoing treatment of granulation tissue, with last visit on 02/21/2025, during which several patches of granulation tissue around the introitus and inside canal were treated with silver nitrate. She was seen 3/19/2025 by Dr Dominguez for her regular bladder botox, at which time granulation tissue showed some improvement. She was also referred by Dr Dominguez to see physical medicine to discuss possible pelvic floor botox to address the ongoing spasms she is experiencing with dilations.     She is here today for ongoing follow-up and repeat treatment of granulation tissue. She reports improvement in bleeding since last treatment, but has noticed worsening of the muscle spasms in her pelvic floor. She has bleeding with onset of dilation, but it does not continue to flow after dilation like it used to do. She rarely has spotting between dilations anymore. She continues to dilate once daily, but there are some days she skips due to pain. Still doing prolonged dilations when she dilates. Dilation has been more painful from muscle tightness, and there are some days she can't get beyond the pelvic floor tightness. When she dilates she can still get 3 dots inside, which is consistent with how much depth she has had for several months. She feels like the canal; has good elasticity and there is still room beyond the pelvic floor tightness, but is frustrated with how much the spasticity of those muscles is interfering with dilation. Muscle relaxer did not help minimize this tightness much, and the small amount it helped was not worth side effects, so she stopped using it. She would still like to see physical medicine to explore  "options for pelvic floor botox. Her muscle spasticity has responded well to botox for neurogenic bladder and other spasticity in the past.      OBJECTIVE:  /86 (BP Location: Right arm, Patient Position: Sitting, Cuff Size: Adult Regular)   Pulse 82   Ht 1.727 m (5' 8\")   SpO2 100%   BMI 27.37 kg/m     General: NAD  Vulva: External vulva well healed. Clitoris healthy and well hooded. Urethra well healed and patent. No granulation tissue visible at introitus when at rest. Granulation tissue starting approximately 1 cm inside canal and extending about 1-2 cm internally. This area is less friable and red than last exam. Granulation tissue extending along posterior wall of canal, but less at apex. Minimal bleeding with speculum exam or stretching of tissue, which is a great improvement from previous visits. All granulation tissue treated during visit. Area of resistance approximately 2-3cm inside canal, but able to insert speculum beyond this area with gentle pressure.    ASSESSMENT/PLAN:  S/P full depth vaginoplasty  Granulation tissue - decreased amounts of granulation tissue inside canal. Treatment of remaining areas with silver nitrate today. Given improvement with repeat treatments, and major decrease in bleeding, we discussed follow-up as needed. Reviewed signs of worsening granulation tissue including increased bleeding with dilations, visible red tissue at introitus. She will reach out to schedule repeat treatment if needed.    Muscle spasm - trial of muscle relaxer before dilations was not effective. Referral for physical medicine evaluation for pelvic floor botox entered by Dr Dominguez at her previous visit. Patient has not yet heard from them. Provided patient with number for scheduling for physical medicine. Encouraged patient to contact us if needing new referral or more assistance.      Pat Wilkinson, APRN, CNP    A total of 25 minutes was spent today with patient, reviewing records, completing " charting, and other tasks as detailed above.

## 2025-04-21 NOTE — NURSING NOTE
"Chief Complaint   Patient presents with    RECHECK     Granulation tissue treatment.       Vitals:    04/21/25 1431   BP: 120/86   BP Location: Right arm   Patient Position: Sitting   Cuff Size: Adult Regular   Pulse: 82   SpO2: 100%   Height: 5' 8\"       Body mass index is 27.37 kg/m .    Patient reports severe vaginal pain with dilation (7/10).    Emil Giron, EMT    "

## 2025-04-21 NOTE — LETTER
4/21/2025       RE: Hannah Chavez  [address]  [place] [address]     Dear Colleague,    Thank you for referring your patient, Hannah Chavez, to the [facility] CLIN[facility] [place] at [facility]. Please see a copy of my visit note below.    SUBJECTIVE:  Asher is a 34 year old who underwent full depth vaginoplasty on 4/25/24. She noticed an increase in bleeding with dilation in September which became even heavier in October. She also noticed an increase in pain with dilations at that time. She has been seen by this writer several times for ongoing treatment of granulation tissue, with last visit on 02/21/2025, during which several patches of granulation tissue around the introitus and inside canal were treated with silver nitrate. She was seen 3/19/2025 by Dr Dominguez for her regular bladder botox, at which time granulation tissue showed some improvement. She was also referred by Dr Dominguez to see physical medicine to discuss possible pelvic floor botox to address the ongoing spasms she is experiencing with dilations.     She is here today for ongoing follow-up and repeat treatment of granulation tissue. She reports improvement in bleeding since last treatment, but has noticed worsening of the muscle spasms in her pelvic floor. She has bleeding with onset of dilation, but it does not continue to flow after dilation like it used to do. She rarely has spotting between dilations anymore. She continues to dilate once daily, but there are some days she skips due to pain. Still doing prolonged dilations when she dilates. Dilation has been more painful from muscle tightness, and there are some days she can't get beyond the pelvic floor tightness. When she dilates she can still get 3 dots inside, which is consistent with how much depth she has had for several months. She feels like the canal; has good elasticity and there is still room  "beyond the pelvic floor tightness, but is frustrated with how much the spasticity of those muscles is interfering with dilation. Muscle relaxer did not help minimize this tightness much, and the small amount it helped was not worth side effects, so she stopped using it. She would still like to see physical medicine to explore options for pelvic floor botox. Her muscle spasticity has responded well to botox for neurogenic bladder and other spasticity in the past.      OBJECTIVE:  /86 (BP Location: Right arm, Patient Position: Sitting, Cuff Size: Adult Regular)   Pulse 82   Ht 1.727 m (5' 8\")   SpO2 100%   BMI 27.37 kg/m     General: NAD  Vulva: External vulva well healed. Clitoris healthy and well hooded. Urethra well healed and patent. No granulation tissue visible at introitus when at rest. Granulation tissue starting approximately 1 cm inside canal and extending about 1-2 cm internally. This area is less friable and red than last exam. Granulation tissue extending along posterior wall of canal, but less at apex. Minimal bleeding with speculum exam or stretching of tissue, which is a great improvement from previous visits. All granulation tissue treated during visit. Area of resistance approximately 2-3cm inside canal, but able to insert speculum beyond this area with gentle pressure.    ASSESSMENT/PLAN:  S/P full depth vaginoplasty  Granulation tissue - decreased amounts of granulation tissue inside canal. Treatment of remaining areas with silver nitrate today. Given improvement with repeat treatments, and major decrease in bleeding, we discussed follow-up as needed. Reviewed signs of worsening granulation tissue including increased bleeding with dilations, visible red tissue at introitus. She will reach out to schedule repeat treatment if needed.    Muscle spasm - trial of muscle relaxer before dilations was not effective. Referral for physical medicine evaluation for pelvic floor botox entered by " Alberto at her previous visit. Patient has not yet heard from them. Provided patient with number for scheduling for physical medicine. Encouraged patient to contact us if needing new referral or more assistance.      AUTUMN Mo, CNP    A total of 25 minutes was spent today with patient, reviewing records, completing charting, and other tasks as detailed above.       Again, thank you for allowing me to participate in the care of your patient.      Sincerely,    AUTUMN Bonilla CNP

## 2025-05-19 DIAGNOSIS — M62.89 PELVIC FLOOR DYSFUNCTION IN FEMALE: Primary | ICD-10-CM

## 2025-05-19 DIAGNOSIS — N31.9 NEUROGENIC BLADDER: ICD-10-CM

## 2025-05-21 ENCOUNTER — TELEPHONE (OUTPATIENT)
Dept: UROLOGY | Facility: CLINIC | Age: 35
End: 2025-05-21
Payer: COMMERCIAL

## 2025-06-16 DIAGNOSIS — N89.5 VAGINAL STENOSIS: ICD-10-CM

## 2025-06-16 DIAGNOSIS — K59.02 SPASTIC PELVIC FLOOR SYNDROME: Primary | ICD-10-CM

## 2025-06-16 DIAGNOSIS — N31.9 NEUROGENIC BLADDER: ICD-10-CM

## 2025-06-16 RX ORDER — ACETAMINOPHEN 325 MG/1
975 TABLET ORAL ONCE
OUTPATIENT
Start: 2025-06-16 | End: 2025-06-16

## 2025-06-16 RX ORDER — ACETAMINOPHEN 650 MG/1
650 SUPPOSITORY RECTAL ONCE
OUTPATIENT
Start: 2025-06-16

## 2025-06-16 RX ORDER — CLINDAMYCIN PHOSPHATE 900 MG/50ML
900 INJECTION, SOLUTION INTRAVENOUS
OUTPATIENT
Start: 2025-06-16

## 2025-06-16 RX ORDER — CLINDAMYCIN PHOSPHATE 900 MG/50ML
900 INJECTION, SOLUTION INTRAVENOUS SEE ADMIN INSTRUCTIONS
OUTPATIENT
Start: 2025-06-16

## 2025-06-18 ENCOUNTER — TELEPHONE (OUTPATIENT)
Dept: UROLOGY | Facility: CLINIC | Age: 35
End: 2025-06-18
Payer: COMMERCIAL

## 2025-06-18 PROBLEM — N89.5 VAGINAL STENOSIS: Status: ACTIVE | Noted: 2025-06-16

## 2025-06-18 PROBLEM — K59.02 SPASTIC PELVIC FLOOR SYNDROME: Status: ACTIVE | Noted: 2025-06-16

## 2025-06-18 NOTE — TELEPHONE ENCOUNTER
Called patient to schedule surgery with Dr Dominguez    Spoke with: Asher (patient)     Date of Surgery: 8/7/25    Estimated Arrival time Discussed with Patient:  Yes    Location of surgery: [facility]     Pre-Op H&P: [facility]    Labs: Not Applicable     Imaging: Not Applicable     Post-Op Appt Date: AUTUMN Bonilla, CNP   8/15/25 at 0930    Post-Op Imaging Date:  Not Applicable     Discussed with patient pre-op RN will call 2-3 days prior to surgery with arrival time and instructions:  Yes     Standard Surgery Packet Sent: Yes 06/18/25  via [employer] Message      Additional Comments:       All patients questions were answered and was instructed to review surgical packet and call back with any questions or concerns.       Octavia Perdomo on 6/18/2025 at 12:31 PM

## 2025-08-06 ENCOUNTER — ANESTHESIA EVENT (OUTPATIENT)
Dept: SURGERY | Facility: AMBULATORY SURGERY CENTER | Age: 35
End: 2025-08-06
Payer: COMMERCIAL

## 2025-08-07 ENCOUNTER — ANESTHESIA (OUTPATIENT)
Dept: SURGERY | Facility: AMBULATORY SURGERY CENTER | Age: 35
End: 2025-08-07
Payer: COMMERCIAL

## 2025-08-07 RX ORDER — PROPOFOL 10 MG/ML
INJECTION, EMULSION INTRAVENOUS PRN
Status: DISCONTINUED | OUTPATIENT
Start: 2025-08-07 | End: 2025-08-07

## 2025-08-07 RX ORDER — FENTANYL CITRATE 50 UG/ML
INJECTION, SOLUTION INTRAMUSCULAR; INTRAVENOUS PRN
Status: DISCONTINUED | OUTPATIENT
Start: 2025-08-07 | End: 2025-08-07

## 2025-08-07 RX ORDER — KETAMINE HYDROCHLORIDE 10 MG/ML
INJECTION INTRAMUSCULAR; INTRAVENOUS PRN
Status: DISCONTINUED | OUTPATIENT
Start: 2025-08-07 | End: 2025-08-07

## 2025-08-07 RX ORDER — LIDOCAINE HYDROCHLORIDE 20 MG/ML
INJECTION, SOLUTION INFILTRATION; PERINEURAL PRN
Status: DISCONTINUED | OUTPATIENT
Start: 2025-08-07 | End: 2025-08-07

## 2025-08-07 RX ORDER — ONDANSETRON 2 MG/ML
INJECTION INTRAMUSCULAR; INTRAVENOUS PRN
Status: DISCONTINUED | OUTPATIENT
Start: 2025-08-07 | End: 2025-08-07

## 2025-08-07 RX ORDER — GLYCOPYRROLATE 0.2 MG/ML
INJECTION, SOLUTION INTRAMUSCULAR; INTRAVENOUS PRN
Status: DISCONTINUED | OUTPATIENT
Start: 2025-08-07 | End: 2025-08-07

## 2025-08-07 RX ORDER — KETOROLAC TROMETHAMINE 30 MG/ML
INJECTION, SOLUTION INTRAMUSCULAR; INTRAVENOUS PRN
Status: DISCONTINUED | OUTPATIENT
Start: 2025-08-07 | End: 2025-08-07

## 2025-08-07 RX ORDER — PROPOFOL 10 MG/ML
INJECTION, EMULSION INTRAVENOUS CONTINUOUS PRN
Status: DISCONTINUED | OUTPATIENT
Start: 2025-08-07 | End: 2025-08-07

## 2025-08-07 RX ORDER — DEXAMETHASONE SODIUM PHOSPHATE 4 MG/ML
INJECTION, SOLUTION INTRA-ARTICULAR; INTRALESIONAL; INTRAMUSCULAR; INTRAVENOUS; SOFT TISSUE PRN
Status: DISCONTINUED | OUTPATIENT
Start: 2025-08-07 | End: 2025-08-07

## 2025-08-07 RX ADMIN — FENTANYL CITRATE 50 MCG: 50 INJECTION, SOLUTION INTRAMUSCULAR; INTRAVENOUS at 13:53

## 2025-08-07 RX ADMIN — FENTANYL CITRATE 50 MCG: 50 INJECTION, SOLUTION INTRAMUSCULAR; INTRAVENOUS at 13:58

## 2025-08-07 RX ADMIN — SODIUM CHLORIDE, SODIUM LACTATE, POTASSIUM CHLORIDE, CALCIUM CHLORIDE: 600; 310; 30; 20 INJECTION, SOLUTION INTRAVENOUS at 12:47

## 2025-08-07 RX ADMIN — KETOROLAC TROMETHAMINE 15 MG: 30 INJECTION, SOLUTION INTRAMUSCULAR; INTRAVENOUS at 14:13

## 2025-08-07 RX ADMIN — PROPOFOL 100 MCG/KG/MIN: 10 INJECTION, EMULSION INTRAVENOUS at 13:54

## 2025-08-07 RX ADMIN — ONDANSETRON 4 MG: 2 INJECTION INTRAMUSCULAR; INTRAVENOUS at 13:57

## 2025-08-07 RX ADMIN — KETAMINE HYDROCHLORIDE 20 MG: 10 INJECTION INTRAMUSCULAR; INTRAVENOUS at 13:54

## 2025-08-07 RX ADMIN — DEXAMETHASONE SODIUM PHOSPHATE 4 MG: 4 INJECTION, SOLUTION INTRA-ARTICULAR; INTRALESIONAL; INTRAMUSCULAR; INTRAVENOUS; SOFT TISSUE at 13:57

## 2025-08-07 RX ADMIN — PROPOFOL 30 MG: 10 INJECTION, EMULSION INTRAVENOUS at 14:10

## 2025-08-07 RX ADMIN — CLINDAMYCIN PHOSPHATE 900 MG: 900 INJECTION, SOLUTION INTRAVENOUS at 13:49

## 2025-08-07 RX ADMIN — KETAMINE HYDROCHLORIDE 10 MG: 10 INJECTION INTRAMUSCULAR; INTRAVENOUS at 14:10

## 2025-08-07 RX ADMIN — LIDOCAINE HYDROCHLORIDE 100 MG: 20 INJECTION, SOLUTION INFILTRATION; PERINEURAL at 13:54

## 2025-08-07 RX ADMIN — GLYCOPYRROLATE 0.2 MG: 0.2 INJECTION, SOLUTION INTRAMUSCULAR; INTRAVENOUS at 13:48

## 2025-08-07 ASSESSMENT — ENCOUNTER SYMPTOMS: ORTHOPNEA: 0

## (undated) DEVICE — SOL NACL 0.9% IRRIG 1000ML BOTTLE 2F7124

## (undated) DEVICE — SU WND CLOSURE VLOC 180 ABS 3-0 6" V-20 VLOCL0604

## (undated) DEVICE — DRAIN JACKSON PRATT CHANNEL 19FR ROUND HUBLESS SIL JP-2230

## (undated) DEVICE — ESU LIGASURE LAPAROSCOPIC BLUNT TIP SEALER 5MMX44CM LF1844

## (undated) DEVICE — SOL NACL 0.9% 10ML VIAL 0409-4888-02

## (undated) DEVICE — NDL 25GA 1.5" 305127

## (undated) DEVICE — NDL BLUNT 18GA 1" W/O FILTER 305181

## (undated) DEVICE — SU MONOCRYL 4-0 PS-2 27" UND Y426H

## (undated) DEVICE — ESU CORD BIPOLAR GREEN 10-4000

## (undated) DEVICE — GLOVE BIOGEL PI MICRO SZ 7.5 48575

## (undated) DEVICE — ENDO OBTURATOR ACCESS PORT BLADELESS 8X100MM IAS8-100LP

## (undated) DEVICE — SOL WATER IRRIG 3000ML BAG 2B7117

## (undated) DEVICE — DECANTER VIAL 2006S

## (undated) DEVICE — DRAPE U SPLIT 74X120" 29440

## (undated) DEVICE — PREP CHLORAPREP 26ML TINTED HI-LITE ORANGE 930815

## (undated) DEVICE — SU VICRYL 0 UR-6 27" J603H

## (undated) DEVICE — SU VICRYL 2-0 CT-2 CR 8X18" J726D

## (undated) DEVICE — DAVINCI XI DRAPE ARM 470015

## (undated) DEVICE — CATH PLUG W/CAP 000076

## (undated) DEVICE — SYR 30ML LL W/O NDL 302832

## (undated) DEVICE — LINEN GOWN XLG 5407

## (undated) DEVICE — STPL CIRCULAR 29MM CVD CDH29P

## (undated) DEVICE — LINEN TOWEL PACK X5 5464

## (undated) DEVICE — PAD CHUX UNDERPAD 30X30"

## (undated) DEVICE — SOL NACL 0.9% IRRIG 500ML BOTTLE 2F7123

## (undated) DEVICE — PREP POVIDONE-IODINE 7.5% SCRUB 4OZ BOTTLE MDS093945

## (undated) DEVICE — SU VICRYL+ 3-0 27IN SH UND VCP416H

## (undated) DEVICE — PREP POVIDONE-IODINE 10% SOLUTION 4OZ BOTTLE MDS093944

## (undated) DEVICE — PACK DAVINCI UROL

## (undated) DEVICE — ESU ELEC BLADE 2.75" COATED/INSULATED E1455

## (undated) DEVICE — ESU GROUND PAD ADULT W/CORD E7507

## (undated) DEVICE — LABEL MEDICATION SYSTEM 3303-P

## (undated) DEVICE — GLOVE PROTEXIS W/NEU-THERA 8.0  2D73TE80

## (undated) DEVICE — ESU PENCIL W/COATED BLADE E2450H

## (undated) DEVICE — PREP SKIN SCRUB TRAY 4461A

## (undated) DEVICE — BLADE CLIPPER SGL USE 9680

## (undated) DEVICE — DRAPE SHEET REV FOLD 3/4 9349

## (undated) DEVICE — GOWN IMPERVIOUS BREATHABLE SMART XLG 89045

## (undated) DEVICE — STPL SKIN 35W ROTATING HEAD PRW35

## (undated) DEVICE — CATH FOLYSIL 16FR 15ML AA6116

## (undated) DEVICE — DAVINCI XI HANDPIECE ESU VESSEL SEALER 8MM EXT 480422

## (undated) DEVICE — PREP CHLORAPREP 26ML TINTED ORANGE  260815

## (undated) DEVICE — RX BACITRACIN OINTMENT 14G 0.5OZ 45802-060-01

## (undated) DEVICE — DRAPE IOBAN INCISE 23X17" 6650EZ

## (undated) DEVICE — SOL NACL 0.9% IRRIG 3000ML BAG 2B7477

## (undated) DEVICE — ENDO ACCESS PLATFORM GELPOINT MINI CNGL3

## (undated) DEVICE — SUCTION MANIFOLD NEPTUNE 2 SYS 4 PORT 0702-020-000

## (undated) DEVICE — PACK GOWN 3/PK DISP XL SBA32GPFCB

## (undated) DEVICE — DRAPE POUCH INSTRUMENT 1018

## (undated) DEVICE — SU ETHILON 3-0 PS-1 18" 1663H

## (undated) DEVICE — DRSG GAUZE 4X4" TRAY 6939

## (undated) DEVICE — SYR 50ML LL W/O NDL 309653

## (undated) DEVICE — DRSG KERLIX FLUFFS X5

## (undated) DEVICE — CONNECTOR WATER VALVE PERFUSION PACK STR 020272801

## (undated) DEVICE — SU VICRYL 3-0 SH 27" UND J416H

## (undated) DEVICE — NDL SPINAL 22GA 3.5" QUINCKE 405181

## (undated) DEVICE — GLOVE PROTEXIS W/NEU-THERA 7.5  2D73TE75

## (undated) DEVICE — NDL BLUNT 18GA 1.5" W/O FILTER 305180

## (undated) DEVICE — ENDO SYSTEM WATER BOTTLE & TUBING W/CO2 FILTER 00711549

## (undated) DEVICE — PROTECTOR ARM ONE-STEP TRENDELENBURG 40418

## (undated) DEVICE — SUCTION MANIFOLD NEPTUNE 2 SYS 1 PORT 702-025-000

## (undated) DEVICE — SU VICRYL 0 TIE 54" J608H

## (undated) DEVICE — SU VICRYL 2-0 CT-1 27" UND J259H

## (undated) DEVICE — SYR 10ML LL W/O NDL 302995

## (undated) DEVICE — SU VICRYL 3-0 SH 8X18" UND J864D

## (undated) DEVICE — SYR 30ML LL W/O NDL

## (undated) DEVICE — STPL SKIN SUBCUTICULAR INSORB  2030

## (undated) DEVICE — BLADE KNIFE SURG 15 371115

## (undated) DEVICE — SPONGE LAP 18X18" X8435

## (undated) DEVICE — SOL WATER IRRIG 1000ML BOTTLE 2F7114

## (undated) DEVICE — DAVINCI XI SEAL UNIVERSAL 5-12MM 470500

## (undated) DEVICE — PAD CHUX UNDERPAD 23X24" 7136

## (undated) DEVICE — DAVINCI XI GRASPER FENESTRATED TIP UP 8MM 470347

## (undated) DEVICE — GLOVE BIOGEL PI MICRO INDICATOR UNDERGLOVE SZ 8.0 48980

## (undated) DEVICE — KIT PATIENT POSITIONING PIGAZZI LATEX FREE 40580

## (undated) DEVICE — SOL WATER 3000ML BAG 7973-08

## (undated) DEVICE — SYR 10ML LL W/O NDL

## (undated) DEVICE — DRSG KERLIX 4 1/2"X4YDS ROLL 6715

## (undated) DEVICE — LINEN TOWEL PACK X30 5481

## (undated) DEVICE — NDL ASPIRATING INJECT 22GA 31.25CM

## (undated) DEVICE — DRSG XEROFORM 5X9" CUR253590W

## (undated) DEVICE — SUCTION TIP YANKAUER W/O VENT K86

## (undated) DEVICE — TUBING SUCTION MEDI-VAC SOFT 3/16"X20' N520A

## (undated) DEVICE — PACK CYSTO CUSTOM ASC

## (undated) DEVICE — KIT ENDO FIRST STEP DISINFECTANT 200ML W/POUCH EP-4

## (undated) DEVICE — SU PDS II 0 CT-1 27" Z340H

## (undated) DEVICE — STPL RELOAD REG TISSUE ECHELON 60 X 3.6MM BLUE GST60B

## (undated) DEVICE — SU PROLENE 0 CT-1 30" 8424H

## (undated) DEVICE — NDL ENDOSCOPIC RIGID UROPLASTY 3.8FRX14.5" MRN-420

## (undated) DEVICE — SU SILK 0 SH 30" K834H

## (undated) DEVICE — NDL COUNTER 40CT  31142311

## (undated) DEVICE — DRAPE GYN/UROLOGY FLUID POUCH TUR 29455

## (undated) DEVICE — SU ETHILON 2-0 FS 18" 664H

## (undated) DEVICE — DAVINCI XI GRASPER PROBE CARDIAC ENDOWRIST 470215

## (undated) DEVICE — SU DERMABOND ADVANCED .7ML DNX12

## (undated) DEVICE — ENDO CAP AND TUBING STERILE FOR ENDOGATOR  100130

## (undated) DEVICE — JELLY LUBRICATING SURGILUBE 2OZ TUBE

## (undated) DEVICE — SUPPORTER ATHLETIC LG LATEX 202636

## (undated) DEVICE — DRAIN PENROSE 5/8X18" LATEX 30416-058

## (undated) DEVICE — Device

## (undated) DEVICE — PACK SET-UP STD 9102

## (undated) DEVICE — DRAIN JACKSON PRATT 15FR ROUND SU130-1323

## (undated) DEVICE — ESU LIGASURE IMPACT OPEN SEALER/DVDR CVD LG JAW LF4418

## (undated) DEVICE — WIPE PREMOIST CLEANSING WASHCLOTHS 7988

## (undated) DEVICE — SUCTION TIP YANKAUER STR K87

## (undated) DEVICE — BNDG ABDOMINAL BINDER 9X45-62" 79-89071

## (undated) DEVICE — STPL DAVINCI SUREFORM 60MM STR 480460

## (undated) DEVICE — TUBING SUCTION CONNECTING 20'X3/16" 035020

## (undated) DEVICE — DRAPE LEGGINGS CLEAR 8430

## (undated) DEVICE — STPL DAVINCI SUREFORM 60MM RELOAD BLUE 48360B

## (undated) DEVICE — SU VICRYL 2-0 SH 27" UND J417H

## (undated) DEVICE — ANTIFOG SOLUTION SEE SHARP 150M TROCAR SWABS 30978

## (undated) DEVICE — PACK NEURO MINOR UMMC SNE32MNMU4

## (undated) DEVICE — TUBING FILTER TRI-LUMEN AIRSEAL ASC-EVAC1

## (undated) DEVICE — DRAPE LAP W/ARMBOARD 29410

## (undated) DEVICE — SU DERMABOND PRINEO 22CM CLR222US

## (undated) DEVICE — SU MONOCRYL 4-0 PS-2 18" UND Y496G

## (undated) DEVICE — KIT CONNECTOR FOR OLYMPUS ENDOSCOPES DEFENDO 100310

## (undated) DEVICE — BAG URINARY DRAIN 4000ML LF 153509

## (undated) DEVICE — DAVINCI XI DRAPE COLUMN 470341

## (undated) DEVICE — DRAPE SHEET MED 44X70" 9355

## (undated) DEVICE — SURGICEL POWDER ABSORBABLE HEMOSTAT 3GM 3013SP

## (undated) DEVICE — DAVINCI XI DRIVER NDL LARGE 8MM EXT 471006

## (undated) DEVICE — DRAPE IOBAN INCISE 13X13" 6640EZ

## (undated) DEVICE — NDL BLADDER INJ BONEE 70CM NBI070

## (undated) DEVICE — DRAPE MAYO STAND 23X54 8337

## (undated) DEVICE — SYR 50ML SLIP TIP W/O NDL 309654

## (undated) DEVICE — NDL WOLF ASPIRATING INJECT 22GA 31.25CM 8652.7775

## (undated) DEVICE — DAVINCI XI ESU BIPOLAR 3MM ENDOWRIST FENESTRATED EXT 471205

## (undated) DEVICE — SU VICRYL 3-0 SH 27" J316H

## (undated) DEVICE — SOL WATER IRRIG 500ML BOTTLE 2F7113

## (undated) DEVICE — DEVICE SUTURE PASSER 14GA WECK EFX EFXSP2

## (undated) DEVICE — DAVINCI HOT SHEARS TIP COVER  400180

## (undated) DEVICE — STPL POWERED ECHELON 60MM PSEE60A

## (undated) DEVICE — NDL INSUFFLATION 13GA 120MM C2201

## (undated) DEVICE — DAVINCI XI MONOPOLAR SCISSORS HOT SHEARS 8MM 470179

## (undated) DEVICE — SU MONOCRYL 3-0 SH 27" UND Y416H

## (undated) DEVICE — RX VISTASEAL FIBRIN SEALANT W/THROMBIN 10ML VST10

## (undated) DEVICE — SU PDS II 3-0 SH 27" Z316H

## (undated) DEVICE — SU PDS II 3-0 FS-1 27" CLR  Z442H

## (undated) DEVICE — TUBING IRRIG CYSTO/BLADDER SET 81" LF 2C4040

## (undated) DEVICE — WIPES FOLEY CARE SURESTEP PROVON DFC100

## (undated) DEVICE — SU SILK 2-0 TIE 12X30" A305H

## (undated) DEVICE — DRSG PRIMAPORE 02X3" 7133

## (undated) DEVICE — DRAPE POUCH IRR 1016

## (undated) DEVICE — TUBING SUCTION 10'X3/16" N510

## (undated) DEVICE — ENDO TROCAR BLUNT TIP KII BALLOON 12X100MM C0R47

## (undated) DEVICE — PACK MINOR CUSTOM ASC

## (undated) DEVICE — DRAIN JACKSON PRATT RESERVOIR 100ML SU130-1305

## (undated) DEVICE — DAVINCI XI REDUCER 8-12MM 470381

## (undated) RX ORDER — CEFAZOLIN SODIUM 2 G/50ML
SOLUTION INTRAVENOUS
Status: DISPENSED
Start: 2022-08-18

## (undated) RX ORDER — FENTANYL CITRATE 50 UG/ML
INJECTION, SOLUTION INTRAMUSCULAR; INTRAVENOUS
Status: DISPENSED
Start: 2023-06-02

## (undated) RX ORDER — ONDANSETRON 2 MG/ML
INJECTION INTRAMUSCULAR; INTRAVENOUS
Status: DISPENSED
Start: 2018-10-12

## (undated) RX ORDER — CEFAZOLIN SODIUM 2 G/50ML
SOLUTION INTRAVENOUS
Status: DISPENSED
Start: 2018-10-12

## (undated) RX ORDER — LIDOCAINE HYDROCHLORIDE 20 MG/ML
INJECTION, SOLUTION EPIDURAL; INFILTRATION; INTRACAUDAL; PERINEURAL
Status: DISPENSED
Start: 2022-08-18

## (undated) RX ORDER — BUPIVACAINE HYDROCHLORIDE 2.5 MG/ML
INJECTION, SOLUTION EPIDURAL; INFILTRATION; INTRACAUDAL
Status: DISPENSED
Start: 2024-04-25

## (undated) RX ORDER — OXYCODONE HYDROCHLORIDE 5 MG/1
TABLET ORAL
Status: DISPENSED
Start: 2024-04-25

## (undated) RX ORDER — LIDOCAINE HYDROCHLORIDE AND EPINEPHRINE 10; 10 MG/ML; UG/ML
INJECTION, SOLUTION INFILTRATION; PERINEURAL
Status: DISPENSED
Start: 2023-06-02

## (undated) RX ORDER — FENTANYL CITRATE 50 UG/ML
INJECTION, SOLUTION INTRAMUSCULAR; INTRAVENOUS
Status: DISPENSED
Start: 2024-04-25

## (undated) RX ORDER — PHENYLEPHRINE HCL IN 0.9% NACL 1 MG/10 ML
SYRINGE (ML) INTRAVENOUS
Status: DISPENSED
Start: 2018-03-02

## (undated) RX ORDER — LIDOCAINE HYDROCHLORIDE 20 MG/ML
JELLY TOPICAL
Status: DISPENSED
Start: 2025-03-19

## (undated) RX ORDER — ALBUTEROL SULFATE 0.83 MG/ML
SOLUTION RESPIRATORY (INHALATION)
Status: DISPENSED
Start: 2018-03-02

## (undated) RX ORDER — GABAPENTIN 300 MG/1
CAPSULE ORAL
Status: DISPENSED
Start: 2022-08-18

## (undated) RX ORDER — KETOROLAC TROMETHAMINE 30 MG/ML
INJECTION, SOLUTION INTRAMUSCULAR; INTRAVENOUS
Status: DISPENSED
Start: 2018-10-12

## (undated) RX ORDER — DEXAMETHASONE SODIUM PHOSPHATE 4 MG/ML
INJECTION, SOLUTION INTRA-ARTICULAR; INTRALESIONAL; INTRAMUSCULAR; INTRAVENOUS; SOFT TISSUE
Status: DISPENSED
Start: 2024-04-25

## (undated) RX ORDER — METRONIDAZOLE 500 MG/100ML
INJECTION, SOLUTION INTRAVENOUS
Status: DISPENSED
Start: 2024-04-25

## (undated) RX ORDER — ACETAMINOPHEN 325 MG/1
TABLET ORAL
Status: DISPENSED
Start: 2018-10-12

## (undated) RX ORDER — DEXAMETHASONE SODIUM PHOSPHATE 4 MG/ML
INJECTION, SOLUTION INTRA-ARTICULAR; INTRALESIONAL; INTRAMUSCULAR; INTRAVENOUS; SOFT TISSUE
Status: DISPENSED
Start: 2018-03-02

## (undated) RX ORDER — FENTANYL CITRATE 50 UG/ML
INJECTION, SOLUTION INTRAMUSCULAR; INTRAVENOUS
Status: DISPENSED
Start: 2024-08-22

## (undated) RX ORDER — LIDOCAINE HYDROCHLORIDE 20 MG/ML
JELLY TOPICAL
Status: DISPENSED
Start: 2023-12-20

## (undated) RX ORDER — FENTANYL CITRATE 50 UG/ML
INJECTION, SOLUTION INTRAMUSCULAR; INTRAVENOUS
Status: DISPENSED
Start: 2017-09-01

## (undated) RX ORDER — LIDOCAINE HYDROCHLORIDE 10 MG/ML
INJECTION, SOLUTION EPIDURAL; INFILTRATION; INTRACAUDAL; PERINEURAL
Status: DISPENSED
Start: 2024-04-27

## (undated) RX ORDER — PROPOFOL 10 MG/ML
INJECTION, EMULSION INTRAVENOUS
Status: DISPENSED
Start: 2023-06-02

## (undated) RX ORDER — OXYCODONE HYDROCHLORIDE 5 MG/1
TABLET ORAL
Status: DISPENSED
Start: 2022-08-18

## (undated) RX ORDER — EPINEPHRINE 1 MG/ML
INJECTION, SOLUTION INTRAMUSCULAR; SUBCUTANEOUS
Status: DISPENSED
Start: 2022-08-18

## (undated) RX ORDER — BUPIVACAINE HYDROCHLORIDE 2.5 MG/ML
INJECTION, SOLUTION EPIDURAL; INFILTRATION; INTRACAUDAL
Status: DISPENSED
Start: 2024-08-22

## (undated) RX ORDER — CEFAZOLIN SODIUM 1 G/3ML
INJECTION, POWDER, FOR SOLUTION INTRAMUSCULAR; INTRAVENOUS
Status: DISPENSED
Start: 2024-04-25

## (undated) RX ORDER — DEXAMETHASONE SODIUM PHOSPHATE 4 MG/ML
INJECTION, SOLUTION INTRA-ARTICULAR; INTRALESIONAL; INTRAMUSCULAR; INTRAVENOUS; SOFT TISSUE
Status: DISPENSED
Start: 2023-06-02

## (undated) RX ORDER — HYDROMORPHONE HYDROCHLORIDE 1 MG/ML
INJECTION, SOLUTION INTRAMUSCULAR; INTRAVENOUS; SUBCUTANEOUS
Status: DISPENSED
Start: 2023-06-02

## (undated) RX ORDER — DEXMEDETOMIDINE HYDROCHLORIDE 4 UG/ML
INJECTION, SOLUTION INTRAVENOUS
Status: DISPENSED
Start: 2018-03-02

## (undated) RX ORDER — NITROGLYCERIN 20 MG/100ML
INJECTION INTRAVENOUS
Status: DISPENSED
Start: 2017-09-01

## (undated) RX ORDER — FENTANYL CITRATE 50 UG/ML
INJECTION, SOLUTION INTRAMUSCULAR; INTRAVENOUS
Status: DISPENSED
Start: 2022-08-18

## (undated) RX ORDER — PROPOFOL 10 MG/ML
INJECTION, EMULSION INTRAVENOUS
Status: DISPENSED
Start: 2017-09-01

## (undated) RX ORDER — DEXAMETHASONE SODIUM PHOSPHATE 4 MG/ML
INJECTION, SOLUTION INTRA-ARTICULAR; INTRALESIONAL; INTRAMUSCULAR; INTRAVENOUS; SOFT TISSUE
Status: DISPENSED
Start: 2017-09-01

## (undated) RX ORDER — METHOCARBAMOL 500 MG/1
TABLET, FILM COATED ORAL
Status: DISPENSED
Start: 2024-04-25

## (undated) RX ORDER — LIDOCAINE HYDROCHLORIDE 20 MG/ML
JELLY TOPICAL
Status: DISPENSED
Start: 2024-09-18

## (undated) RX ORDER — PROPOFOL 10 MG/ML
INJECTION, EMULSION INTRAVENOUS
Status: DISPENSED
Start: 2018-03-02

## (undated) RX ORDER — HYDROMORPHONE HYDROCHLORIDE 1 MG/ML
INJECTION, SOLUTION INTRAMUSCULAR; INTRAVENOUS; SUBCUTANEOUS
Status: DISPENSED
Start: 2024-04-25

## (undated) RX ORDER — PROPOFOL 10 MG/ML
INJECTION, EMULSION INTRAVENOUS
Status: DISPENSED
Start: 2022-08-18

## (undated) RX ORDER — CEFAZOLIN SODIUM/WATER 2 G/20 ML
SYRINGE (ML) INTRAVENOUS
Status: DISPENSED
Start: 2024-04-25

## (undated) RX ORDER — HYDROMORPHONE HYDROCHLORIDE 1 MG/ML
INJECTION, SOLUTION INTRAMUSCULAR; INTRAVENOUS; SUBCUTANEOUS
Status: DISPENSED
Start: 2024-08-22

## (undated) RX ORDER — INDOCYANINE GREEN AND WATER 25 MG
KIT INJECTION
Status: DISPENSED
Start: 2024-04-25

## (undated) RX ORDER — BUPIVACAINE HYDROCHLORIDE 2.5 MG/ML
INJECTION, SOLUTION EPIDURAL; INFILTRATION; INTRACAUDAL
Status: DISPENSED
Start: 2023-06-02

## (undated) RX ORDER — CELECOXIB 200 MG/1
CAPSULE ORAL
Status: DISPENSED
Start: 2024-04-25

## (undated) RX ORDER — ONDANSETRON 2 MG/ML
INJECTION INTRAMUSCULAR; INTRAVENOUS
Status: DISPENSED
Start: 2023-06-02

## (undated) RX ORDER — CIPROFLOXACIN 500 MG/1
TABLET, FILM COATED ORAL
Status: DISPENSED
Start: 2024-05-08

## (undated) RX ORDER — CEFAZOLIN SODIUM/WATER 2 G/20 ML
SYRINGE (ML) INTRAVENOUS
Status: DISPENSED
Start: 2023-06-02

## (undated) RX ORDER — PROPOFOL 10 MG/ML
INJECTION, EMULSION INTRAVENOUS
Status: DISPENSED
Start: 2018-10-12

## (undated) RX ORDER — ACETAMINOPHEN 325 MG/1
TABLET ORAL
Status: DISPENSED
Start: 2022-08-18

## (undated) RX ORDER — ACETAMINOPHEN 325 MG/1
TABLET ORAL
Status: DISPENSED
Start: 2024-08-22

## (undated) RX ORDER — PHENYLEPHRINE HCL IN 0.9% NACL 1 MG/10 ML
SYRINGE (ML) INTRAVENOUS
Status: DISPENSED
Start: 2017-09-01

## (undated) RX ORDER — FENTANYL CITRATE-0.9 % NACL/PF 10 MCG/ML
PLASTIC BAG, INJECTION (ML) INTRAVENOUS
Status: DISPENSED
Start: 2024-04-25

## (undated) RX ORDER — ENOXAPARIN SODIUM 100 MG/ML
INJECTION SUBCUTANEOUS
Status: DISPENSED
Start: 2024-04-25

## (undated) RX ORDER — ONDANSETRON 2 MG/ML
INJECTION INTRAMUSCULAR; INTRAVENOUS
Status: DISPENSED
Start: 2018-03-02

## (undated) RX ORDER — ONDANSETRON 2 MG/ML
INJECTION INTRAMUSCULAR; INTRAVENOUS
Status: DISPENSED
Start: 2024-04-25

## (undated) RX ORDER — PROPOFOL 10 MG/ML
INJECTION, EMULSION INTRAVENOUS
Status: DISPENSED
Start: 2024-04-25

## (undated) RX ORDER — EPHEDRINE SULFATE 50 MG/ML
INJECTION, SOLUTION INTRAMUSCULAR; INTRAVENOUS; SUBCUTANEOUS
Status: DISPENSED
Start: 2024-04-25

## (undated) RX ORDER — EPINEPHRINE 1 MG/ML
INJECTION, SOLUTION INTRAMUSCULAR; SUBCUTANEOUS
Status: DISPENSED
Start: 2024-04-25

## (undated) RX ORDER — ONDANSETRON 2 MG/ML
INJECTION INTRAMUSCULAR; INTRAVENOUS
Status: DISPENSED
Start: 2017-09-01

## (undated) RX ORDER — GRANISETRON HYDROCHLORIDE 1 MG/ML
INJECTION INTRAVENOUS
Status: DISPENSED
Start: 2024-04-25

## (undated) RX ORDER — GABAPENTIN 300 MG/1
CAPSULE ORAL
Status: DISPENSED
Start: 2018-03-02

## (undated) RX ORDER — GLYCOPYRROLATE 0.2 MG/ML
INJECTION INTRAMUSCULAR; INTRAVENOUS
Status: DISPENSED
Start: 2017-09-01

## (undated) RX ORDER — LIDOCAINE HYDROCHLORIDE AND EPINEPHRINE 10; 10 MG/ML; UG/ML
INJECTION, SOLUTION INFILTRATION; PERINEURAL
Status: DISPENSED
Start: 2024-08-22

## (undated) RX ORDER — CEFAZOLIN SODIUM/WATER 2 G/20 ML
SYRINGE (ML) INTRAVENOUS
Status: DISPENSED
Start: 2024-08-22

## (undated) RX ORDER — DEXMEDETOMIDINE HYDROCHLORIDE 4 UG/ML
INJECTION, SOLUTION INTRAVENOUS
Status: DISPENSED
Start: 2017-09-01

## (undated) RX ORDER — ACETAMINOPHEN 325 MG/1
TABLET ORAL
Status: DISPENSED
Start: 2018-03-02

## (undated) RX ORDER — BUPIVACAINE HYDROCHLORIDE 2.5 MG/ML
INJECTION, SOLUTION EPIDURAL; INFILTRATION; INTRACAUDAL
Status: DISPENSED
Start: 2022-08-18

## (undated) RX ORDER — FENTANYL CITRATE 50 UG/ML
INJECTION, SOLUTION INTRAMUSCULAR; INTRAVENOUS
Status: DISPENSED
Start: 2018-03-02

## (undated) RX ORDER — CIPROFLOXACIN 500 MG/1
TABLET, FILM COATED ORAL
Status: DISPENSED
Start: 2025-03-19